# Patient Record
Sex: MALE | Race: OTHER | NOT HISPANIC OR LATINO | URBAN - METROPOLITAN AREA
[De-identification: names, ages, dates, MRNs, and addresses within clinical notes are randomized per-mention and may not be internally consistent; named-entity substitution may affect disease eponyms.]

---

## 2022-11-07 ENCOUNTER — INPATIENT (INPATIENT)
Facility: HOSPITAL | Age: 67
LOS: 32 days | Discharge: HOSPICE | DRG: 180 | End: 2022-12-10
Attending: HOSPITALIST | Admitting: INTERNAL MEDICINE
Payer: MEDICAID

## 2022-11-07 ENCOUNTER — RESULT REVIEW (OUTPATIENT)
Age: 67
End: 2022-11-07

## 2022-11-07 VITALS — OXYGEN SATURATION: 65 % | RESPIRATION RATE: 24 BRPM

## 2022-11-07 DIAGNOSIS — I21.3 ST ELEVATION (STEMI) MYOCARDIAL INFARCTION OF UNSPECIFIED SITE: ICD-10-CM

## 2022-11-07 LAB
ALBUMIN SERPL ELPH-MCNC: 3.7 G/DL — SIGNIFICANT CHANGE UP (ref 3.3–5.2)
ALP SERPL-CCNC: 146 U/L — HIGH (ref 40–120)
ALT FLD-CCNC: >645 U/L — HIGH
ANION GAP SERPL CALC-SCNC: 19 MMOL/L — HIGH (ref 5–17)
APTT BLD: 29.6 SEC — SIGNIFICANT CHANGE UP (ref 27.5–35.5)
AST SERPL-CCNC: 545 U/L — HIGH
B PERT IGG+IGM PNL SER: ABNORMAL
BASOPHILS # BLD AUTO: 0.02 K/UL — SIGNIFICANT CHANGE UP (ref 0–0.2)
BASOPHILS NFR BLD AUTO: 0.1 % — SIGNIFICANT CHANGE UP (ref 0–2)
BILIRUB SERPL-MCNC: 3.8 MG/DL — HIGH (ref 0.4–2)
BLD GP AB SCN SERPL QL: SIGNIFICANT CHANGE UP
BUN SERPL-MCNC: 87.6 MG/DL — HIGH (ref 8–20)
CALCIUM SERPL-MCNC: 10.2 MG/DL — SIGNIFICANT CHANGE UP (ref 8.4–10.5)
CHLORIDE SERPL-SCNC: 85 MMOL/L — LOW (ref 96–108)
CK MB CFR SERPL CALC: 13.1 NG/ML — HIGH (ref 0–6.7)
CK SERPL-CCNC: 812 U/L — HIGH (ref 30–200)
CO2 SERPL-SCNC: 20 MMOL/L — LOW (ref 22–29)
COLOR FLD: ABNORMAL
CREAT SERPL-MCNC: 1.37 MG/DL — HIGH (ref 0.5–1.3)
EGFR: 56 ML/MIN/1.73M2 — LOW
EOSINOPHIL # BLD AUTO: 0 K/UL — SIGNIFICANT CHANGE UP (ref 0–0.5)
EOSINOPHIL NFR BLD AUTO: 0 % — SIGNIFICANT CHANGE UP (ref 0–6)
FLUID INTAKE SUBSTANCE CLASS: SIGNIFICANT CHANGE UP
GAS PNL BLDA: SIGNIFICANT CHANGE UP
GLUCOSE SERPL-MCNC: 133 MG/DL — HIGH (ref 70–99)
HCT VFR BLD CALC: 39.6 % — SIGNIFICANT CHANGE UP (ref 39–50)
HGB BLD-MCNC: 14 G/DL — SIGNIFICANT CHANGE UP (ref 13–17)
IMM GRANULOCYTES NFR BLD AUTO: 0.7 % — SIGNIFICANT CHANGE UP (ref 0–0.9)
INR BLD: 1.71 RATIO — HIGH (ref 0.88–1.16)
LYMPHOCYTES # BLD AUTO: 0.51 K/UL — LOW (ref 1–3.3)
LYMPHOCYTES # BLD AUTO: 3.4 % — LOW (ref 13–44)
MCHC RBC-ENTMCNC: 29.7 PG — SIGNIFICANT CHANGE UP (ref 27–34)
MCHC RBC-ENTMCNC: 35.4 GM/DL — SIGNIFICANT CHANGE UP (ref 32–36)
MCV RBC AUTO: 84.1 FL — SIGNIFICANT CHANGE UP (ref 80–100)
MONOCYTES # BLD AUTO: 1.22 K/UL — HIGH (ref 0–0.9)
MONOCYTES NFR BLD AUTO: 8.1 % — SIGNIFICANT CHANGE UP (ref 2–14)
NEUTROPHILS # BLD AUTO: 13.27 K/UL — HIGH (ref 1.8–7.4)
NEUTROPHILS NFR BLD AUTO: 87.7 % — HIGH (ref 43–77)
PLATELET # BLD AUTO: 185 K/UL — SIGNIFICANT CHANGE UP (ref 150–400)
POTASSIUM SERPL-MCNC: 5.4 MMOL/L — HIGH (ref 3.5–5.3)
POTASSIUM SERPL-SCNC: 5.4 MMOL/L — HIGH (ref 3.5–5.3)
PROT SERPL-MCNC: 7.5 G/DL — SIGNIFICANT CHANGE UP (ref 6.6–8.7)
PROTHROM AB SERPL-ACNC: 19.9 SEC — HIGH (ref 10.5–13.4)
RBC # BLD: 4.71 M/UL — SIGNIFICANT CHANGE UP (ref 4.2–5.8)
RBC # FLD: 14.2 % — SIGNIFICANT CHANGE UP (ref 10.3–14.5)
RCV VOL RI: HIGH /UL (ref 0–0)
SARS-COV-2 RNA SPEC QL NAA+PROBE: SIGNIFICANT CHANGE UP
SODIUM SERPL-SCNC: 124 MMOL/L — LOW (ref 135–145)
TOTAL NUCLEATED CELL COUNT, BODY FLUID: 2730 /UL — SIGNIFICANT CHANGE UP
TROPONIN T SERPL-MCNC: 0.05 NG/ML — SIGNIFICANT CHANGE UP (ref 0–0.06)
TROPONIN T SERPL-MCNC: <0.01 NG/ML — SIGNIFICANT CHANGE UP (ref 0–0.06)
TUBE TYPE: SIGNIFICANT CHANGE UP
WBC # BLD: 15.12 K/UL — HIGH (ref 3.8–10.5)
WBC # FLD AUTO: 15.12 K/UL — HIGH (ref 3.8–10.5)

## 2022-11-07 PROCEDURE — 76705 ECHO EXAM OF ABDOMEN: CPT | Mod: 26

## 2022-11-07 PROCEDURE — 93306 TTE W/DOPPLER COMPLETE: CPT | Mod: 26

## 2022-11-07 PROCEDURE — 88305 TISSUE EXAM BY PATHOLOGIST: CPT | Mod: 26

## 2022-11-07 PROCEDURE — 88360 TUMOR IMMUNOHISTOCHEM/MANUAL: CPT | Mod: 26

## 2022-11-07 PROCEDURE — 93010 ELECTROCARDIOGRAM REPORT: CPT

## 2022-11-07 PROCEDURE — 88342 IMHCHEM/IMCYTCHM 1ST ANTB: CPT | Mod: 26,59

## 2022-11-07 PROCEDURE — 71045 X-RAY EXAM CHEST 1 VIEW: CPT | Mod: 26

## 2022-11-07 PROCEDURE — 88112 CYTOPATH CELL ENHANCE TECH: CPT | Mod: 26

## 2022-11-07 PROCEDURE — 99291 CRITICAL CARE FIRST HOUR: CPT

## 2022-11-07 PROCEDURE — 88341 IMHCHEM/IMCYTCHM EA ADD ANTB: CPT | Mod: 26,59

## 2022-11-07 RX ORDER — IPRATROPIUM/ALBUTEROL SULFATE 18-103MCG
3 AEROSOL WITH ADAPTER (GRAM) INHALATION EVERY 6 HOURS
Refills: 0 | Status: DISCONTINUED | OUTPATIENT
Start: 2022-11-07 | End: 2022-11-16

## 2022-11-07 RX ORDER — COLCHICINE 0.6 MG
0.6 TABLET ORAL ONCE
Refills: 0 | Status: COMPLETED | OUTPATIENT
Start: 2022-11-07 | End: 2022-11-07

## 2022-11-07 RX ORDER — INFLUENZA VIRUS VACCINE 15; 15; 15; 15 UG/.5ML; UG/.5ML; UG/.5ML; UG/.5ML
0.7 SUSPENSION INTRAMUSCULAR ONCE
Refills: 0 | Status: DISCONTINUED | OUTPATIENT
Start: 2022-11-07 | End: 2022-12-05

## 2022-11-07 RX ORDER — IPRATROPIUM/ALBUTEROL SULFATE 18-103MCG
3 AEROSOL WITH ADAPTER (GRAM) INHALATION EVERY 6 HOURS
Refills: 0 | Status: DISCONTINUED | OUTPATIENT
Start: 2022-11-07 | End: 2022-11-07

## 2022-11-07 RX ORDER — SODIUM CHLORIDE 9 MG/ML
1000 INJECTION INTRAMUSCULAR; INTRAVENOUS; SUBCUTANEOUS
Refills: 0 | Status: DISCONTINUED | OUTPATIENT
Start: 2022-11-07 | End: 2022-11-08

## 2022-11-07 RX ADMIN — Medication 0.6 MILLIGRAM(S): at 23:15

## 2022-11-07 NOTE — ED ADULT TRIAGE NOTE - CHIEF COMPLAINT QUOTE
Pt BIBA from home, c/o difficulty breathing x 1 week, EMS unable to obtain pulse ox on scene, hypotensive prior to ED arrival, everyday cigarette smoker, more than 1 pack/day

## 2022-11-07 NOTE — ED PROVIDER NOTE - CLINICAL SUMMARY MEDICAL DECISION MAKING FREE TEXT BOX
67M in hypoxic with ST elevations, code stemi called with cardiology at bedside.  Bedside US showed pericardial effusion, patient to OR, Admit.

## 2022-11-07 NOTE — ED PROVIDER NOTE - OBJECTIVE STATEMENT
67M, 50+ yr smoker never sees doctor or takes meds, presents with worsening SOB for 1 week.  Otherwise denies pain, bleeding,  chest pain, abdominal pain or fevers.  Denies other pertinent medical problems.  Denies any other substance use. 67 M smoker for decades, does not see doctor or take meds, presents with worsening SOB for 1 week.  Otherwise denies pain, bleeding,  chest pain, abdominal pain or fevers.  Denies other pertinent medical problems.  Denies any other substance use.

## 2022-11-07 NOTE — PATIENT PROFILE ADULT - FALL HARM RISK - HARM RISK INTERVENTIONS
Assistance with ambulation/Assistance OOB with selected safe patient handling equipment/Communicate Risk of Fall with Harm to all staff/Discuss with provider need for PT consult/Monitor gait and stability/Reinforce activity limits and safety measures with patient and family/Tailored Fall Risk Interventions/Visual Cue: Yellow wristband and red socks/Bed in lowest position, wheels locked, appropriate side rails in place/Call bell, personal items and telephone in reach/Instruct patient to call for assistance before getting out of bed or chair/Non-slip footwear when patient is out of bed/[place] to call system/Physically safe environment - no spills, clutter or unnecessary equipment/Purposeful Proactive Rounding/Room/bathroom lighting operational, light cord in reach

## 2022-11-07 NOTE — H&P ADULT - ASSESSMENT
68 y/o M with no known PMH (does not see doctors), active smoker, with:    # Large pericardial effusion s/p pericardiocentesis  # Left lung mass  # FILIBERTO  # Transaminitis    Admit to MICU.    - 800cc bloody fluid drained from pericardium, concern for malignant effusion given heavy smoking history and lung mass  - f/u pericardial fluid studies, culture, and cytology  - monitor pericardial drain output closely  - TTE in the morning  - monitor hemodynamics, maintain a MAP > 65  - FILIBERTO likely pre-renal, BUN/Cr ratio 63, continue IVF hydration  - trend BUN/Cr, electrolytes, acid-base balance, monitor UOP  - unclear etiology of transaminitis, possibly congestive hepatopathy due to pericardial effusion and RV dysfunction?  - check RUQ ultrasound and hepatitis panel  - TBili 3.8, trend LFTs, avoid hepatotoxic agents  - will eventually need CT chest to further evaluate lung mass  - start inhaled bronchodilators as he is currently bronchospastic, although moving air and oxygenating with nasal O2  - maintain SpO2 > 92%    Case discussed with MICU physician, Dr. Baron.

## 2022-11-07 NOTE — ED ADULT NURSE NOTE - CHIEF COMPLAINT
[address] [address]  [phone]                                                                                                           Yesika Cohen  [address] [place] [address]    November 21, 2017    Dear Yesika Cohen     Here are your cholesterol results:     Your LDL is: Lab Results        Component                Value               Date                        LDL                      184                 11/18/2017          Your LDL goal is to be less than 160   Your HDL is: Lab Results        Component                Value               Date                        HDL                      44                  11/18/2017           Goal HDL is Greater than 40 (for men) or 50 (for women).   Your Triglycerides are: Lab Results        Component                Value               Date                        TRIG                     70                  11/18/2017           Goal TRIGLYCERIDES are less than 150.     Glucose is in the impaired range, this means that you do not have diabetes but are at an increased risk of developing diabetes.     Here are some ways to improve your cholesterol and glucose without medication:     Try to get at least 45 minutes of aerobic exercise 5-6 days a week   Maintain a healthy body weight   Eat less saturated fats   Buy lean cuts of meat, reduce your portions of red meat or substitute poultry or fish   Avoid fried or fast foods that are high in fat   Eat more fruits and vegetables     We should recheck labs in 6 months and if still elevated then medication will need to be considered.   Please let me know if you have any questions and thank you for choosing [facility].     Regards,     Kathy Thompson MD MPH/smj    Results for orders placed or performed in visit on 11/18/17   Lipid panel reflex to direct LDL Fasting   Result Value Ref Range    Cholesterol 242 (H) <200 mg/dL    Triglycerides 70 <150  mg/dL    HDL Cholesterol 44 (L) >49 mg/dL    LDL Cholesterol Calculated 184 (H) <100 mg/dL    Non HDL Cholesterol 198 (H) <130 mg/dL   Glucose   Result Value Ref Range    Glucose 106 (H) 70 - 99 mg/dL           The patient is a 67y Male complaining of difficulty breathing.

## 2022-11-07 NOTE — H&P ADULT - HISTORY OF PRESENT ILLNESS
68 y/o M with no known PMH (does not see doctors), active smoker, presents to the ED with complaints of SOB. Initial ECG revealed diffuse ST-elevations and the patient was taken for emergent LHC where he was found to have nonobstructive CAD, however a large circumferential pericardial effusion was seen. Pericardiocentesis was performed with approx 800cc bloody fluid drained. Of note, there is a 3cm mass in his left upper lobe on CXR.

## 2022-11-07 NOTE — CONSULT NOTE ADULT - ASSESSMENT
68 y/o old male , active heavy smoker, no known medical history was having shortness of breath for past 1 week.   His friend got him to hospital as he was sob.   On arrival his EKG showed diffuse ST elevations, and bedside limited echo showed circumferential pericardial effusion.   Pulses were equal in both arms  Xray did not show widened mediastinum   He was taken emergently to cath lab for pericardialcenetesis and cardiac cath   Cath showed 90 % m LAD, no acute lesions.   Pericardial drain was placed, 420 ml + was drained.   Patient felt much better  Post EKG showed ST elevations in multiple EKG leads. AS we have coronary angiogram showing stable lesion in the LAD with OSVALDO 3 flow, RCA, and LCX normal, with  his presenting symptoms of Shortness of breath has been on going for a week,  patient and his friend informed us that he had a cold recently, he did not seek medical care, but kept on smoking, not eating, not drinking, not ambulating this  Diffuse ST elevation in multiple leads + pericardial effusion, the pericarditis is more likely the diagnosis than MI  PLAN:   - ICU level care   - C/w  him on non rebreather - wean off as tolerated as per ICU team   - C/w  with IV fluids  - Monitor kidney function , electrolytes,  - Give one dose of colchicine 0.6 mg.   - official echo in AM  - Maintain drain to gravity   - Grain checks for hematoma, bleeding etc..  - we will reassess in AM about the drain removal   - Trend trops q6 hours,   - Trend BMP q6 hours    call cardiology if any questions.

## 2022-11-07 NOTE — CONSULT NOTE ADULT - SUBJECTIVE AND OBJECTIVE BOX
March 18, 2022       Annabelle Lambert  [address]  [place] [address]    Dear Ms. Lambert,    Your procedure is scheduled with Annemarei Garcia MD on May 16, 2022, at [facility] [place].  Merle can expect to be contacted by the facility 3 to 5 days prior to the surgery to confirm arrival and surgery time. Occasionally these times may change.    The address of the facility is:      [place] [address]  [phone]    The following appointment(s) have been scheduled for you:     Preop exam and eye measurements with Dr Annemarie Garcia at the [facility] on   May 4, 2022 at 2:45 PM.    Pre-Procedure / Pre-Surgery COVID-19 Testing:  We have you scheduled for your COVID-19 Testing Visit on:  May 13, 2022 at 1:30 pm  for our ACL Lab site located at [facility]  ([address] [place] [address]).    If you need to reschedule this appointment, please call our office ASAP for assistance.    Please remember the following instructions for after your test and before surgery:  • Self-quarantine is recommended and minimizes your risk of exposure before your procedure. If you are unable to self-quarantine, please continue to wear a mask, practice social distancing and perform good hand hygiene.      • After your COVID-19 test and before your procedure, you should continue to monitor for signs/symptoms of COVID-19 and notify your provider and/or facility contact provided, if you experience any symptoms.                        1 day post op with Dr Annemarie Garcia at the [facility] on May 17, 2022 at 11:00 AM.    1 week post op with Dr Annemarie Garcia at the [facility] on May 24, 2022 at   11:00 AM.    Here are instructions for your surgery:     · Do not eat or drink after midnight the night before your surgery.    · You will need someone to drive you home and remain with you up to 24 hours after  you have been discharged.     · You are strongly advised to have someone stay with you the night of your surgery.  If you live alone, please make arrangements, if possible.     · The hospital recommends 1 pre op shower the night before your surgery with an antibacterial soap.     If you have any questions or need to reschedule, please contact me at the telephone number and extension listed below.     Thank you,    Ameena [phone]  Surgery Scheduler for Annemarie Garcia MD   [facility] Pre-Admit Department    Enclosures: [facility] Booklet       Sub:   patient feels better   SOB much better     REVIEW OF SYSTEMS:    CONSTITUTIONAL: No weakness, fevers or chills  EYES/ENT: No visual changes;  No vertigo or throat pain   NECK: No pain or stiffness  RESPIRATORY: No cough, wheezing, hemoptysis; No shortness of breath  CARDIOVASCULAR: No chest pain or palpitations  GASTROINTESTINAL: No abdominal or epigastric pain. No nausea, vomiting, or hematemesis; No diarrhea or constipation. No melena or hematochezia.  GENITOURINARY: No dysuria, frequency or hematuria  NEUROLOGICAL: No numbness or weakness  SKIN: No itching, burning, rashes, or lesions   All other review of systems is negative unless indicated above.    PHYSICAL EXAM:    Vital Signs Last 24 Hrs  T(C): 37 (07 Nov 2022 16:54), Max: 37 (07 Nov 2022 16:54)  T(F): 98.6 (07 Nov 2022 16:54), Max: 98.6 (07 Nov 2022 16:54)  HR: 103 (07 Nov 2022 17:35) (101 - 103)  BP: 105/74 (07 Nov 2022 17:35) (92/71 - 105/74)  BP(mean): --  RR: 24 (07 Nov 2022 17:35) (16 - 24)  SpO2: 98% (07 Nov 2022 17:35) (65% - 98%)    Parameters below as of 07 Nov 2022 17:35  Patient On (Oxygen Delivery Method): mask, nonrebreather  O2 Flow (L/min): 15      GENERAL: Pt lying comfortably, NAD.  ENMT: PERRL, +EOMI.  NECK: soft, Supple, No JVD,   CHEST/LUNG: Clear to auscultatation bilaterally; No wheezing.  HEART: S1S2+, Regular rate and rhythm; No murmurs. pericaridal drain in place   ABDOMEN: Soft, Nontender, Nondistended; Bowel sounds present.  MUSCULOSKELETAL: Normal range of motion.  SKIN: No rashes or lesions.  NEURO: AAOX3, no focal deficits, no motor r sensory loss.  PSYCH: normal mood.  Procedure site:   VASCULAR:   Radial +2 R/+2 L  Femoral +2 R/+2 L  PT +2 R/+2 L  DP +2 R/+2 L

## 2022-11-07 NOTE — ED PROVIDER NOTE - ATTENDING CONTRIBUTION TO CARE
I, Tea Price DO, have personally provided 60 minutes of critical care time exclusive of time spent on separately billable procedures. Time includes review of laboratory data, radiology results, discussion with consultants, and monitoring for potential decompensation. Interventions were performed as documented above.     I personally saw the patient with the resident, and completed the key components of the history and physical exam. I then discussed the management plan with the resident.    68 y/o M with COPD, heavy smoking history presents for 1 week of SOB, patient satting 74% on room air, denies chest pain or fevers. Patient in respiratory distress, satting well on NRB, on bedside sono with diffuse B lines, also on bedside echo with large pericardial effusion, EKG consistent with STEMI (inferior and lateral) - Dr. Melton at bedside, patient to cath lab for intervention.

## 2022-11-07 NOTE — ED PROVIDER NOTE - PHYSICAL EXAMINATION
General: NAD, cachectic appearing  HEENT: Normocephalic, atraumatic  Neck: No apparent stiffness or JVD  Pulm: Chest wall symmetric and nontender, lungs clear to ascultation   Cardiac: fast rate and regular rhythm  Abdomen: Nontender and nondistended  Skin: Skin is warm, dry and intact without rashes or lesions.  Neuro: No motor or sensory deficits    MSK: No deformity or tenderness

## 2022-11-07 NOTE — H&P ADULT - MENTAL STATUS
A&Ox3, answers questions appropriately and follows commands, moves all extremities, grossly nonfocal

## 2022-11-08 DIAGNOSIS — I50.20 UNSPECIFIED SYSTOLIC (CONGESTIVE) HEART FAILURE: ICD-10-CM

## 2022-11-08 DIAGNOSIS — I30.9 ACUTE PERICARDITIS, UNSPECIFIED: ICD-10-CM

## 2022-11-08 DIAGNOSIS — I82.90 ACUTE EMBOLISM AND THROMBOSIS OF UNSPECIFIED VEIN: ICD-10-CM

## 2022-11-08 LAB
A1C WITH ESTIMATED AVERAGE GLUCOSE RESULT: 6.1 % — HIGH (ref 4–5.6)
ALBUMIN FLD-MCNC: 2.7 G/DL — SIGNIFICANT CHANGE UP
ALBUMIN SERPL ELPH-MCNC: 3.1 G/DL — LOW (ref 3.3–5.2)
ALP SERPL-CCNC: 115 U/L — SIGNIFICANT CHANGE UP (ref 40–120)
ALT FLD-CCNC: 541 U/L — HIGH
ANION GAP SERPL CALC-SCNC: 13 MMOL/L — SIGNIFICANT CHANGE UP (ref 5–17)
APTT BLD: 79.1 SEC — HIGH (ref 27.5–35.5)
AST SERPL-CCNC: 367 U/L — HIGH
BASOPHILS # BLD AUTO: 0.01 K/UL — SIGNIFICANT CHANGE UP (ref 0–0.2)
BASOPHILS NFR BLD AUTO: 0.1 % — SIGNIFICANT CHANGE UP (ref 0–2)
BILIRUB DIRECT SERPL-MCNC: 1.8 MG/DL — HIGH (ref 0–0.3)
BILIRUB INDIRECT FLD-MCNC: 1.4 MG/DL — HIGH (ref 0.2–1)
BILIRUB SERPL-MCNC: 3.2 MG/DL — HIGH (ref 0.4–2)
BUN SERPL-MCNC: 64.7 MG/DL — HIGH (ref 8–20)
CALCIUM SERPL-MCNC: 9.1 MG/DL — SIGNIFICANT CHANGE UP (ref 8.4–10.5)
CHLORIDE SERPL-SCNC: 96 MMOL/L — SIGNIFICANT CHANGE UP (ref 96–108)
CHOLEST SERPL-MCNC: 152 MG/DL — SIGNIFICANT CHANGE UP
CK MB CFR SERPL CALC: 12.8 NG/ML — HIGH (ref 0–6.7)
CK SERPL-CCNC: 477 U/L — HIGH (ref 30–200)
CO2 SERPL-SCNC: 24 MMOL/L — SIGNIFICANT CHANGE UP (ref 22–29)
COMMENT - FLUIDS: SIGNIFICANT CHANGE UP
CREAT SERPL-MCNC: 0.86 MG/DL — SIGNIFICANT CHANGE UP (ref 0.5–1.3)
EGFR: 94 ML/MIN/1.73M2 — SIGNIFICANT CHANGE UP
EOSINOPHIL # BLD AUTO: 0 K/UL — SIGNIFICANT CHANGE UP (ref 0–0.5)
EOSINOPHIL NFR BLD AUTO: 0 % — SIGNIFICANT CHANGE UP (ref 0–6)
ESTIMATED AVERAGE GLUCOSE: 128 MG/DL — HIGH (ref 68–114)
FOLATE+VIT B12 SERBLD-IMP: 4 % — SIGNIFICANT CHANGE UP
GLUCOSE BLDC GLUCOMTR-MCNC: 119 MG/DL — HIGH (ref 70–99)
GLUCOSE FLD-MCNC: 27 MG/DL — SIGNIFICANT CHANGE UP
GLUCOSE SERPL-MCNC: 92 MG/DL — SIGNIFICANT CHANGE UP (ref 70–99)
GRAM STN FLD: SIGNIFICANT CHANGE UP
HAV IGM SER-ACNC: SIGNIFICANT CHANGE UP
HBV CORE IGM SER-ACNC: SIGNIFICANT CHANGE UP
HBV SURFACE AG SER-ACNC: SIGNIFICANT CHANGE UP
HCT VFR BLD CALC: 36.7 % — LOW (ref 39–50)
HCV AB S/CO SERPL IA: 0.1 S/CO — SIGNIFICANT CHANGE UP (ref 0–0.99)
HCV AB SERPL-IMP: SIGNIFICANT CHANGE UP
HDLC SERPL-MCNC: 22 MG/DL — LOW
HGB BLD-MCNC: 13 G/DL — SIGNIFICANT CHANGE UP (ref 13–17)
IMM GRANULOCYTES NFR BLD AUTO: 0.6 % — SIGNIFICANT CHANGE UP (ref 0–0.9)
INR BLD: 1.73 RATIO — HIGH (ref 0.88–1.16)
LDH SERPL L TO P-CCNC: 6191 U/L — SIGNIFICANT CHANGE UP
LIPID PNL WITH DIRECT LDL SERPL: 104 MG/DL — HIGH
LYMPHOCYTES # BLD AUTO: 0.25 K/UL — LOW (ref 1–3.3)
LYMPHOCYTES # BLD AUTO: 2.2 % — LOW (ref 13–44)
LYMPHOCYTES # FLD: 27 % — SIGNIFICANT CHANGE UP
MAGNESIUM SERPL-MCNC: 2.6 MG/DL — SIGNIFICANT CHANGE UP (ref 1.6–2.6)
MCHC RBC-ENTMCNC: 29.9 PG — SIGNIFICANT CHANGE UP (ref 27–34)
MCHC RBC-ENTMCNC: 35.4 GM/DL — SIGNIFICANT CHANGE UP (ref 32–36)
MCV RBC AUTO: 84.4 FL — SIGNIFICANT CHANGE UP (ref 80–100)
MESOTHL CELL # FLD: 7 % — SIGNIFICANT CHANGE UP
MONOCYTES # BLD AUTO: 0.72 K/UL — SIGNIFICANT CHANGE UP (ref 0–0.9)
MONOCYTES NFR BLD AUTO: 6.2 % — SIGNIFICANT CHANGE UP (ref 2–14)
MONOS+MACROS # FLD: 15 % — SIGNIFICANT CHANGE UP
MRSA PCR RESULT.: SIGNIFICANT CHANGE UP
NEUTROPHILS # BLD AUTO: 10.57 K/UL — HIGH (ref 1.8–7.4)
NEUTROPHILS NFR BLD AUTO: 90.9 % — HIGH (ref 43–77)
NEUTROPHILS-BODY FLUID: 47 % — SIGNIFICANT CHANGE UP
NIGHT BLUE STAIN TISS: SIGNIFICANT CHANGE UP
NON HDL CHOLESTEROL: 130 MG/DL — HIGH
PHOSPHATE SERPL-MCNC: 3.5 MG/DL — SIGNIFICANT CHANGE UP (ref 2.4–4.7)
PLATELET # BLD AUTO: 144 K/UL — LOW (ref 150–400)
POTASSIUM SERPL-MCNC: 4.5 MMOL/L — SIGNIFICANT CHANGE UP (ref 3.5–5.3)
POTASSIUM SERPL-SCNC: 4.5 MMOL/L — SIGNIFICANT CHANGE UP (ref 3.5–5.3)
PROT FLD-MCNC: 6.5 G/DL — SIGNIFICANT CHANGE UP
PROT SERPL-MCNC: 6.5 G/DL — LOW (ref 6.6–8.7)
PROTHROM AB SERPL-ACNC: 20.2 SEC — HIGH (ref 10.5–13.4)
RBC # BLD: 4.35 M/UL — SIGNIFICANT CHANGE UP (ref 4.2–5.8)
RBC # FLD: 14.3 % — SIGNIFICANT CHANGE UP (ref 10.3–14.5)
S AUREUS DNA NOSE QL NAA+PROBE: SIGNIFICANT CHANGE UP
SODIUM SERPL-SCNC: 133 MMOL/L — LOW (ref 135–145)
SPECIMEN SOURCE: SIGNIFICANT CHANGE UP
SPECIMEN SOURCE: SIGNIFICANT CHANGE UP
TRIGL SERPL-MCNC: 129 MG/DL — SIGNIFICANT CHANGE UP
TROPONIN T SERPL-MCNC: 0.11 NG/ML — HIGH (ref 0–0.06)
TROPONIN T SERPL-MCNC: 0.15 NG/ML — HIGH (ref 0–0.06)
TSH SERPL-MCNC: 0.94 UIU/ML — SIGNIFICANT CHANGE UP (ref 0.27–4.2)
WBC # BLD: 11.62 K/UL — HIGH (ref 3.8–10.5)
WBC # FLD AUTO: 11.62 K/UL — HIGH (ref 3.8–10.5)

## 2022-11-08 PROCEDURE — 99233 SBSQ HOSP IP/OBS HIGH 50: CPT

## 2022-11-08 PROCEDURE — 93970 EXTREMITY STUDY: CPT | Mod: 26

## 2022-11-08 PROCEDURE — 93010 ELECTROCARDIOGRAM REPORT: CPT

## 2022-11-08 PROCEDURE — 74177 CT ABD & PELVIS W/CONTRAST: CPT | Mod: 26

## 2022-11-08 PROCEDURE — 71275 CT ANGIOGRAPHY CHEST: CPT | Mod: 26

## 2022-11-08 PROCEDURE — 93308 TTE F-UP OR LMTD: CPT | Mod: 26

## 2022-11-08 PROCEDURE — 70491 CT SOFT TISSUE NECK W/DYE: CPT | Mod: 26

## 2022-11-08 PROCEDURE — 99254 IP/OBS CNSLTJ NEW/EST MOD 60: CPT | Mod: 57

## 2022-11-08 PROCEDURE — 76870 US EXAM SCROTUM: CPT | Mod: 26

## 2022-11-08 PROCEDURE — 99253 IP/OBS CNSLTJ NEW/EST LOW 45: CPT

## 2022-11-08 RX ORDER — HEPARIN SODIUM 5000 [USP'U]/ML
INJECTION INTRAVENOUS; SUBCUTANEOUS
Qty: 25000 | Refills: 0 | Status: DISCONTINUED | OUTPATIENT
Start: 2022-11-08 | End: 2022-11-08

## 2022-11-08 RX ORDER — HEPARIN SODIUM 5000 [USP'U]/ML
1500 INJECTION INTRAVENOUS; SUBCUTANEOUS EVERY 6 HOURS
Refills: 0 | Status: DISCONTINUED | OUTPATIENT
Start: 2022-11-08 | End: 2022-11-08

## 2022-11-08 RX ORDER — CHLORHEXIDINE GLUCONATE 213 G/1000ML
1 SOLUTION TOPICAL DAILY
Refills: 0 | Status: DISCONTINUED | OUTPATIENT
Start: 2022-11-08 | End: 2022-12-05

## 2022-11-08 RX ORDER — HEPARIN SODIUM 5000 [USP'U]/ML
3000 INJECTION INTRAVENOUS; SUBCUTANEOUS ONCE
Refills: 0 | Status: DISCONTINUED | OUTPATIENT
Start: 2022-11-08 | End: 2022-11-08

## 2022-11-08 RX ORDER — HEPARIN SODIUM 5000 [USP'U]/ML
1500 INJECTION INTRAVENOUS; SUBCUTANEOUS EVERY 6 HOURS
Refills: 0 | Status: DISCONTINUED | OUTPATIENT
Start: 2022-11-08 | End: 2022-11-15

## 2022-11-08 RX ORDER — POLYETHYLENE GLYCOL 3350 17 G/17G
17 POWDER, FOR SOLUTION ORAL DAILY
Refills: 0 | Status: DISCONTINUED | OUTPATIENT
Start: 2022-11-08 | End: 2022-11-11

## 2022-11-08 RX ORDER — HEPARIN SODIUM 5000 [USP'U]/ML
3000 INJECTION INTRAVENOUS; SUBCUTANEOUS EVERY 6 HOURS
Refills: 0 | Status: DISCONTINUED | OUTPATIENT
Start: 2022-11-08 | End: 2022-11-15

## 2022-11-08 RX ORDER — ATORVASTATIN CALCIUM 80 MG/1
80 TABLET, FILM COATED ORAL AT BEDTIME
Refills: 0 | Status: DISCONTINUED | OUTPATIENT
Start: 2022-11-08 | End: 2022-12-05

## 2022-11-08 RX ORDER — METOPROLOL TARTRATE 50 MG
12.5 TABLET ORAL
Refills: 0 | Status: DISCONTINUED | OUTPATIENT
Start: 2022-11-08 | End: 2022-11-08

## 2022-11-08 RX ORDER — HEPARIN SODIUM 5000 [USP'U]/ML
INJECTION INTRAVENOUS; SUBCUTANEOUS
Qty: 25000 | Refills: 0 | Status: DISCONTINUED | OUTPATIENT
Start: 2022-11-08 | End: 2022-11-15

## 2022-11-08 RX ORDER — ACETAMINOPHEN 500 MG
650 TABLET ORAL ONCE
Refills: 0 | Status: COMPLETED | OUTPATIENT
Start: 2022-11-08 | End: 2022-11-08

## 2022-11-08 RX ORDER — HEPARIN SODIUM 5000 [USP'U]/ML
3000 INJECTION INTRAVENOUS; SUBCUTANEOUS EVERY 6 HOURS
Refills: 0 | Status: DISCONTINUED | OUTPATIENT
Start: 2022-11-08 | End: 2022-11-08

## 2022-11-08 RX ORDER — METOPROLOL TARTRATE 50 MG
12.5 TABLET ORAL
Refills: 0 | Status: DISCONTINUED | OUTPATIENT
Start: 2022-11-08 | End: 2022-11-09

## 2022-11-08 RX ORDER — ASPIRIN/CALCIUM CARB/MAGNESIUM 324 MG
81 TABLET ORAL DAILY
Refills: 0 | Status: DISCONTINUED | OUTPATIENT
Start: 2022-11-08 | End: 2022-11-10

## 2022-11-08 RX ADMIN — ATORVASTATIN CALCIUM 80 MILLIGRAM(S): 80 TABLET, FILM COATED ORAL at 22:39

## 2022-11-08 RX ADMIN — Medication 81 MILLIGRAM(S): at 16:10

## 2022-11-08 RX ADMIN — Medication 650 MILLIGRAM(S): at 18:29

## 2022-11-08 RX ADMIN — Medication 3 MILLILITER(S): at 16:19

## 2022-11-08 RX ADMIN — HEPARIN SODIUM 700 UNIT(S)/HR: 5000 INJECTION INTRAVENOUS; SUBCUTANEOUS at 22:30

## 2022-11-08 RX ADMIN — Medication 3 MILLILITER(S): at 08:40

## 2022-11-08 RX ADMIN — CHLORHEXIDINE GLUCONATE 1 APPLICATION(S): 213 SOLUTION TOPICAL at 11:44

## 2022-11-08 RX ADMIN — HEPARIN SODIUM 700 UNIT(S)/HR: 5000 INJECTION INTRAVENOUS; SUBCUTANEOUS at 16:31

## 2022-11-08 RX ADMIN — POLYETHYLENE GLYCOL 3350 17 GRAM(S): 17 POWDER, FOR SOLUTION ORAL at 16:11

## 2022-11-08 RX ADMIN — Medication 3 MILLILITER(S): at 20:21

## 2022-11-08 NOTE — PROGRESS NOTE ADULT - ASSESSMENT
68 y/o M with no known PMH (does not see doctors), active smoker, with large pericardial effusion s/p pericardiocentesis, left lung mass, Acute Kidney Injury, and Transaminitis, admitted to MICU.    - 800cc bloody fluid drained from pericardium, concern for malignant effusion given heavy smoking history and lung mass  - f/u pericardial fluid studies, culture, and cytology  - monitor pericardial drain output closely  - TTE in the morning  - monitor hemodynamics, maintain a MAP > 65  - FILIBERTO likely pre-renal, BUN/Cr ratio 63, continue IVF hydration  - trend BUN/Cr, electrolytes, acid-base balance, monitor UOP  - unclear etiology of transaminitis, possibly congestive hepatopathy due to pericardial effusion and RV dysfunction?  - check RUQ ultrasound and hepatitis panel  - TBili 3.8, trend LFTs, avoid hepatotoxic agents  - will eventually need CT chest to further evaluate lung mass  - start inhaled bronchodilators as he is currently bronchospastic, although moving air and oxygenating with nasal O2  - maintain SpO2 > 92%     68 y/o M with no known PMH (does not see doctors), active smoker, with large pericardial effusion s/p pericardiocentesis, left lung mass, Acute Kidney Injury, and Transaminitis, admitted to MICU.    - 800cc bloody fluid drained from pericardium, concern for malignant effusion given heavy smoking history and lung mass  - f/u pericardial fluid studies, culture, and cytology  - monitor pericardial drain output closely  - TTE in the morning  - monitor hemodynamics, maintain a MAP > 65  - FILIBERTO likely pre-renal, BUN/Cr ratio 63, continue IVF hydration  - trend BUN/Cr, electrolytes, acid-base balance, monitor UOP  - unclear etiology of transaminitis, possibly congestive hepatopathy due to pericardial effusion and RV dysfunction?  - check RUQ ultrasound and hepatitis panel  - TBili 3.8, trend LFTs, avoid hepatotoxic agents  - will eventually need CT chest to further evaluate lung mass  - start inhaled bronchodilators as he is currently bronchospastic, although moving air and oxygenating with nasal O2  - maintain SpO2 > 92%    Patient was seen and examined at the bedside. I was physically present for the key portions of the evaluation and management (E/M) service provided. Agree with history, physical exam, assessment, and plan as outlined by MICHELLE note above, with the following additions and/or comments:    67M with no medical f/u, smoker who presented on 11/7/22 with SOB, found with diffuse ST-elevation, LHC performed with 90% stenosis of mLAD however felt not to be acute lesion and no PCI performed, found with pericardial effusion s/p drain placement. Will monitor serial TTE. Monitor rhythm on telemetry. F/u cytology from pericardial fluid. Monitor for worsening chest pain, SOB. Has L lung mass on CXR - CT chest ordered, concerning for possible malignancy given smoking history. Monitoring in ICU overnight     68 y/o M with no known PMH (does not see doctors), active smoker, with large pericardial effusion s/p pericardiocentesis, left lung mass, Acute Kidney Injury, and Transaminitis, admitted to MICU.    - 800cc bloody fluid drained from pericardium, concern for malignant effusion given heavy smoking history and lung mass.  Pericardial fluid showed clusters of large cells with nuclear atypia suspicious for malignancy.  Pericardial pigtail in place to gravity.  40 cc of drainage in the last 12 hours.  Interventional cardiology is following.  We will continue to monitor pericardial drain output closely.   - TTE in the morning  - monitor hemodynamics, maintain a MAP > 65  - FILIBERTO likely pre-renal, BUN/Cr ratio 63, continue IVF hydration  - trend BUN/Cr, electrolytes, acid-base balance, monitor UOP  - unclear etiology of transaminitis, possibly congestive hepatopathy due to pericardial effusion and RV dysfunction?  - check RUQ ultrasound and hepatitis panel  - TBili 3.8, trend LFTs, avoid hepatotoxic agents  - will eventually need CT chest to further evaluate lung mass  - start inhaled bronchodilators as he is currently bronchospastic, although moving air and oxygenating with nasal O2  - maintain SpO2 > 92%    Patient was seen and examined at the bedside. I was physically present for the key portions of the evaluation and management (E/M) service provided. Agree with history, physical exam, assessment, and plan as outlined by MICHELLE note above, with the following additions and/or comments:    67M with no medical f/u, smoker who presented on 11/7/22 with SOB, found with diffuse ST-elevation, LHC performed with 90% stenosis of mLAD however felt not to be acute lesion and no PCI performed, found with pericardial effusion s/p drain placement. Will monitor serial TTE. Monitor rhythm on telemetry. F/u cytology from pericardial fluid. Monitor for worsening chest pain, SOB. Has L lung mass on CXR - CT chest ordered, concerning for possible malignancy given smoking history. Monitoring in ICU overnight     66 y/o M with no known PMH (does not see doctors), active smoker, with large pericardial effusion s/p pericardiocentesis, left lung mass, Acute Kidney Injury, and Transaminitis, admitted to MICU.    - 800cc bloody fluid drained from pericardium, concern for malignant effusion given heavy smoking history and lung mass.  Pericardial fluid showed clusters of large cells with nuclear atypia suspicious for malignancy.  Pericardial pigtail in place to gravity.  40 cc of drainage in the last 12 hours.  Interventional cardiology is following.  We will continue to monitor pericardial drain output closely.   - Transthoracic echocardiogram showed left ventricular ejection fraction of 20 to 25% with severely decreased global left ventricular systolic function.  There were multiple left ventricular regional wall motion abnormalities present possibly representing stress induced cardiomyopathy/   Takotsubo cardiomyopathy.   - Continue to monitor hemodynamics closely to maintain MAP > 65 mm Hg  - FILIBERTO likely pre-renal, BUN/Cr ratio 63, continue IVF hydration  - trend BUN/Cr, electrolytes, acid-base balance, monitor UOP  - Transaminitis - Possibly related to acute liver shock from reduced Left Ventricle Ejection Fraction or congestive hepatopathy due to pericardial effusion and RV dysfunction.  RUQ ultrasound significant for distended gallbladder filled with sludge.  Continue to trend LFTs and avoid hepatotoxic agents  - CT scan   - start inhaled bronchodilators as he is currently bronchospastic, although moving air and oxygenating with nasal O2  - maintain SpO2 > 92%    Patient was seen and examined at the bedside. I was physically present for the key portions of the evaluation and management (E/M) service provided. Agree with history, physical exam, assessment, and plan as outlined by MICHELLE note above, with the following additions and/or comments:    67M with no medical f/u, smoker who presented on 11/7/22 with SOB, found with diffuse ST-elevation, LHC performed with 90% stenosis of mLAD however felt not to be acute lesion and no PCI performed, found with pericardial effusion s/p drain placement. Will monitor serial TTE. Monitor rhythm on telemetry. F/u cytology from pericardial fluid. Monitor for worsening chest pain, SOB. Has L lung mass on CXR - CT chest ordered, concerning for possible malignancy given smoking history. Monitoring in ICU overnight     66 y/o M with no known PMH (does not see doctors), active smoker, with large pericardial effusion s/p pericardiocentesis, left lung mass, Acute Kidney Injury, and Transaminitis, admitted to MICU.    - 800cc bloody fluid drained from pericardium, concern for malignant effusion given heavy smoking history and lung mass.  Pericardial fluid showed clusters of large cells with nuclear atypia suspicious for malignancy.  Pericardial pigtail in place to gravity.  40 cc of drainage in the last 12 hours.  Interventional cardiology is following.  We will continue to monitor pericardial drain output closely.   - Transthoracic echocardiogram showed left ventricular ejection fraction of 20 to 25% with severely decreased global left ventricular systolic function.  There were multiple left ventricular regional wall motion abnormalities present possibly representing stress induced cardiomyopathy/   Takotsubo cardiomyopathy.   - Continue to monitor hemodynamics closely to maintain MAP > 65 mm Hg  - FILIBERTO likely pre-renal, BUN/Cr ratio 63, continue IVF hydration  - trend BUN/Cr, electrolytes, acid-base balance, monitor UOP  - Transaminitis - Possibly related to acute liver shock from reduced Left Ventricle Ejection Fraction or congestive hepatopathy due to pericardial effusion and RV dysfunction.  RUQ ultrasound significant for distended gallbladder filled with sludge.  Continue to trend LFTs and avoid hepatotoxic agents  - CT abdomen/pelvis/chest pending  - Continue inhaled bronchodilators as he is currently bronchospastic, although moving air and oxygenating with nasal O2  - maintain SpO2 > 92%

## 2022-11-08 NOTE — CONSULT NOTE ADULT - ATTENDING COMMENTS
Patient seen and examined by me personally. Briefly this is a 67y year old Male with relevant history of tobacco abuse presented to the hospital with shortness of breath, found to have pericardial effusion, now s/p drainage with removal of 450 cc's of serosanguinous fluid. 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      Recommendations:   - Continue drain in place until output <25 cc per day  - Anticoagulation as above.         Burak Bedoya MD  Interventional and Structural Cardiology  [phone]
Patient seen and examined by me personally. Briefly this is a 67y year old Male with relevant history of tobacco abuse presented to the hospital with shortness of breath, found to have pericardial effusion, now s/p drainage with removal of 450 cc's of serosanguinous fluid. 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 AC started yesterday. Drainage over night ~ 10 cc.      Recommendations:   - discontinue drain once AC has run >24 hours.  - Anticoagulation for PE/IVC thrombi as above.    We will sign off. Please call with questions.        Burak Bedoya MD

## 2022-11-08 NOTE — CHART NOTE - NSCHARTNOTEFT_GEN_A_CORE
Left Groin soft, nontender, no hematoma, no bleed, DP 2.   Pericardial pigtail with drainage to gravity, 40ml blood effusion output/shift, TTE pending.

## 2022-11-08 NOTE — CONSULT NOTE ADULT - ASSESSMENT
68 y/o old male , active heavy smoker, no known medical history was having shortness of breath for past 1 week.   His friend got him to hospital as he was sob.   On arrival his EKG showed diffuse ST elevations, and bedside limited echo showed circumferential pericardial effusion.   s/p Drainge of 420 ml in cath lab + 40 cc over past 12 hours.   - trop downtrending- likely low flow state form the low cardiac output.   - c/ww ith drain to gravity   - follow up official echo  - Follow up labs for pericardiocentesis  - Rest of care as per primary team              68 y/o old male , active heavy smoker, no known medical history was having shortness of breath for past 1 week.   His friend got him to hospital as he was sob.   On arrival his EKG showed diffuse ST elevations, and bedside limited echo showed circumferential pericardial effusion.   s/p Drainge of 420 ml in cath lab + 40 cc over past 12 hours.   - trop downtrending- likely low flow state form the low cardiac output.   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 If there is no other contraindication for anticoagulation its ok to start anticoagulation from cardiology point of view.

## 2022-11-08 NOTE — PROGRESS NOTE ADULT - SUBJECTIVE AND OBJECTIVE BOX
Patient is a 67y old  Male who presents with a chief complaint of Pericardial effusion.      PAST MEDICAL & SURGICAL HISTORY:  No pertinent past medical history  No significant past surgical history    Allergies  No Known Allergies    Intolerances    Review of Systems:  CONSTITUTIONAL: No fever, chills, or fatigue  EYES: No eye pain, visual disturbances, or discharge  ENMT:  No difficulty hearing, tinnitus, vertigo; No sinus or throat pain  NECK: No pain or stiffness  RESPIRATORY: No cough, wheezing, chills or hemoptysis; No shortness of breath  CARDIOVASCULAR: No chest pain, palpitations, dizziness, or leg swelling  GASTROINTESTINAL: No abdominal or epigastric pain. No nausea, vomiting, or hematemesis; No diarrhea or constipation. No melena or hematochezia.  GENITOURINARY: No dysuria, frequency, hematuria, or incontinence  NEUROLOGICAL: No headaches, memory loss, loss of strength, numbness, or tremors  SKIN: No itching, burning, rashes, or lesions   MUSCULOSKELETAL: No joint pain or swelling; No muscle, back, or extremity pain  PSYCHIATRIC: No depression, anxiety, mood swings, or difficulty sleeping      Medications:    albuterol/ipratropium for Nebulization 3 milliLiter(s) Nebulizer every 6 hours  polyethylene glycol 3350 17 Gram(s) Oral daily  sodium chloride 0.9%. 1000 milliLiter(s) IV Continuous <Continuous>  influenza  Vaccine (HIGH DOSE) 0.7 milliLiter(s) IntraMuscular once  chlorhexidine 2% Cloths 1 Application(s) Topical daily      ICU Vital Signs Last 24 Hrs    T(C): 36.6 (08 Nov 2022 11:05), Max: 37 (07 Nov 2022 16:54)  T(F): 97.8 (08 Nov 2022 11:05), Max: 98.6 (07 Nov 2022 16:54)  HR: 94 (08 Nov 2022 13:00) (85 - 103)  BP: 93/80 (08 Nov 2022 13:00) (84/72 - 117/94)  BP(mean): 86 (08 Nov 2022 13:00) (76 - 102)  RR: 24 (08 Nov 2022 13:00) (16 - 26)  SpO2: 97% (08 Nov 2022 13:00) (65% - 100%)    O2 Parameters below as of 08 Nov 2022 12:00  Patient On (Oxygen Delivery Method): nasal cannula  O2 Flow (L/min): 2    ABG - ( 07 Nov 2022 18:32 )  pH, Arterial: 7.420 pH, Blood: x     /  pCO2: 34    /  pO2: 244   / HCO3: 22    / Base Excess: -2.4  /  SaO2: 99.9        I&O's Detail    07 Nov 2022 07:01  -  08 Nov 2022 07:00  --------------------------------------------------------  IN:    sodium chloride 0.9%: 600 mL  Total IN: 600 mL    OUT:    Drain (mL): 0 mL    Incontinent per Condom Catheter (mL): 750 mL  Total OUT: 750 mL    Total NET: -150 mL      08 Nov 2022 07:01  -  08 Nov 2022 13:35  --------------------------------------------------------  IN:    Oral Fluid: 120 mL    sodium chloride 0.9%: 300 mL  Total IN: 420 mL    OUT:    Drain (mL): 10 mL    Voided (mL): 365 mL  Total OUT: 375 mL    Total NET: 45 mL    LABS:                        13.0   11.62 )-----------( 144      ( 08 Nov 2022 04:43 )             36.7     11-08    133<L>  |  96  |  64.7<H>  ----------------------------<  92  4.5   |  24.0  |  0.86    Ca    9.1      08 Nov 2022 04:43  Phos  3.5     11-08  Mg     2.6     11-08    TPro  6.5<L>  /  Alb  3.1<L>  /  TBili  3.2<H>  /  DBili  1.8<H>  /  AST  367<H>  /  ALT  541<H>  /  AlkPhos  115  11-08      CARDIAC MARKERS ( 08 Nov 2022 10:50 )  x     / 0.11 ng/mL / 477 U/L / x     / 12.8 ng/mL  CARDIAC MARKERS ( 08 Nov 2022 04:43 )  x     / 0.15 ng/mL / x     / x     / x      CARDIAC MARKERS ( 07 Nov 2022 21:20 )  x     / 0.05 ng/mL / 812 U/L / x     / 13.1 ng/mL  CARDIAC MARKERS ( 07 Nov 2022 17:40 )  x     / <0.01 ng/mL / x     / x     / x          CAPILLARY BLOOD GLUCOSE    PT/INR - ( 07 Nov 2022 17:40 )   PT: 19.9 sec;   INR: 1.71 ratio    PTT - ( 07 Nov 2022 17:40 )  PTT:29.6 sec    CULTURES:  Culture Results:   Testing in progress (11-07 @ 19:00)      Physical Examination:  General: No acute distress.  Alert, oriented, interactive, nonfocal  HEENT: Pupils equal, reactive to light.  Symmetric.  PULM: Clear to auscultation bilaterally, no significant sputum production  CVS: Regular rate and rhythm, no murmurs, rubs, or gallops  ABD: Soft, nondistended, nontender, normoactive bowel sounds, no masses  EXT: No edema, nontender  SKIN: Warm and well perfused, no rashes noted.     Patient is a 67 year old active smoker who presented to the ED last evening with complaints of SOB. Initial ECG revealed diffuse ST-elevations and he was taken for emergent LHC where he was found to have nonobstructive CAD and a large circumferential pericardial effusion. Pericardiocentesis was performed and approximately 800 cc of bloody fluid was drained.  There was also noted to be a 3 cm mass in his left upper lobe on chest x-ray.  He has no known past medical history as he does not see doctors.    PAST MEDICAL & SURGICAL HISTORY:  No pertinent past medical history  No significant past surgical history    Allergies  No Known Allergies    Intolerances    Review of Systems:  CONSTITUTIONAL: No fever, chills, or fatigue  EYES: No eye pain, visual disturbances, or discharge  ENMT:  No difficulty hearing, tinnitus, vertigo; No sinus or throat pain  NECK: No pain or stiffness  RESPIRATORY: No cough, wheezing, chills or hemoptysis; No shortness of breath  CARDIOVASCULAR: No chest pain, palpitations, dizziness, or leg swelling  GASTROINTESTINAL: No abdominal or epigastric pain. No nausea, vomiting, or hematemesis; No diarrhea or constipation. No melena or hematochezia.  GENITOURINARY: No dysuria, frequency, hematuria, or incontinence  NEUROLOGICAL: No headaches, memory loss, loss of strength, numbness, or tremors  SKIN: No itching, burning, rashes, or lesions   MUSCULOSKELETAL: No joint pain or swelling; No muscle, back, or extremity pain  PSYCHIATRIC: No depression, anxiety, mood swings, or difficulty sleeping    MEDICATIONS  (STANDING):  albuterol/ipratropium for Nebulization 3 milliLiter(s) Nebulizer every 6 hours  chlorhexidine 2% Cloths 1 Application(s) Topical daily  influenza  Vaccine (HIGH DOSE) 0.7 milliLiter(s) IntraMuscular once  polyethylene glycol 3350 17 Gram(s) Oral daily  sodium chloride 0.9%. 1000 milliLiter(s) (50 mL/Hr) IV Continuous <Continuous>    MEDICATIONS  (PRN):      ICU Vital Signs Last 24 Hrs    T(C): 36.6 (08 Nov 2022 11:05), Max: 37 (07 Nov 2022 16:54)  T(F): 97.8 (08 Nov 2022 11:05), Max: 98.6 (07 Nov 2022 16:54)  HR: 94 (08 Nov 2022 13:00) (85 - 103)  BP: 93/80 (08 Nov 2022 13:00) (84/72 - 117/94)  BP(mean): 86 (08 Nov 2022 13:00) (76 - 102)  RR: 24 (08 Nov 2022 13:00) (16 - 26)  SpO2: 97% (08 Nov 2022 13:00) (65% - 100%)    O2 Parameters below as of 08 Nov 2022 12:00  Patient On (Oxygen Delivery Method): nasal cannula  O2 Flow (L/min): 2    ABG - ( 07 Nov 2022 18:32 )  pH, Arterial: 7.420 pH, Blood: x     /  pCO2: 34    /  pO2: 244   / HCO3: 22    / Base Excess: -2.4  /  SaO2: 99.9      I&O's Detail    07 Nov 2022 07:01  -  08 Nov 2022 07:00  --------------------------------------------------------  IN:    sodium chloride 0.9%: 600 mL  Total IN: 600 mL    OUT:    Drain (mL): 0 mL    Incontinent per Condom Catheter (mL): 750 mL  Total OUT: 750 mL    Total NET: -150 mL      08 Nov 2022 07:01  -  08 Nov 2022 13:35  --------------------------------------------------------  IN:    Oral Fluid: 120 mL    sodium chloride 0.9%: 300 mL  Total IN: 420 mL    OUT:    Drain (mL): 10 mL    Voided (mL): 365 mL  Total OUT: 375 mL    Total NET: 45 mL    LABS:                        13.0   11.62 )-----------( 144      ( 08 Nov 2022 04:43 )             36.7     11-08    133<L>  |  96  |  64.7<H>  ----------------------------<  92  4.5   |  24.0  |  0.86    Ca    9.1      08 Nov 2022 04:43  Phos  3.5     11-08  Mg     2.6     11-08    TPro  6.5<L>  /  Alb  3.1<L>  /  TBili  3.2<H>  /  DBili  1.8<H>  /  AST  367<H>  /  ALT  541<H>  /  AlkPhos  115  11-08      CARDIAC MARKERS ( 08 Nov 2022 10:50 )  x     / 0.11 ng/mL / 477 U/L / x     / 12.8 ng/mL  CARDIAC MARKERS ( 08 Nov 2022 04:43 )  x     / 0.15 ng/mL / x     / x     / x      CARDIAC MARKERS ( 07 Nov 2022 21:20 )  x     / 0.05 ng/mL / 812 U/L / x     / 13.1 ng/mL  CARDIAC MARKERS ( 07 Nov 2022 17:40 )  x     / <0.01 ng/mL / x     / x     / x          CAPILLARY BLOOD GLUCOSE    PT/INR - ( 07 Nov 2022 17:40 )   PT: 19.9 sec;   INR: 1.71 ratio    PTT - ( 07 Nov 2022 17:40 )  PTT:29.6 sec    CULTURES:  Culture Results:   Testing in progress (11-07 @ 19:00)      Physical Examination:  General: No acute distress.  Alert, oriented, interactive, nonfocal  HEENT: Pupils equal, reactive to light.  Symmetric.  PULM: Clear to auscultation bilaterally, no significant sputum production  CVS: Regular rate and rhythm, no murmurs, rubs, or gallops  ABD: Soft, nondistended, nontender, normoactive bowel sounds, no masses  EXT: No edema, nontender  SKIN: Warm and well perfused, no rashes noted.     Patient is a 67 year old active smoker who presented to the ED last evening with complaints of SOB. Initial ECG revealed diffuse ST-elevations and he was taken for emergent LHC where he was found to have nonobstructive CAD and a large circumferential pericardial effusion. Pericardiocentesis was performed and approximately 800 cc of bloody fluid was drained.  There was also noted to be a 3 cm mass in his left upper lobe on chest x-ray.  He has no known past medical history as he does not see doctors.    PAST MEDICAL & SURGICAL HISTORY:  No pertinent past medical history  No significant past surgical history    Allergies  No Known Allergies    Intolerances    Review of Systems:  CONSTITUTIONAL: No fever, chills, or fatigue  EYES: No eye pain, visual disturbances, or discharge  ENMT:  No difficulty hearing, tinnitus, vertigo; No sinus or throat pain  NECK: No pain or stiffness  RESPIRATORY: No cough, wheezing, chills or hemoptysis; No shortness of breath  CARDIOVASCULAR: No chest pain, palpitations, dizziness, or leg swelling  GASTROINTESTINAL: No abdominal or epigastric pain. No nausea, vomiting, or hematemesis; No diarrhea or constipation. No melena or hematochezia.  GENITOURINARY: No dysuria, frequency, hematuria, or incontinence  NEUROLOGICAL: No headaches, memory loss, loss of strength, numbness, or tremors  SKIN: No itching, burning, rashes, or lesions   MUSCULOSKELETAL: No joint pain or swelling; No muscle, back, or extremity pain  PSYCHIATRIC: No depression, anxiety, mood swings, or difficulty sleeping    MEDICATIONS  (STANDING):  albuterol/ipratropium for Nebulization 3 milliLiter(s) Nebulizer every 6 hours  chlorhexidine 2% Cloths 1 Application(s) Topical daily  influenza  Vaccine (HIGH DOSE) 0.7 milliLiter(s) IntraMuscular once  polyethylene glycol 3350 17 Gram(s) Oral daily  sodium chloride 0.9%. 1000 milliLiter(s) (50 mL/Hr) IV Continuous <Continuous>    MEDICATIONS  (PRN):      ICU Vital Signs Last 24 Hrs    T(C): 36.6 (08 Nov 2022 11:05), Max: 37 (07 Nov 2022 16:54)  T(F): 97.8 (08 Nov 2022 11:05), Max: 98.6 (07 Nov 2022 16:54)  HR: 94 (08 Nov 2022 13:00) (85 - 103)  BP: 93/80 (08 Nov 2022 13:00) (84/72 - 117/94)  BP(mean): 86 (08 Nov 2022 13:00) (76 - 102)  RR: 24 (08 Nov 2022 13:00) (16 - 26)  SpO2: 97% (08 Nov 2022 13:00) (65% - 100%)    O2 Parameters below as of 08 Nov 2022 12:00  Patient On (Oxygen Delivery Method): nasal cannula  O2 Flow (L/min): 2    ABG - ( 07 Nov 2022 18:32 )  pH, Arterial: 7.420 pH, Blood: x     /  pCO2: 34    /  pO2: 244   / HCO3: 22    / Base Excess: -2.4  /  SaO2: 99.9      I&O's Detail    07 Nov 2022 07:01  -  08 Nov 2022 07:00  --------------------------------------------------------  IN:    sodium chloride 0.9%: 600 mL  Total IN: 600 mL    OUT:    Drain (mL): 0 mL    Incontinent per Condom Catheter (mL): 750 mL  Total OUT: 750 mL    Total NET: -150 mL      08 Nov 2022 07:01  -  08 Nov 2022 13:35  --------------------------------------------------------  IN:    Oral Fluid: 120 mL    sodium chloride 0.9%: 300 mL  Total IN: 420 mL    OUT:    Drain (mL): 10 mL    Voided (mL): 365 mL  Total OUT: 375 mL    Total NET: 45 mL    LABS:                        13.0   11.62 )-----------( 144      ( 08 Nov 2022 04:43 )             36.7     11-08    133<L>  |  96  |  64.7<H>  ----------------------------<  92  4.5   |  24.0  |  0.86    Ca    9.1      08 Nov 2022 04:43  Phos  3.5     11-08  Mg     2.6     11-08    TPro  6.5<L>  /  Alb  3.1<L>  /  TBili  3.2<H>  /  DBili  1.8<H>  /  AST  367<H>  /  ALT  541<H>  /  AlkPhos  115  11-08      CARDIAC MARKERS ( 08 Nov 2022 10:50 )  x     / 0.11 ng/mL / 477 U/L / x     / 12.8 ng/mL  CARDIAC MARKERS ( 08 Nov 2022 04:43 )  x     / 0.15 ng/mL / x     / x     / x      CARDIAC MARKERS ( 07 Nov 2022 21:20 )  x     / 0.05 ng/mL / 812 U/L / x     / 13.1 ng/mL  CARDIAC MARKERS ( 07 Nov 2022 17:40 )  x     / <0.01 ng/mL / x     / x     / x          CAPILLARY BLOOD GLUCOSE    PT/INR - ( 07 Nov 2022 17:40 )   PT: 19.9 sec;   INR: 1.71 ratio    PTT - ( 07 Nov 2022 17:40 )  PTT:29.6 sec    CULTURES:  Culture Results:   Testing in progress (11-07 @ 19:00)    · Constitutional	no distress; cachectic  · Constitutional Comments	laying in bed, a bit difficult to understand  	  	    Respiratory	no respiratory distress  · Respiratory Comments	bilateral expiratory wheezing (L>>R)  · Cardiovascular	regular rate and rhythm; S1 S2 present; no murmur  · Cardiovascular Comments	subxiphoid pericardial drain in place  · Gastrointestinal	soft; nontender; nondistended; normal active bowel sounds; no guarding; no rigidity  · Mental Status	A&Ox3, answers questions appropriately and follows commands, moves all extremities, grossly nonfocal       Patient is a 67 year old active smoker who presented to the ED last evening with complaints of SOB. Initial ECG revealed diffuse ST-elevations and he was taken for emergent LHC where he was found to have nonobstructive CAD and a large circumferential pericardial effusion. Pericardiocentesis was performed and approximately 800 cc of bloody fluid was drained.  There was also noted to be a 3 cm mass in his left upper lobe on chest x-ray.  He has no known past medical history as he does not see doctors.    PAST MEDICAL & SURGICAL HISTORY:  No pertinent past medical history  No significant past surgical history    Allergies  No Known Allergies    Intolerances    Review of Systems:  CONSTITUTIONAL: No fever, chills, or fatigue  EYES: No eye pain, visual disturbances, or discharge  ENMT:  No difficulty hearing, tinnitus, vertigo; No sinus or throat pain  NECK: No pain or stiffness  RESPIRATORY: No cough, wheezing, chills or hemoptysis; No shortness of breath  CARDIOVASCULAR: No chest pain, palpitations, dizziness, or leg swelling  GASTROINTESTINAL: No abdominal or epigastric pain. No nausea, vomiting, or hematemesis; No diarrhea or constipation. No melena or hematochezia.  GENITOURINARY: No dysuria, frequency, hematuria, or incontinence  NEUROLOGICAL: No headaches, memory loss, loss of strength, numbness, or tremors  SKIN: No itching, burning, rashes, or lesions   MUSCULOSKELETAL: No joint pain or swelling; No muscle, back, or extremity pain  PSYCHIATRIC: No depression, anxiety, mood swings, or difficulty sleeping    MEDICATIONS  (STANDING):  albuterol/ipratropium for Nebulization 3 milliLiter(s) Nebulizer every 6 hours  chlorhexidine 2% Cloths 1 Application(s) Topical daily  influenza  Vaccine (HIGH DOSE) 0.7 milliLiter(s) IntraMuscular once  polyethylene glycol 3350 17 Gram(s) Oral daily  sodium chloride 0.9%. 1000 milliLiter(s) (50 mL/Hr) IV Continuous <Continuous>    MEDICATIONS  (PRN):    ICU Vital Signs Last 24 Hrs    T(C): 36.6 (08 Nov 2022 11:05), Max: 37 (07 Nov 2022 16:54)  T(F): 97.8 (08 Nov 2022 11:05), Max: 98.6 (07 Nov 2022 16:54)  HR: 94 (08 Nov 2022 13:00) (85 - 103)  BP: 93/80 (08 Nov 2022 13:00) (84/72 - 117/94)  BP(mean): 86 (08 Nov 2022 13:00) (76 - 102)  RR: 24 (08 Nov 2022 13:00) (16 - 26)  SpO2: 97% (08 Nov 2022 13:00) (65% - 100%)    O2 Parameters below as of 08 Nov 2022 12:00  Patient On (Oxygen Delivery Method): nasal cannula  O2 Flow (L/min): 2    ABG - ( 07 Nov 2022 18:32 )  pH, Arterial: 7.420 pH, Blood: x     /  pCO2: 34    /  pO2: 244   / HCO3: 22    / Base Excess: -2.4  /  SaO2: 99.9      I&O's Detail    07 Nov 2022 07:01  -  08 Nov 2022 07:00  --------------------------------------------------------  IN:    sodium chloride 0.9%: 600 mL  Total IN: 600 mL    OUT:    Drain (mL): 0 mL    Incontinent per Condom Catheter (mL): 750 mL  Total OUT: 750 mL    Total NET: -150 mL      08 Nov 2022 07:01  -  08 Nov 2022 13:35  --------------------------------------------------------  IN:    Oral Fluid: 120 mL    sodium chloride 0.9%: 300 mL  Total IN: 420 mL    OUT:    Drain (mL): 10 mL    Voided (mL): 365 mL  Total OUT: 375 mL    Total NET: 45 mL    LABS:                        13.0   11.62 )-----------( 144      ( 08 Nov 2022 04:43 )             36.7     11-08    133<L>  |  96  |  64.7<H>  ----------------------------<  92  4.5   |  24.0  |  0.86    Ca    9.1      08 Nov 2022 04:43  Phos  3.5     11-08  Mg     2.6     11-08    TPro  6.5<L>  /  Alb  3.1<L>  /  TBili  3.2<H>  /  DBili  1.8<H>  /  AST  367<H>  /  ALT  541<H>  /  AlkPhos  115  11-08      CARDIAC MARKERS ( 08 Nov 2022 10:50 )  x     / 0.11 ng/mL / 477 U/L / x     / 12.8 ng/mL  CARDIAC MARKERS ( 08 Nov 2022 04:43 )  x     / 0.15 ng/mL / x     / x     / x      CARDIAC MARKERS ( 07 Nov 2022 21:20 )  x     / 0.05 ng/mL / 812 U/L / x     / 13.1 ng/mL  CARDIAC MARKERS ( 07 Nov 2022 17:40 )  x     / <0.01 ng/mL / x     / x     / x          CAPILLARY BLOOD GLUCOSE    PT/INR - ( 07 Nov 2022 17:40 )   PT: 19.9 sec;   INR: 1.71 ratio    PTT - ( 07 Nov 2022 17:40 )  PTT:29.6 sec    CULTURES:  Culture Results:   Testing in progress (11-07 @ 19:00)    · Constitutional	no distress; cachectic  · Constitutional Comments	laying in bed, a bit difficult to understand  	  	    Respiratory	no respiratory distress  · Respiratory Comments	bilateral expiratory wheezing (L>>R)  · Cardiovascular	regular rate and rhythm; S1 S2 present; no murmur  · Cardiovascular Comments	subxiphoid pericardial drain in place  · Gastrointestinal	soft; nontender; nondistended; normal active bowel sounds; no guarding; no rigidity  · Mental Status	A&Ox3, answers questions appropriately and follows commands, moves all extremities, grossly nonfocal      Pericardial fluid, Pericardiocentesis:  Clusters of large cells with nuclear atypia suspicious for malignancy. Recommend cytopathology evaluation.   Patient is a 67 year old active smoker who presented to the ED last evening with complaints of SOB. Initial ECG revealed diffuse ST-elevations and he was taken for emergent LHC where he was found to have nonobstructive CAD and a large circumferential pericardial effusion. Pericardiocentesis was performed and approximately 800 cc of bloody fluid was drained.  There was also noted to be a 3 cm mass in his left upper lobe on chest x-ray.  He has no known past medical history as he does not see doctors.    PAST MEDICAL & SURGICAL HISTORY:  No pertinent past medical history  No significant past surgical history    Allergies  No Known Allergies    Intolerances    Review of Systems:  CONSTITUTIONAL: No fever, chills, or fatigue  EYES: No eye pain, visual disturbances, or discharge  ENMT:  No difficulty hearing, tinnitus, vertigo; No sinus or throat pain  NECK: No pain or stiffness  RESPIRATORY: No cough, wheezing, chills or hemoptysis; No shortness of breath  CARDIOVASCULAR: No chest pain, palpitations, dizziness, or leg swelling  GASTROINTESTINAL: No abdominal or epigastric pain. No nausea, vomiting, or hematemesis; No diarrhea or constipation. No melena or hematochezia.  GENITOURINARY: No dysuria, frequency, hematuria, or incontinence  NEUROLOGICAL: No headaches, memory loss, loss of strength, numbness, or tremors  SKIN: No itching, burning, rashes, or lesions   MUSCULOSKELETAL: No joint pain or swelling; No muscle, back, or extremity pain  PSYCHIATRIC: No depression, anxiety, mood swings, or difficulty sleeping    MEDICATIONS  (STANDING):  albuterol/ipratropium for Nebulization 3 milliLiter(s) Nebulizer every 6 hours  chlorhexidine 2% Cloths 1 Application(s) Topical daily  influenza  Vaccine (HIGH DOSE) 0.7 milliLiter(s) IntraMuscular once  polyethylene glycol 3350 17 Gram(s) Oral daily  sodium chloride 0.9%. 1000 milliLiter(s) (50 mL/Hr) IV Continuous <Continuous>    MEDICATIONS  (PRN):    ICU Vital Signs Last 24 Hrs    T(C): 36.6 (08 Nov 2022 11:05), Max: 37 (07 Nov 2022 16:54)  T(F): 97.8 (08 Nov 2022 11:05), Max: 98.6 (07 Nov 2022 16:54)  HR: 94 (08 Nov 2022 13:00) (85 - 103)  BP: 93/80 (08 Nov 2022 13:00) (84/72 - 117/94)  BP(mean): 86 (08 Nov 2022 13:00) (76 - 102)  RR: 24 (08 Nov 2022 13:00) (16 - 26)  SpO2: 97% (08 Nov 2022 13:00) (65% - 100%)    O2 Parameters below as of 08 Nov 2022 12:00  Patient On (Oxygen Delivery Method): nasal cannula  O2 Flow (L/min): 2    ABG - ( 07 Nov 2022 18:32 )  pH, Arterial: 7.420 pH, Blood: x     /  pCO2: 34    /  pO2: 244   / HCO3: 22    / Base Excess: -2.4  /  SaO2: 99.9      I&O's Detail    07 Nov 2022 07:01  -  08 Nov 2022 07:00  --------------------------------------------------------  IN:    sodium chloride 0.9%: 600 mL  Total IN: 600 mL    OUT:    Drain (mL): 0 mL    Incontinent per Condom Catheter (mL): 750 mL  Total OUT: 750 mL    Total NET: -150 mL      08 Nov 2022 07:01  -  08 Nov 2022 13:35  --------------------------------------------------------  IN:    Oral Fluid: 120 mL    sodium chloride 0.9%: 300 mL  Total IN: 420 mL    OUT:    Drain (mL): 10 mL    Voided (mL): 365 mL  Total OUT: 375 mL    Total NET: 45 mL    LABS:                        13.0   11.62 )-----------( 144      ( 08 Nov 2022 04:43 )             36.7     11-08    133<L>  |  96  |  64.7<H>  ----------------------------<  92  4.5   |  24.0  |  0.86    Ca    9.1      08 Nov 2022 04:43  Phos  3.5     11-08  Mg     2.6     11-08    TPro  6.5<L>  /  Alb  3.1<L>  /  TBili  3.2<H>  /  DBili  1.8<H>  /  AST  367<H>  /  ALT  541<H>  /  AlkPhos  115  11-08      CARDIAC MARKERS ( 08 Nov 2022 10:50 )  x     / 0.11 ng/mL / 477 U/L / x     / 12.8 ng/mL  CARDIAC MARKERS ( 08 Nov 2022 04:43 )  x     / 0.15 ng/mL / x     / x     / x      CARDIAC MARKERS ( 07 Nov 2022 21:20 )  x     / 0.05 ng/mL / 812 U/L / x     / 13.1 ng/mL  CARDIAC MARKERS ( 07 Nov 2022 17:40 )  x     / <0.01 ng/mL / x     / x     / x          CAPILLARY BLOOD GLUCOSE    PT/INR - ( 07 Nov 2022 17:40 )   PT: 19.9 sec;   INR: 1.71 ratio    PTT - ( 07 Nov 2022 17:40 )  PTT:29.6 sec    CULTURES:  Culture Results:   Testing in progress (11-07 @ 19:00)    · Constitutional	no distress; cachectic  · Constitutional Comments	laying in bed, a bit difficult to understand  	  	    Respiratory	no respiratory distress  · Respiratory Comments	bilateral expiratory wheezing (L>>R)  · Cardiovascular	regular rate and rhythm; S1 S2 present; no murmur  · Cardiovascular Comments	subxiphoid pericardial drain in place  · Gastrointestinal	soft; nontender; nondistended; normal active bowel sounds; no guarding; no rigidity  · Mental Status	A&Ox3, answers questions appropriately and follows commands, moves all extremities, grossly nonfocal    Right Upper Quadrant Ultrasound    Nonocclusive thrombus in the IVC.  Distended gallbladder filled with sludge.  Small amount of ascites and right pleural effusion; internal echoes in   the ascites, possibly hemorrhagic content.  A CT abdomen/pelvis with intravenous contrast is recommended for further   evaluation when feasible.  The critical findings were discussed with Dr. Boland with read back   confirmation at 8:53 AM on 11/8/2022.    Pericardial fluid, Pericardiocentesis:  Clusters of large cells with nuclear atypia suspicious for malignancy. Recommend cytopathology evaluation.   Patient is a 67 year old active smoker who presented to the ED last evening with complaints of SOB. Initial ECG revealed diffuse ST-elevations and he was taken for emergent LHC where he was found to have nonobstructive CAD and a large circumferential pericardial effusion. Pericardiocentesis was performed and approximately 800 cc of bloody fluid was drained.  There was also noted to be a 3 cm mass in his left upper lobe on chest x-ray.  He has no known past medical history as he does not see doctors.    PAST MEDICAL & SURGICAL HISTORY:  No pertinent past medical history  No significant past surgical history    Allergies  No Known Allergies    Intolerances    Review of Systems:  CONSTITUTIONAL: No fever, chills, or fatigue  EYES: No eye pain, visual disturbances, or discharge  ENMT:  No difficulty hearing, tinnitus, vertigo; No sinus or throat pain  NECK: No pain or stiffness  RESPIRATORY: No cough, wheezing, chills or hemoptysis; No shortness of breath  CARDIOVASCULAR: No chest pain, palpitations, dizziness, or leg swelling  GASTROINTESTINAL: No abdominal or epigastric pain. No nausea, vomiting, or hematemesis; No diarrhea or constipation. No melena or hematochezia.  GENITOURINARY: No dysuria, frequency, hematuria, or incontinence  NEUROLOGICAL: No headaches, memory loss, loss of strength, numbness, or tremors  SKIN: No itching, burning, rashes, or lesions   MUSCULOSKELETAL: No joint pain or swelling; No muscle, back, or extremity pain  PSYCHIATRIC: No depression, anxiety, mood swings, or difficulty sleeping    MEDICATIONS  (STANDING):  albuterol/ipratropium for Nebulization 3 milliLiter(s) Nebulizer every 6 hours  chlorhexidine 2% Cloths 1 Application(s) Topical daily  influenza  Vaccine (HIGH DOSE) 0.7 milliLiter(s) IntraMuscular once  polyethylene glycol 3350 17 Gram(s) Oral daily  sodium chloride 0.9%. 1000 milliLiter(s) (50 mL/Hr) IV Continuous <Continuous>    MEDICATIONS  (PRN):    ICU Vital Signs Last 24 Hrs    T(C): 36.6 (08 Nov 2022 11:05), Max: 37 (07 Nov 2022 16:54)  T(F): 97.8 (08 Nov 2022 11:05), Max: 98.6 (07 Nov 2022 16:54)  HR: 94 (08 Nov 2022 13:00) (85 - 103)  BP: 93/80 (08 Nov 2022 13:00) (84/72 - 117/94)  BP(mean): 86 (08 Nov 2022 13:00) (76 - 102)  RR: 24 (08 Nov 2022 13:00) (16 - 26)  SpO2: 97% (08 Nov 2022 13:00) (65% - 100%)    O2 Parameters below as of 08 Nov 2022 12:00  Patient On (Oxygen Delivery Method): nasal cannula  O2 Flow (L/min): 2    ABG - ( 07 Nov 2022 18:32 )  pH, Arterial: 7.420 pH, Blood: x     /  pCO2: 34    /  pO2: 244   / HCO3: 22    / Base Excess: -2.4  /  SaO2: 99.9      I&O's Detail    07 Nov 2022 07:01  -  08 Nov 2022 07:00  --------------------------------------------------------  IN:    sodium chloride 0.9%: 600 mL  Total IN: 600 mL    OUT:    Drain (mL): 0 mL    Incontinent per Condom Catheter (mL): 750 mL  Total OUT: 750 mL    Total NET: -150 mL      08 Nov 2022 07:01  -  08 Nov 2022 13:35  --------------------------------------------------------  IN:    Oral Fluid: 120 mL    sodium chloride 0.9%: 300 mL  Total IN: 420 mL    OUT:    Drain (mL): 10 mL    Voided (mL): 365 mL  Total OUT: 375 mL    Total NET: 45 mL    LABS:                        13.0   11.62 )-----------( 144      ( 08 Nov 2022 04:43 )             36.7     11-08    133<L>  |  96  |  64.7<H>  ----------------------------<  92  4.5   |  24.0  |  0.86    Ca    9.1      08 Nov 2022 04:43  Phos  3.5     11-08  Mg     2.6     11-08    TPro  6.5<L>  /  Alb  3.1<L>  /  TBili  3.2<H>  /  DBili  1.8<H>  /  AST  367<H>  /  ALT  541<H>  /  AlkPhos  115  11-08      CARDIAC MARKERS ( 08 Nov 2022 10:50 )  x     / 0.11 ng/mL / 477 U/L / x     / 12.8 ng/mL  CARDIAC MARKERS ( 08 Nov 2022 04:43 )  x     / 0.15 ng/mL / x     / x     / x      CARDIAC MARKERS ( 07 Nov 2022 21:20 )  x     / 0.05 ng/mL / 812 U/L / x     / 13.1 ng/mL  CARDIAC MARKERS ( 07 Nov 2022 17:40 )  x     / <0.01 ng/mL / x     / x     / x          CAPILLARY BLOOD GLUCOSE    PT/INR - ( 07 Nov 2022 17:40 )   PT: 19.9 sec;   INR: 1.71 ratio    PTT - ( 07 Nov 2022 17:40 )  PTT:29.6 sec    CULTURES:  Culture Results:   Testing in progress (11-07 @ 19:00)    · Constitutional	no distress; cachectic  · Constitutional Comments	laying in bed, a bit difficult to understand  	  	    Respiratory	no respiratory distress  · Respiratory Comments	bilateral expiratory wheezing (L>>R)  · Cardiovascular	regular rate and rhythm; S1 S2 present; no murmur  · Cardiovascular Comments	subxiphoid pericardial drain in place  · Gastrointestinal	soft; nontender; nondistended; normal active bowel sounds; no guarding; no rigidity  · Mental Status	A&Ox3, answers questions appropriately and follows commands, moves all extremities, grossly nonfocal    Right Upper Quadrant Ultrasound    Nonocclusive thrombus in the IVC.  Distended gallbladder filled with sludge.  Small amount of ascites and right pleural effusion; internal echoes in   the ascites, possibly hemorrhagic content.  A CT abdomen/pelvis with intravenous contrast is recommended for further   evaluation when feasible.  The critical findings were discussed with Dr. Boland with read back   confirmation at 8:53 AM on 11/8/2022.    Trans Thoracic Echocardiogram   1. Technically adequate study.   2. Left ventricular ejection fraction, by visual estimation, is 20 to   25%.   3. Severely decreased global left ventricular systolic function.   4. Multiple left ventricular regional wall motion abnormalities exist,   this is possibly consistent with stress induced cardiomyopathy /   Takotsubo cardiomyopathy. Clinically correlate.   5. The mitral in-flow pattern reveals no discernable A-wave, therefore   no comment on diastolic function can be made.   6. Small pericardial effusion, localized anterior to the RV and RA.   7. Mild ascites.   8. Endocardial visualization was enhanced with intravenous echo contrast.   9. Echogenic structure noted in the pericardial space, possibly   consistent with pericardial drain.  10. Findings disucssed with ICU team.    Pericardial fluid, Pericardiocentesis:  Clusters of large cells with nuclear atypia suspicious for malignancy. Recommend cytopathology evaluation.   Patient is a 67 year old active smoker who presented to the ED last evening with complaints of SOB. Initial ECG revealed diffuse ST-elevations and he was taken for emergent LHC where he was found to have nonobstructive CAD and a large circumferential pericardial effusion. Pericardiocentesis was performed and approximately 800 cc of bloody fluid was drained.  There was also noted to be a 3 cm mass in his left upper lobe on chest x-ray.  He has no known past medical history as he does not see doctors.    PAST MEDICAL & SURGICAL HISTORY:  No pertinent past medical history  No significant past surgical history    Allergies  No Known Allergies    Intolerances    Review of Systems:  CONSTITUTIONAL: No fever, chills, or fatigue  EYES: No eye pain, visual disturbances, or discharge  NECK: No pain or stiffness  RESPIRATORY: No cough, wheezing, chills or hemoptysis; No shortness of breath  CARDIOVASCULAR: No chest pain, palpitations, dizziness, or leg swelling  GASTROINTESTINAL: No abdominal or epigastric pain. No nausea, vomiting, or hematemesis; No diarrhea or constipation. No melena or hematochezia.  GENITOURINARY: No dysuria, frequency, hematuria, or incontinence  NEUROLOGICAL: No headaches, memory loss, loss of strength, numbness, or tremors  SKIN: No itching, burning, rashes, or lesions   MUSCULOSKELETAL: No joint pain or swelling; No muscle, back, or extremity pain  PSYCHIATRIC: No depression, anxiety, mood swings, or difficulty sleeping    MEDICATIONS  (STANDING):  albuterol/ipratropium for Nebulization 3 milliLiter(s) Nebulizer every 6 hours  chlorhexidine 2% Cloths 1 Application(s) Topical daily  influenza  Vaccine (HIGH DOSE) 0.7 milliLiter(s) IntraMuscular once  polyethylene glycol 3350 17 Gram(s) Oral daily  sodium chloride 0.9%. 1000 milliLiter(s) (50 mL/Hr) IV Continuous <Continuous>    MEDICATIONS  (PRN):    ICU Vital Signs Last 24 Hrs    T(C): 36.6 (08 Nov 2022 11:05), Max: 37 (07 Nov 2022 16:54)  T(F): 97.8 (08 Nov 2022 11:05), Max: 98.6 (07 Nov 2022 16:54)  HR: 94 (08 Nov 2022 13:00) (85 - 103)  BP: 93/80 (08 Nov 2022 13:00) (84/72 - 117/94)  BP(mean): 86 (08 Nov 2022 13:00) (76 - 102)  RR: 24 (08 Nov 2022 13:00) (16 - 26)  SpO2: 97% (08 Nov 2022 13:00) (65% - 100%)    O2 Parameters below as of 08 Nov 2022 12:00  Patient On (Oxygen Delivery Method): nasal cannula  O2 Flow (L/min): 2    ABG - ( 07 Nov 2022 18:32 )  pH, Arterial: 7.420 pH, Blood: x     /  pCO2: 34    /  pO2: 244   / HCO3: 22    / Base Excess: -2.4  /  SaO2: 99.9      I&O's Detail    07 Nov 2022 07:01  -  08 Nov 2022 07:00  --------------------------------------------------------  IN:    sodium chloride 0.9%: 600 mL  Total IN: 600 mL    OUT:    Drain (mL): 0 mL    Incontinent per Condom Catheter (mL): 750 mL  Total OUT: 750 mL    Total NET: -150 mL      08 Nov 2022 07:01  -  08 Nov 2022 13:35  --------------------------------------------------------  IN:    Oral Fluid: 120 mL    sodium chloride 0.9%: 300 mL  Total IN: 420 mL    OUT:    Drain (mL): 10 mL    Voided (mL): 365 mL  Total OUT: 375 mL    Total NET: 45 mL    LABS:                        13.0   11.62 )-----------( 144      ( 08 Nov 2022 04:43 )             36.7     11-08    133<L>  |  96  |  64.7<H>  ----------------------------<  92  4.5   |  24.0  |  0.86    Ca    9.1      08 Nov 2022 04:43  Phos  3.5     11-08  Mg     2.6     11-08    TPro  6.5<L>  /  Alb  3.1<L>  /  TBili  3.2<H>  /  DBili  1.8<H>  /  AST  367<H>  /  ALT  541<H>  /  AlkPhos  115  11-08      CARDIAC MARKERS ( 08 Nov 2022 10:50 )  x     / 0.11 ng/mL / 477 U/L / x     / 12.8 ng/mL  CARDIAC MARKERS ( 08 Nov 2022 04:43 )  x     / 0.15 ng/mL / x     / x     / x      CARDIAC MARKERS ( 07 Nov 2022 21:20 )  x     / 0.05 ng/mL / 812 U/L / x     / 13.1 ng/mL  CARDIAC MARKERS ( 07 Nov 2022 17:40 )  x     / <0.01 ng/mL / x     / x     / x          CAPILLARY BLOOD GLUCOSE    PT/INR - ( 07 Nov 2022 17:40 )   PT: 19.9 sec;   INR: 1.71 ratio    PTT - ( 07 Nov 2022 17:40 )  PTT:29.6 sec    CULTURES:  Culture Results:   Testing in progress (11-07 @ 19:00)    · Constitutional	no distress; cachectic  · Constitutional Comments	laying in bed, a bit difficult to understand  	  	    Respiratory	no respiratory distress  · Respiratory Comments	bilateral expiratory wheezing (L>>R)  · Cardiovascular	regular rate and rhythm; S1 S2 present; no murmur  · Cardiovascular Comments	subxiphoid pericardial drain in place  · Gastrointestinal	soft; nontender; nondistended; normal active bowel sounds; no guarding; no rigidity  · Mental Status	A&Ox3, answers questions appropriately and follows commands, moves all extremities, grossly nonfocal    Right Upper Quadrant Ultrasound    Nonocclusive thrombus in the IVC.  Distended gallbladder filled with sludge.  Small amount of ascites and right pleural effusion; internal echoes in   the ascites, possibly hemorrhagic content.  A CT abdomen/pelvis with intravenous contrast is recommended for further   evaluation when feasible.  The critical findings were discussed with Dr. Boland with read back   confirmation at 8:53 AM on 11/8/2022.    Trans Thoracic Echocardiogram   1. Technically adequate study.   2. Left ventricular ejection fraction, by visual estimation, is 20 to   25%.   3. Severely decreased global left ventricular systolic function.   4. Multiple left ventricular regional wall motion abnormalities exist,   this is possibly consistent with stress induced cardiomyopathy /   Takotsubo cardiomyopathy. Clinically correlate.   5. The mitral in-flow pattern reveals no discernable A-wave, therefore   no comment on diastolic function can be made.   6. Small pericardial effusion, localized anterior to the RV and RA.   7. Mild ascites.   8. Endocardial visualization was enhanced with intravenous echo contrast.   9. Echogenic structure noted in the pericardial space, possibly   consistent with pericardial drain.  10. Findings disucssed with ICU team.    Pericardial fluid, Pericardiocentesis:  Clusters of large cells with nuclear atypia suspicious for malignancy. Recommend cytopathology evaluation.

## 2022-11-08 NOTE — CONSULT NOTE ADULT - SUBJECTIVE AND OBJECTIVE BOX
[facility]                                              CARDIOLOGY AT [place] [address]                                             Telephone: [phone]. Fax:[phone]                                                       CARDIOLOGY CONSULTATION NOTE                                                                                             History obtained by: Patient and medical record  Community Cardiologist: None   obtained: Yes [  ] No [ x ]  Reason for Consultation: Pericardial Effusion   Available out pt records reviewed: Yes [  ] No [ x ]    Chief complaint:    Patient is a 67y old  Male who presents with a chief complaint of Pericardial effusion (08 Nov 2022 13:32)    HPI:  68 y/o M with no known PMH (does not see doctors), active smoker, presents to the ED with complaints of SOB. Initial ECG revealed diffuse ST-elevations and the patient was taken for emergent LHC where he was found to have nonobstructive CAD, however a large circumferential pericardial effusion was seen. Pericardiocentesis was performed with approx 800cc bloody fluid drained. Of note, there is a 3cm mass in his left upper lobe on CXR. (07 Nov 2022 21:14)      CARDIAC TESTING   ECHO:  < from: TTE Echo Limited or F/U (11.08.22 @ 11:33) >  Summary:   1. Technically adequate study.   2. Left ventricular ejection fraction, by visual estimation, is 20 to   25%.   3. Severely decreased global left ventricular systolic function.   4. Multiple left ventricular regional wall motion abnormalities exist,   this is possibly consistent with stress induced cardiomyopathy /   Takotsubo cardiomyopathy. Clinically correlate.   5. The mitral in-flow pattern reveals no discernable A-wave, therefore   no comment on diastolic function can be made.   6. Small pericardial effusion, localized anterior to the RV and RA.   7. Mild ascites.   8. Endocardial visualization was enhanced with intravenous echo contrast.   9. Echogenic structure noted in the pericardial space, possibly   consistent with pericardial drain.  10. Findings disucssed with ICU team.    Mirela Grier MD Electronically signed on 11/8/2022 at 1:13:50 PM    < end of copied text >    CATH:   < from: Cardiac Catheterization (11.07.22 @ 18:15) >  Cath Lab Report    Diagnostic Cardiologist:       Dusty Melton MD   Interventional Cardiologist:   Dusty Melton MD   Fellow:                        Stephanie Woodward, Fellow   Referring Physician:           Samy Mohsen Selim, MD     Procedures Performed   Procedures:              1.    Arterial Access - Left Femoral     2.    Diagnostic Coronary Angiography   3.    Left Heart Cath   4.    Ultrasound Guided Access   5.    Pericardiocentesis     Indications:               Shortness of breath     Diagnostic Conclusions:     Patient presented with shortness of breath   ECG showed ST elevations inferiorly.   POCUS showed a moderate to large sized pericardial effusion.   Patient brought to Cath lab emergently for Cath for a Cath and  possible pericardiocentesis.  Recommendations:     Normal flow in coronary arteries.   NOT a STEMI   Pericardiocentesis performed.   Drain left in   PCI of LAD once patient has stabilized.     Acute complication:    No complications     Procedure Narrative:   The risks and alternatives of the procedures and conscious sedation  were explained to the patient and informed consent was  obtained. The patient was brought to the cath lab and placed on the  exam table.  Access   Left femoral artery:   Vascular access was obtained using modified seldinger technique.      Coronary Angiography   Left Coronary System:   A catheter was positioned into the vessel ostia under fluoroscopic  guidance. Angiograms were obtained in multiple views.  Right Coronary System:     Patient: STEVE DENISE             MRN: [id number]  Study Date: 11/07/2022   06:15 PM      Page 1 of 4    A catheter was positioned into the vessel ostia under fluoroscopic  guidance. Angiograms were obtained in multiple views.    Pericardiocentesis   Pericardiocentesis was performed. Along thin-walled needle was  advanced into the pericardial space until fluid was aspirated.  Over a floppy wire, the needle was replaced with a catheter.      Diagnostic Findings:     Coronary Angiography   The coronary circulation is right dominant.     LM   Left main artery: The segment is visually normal in size and  structure.    LAD   Mid left anterior descending: There is a 90 % stenosis.      CX   Circumflex: Angiography shows mild atherosclerosis.      RCA   Proximal right coronary artery: The segment is moderately calcified.  There is a 30 % stenosis. Mid right coronary artery: There  is a 20 % stenosis. Distal right coronary artery: The segment is  severely calcified. There is a 60 % stenosis. OSVALDO Flow 3.    Left Heart Cath   Global left ventricular function is normal. Ejection fraction is 60%.    Valves   Aortic Valve: There is no aortic valve stenosis.    Mitral Valve: The mitral valve exhibits trivial (less than 1+)  regurgitation.    History and Risk Factors:   Tobacco Use:                                 Current - Every Day     X-Ray:   Diagnostic Flouro time:       4.6 min.                     Exam record  DAP:  Interventional Flouro time:   2 min.                       Exam fluoro  DAP:  Total Flouro Time: 6.6 min.                     Exam total  DAP:    Exam Start Time:   06:15 PM   Exam End Time:     06:48 PM   Exam Duration:     33 min     Contrast:   Description                      Dose          Unit        Serial No.   OMNIPAQUE 350 100ML       52.000     Medication:   Description                   Dose, Unit, Route, Time     Patient: STEVE DENISE             MRN: [id number]  Study Date: 11/07/2022   06:15 PM      Page 2 of 4    lidocaine PF 1% Injectable 10.0, ml, Subcut, 18:15:33   (Xylocaine-PF)   lidocaine PF 1% Injectable    10.0, ml, Subcut, 18:28:17   (Xylocaine-PF)   fentaNYL  50 mCg/mL           25.0, mcg, IV, 18:30:26   Injectable (Sublimaze)     sodium chloride 0.9% (Bolus)  600.0, ml, IV, 18:50:09     < end of copied text >    PAST MEDICAL HISTORY  No pertinent past medical history    PAST SURGICAL HISTORY  No significant past surgical history    SOCIAL HISTORY:    CIGARETTES:   former smoker  ALCOHOL: occasional ETOH ("2 beers a month")  DRUGS:    FAMILY HISTORY:    Family History of Cardiovascular Disease:  Yes [  ] No [ x ]  Coronary Artery Disease in first degree relative: Yes [  ] No [ x ]  Sudden Cardiac Death in First degree relative: Yes [  ] No [ x ]    CURRENT OTHER MEDICATIONS:  albuterol/ipratropium for Nebulization 3 milliLiter(s) Nebulizer every 6 hours  polyethylene glycol 3350 17 Gram(s) Oral daily  chlorhexidine 2% Cloths 1 Application(s) Topical daily  influenza  Vaccine (HIGH DOSE) 0.7 milliLiter(s) IntraMuscular once  sodium chloride 0.9%. 1000 milliLiter(s) (50 mL/Hr) IV Continuous <Continuous>    ALLERGIES:   No Known Allergies    REVIEW OF SYMPTOMS:   CONSTITUTIONAL: No fever, no chills, no weight loss, no weight gain, no fatigue   ENMT:  No vertigo; No sinus or throat pain  NECK: No pain or stiffness  CARDIOVASCULAR: No chest pain, no dyspnea, no syncope/presyncope, no palpitations, no dizziness, no Orthopnea, no Paroxsymal nocturnal dyspnea  RESPIRATORY: no Shortness of breath, no cough, no wheezing  : No dysuria, no hematuria   GI: No dark color stool, no nausea, no diarrhea, no constipation, no abdominal pain   NEURO: No headache, no slurred speech   MUSCULOSKELETAL: No joint pain or swelling; No muscle, back, or extremity pain  PSYCH: No agitation, no anxiety.    ALL OTHER REVIEW OF SYSTEMS ARE NEGATIVE.    VITAL SIGNS:  T(C): 36.6 (11-08-22 @ 11:05), Max: 37 (11-07-22 @ 16:54)  T(F): 97.8 (11-08-22 @ 11:05), Max: 98.6 (11-07-22 @ 16:54)  HR: 94 (11-08-22 @ 13:00) (85 - 103)  BP: 93/80 (11-08-22 @ 13:00) (84/72 - 117/94)  RR: 24 (11-08-22 @ 13:00) (16 - 26)  SpO2: 97% (11-08-22 @ 13:00) (65% - 100%)    INTAKE AND OUTPUT:     11-07 @ 07:01  -  11-08 @ 07:00  --------------------------------------------------------  IN: 600 mL / OUT: 750 mL / NET: -150 mL    11-08 @ 07:01  -  11-08 @ 14:56  --------------------------------------------------------  IN: 420 mL / OUT: 375 mL / NET: 45 mL    PHYSICAL EXAM:  Constitutional: Cachectic   HEENT: Atraumatic and normocephalic , neck is supple . no JVD. No carotid bruit.  CNS: A&Ox3. No focal deficits.   Respiratory: CTAB, unlabored   Cardiovascular: RRR normal s1 s2. No murmur. No rubs or gallop.  Gastrointestinal: Soft, non-tender. +Bowel sounds.   Extremities: 2+ Peripheral Pulses, No clubbing, cyanosis, or edema  Psychiatric: Calm . no agitation.   Skin: hyperpigmented skin     LABS:  ( 08 Nov 2022 10:50 )  Troponin T  0.11<H>,  CPK  477<H>, CKMB  X    , BNP X        , ( 08 Nov 2022 04:43 )  Troponin T  0.15<H>,  CPK  X    , CKMB  X    , BNP X        , ( 07 Nov 2022 21:20 )  Troponin T  0.05 ,  CPK  812<H>, CKMB  X    , BNP X                   13.0   11.62 )-----------( 144      ( 08 Nov 2022 04:43 )             36.7     11-08    133<L>  |  96  |  64.7<H>  ----------------------------<  92  4.5   |  24.0  |  0.86    Ca    9.1      08 Nov 2022 04:43  Phos  3.5     11-08  Mg     2.6     11-08    TPro  6.5<L>  /  Alb  3.1<L>  /  TBili  3.2<H>  /  DBili  1.8<H>  /  AST  367<H>  /  ALT  541<H>  /  AlkPhos  115  11-08    PT/INR - ( 07 Nov 2022 17:40 )   PT: 19.9 sec;   INR: 1.71 ratio    PTT - ( 07 Nov 2022 17:40 )  PTT:29.6 sec    11-08-22 @ 04:43  CHolesterol: 152,  HDL: 22,  LDL: 104, Triglycerides: 129     Thyroid Stimulating Hormone, Serum: 0.94 uIU/mL (11-08-22 @ 04:43)    INTERPRETATION OF TELEMETRY: NSR                                                   [facility]                                              CARDIOLOGY AT [place] [address]                                             Telephone: [phone]. Fax:[phone]                                                       CARDIOLOGY CONSULTATION NOTE                                                                                             History obtained by: Patient and medical record  Community Cardiologist: None   obtained: Yes [  ] No [ x ]  Reason for Consultation: Pericardial Effusion   Available out pt records reviewed: Yes [  ] No [ x ]    Chief complaint:    Patient is a 67y old  Male who presents with a chief complaint of Pericardial effusion (08 Nov 2022 13:32)    HPI:  66 y/o M with no known PMH (does not see doctors), active smoker, presents to the ED with complaints of SOB. Initial ECG revealed diffuse ST-elevations and the patient was taken for emergent LHC where he was found to have obstructive mLAD, however a large circumferential pericardial effusion was seen. Pericardiocentesis was performed with approx 800cc bloody fluid drained. Of note, there is a 3cm mass in his left upper lobe on CXR. (07 Nov 2022 21:14)      CARDIAC TESTING   ECHO:  < from: TTE Echo Limited or F/U (11.08.22 @ 11:33) >  Summary:   1. Technically adequate study.   2. Left ventricular ejection fraction, by visual estimation, is 20 to   25%.   3. Severely decreased global left ventricular systolic function.   4. Multiple left ventricular regional wall motion abnormalities exist,   this is possibly consistent with stress induced cardiomyopathy /   Takotsubo cardiomyopathy. Clinically correlate.   5. The mitral in-flow pattern reveals no discernable A-wave, therefore   no comment on diastolic function can be made.   6. Small pericardial effusion, localized anterior to the RV and RA.   7. Mild ascites.   8. Endocardial visualization was enhanced with intravenous echo contrast.   9. Echogenic structure noted in the pericardial space, possibly   consistent with pericardial drain.  10. Findings disucssed with ICU team.    Mirela Grier MD Electronically signed on 11/8/2022 at 1:13:50 PM    < end of copied text >    CATH:   < from: Cardiac Catheterization (11.07.22 @ 18:15) >  Cath Lab Report    Diagnostic Cardiologist:       Dusty Melton MD   Interventional Cardiologist:   Dusty Melton MD   Fellow:                        Stephanie Woodward, Fellow   Referring Physician:           Samy Mohsen Selim, MD     Procedures Performed   Procedures:              1.    Arterial Access - Left Femoral     2.    Diagnostic Coronary Angiography   3.    Left Heart Cath   4.    Ultrasound Guided Access   5.    Pericardiocentesis     Indications:               Shortness of breath     Diagnostic Conclusions:     Patient presented with shortness of breath   ECG showed ST elevations inferiorly.   POCUS showed a moderate to large sized pericardial effusion.   Patient brought to Cath lab emergently for Cath for a Cath and  possible pericardiocentesis.  Recommendations:     Normal flow in coronary arteries.   NOT a STEMI   Pericardiocentesis performed.   Drain left in   PCI of LAD once patient has stabilized.     Acute complication:    No complications     Procedure Narrative:   The risks and alternatives of the procedures and conscious sedation  were explained to the patient and informed consent was  obtained. The patient was brought to the cath lab and placed on the  exam table.  Access   Left femoral artery:   Vascular access was obtained using modified seldinger technique.      Coronary Angiography   Left Coronary System:   A catheter was positioned into the vessel ostia under fluoroscopic  guidance. Angiograms were obtained in multiple views.  Right Coronary System:     Patient: STEVE DENISE             MRN: [id number]  Study Date: 11/07/2022   06:15 PM      Page 1 of 4    A catheter was positioned into the vessel ostia under fluoroscopic  guidance. Angiograms were obtained in multiple views.    Pericardiocentesis   Pericardiocentesis was performed. Along thin-walled needle was  advanced into the pericardial space until fluid was aspirated.  Over a floppy wire, the needle was replaced with a catheter.      Diagnostic Findings:     Coronary Angiography   The coronary circulation is right dominant.     LM   Left main artery: The segment is visually normal in size and  structure.    LAD   Mid left anterior descending: There is a 90 % stenosis.      CX   Circumflex: Angiography shows mild atherosclerosis.      RCA   Proximal right coronary artery: The segment is moderately calcified.  There is a 30 % stenosis. Mid right coronary artery: There  is a 20 % stenosis. Distal right coronary artery: The segment is  severely calcified. There is a 60 % stenosis. OSVALDO Flow 3.    Left Heart Cath   Global left ventricular function is normal. Ejection fraction is 60%.    Valves   Aortic Valve: There is no aortic valve stenosis.    Mitral Valve: The mitral valve exhibits trivial (less than 1+)  regurgitation.    History and Risk Factors:   Tobacco Use:                                 Current - Every Day     X-Ray:   Diagnostic Flouro time:       4.6 min.                     Exam record  DAP:  Interventional Flouro time:   2 min.                       Exam fluoro  DAP:  Total Flouro Time: 6.6 min.                     Exam total  DAP:    Exam Start Time:   06:15 PM   Exam End Time:     06:48 PM   Exam Duration:     33 min     Contrast:   Description                      Dose          Unit        Serial No.   OMNIPAQUE 350 100ML       52.000     Medication:   Description                   Dose, Unit, Route, Time     Patient: STEVE DENISE             MRN: [id number]  Study Date: 11/07/2022   06:15 PM      Page 2 of 4    lidocaine PF 1% Injectable 10.0, ml, Subcut, 18:15:33   (Xylocaine-PF)   lidocaine PF 1% Injectable    10.0, ml, Subcut, 18:28:17   (Xylocaine-PF)   fentaNYL  50 mCg/mL           25.0, mcg, IV, 18:30:26   Injectable (Sublimaze)     sodium chloride 0.9% (Bolus)  600.0, ml, IV, 18:50:09     < end of copied text >    PAST MEDICAL HISTORY  No pertinent past medical history    PAST SURGICAL HISTORY  No significant past surgical history    SOCIAL HISTORY:    CIGARETTES:   former smoker  ALCOHOL: occasional ETOH ("2 beers a month")  DRUGS:    FAMILY HISTORY:    Family History of Cardiovascular Disease:  Yes [  ] No [ x ]  Coronary Artery Disease in first degree relative: Yes [  ] No [ x ]  Sudden Cardiac Death in First degree relative: Yes [  ] No [ x ]    CURRENT OTHER MEDICATIONS:  albuterol/ipratropium for Nebulization 3 milliLiter(s) Nebulizer every 6 hours  polyethylene glycol 3350 17 Gram(s) Oral daily  chlorhexidine 2% Cloths 1 Application(s) Topical daily  influenza  Vaccine (HIGH DOSE) 0.7 milliLiter(s) IntraMuscular once  sodium chloride 0.9%. 1000 milliLiter(s) (50 mL/Hr) IV Continuous <Continuous>    ALLERGIES:   No Known Allergies    REVIEW OF SYMPTOMS:   CONSTITUTIONAL: No fever, no chills, no weight loss, no weight gain, no fatigue   ENMT:  No vertigo; No sinus or throat pain  NECK: No pain or stiffness  CARDIOVASCULAR: No chest pain, no dyspnea, no syncope/presyncope, no palpitations, no dizziness, no Orthopnea, no Paroxsymal nocturnal dyspnea  RESPIRATORY: no Shortness of breath, no cough, no wheezing  : No dysuria, no hematuria   GI: No dark color stool, no nausea, no diarrhea, no constipation, no abdominal pain   NEURO: No headache, no slurred speech   MUSCULOSKELETAL: No joint pain or swelling; No muscle, back, or extremity pain  PSYCH: No agitation, no anxiety.    ALL OTHER REVIEW OF SYSTEMS ARE NEGATIVE.    VITAL SIGNS:  T(C): 36.6 (11-08-22 @ 11:05), Max: 37 (11-07-22 @ 16:54)  T(F): 97.8 (11-08-22 @ 11:05), Max: 98.6 (11-07-22 @ 16:54)  HR: 94 (11-08-22 @ 13:00) (85 - 103)  BP: 93/80 (11-08-22 @ 13:00) (84/72 - 117/94)  RR: 24 (11-08-22 @ 13:00) (16 - 26)  SpO2: 97% (11-08-22 @ 13:00) (65% - 100%)    INTAKE AND OUTPUT:     11-07 @ 07:01  -  11-08 @ 07:00  --------------------------------------------------------  IN: 600 mL / OUT: 750 mL / NET: -150 mL    11-08 @ 07:01  -  11-08 @ 14:56  --------------------------------------------------------  IN: 420 mL / OUT: 375 mL / NET: 45 mL    PHYSICAL EXAM:  Constitutional: Cachectic   HEENT: Atraumatic and normocephalic , neck is supple . no JVD. No carotid bruit.  CNS: A&Ox3. No focal deficits.   Respiratory: CTAB, unlabored   Cardiovascular: RRR normal s1 s2. No murmur. No rubs or gallop.  Gastrointestinal: Soft, non-tender. +Bowel sounds.   Extremities: 2+ Peripheral Pulses, No clubbing, cyanosis, or edema  Psychiatric: Calm . no agitation.   Skin: hyperpigmented skin     LABS:  ( 08 Nov 2022 10:50 )  Troponin T  0.11<H>,  CPK  477<H>, CKMB  X    , BNP X        , ( 08 Nov 2022 04:43 )  Troponin T  0.15<H>,  CPK  X    , CKMB  X    , BNP X        , ( 07 Nov 2022 21:20 )  Troponin T  0.05 ,  CPK  812<H>, CKMB  X    , BNP X                   13.0   11.62 )-----------( 144      ( 08 Nov 2022 04:43 )             36.7     11-08    133<L>  |  96  |  64.7<H>  ----------------------------<  92  4.5   |  24.0  |  0.86    Ca    9.1      08 Nov 2022 04:43  Phos  3.5     11-08  Mg     2.6     11-08    TPro  6.5<L>  /  Alb  3.1<L>  /  TBili  3.2<H>  /  DBili  1.8<H>  /  AST  367<H>  /  ALT  541<H>  /  AlkPhos  115  11-08    PT/INR - ( 07 Nov 2022 17:40 )   PT: 19.9 sec;   INR: 1.71 ratio    PTT - ( 07 Nov 2022 17:40 )  PTT:29.6 sec    11-08-22 @ 04:43  CHolesterol: 152,  HDL: 22,  LDL: 104, Triglycerides: 129     Thyroid Stimulating Hormone, Serum: 0.94 uIU/mL (11-08-22 @ 04:43)    INTERPRETATION OF TELEMETRY: NSR

## 2022-11-08 NOTE — CONSULT NOTE ADULT - SUBJECTIVE AND OBJECTIVE BOX
[facility]                                              INTERVENTIONAL CARDIOLOGY AT [place] [address]                                             Telephone: [phone]. Fax:[phone]                                                       INTERVENTIONAL CARDIOLOGY CONSULTATION NOTE                                                                                             History obtained by: Patient and medical record  Community Cardiologist:   Reason for Consultation: Evaluation for cardiac catheterization  Available pt records reviewed: Yes [ x ] No [  ]    SUB:   Patient feels much better  no new complains  SOB is much better  no chest pain         REVIEW OF SYMPTOMS:   CONSTITUTIONAL: o fever, no chills, no weight loss, no weight gain, no fatigue   CARDIOVASCULAR: no chest pain   RESPIRATORY: no Shortness of breath, no cough, no wheezing  : No dysuria, no hematuria   GI: No dark color stool, no nausea, no diarrhea, no constipation, no abdominal pain   NEURO: No headache, no slurred speech   ALL OTHER REVIEW OF SYSTEMS ARE NEGATIVE.    VITAL SIGNS:  T(C): 36.6 (11-08-22 @ 11:05), Max: 37 (11-07-22 @ 16:54)  T(F): 97.8 (11-08-22 @ 11:05), Max: 98.6 (11-07-22 @ 16:54)  HR: 91 (11-08-22 @ 12:00) (85 - 103)  BP: 93/76 (11-08-22 @ 12:00) (84/72 - 117/94)  RR: 24 (11-08-22 @ 12:00) (16 - 26)  SpO2: 98% (11-08-22 @ 12:00) (65% - 100%)    INTAKE AND OUTPUT:     11-07 @ 07:01  -  11-08 @ 07:00  --------------------------------------------------------  IN: 600 mL / OUT: 750 mL / NET: -150 mL    11-08 @ 07:01  -  11-08 @ 12:37  --------------------------------------------------------  IN: 370 mL / OUT: 360 mL / NET: 10 mL        PHYSICAL EXAM:  Constitutional: Comfortable . No acute distress.   HEENT: Atraumatic and normocephalic , neck is supple . no JVD. No carotid bruit.  CNS: A&Ox3. No focal deficits.   Respiratory: CTAB, unlabored   Cardiovascular: RRR normal s1 s2. No murmur. No rubs or gallop.  Gastrointestinal: Soft, non-tender. +Bowel sounds.   Extremities: 2+ Peripheral Pulses, No clubbing, cyanosis, or edema  Psychiatric: Calm . no agitation.   Skin: Warm and dry, no ulcers on extremities     LABS:  ( 08 Nov 2022 10:50 )  Troponin T  0.11<H>,  CPK  477<H>, CKMB  X    , BNP X        , ( 08 Nov 2022 04:43 )  Troponin T  0.15<H>,  CPK  X    , CKMB  X    , BNP X        , ( 07 Nov 2022 21:20 )  Troponin T  0.05 ,  CPK  812<H>, CKMB  X    , BNP X                                  13.0   11.62 )-----------( 144      ( 08 Nov 2022 04:43 )             36.7     11-08    133<L>  |  96  |  64.7<H>  ----------------------------<  92  4.5   |  24.0  |  0.86    Ca    9.1      08 Nov 2022 04:43  Phos  3.5     11-08  Mg     2.6     11-08    TPro  6.5<L>  /  Alb  3.1<L>  /  TBili  3.2<H>  /  DBili  1.8<H>  /  AST  367<H>  /  ALT  541<H>  /  AlkPhos  115  11-08    PT/INR - ( 07 Nov 2022 17:40 )   PT: 19.9 sec;   INR: 1.71 ratio         PTT - ( 07 Nov 2022 17:40 )  PTT:29.6 sec    11-08-22 @ 04:43  CHolesterol: 152,  HDL: 22,  LDL: 104, Triglycerides: 129       Thyroid Stimulating Hormone, Serum: 0.94 uIU/mL (11-08-22 @ 04:43)

## 2022-11-08 NOTE — CONSULT NOTE ADULT - PROBLEM SELECTOR RECOMMENDATION 3
- ok to use full dose anticoagulation for IVC thrombus  - monitor effusion w/ limited echo as pt has fresh pericardial drain, concern for hemopericardium

## 2022-11-08 NOTE — CONSULT NOTE ADULT - ASSESSMENT
27.3 66 y/o old male , active heavy smoker, no known medical history was having shortness of breath for past 1 week. His friend got him to hospital as he was sob. On arrival his EKG showed diffuse ST elevations, and bedside limited echo showed circumferential pericardial effusion.

## 2022-11-08 NOTE — PROGRESS NOTE ADULT - SUBJECTIVE AND OBJECTIVE BOX
Clinical Pathology Note    Patient is a 66 yo M presenting with SOB and left lung mass on CXR.    Pericardial fluid, Pericardiocentesis:  Clusters of large cells with nuclear atypia suspicious for malignancy. Recommend cytopathology evaluation.

## 2022-11-09 DIAGNOSIS — I25.10 ATHEROSCLEROTIC HEART DISEASE OF NATIVE CORONARY ARTERY WITHOUT ANGINA PECTORIS: ICD-10-CM

## 2022-11-09 LAB
ALBUMIN SERPL ELPH-MCNC: 2.7 G/DL — LOW (ref 3.3–5.2)
ALP SERPL-CCNC: 96 U/L — SIGNIFICANT CHANGE UP (ref 40–120)
ALT FLD-CCNC: 327 U/L — HIGH
ANION GAP SERPL CALC-SCNC: 7 MMOL/L — SIGNIFICANT CHANGE UP (ref 5–17)
APTT BLD: 35.5 SEC — SIGNIFICANT CHANGE UP (ref 27.5–35.5)
APTT BLD: 38.5 SEC — HIGH (ref 27.5–35.5)
APTT BLD: 45.7 SEC — HIGH (ref 27.5–35.5)
AST SERPL-CCNC: 117 U/L — HIGH
BASOPHILS # BLD AUTO: 0.01 K/UL — SIGNIFICANT CHANGE UP (ref 0–0.2)
BASOPHILS NFR BLD AUTO: 0.1 % — SIGNIFICANT CHANGE UP (ref 0–2)
BILIRUB SERPL-MCNC: 2.2 MG/DL — HIGH (ref 0.4–2)
BUN SERPL-MCNC: 39 MG/DL — HIGH (ref 8–20)
CALCIUM SERPL-MCNC: 8.6 MG/DL — SIGNIFICANT CHANGE UP (ref 8.4–10.5)
CHLORIDE SERPL-SCNC: 99 MMOL/L — SIGNIFICANT CHANGE UP (ref 96–108)
CO2 SERPL-SCNC: 28 MMOL/L — SIGNIFICANT CHANGE UP (ref 22–29)
CREAT SERPL-MCNC: 0.65 MG/DL — SIGNIFICANT CHANGE UP (ref 0.5–1.3)
EGFR: 103 ML/MIN/1.73M2 — SIGNIFICANT CHANGE UP
EOSINOPHIL # BLD AUTO: 0.01 K/UL — SIGNIFICANT CHANGE UP (ref 0–0.5)
EOSINOPHIL NFR BLD AUTO: 0.1 % — SIGNIFICANT CHANGE UP (ref 0–6)
GLUCOSE SERPL-MCNC: 74 MG/DL — SIGNIFICANT CHANGE UP (ref 70–99)
HCT VFR BLD CALC: 33.4 % — LOW (ref 39–50)
HGB BLD-MCNC: 11.8 G/DL — LOW (ref 13–17)
IMM GRANULOCYTES NFR BLD AUTO: 0.5 % — SIGNIFICANT CHANGE UP (ref 0–0.9)
INR BLD: 1.62 RATIO — HIGH (ref 0.88–1.16)
LACTATE SERPL-SCNC: 1.6 MMOL/L — SIGNIFICANT CHANGE UP (ref 0.5–2)
LYMPHOCYTES # BLD AUTO: 0.57 K/UL — LOW (ref 1–3.3)
LYMPHOCYTES # BLD AUTO: 6.2 % — LOW (ref 13–44)
MAGNESIUM SERPL-MCNC: 2.5 MG/DL — SIGNIFICANT CHANGE UP (ref 1.6–2.6)
MCHC RBC-ENTMCNC: 29.9 PG — SIGNIFICANT CHANGE UP (ref 27–34)
MCHC RBC-ENTMCNC: 35.3 GM/DL — SIGNIFICANT CHANGE UP (ref 32–36)
MCV RBC AUTO: 84.8 FL — SIGNIFICANT CHANGE UP (ref 80–100)
MONOCYTES # BLD AUTO: 0.78 K/UL — SIGNIFICANT CHANGE UP (ref 0–0.9)
MONOCYTES NFR BLD AUTO: 8.5 % — SIGNIFICANT CHANGE UP (ref 2–14)
NEUTROPHILS # BLD AUTO: 7.79 K/UL — HIGH (ref 1.8–7.4)
NEUTROPHILS NFR BLD AUTO: 84.6 % — HIGH (ref 43–77)
PHOSPHATE SERPL-MCNC: 1.8 MG/DL — LOW (ref 2.4–4.7)
PLATELET # BLD AUTO: 127 K/UL — LOW (ref 150–400)
POTASSIUM SERPL-MCNC: 4 MMOL/L — SIGNIFICANT CHANGE UP (ref 3.5–5.3)
POTASSIUM SERPL-SCNC: 4 MMOL/L — SIGNIFICANT CHANGE UP (ref 3.5–5.3)
PROT SERPL-MCNC: 5.6 G/DL — LOW (ref 6.6–8.7)
PROTHROM AB SERPL-ACNC: 18.9 SEC — HIGH (ref 10.5–13.4)
RBC # BLD: 3.94 M/UL — LOW (ref 4.2–5.8)
RBC # FLD: 14.6 % — HIGH (ref 10.3–14.5)
SODIUM SERPL-SCNC: 134 MMOL/L — LOW (ref 135–145)
TROPONIN T SERPL-MCNC: 0.06 NG/ML — SIGNIFICANT CHANGE UP (ref 0–0.06)
WBC # BLD: 9.21 K/UL — SIGNIFICANT CHANGE UP (ref 3.8–10.5)
WBC # FLD AUTO: 9.21 K/UL — SIGNIFICANT CHANGE UP (ref 3.8–10.5)

## 2022-11-09 PROCEDURE — 99233 SBSQ HOSP IP/OBS HIGH 50: CPT

## 2022-11-09 PROCEDURE — 99254 IP/OBS CNSLTJ NEW/EST MOD 60: CPT

## 2022-11-09 PROCEDURE — 93308 TTE F-UP OR LMTD: CPT | Mod: 26

## 2022-11-09 RX ADMIN — POLYETHYLENE GLYCOL 3350 17 GRAM(S): 17 POWDER, FOR SOLUTION ORAL at 13:21

## 2022-11-09 RX ADMIN — HEPARIN SODIUM 1100 UNIT(S)/HR: 5000 INJECTION INTRAVENOUS; SUBCUTANEOUS at 19:37

## 2022-11-09 RX ADMIN — HEPARIN SODIUM 1100 UNIT(S)/HR: 5000 INJECTION INTRAVENOUS; SUBCUTANEOUS at 14:04

## 2022-11-09 RX ADMIN — Medication 3 MILLILITER(S): at 15:55

## 2022-11-09 RX ADMIN — HEPARIN SODIUM 3000 UNIT(S): 5000 INJECTION INTRAVENOUS; SUBCUTANEOUS at 05:55

## 2022-11-09 RX ADMIN — CHLORHEXIDINE GLUCONATE 1 APPLICATION(S): 213 SOLUTION TOPICAL at 11:56

## 2022-11-09 RX ADMIN — HEPARIN SODIUM 1200 UNIT(S)/HR: 5000 INJECTION INTRAVENOUS; SUBCUTANEOUS at 22:22

## 2022-11-09 RX ADMIN — Medication 3 MILLILITER(S): at 04:14

## 2022-11-09 RX ADMIN — Medication 81 MILLIGRAM(S): at 13:21

## 2022-11-09 RX ADMIN — Medication 62.5 MILLIMOLE(S): at 17:09

## 2022-11-09 RX ADMIN — Medication 3 MILLILITER(S): at 09:11

## 2022-11-09 RX ADMIN — ATORVASTATIN CALCIUM 80 MILLIGRAM(S): 80 TABLET, FILM COATED ORAL at 22:20

## 2022-11-09 RX ADMIN — HEPARIN SODIUM 900 UNIT(S)/HR: 5000 INJECTION INTRAVENOUS; SUBCUTANEOUS at 05:52

## 2022-11-09 NOTE — PROGRESS NOTE ADULT - SUBJECTIVE AND OBJECTIVE BOX
67 year old active smoker who presented to the ED 11/7 with complaints of SOB. Initial ECG revealed diffuse ST-elevations and he was taken for emergent LHC where he was found to have nonobstructive CAD and a large circumferential pericardial effusion. Pericardiocentesis was performed and approximately 800 cc of bloody fluid was drained.  There was also noted to be a 3 cm mass in his left upper lobe on chest x-ray.  He has no known past medical history as he does not see doctors.    PAST MEDICAL & SURGICAL HISTORY:  No pertinent past medical history  No significant past surgical history    Allergies  No Known Allergies    Intolerances    Review of Systems:  CONSTITUTIONAL: No fever, chills, or fatigue  EYES: No eye pain, visual disturbances, or discharge  NECK: No pain or stiffness  RESPIRATORY: No cough, wheezing, chills or hemoptysis; No shortness of breath  CARDIOVASCULAR: No chest pain, palpitations, dizziness, or leg swelling  GASTROINTESTINAL: No abdominal or epigastric pain. No nausea, vomiting, or hematemesis; No diarrhea or constipation. No melena or hematochezia.  GENITOURINARY: No dysuria, frequency, hematuria, or incontinence  NEUROLOGICAL: No headaches, memory loss, loss of strength, numbness, or tremors  SKIN: No itching, burning, rashes, or lesions   MUSCULOSKELETAL: No joint pain or swelling; No muscle, back, or extremity pain  PSYCHIATRIC: No depression, anxiety, mood swings, or difficulty sleeping    MEDICATIONS  (STANDING):  albuterol/ipratropium for Nebulization 3 milliLiter(s) Nebulizer every 6 hours  aspirin  chewable 81 milliGRAM(s) Oral daily  atorvastatin 80 milliGRAM(s) Oral at bedtime  chlorhexidine 2% Cloths 1 Application(s) Topical daily  heparin  Infusion.  Unit(s)/Hr (7 mL/Hr) IV Continuous <Continuous>  influenza  Vaccine (HIGH DOSE) 0.7 milliLiter(s) IntraMuscular once  polyethylene glycol 3350 17 Gram(s) Oral daily  sodium phosphate 15 milliMole(s)/250 mL IVPB 15 milliMole(s) IV Intermittent once    MEDICATIONS  (PRN):  heparin   Injectable 3000 Unit(s) IV Push every 6 hours PRN For aPTT less than 40  heparin   Injectable 1500 Unit(s) IV Push every 6 hours PRN For aPTT between 40 - 57    Vital Signs Last 24 Hrs  T(C): 36.8 (09 Nov 2022 11:19), Max: 36.8 (09 Nov 2022 11:19)  T(F): 98.2 (09 Nov 2022 11:19), Max: 98.2 (09 Nov 2022 11:19)  HR: 87 (09 Nov 2022 14:00) (82 - 100)  BP: 83/61 (09 Nov 2022 14:00) (75/61 - 97/74)  BP(mean): 69 (09 Nov 2022 14:00) (67 - 83)  RR: 18 (09 Nov 2022 14:00) (17 - 26)  SpO2: 93% (09 Nov 2022 14:00) (87% - 98%)    Parameters below as of 09 Nov 2022 12:00  Patient On (Oxygen Delivery Method): nasal cannula    O2 Concentration (%): 2    ABG - ( 07 Nov 2022 18:32 )  pH, Arterial: 7.420 pH, Blood: x     /  pCO2: 34    /  pO2: 244   / HCO3: 22    / Base Excess: -2.4  /  SaO2: 99.9      I&O's Detail    08 Nov 2022 07:01  -  09 Nov 2022 07:00  --------------------------------------------------------  IN:    Heparin Infusion: 116 mL    Oral Fluid: 480 mL    sodium chloride 0.9%: 750 mL  Total IN: 1346 mL    OUT:    Drain (mL): 10 mL    Incontinent per Condom Catheter (mL): 1050 mL    Voided (mL): 945 mL  Total OUT: 2005 mL    Total NET: -659 mL      09 Nov 2022 07:01  -  09 Nov 2022 15:00  --------------------------------------------------------  IN:    Heparin Infusion: 76 mL    Oral Fluid: 240 mL  Total IN: 316 mL    OUT:    Drain (mL): 0 mL    Voided (mL): 200 mL  Total OUT: 200 mL    Total NET: 116 mL      LABS:             CBC Full  -  ( 09 Nov 2022 04:25 )  WBC Count : 9.21 K/uL  RBC Count : 3.94 M/uL  Hemoglobin : 11.8 g/dL  Hematocrit : 33.4 %  Platelet Count - Automated : 127 K/uL  Mean Cell Volume : 84.8 fl  Mean Cell Hemoglobin : 29.9 pg  Mean Cell Hemoglobin Concentration : 35.3 gm/dL  Auto Neutrophil # : 7.79 K/uL  Auto Lymphocyte # : 0.57 K/uL  Auto Monocyte # : 0.78 K/uL  Auto Eosinophil # : 0.01 K/uL  Auto Basophil # : 0.01 K/uL  Auto Neutrophil % : 84.6 %  Auto Lymphocyte % : 6.2 %  Auto Monocyte % : 8.5 %  Auto Eosinophil % : 0.1 %  Auto Basophil % : 0.1 %    11-09    134<L>  |  99  |  39.0<H>  ----------------------------<  74  4.0   |  28.0  |  0.65    Ca    8.6      09 Nov 2022 04:25  Phos  1.8     11-09  Mg     2.5     11-09    TPro  5.6<L>  /  Alb  2.7<L>  /  TBili  2.2<H>  /  DBili  x   /  AST  117<H>  /  ALT  327<H>  /  AlkPhos  96  11-09    CARDIAC MARKERS ( 08 Nov 2022 10:50 )  x     / 0.11 ng/mL / 477 U/L / x     / 12.8 ng/mL  CARDIAC MARKERS ( 08 Nov 2022 04:43 )  x     / 0.15 ng/mL / x     / x     / x      CARDIAC MARKERS ( 07 Nov 2022 21:20 )  x     / 0.05 ng/mL / 812 U/L / x     / 13.1 ng/mL  CARDIAC MARKERS ( 07 Nov 2022 17:40 )  x     / <0.01 ng/mL / x     / x     / x          CAPILLARY BLOOD GLUCOSE    PT/INR - ( 09 Nov 2022 04:25 )   PT: 18.9 sec;   INR: 1.62 ratio    PTT - ( 09 Nov 2022 12:40 )  PTT: 38.5 sec    CULTURES:  Culture Results:   Testing in progress (11-07 @ 19:00)    · Constitutional	no distress; cachectic  · Constitutional Comments	laying in bed, a bit difficult to understand  	  	    Respiratory	no respiratory distress  · Respiratory Comments	bilateral expiratory wheezing (L>>R)  · Cardiovascular	regular rate and rhythm; S1 S2 present; no murmur  · Cardiovascular Comments	subxiphoid pericardial drain in place  · Gastrointestinal	soft; nontender; nondistended; normal active bowel sounds; no guarding; no rigidity  · Mental Status	A&Ox3, answers questions appropriately and follows commands, moves all extremities, grossly nonfocal    Right Upper Quadrant Ultrasound    Nonocclusive thrombus in the IVC.  Distended gallbladder filled with sludge.  Small amount of ascites and right pleural effusion; internal echoes in   the ascites, possibly hemorrhagic content.  A CT abdomen/pelvis with intravenous contrast is recommended for further   evaluation when feasible.  The critical findings were discussed with Dr. Boland with read back   confirmation at 8:53 AM on 11/8/2022.    Trans Thoracic Echocardiogram   1. Technically adequate study.   2. Left ventricular ejection fraction, by visual estimation, is 20 to   25%.   3. Severely decreased global left ventricular systolic function.   4. Multiple left ventricular regional wall motion abnormalities exist,   this is possibly consistent with stress induced cardiomyopathy /   Takotsubo cardiomyopathy. Clinically correlate.   5. The mitral in-flow pattern reveals no discernable A-wave, therefore   no comment on diastolic function can be made.   6. Small pericardial effusion, localized anterior to the RV and RA.   7. Mild ascites.   8. Endocardial visualization was enhanced with intravenous echo contrast.   9. Echogenic structure noted in the pericardial space, possibly   consistent with pericardial drain.  10. Findings disucssed with ICU team.    Pericardial fluid, Pericardiocentesis:  Clusters of large cells with nuclear atypia suspicious for malignancy. Recommend cytopathology evaluation.   67 year old active smoker who presented to the ED 11/7 with complaints of SOB. Initial ECG revealed diffuse ST-elevations and he was taken for emergent LHC where he was found to have nonobstructive CAD and a large circumferential pericardial effusion. Pericardiocentesis was performed and approximately 800 cc of bloody fluid was drained.  There was also noted to be a 3 cm mass in his left upper lobe on chest x-ray.  He has no known past medical history as he does not see doctors.  He was consequently diagnosed with a pulmonary embolism, and both IVC and SVC thrombi and was started on a heparin drip.     PAST MEDICAL & SURGICAL HISTORY:  No pertinent past medical history  No significant past surgical history    Allergies  No Known Allergies    Intolerances    Review of Systems:  CONSTITUTIONAL: No fever, chills, or fatigue  EYES: No eye pain, visual disturbances, or discharge  NECK: No pain or stiffness  RESPIRATORY: No cough, wheezing, chills or hemoptysis; No shortness of breath  CARDIOVASCULAR: No chest pain, palpitations, dizziness, or leg swelling  GASTROINTESTINAL: No abdominal or epigastric pain. No nausea, vomiting, or hematemesis; No diarrhea or constipation. No melena or hematochezia.  GENITOURINARY: No dysuria, frequency, hematuria, or incontinence  NEUROLOGICAL: No headaches, memory loss, loss of strength, numbness, or tremors  SKIN: No itching, burning, rashes, or lesions   MUSCULOSKELETAL: No joint pain or swelling; No muscle, back, or extremity pain  PSYCHIATRIC: No depression, anxiety, mood swings, or difficulty sleeping    MEDICATIONS  (STANDING):  albuterol/ipratropium for Nebulization 3 milliLiter(s) Nebulizer every 6 hours  aspirin  chewable 81 milliGRAM(s) Oral daily  atorvastatin 80 milliGRAM(s) Oral at bedtime  chlorhexidine 2% Cloths 1 Application(s) Topical daily  heparin  Infusion.  Unit(s)/Hr (7 mL/Hr) IV Continuous <Continuous>  influenza  Vaccine (HIGH DOSE) 0.7 milliLiter(s) IntraMuscular once  polyethylene glycol 3350 17 Gram(s) Oral daily  sodium phosphate 15 milliMole(s)/250 mL IVPB 15 milliMole(s) IV Intermittent once    MEDICATIONS  (PRN):  heparin   Injectable 3000 Unit(s) IV Push every 6 hours PRN For aPTT less than 40  heparin   Injectable 1500 Unit(s) IV Push every 6 hours PRN For aPTT between 40 - 57    Vital Signs Last 24 Hrs  T(C): 36.8 (09 Nov 2022 11:19), Max: 36.8 (09 Nov 2022 11:19)  T(F): 98.2 (09 Nov 2022 11:19), Max: 98.2 (09 Nov 2022 11:19)  HR: 87 (09 Nov 2022 14:00) (82 - 100)  BP: 83/61 (09 Nov 2022 14:00) (75/61 - 97/74)  BP(mean): 69 (09 Nov 2022 14:00) (67 - 83)  RR: 18 (09 Nov 2022 14:00) (17 - 26)  SpO2: 93% (09 Nov 2022 14:00) (87% - 98%)    Parameters below as of 09 Nov 2022 12:00  Patient On (Oxygen Delivery Method): nasal cannula    O2 Concentration (%): 2    ABG - ( 07 Nov 2022 18:32 )  pH, Arterial: 7.420 pH, Blood: x     /  pCO2: 34    /  pO2: 244   / HCO3: 22    / Base Excess: -2.4  /  SaO2: 99.9      I&O's Detail    08 Nov 2022 07:01  -  09 Nov 2022 07:00  --------------------------------------------------------  IN:    Heparin Infusion: 116 mL    Oral Fluid: 480 mL    sodium chloride 0.9%: 750 mL  Total IN: 1346 mL    OUT:    Drain (mL): 10 mL    Incontinent per Condom Catheter (mL): 1050 mL    Voided (mL): 945 mL  Total OUT: 2005 mL    Total NET: -659 mL      09 Nov 2022 07:01  -  09 Nov 2022 15:00  --------------------------------------------------------  IN:    Heparin Infusion: 76 mL    Oral Fluid: 240 mL  Total IN: 316 mL    OUT:    Drain (mL): 0 mL    Voided (mL): 200 mL  Total OUT: 200 mL    Total NET: 116 mL      LABS:             CBC Full  -  ( 09 Nov 2022 04:25 )  WBC Count : 9.21 K/uL  RBC Count : 3.94 M/uL  Hemoglobin : 11.8 g/dL  Hematocrit : 33.4 %  Platelet Count - Automated : 127 K/uL  Mean Cell Volume : 84.8 fl  Mean Cell Hemoglobin : 29.9 pg  Mean Cell Hemoglobin Concentration : 35.3 gm/dL  Auto Neutrophil # : 7.79 K/uL  Auto Lymphocyte # : 0.57 K/uL  Auto Monocyte # : 0.78 K/uL  Auto Eosinophil # : 0.01 K/uL  Auto Basophil # : 0.01 K/uL  Auto Neutrophil % : 84.6 %  Auto Lymphocyte % : 6.2 %  Auto Monocyte % : 8.5 %  Auto Eosinophil % : 0.1 %  Auto Basophil % : 0.1 %    11-09    134<L>  |  99  |  39.0<H>  ----------------------------<  74  4.0   |  28.0  |  0.65    Ca    8.6      09 Nov 2022 04:25  Phos  1.8     11-09  Mg     2.5     11-09    TPro  5.6<L>  /  Alb  2.7<L>  /  TBili  2.2<H>  /  DBili  x   /  AST  117<H>  /  ALT  327<H>  /  AlkPhos  96  11-09    CARDIAC MARKERS ( 08 Nov 2022 10:50 )  x     / 0.11 ng/mL / 477 U/L / x     / 12.8 ng/mL  CARDIAC MARKERS ( 08 Nov 2022 04:43 )  x     / 0.15 ng/mL / x     / x     / x      CARDIAC MARKERS ( 07 Nov 2022 21:20 )  x     / 0.05 ng/mL / 812 U/L / x     / 13.1 ng/mL  CARDIAC MARKERS ( 07 Nov 2022 17:40 )  x     / <0.01 ng/mL / x     / x     / x          CAPILLARY BLOOD GLUCOSE    PT/INR - ( 09 Nov 2022 04:25 )   PT: 18.9 sec;   INR: 1.62 ratio    PTT - ( 09 Nov 2022 12:40 )  PTT: 38.5 sec    CULTURES:  Culture Results:   Testing in progress (11-07 @ 19:00)    · Constitutional	no distress; cachectic  · Constitutional Comments	laying in bed, a bit difficult to understand  	  	    Respiratory	no respiratory distress  · Respiratory Comments	bilateral expiratory wheezing (L>>R)  · Cardiovascular	regular rate and rhythm; S1 S2 present; no murmur  · Cardiovascular Comments	subxiphoid pericardial drain in place  · Gastrointestinal	soft; nontender; nondistended; normal active bowel sounds; no guarding; no rigidity  · Mental Status	A&Ox3, answers questions appropriately and follows commands, moves all extremities, grossly nonfocal    Right Upper Quadrant Ultrasound    Nonocclusive thrombus in the IVC.  Distended gallbladder filled with sludge.  Small amount of ascites and right pleural effusion; internal echoes in   the ascites, possibly hemorrhagic content.  A CT abdomen/pelvis with intravenous contrast is recommended for further   evaluation when feasible.  The critical findings were discussed with Dr. Boland with read back   confirmation at 8:53 AM on 11/8/2022.    Trans Thoracic Echocardiogram   1. Technically adequate study.   2. Left ventricular ejection fraction, by visual estimation, is 20 to   25%.   3. Severely decreased global left ventricular systolic function.   4. Multiple left ventricular regional wall motion abnormalities exist,   this is possibly consistent with stress induced cardiomyopathy /   Takotsubo cardiomyopathy. Clinically correlate.   5. The mitral in-flow pattern reveals no discernable A-wave, therefore   no comment on diastolic function can be made.   6. Small pericardial effusion, localized anterior to the RV and RA.   7. Mild ascites.   8. Endocardial visualization was enhanced with intravenous echo contrast.   9. Echogenic structure noted in the pericardial space, possibly   consistent with pericardial drain.  10. Findings disucssed with ICU team.    Pericardial fluid, Pericardiocentesis:  Clusters of large cells with nuclear atypia suspicious for malignancy. Recommend cytopathology evaluation.

## 2022-11-09 NOTE — PROGRESS NOTE ADULT - SUBJECTIVE AND OBJECTIVE BOX
[facility]                                              INTERVENTIONAL CARDIOLOGY AT [facility]                                                   [address], [place] [address]                                             Telephone: [phone]. Fax:[phone]                                                       INTERVENTIONAL CARDIOLOGY Progress NOTE   SUB:   Patient feels much better  no new complains  SOB is much better  no chest pain       REVIEW OF SYMPTOMS:   CONSTITUTIONAL: o fever, no chills, no weight loss, no weight gain, no fatigue   CARDIOVASCULAR: no chest pain   RESPIRATORY: no Shortness of breath, no cough, no wheezing  : No dysuria, no hematuria   GI: No dark color stool, no nausea, no diarrhea, no constipation, no abdominal pain   NEURO: No headache, no slurred speech   ALL OTHER REVIEW OF SYSTEMS ARE NEGATIVE    T(C): 36.6 (11-09-22 @ 07:16), Max: 36.6 (11-08-22 @ 11:05)  HR: 91 (11-09-22 @ 09:12) (82 - 100)  BP: 89/72 (11-09-22 @ 07:00) (75/61 - 101/81)  RR: 17 (11-09-22 @ 07:00) (17 - 26)  SpO2: 95% (11-09-22 @ 09:12) (87% - 98%)    PHYSICAL EXAM:  Constitutional: Comfortable. ill appearing, No acute distress.   HEENT: Atraumatic and normocephalic , neck is supple . + JVD.   CNS: A&O. No focal deficits.   Respiratory: CTAB, unlabored   Cardiovascular: RRR normal s1 s2. No murmur. No rubs or gallop.  Gastrointestinal: Soft, non-tender. +Bowel sounds.   Extremities: 2+ Peripheral Pulses, No clubbing, cyanosis, or edema  Psychiatric: Calm . no agitation.   Skin: Warm and dry, no ulcers on extremities     MEDICATIONS  (STANDING):  albuterol/ipratropium for Nebulization 3 milliLiter(s) Nebulizer every 6 hours  aspirin  chewable 81 milliGRAM(s) Oral daily  atorvastatin 80 milliGRAM(s) Oral at bedtime  chlorhexidine 2% Cloths 1 Application(s) Topical daily  heparin  Infusion.  Unit(s)/Hr (7 mL/Hr) IV Continuous <Continuous>  influenza  Vaccine (HIGH DOSE) 0.7 milliLiter(s) IntraMuscular once  polyethylene glycol 3350 17 Gram(s) Oral daily  sodium phosphate 15 milliMole(s)/250 mL IVPB 15 milliMole(s) IV Intermittent once    MEDICATIONS  (PRN):  heparin   Injectable 3000 Unit(s) IV Push every 6 hours PRN For aPTT less than 40  heparin   Injectable 1500 Unit(s) IV Push every 6 hours PRN For aPTT between 40 - 57               11.8   9.21  )-----------( 127      ( 09 Nov 2022 04:25 )             33.4     11-09    134<L>  |  99  |  39.0<H>  ----------------------------<  74  4.0   |  28.0  |  0.65    Ca    8.6      09 Nov 2022 04:25  Phos  1.8     11-09  Mg     2.5     11-09    TPro  5.6<L>  /  Alb  2.7<L>  /  TBili  2.2<H>  /  DBili  x   /  AST  117<H>  /  ALT  327<H>  /  AlkPhos  96  11-09    Summary:   1. Technically adequate study.   2. Left ventricular ejection fraction, by visual estimation, is 20 to   25%.   3. Severely decreased global left ventricular systolic function.   4. Multiple left ventricular regional wall motion abnormalities exist,   this is possibly consistent with stress induced cardiomyopathy /   Takotsubo cardiomyopathy. Clinically correlate.   5. The mitral in-flow pattern reveals no discernable A-wave, therefore   no comment on diastolic function can be made.   6. Small pericardial effusion, localized anterior to the RV and RA.   7. Mild ascites.   8. Endocardial visualization was enhanced with intravenous echo contrast.   9. Echogenic structure noted in the pericardial space, possibly   consistent with pericardial drain.  10. Findings disucssed with ICU team.    Mirela Grier MD Electronically signed on 11/8/2022 at 1:13:50 PM

## 2022-11-09 NOTE — CONSULT NOTE ADULT - ASSESSMENT
68yo M w/ no known PMH who p/w SOB and found to have diffuse ST elevations and taken for emergent LHC where found to have a large pericardial effusion s/p percardiocentesis during which bloody fluid was drained (a 90% LAD lesion also seen but PCI deferred until further stabilized).  Course further c/b dx of HFrEF with EF 20-25% and imaging revealing REYNALDO PE, SVC occlusion, IVC thrombus, mediastinal/rt hilar LAD with bronchial obstruxn in RML/RLL, a 4.1cm LLL mass c/w lung malignancy, and mod rt effusion.  We are consulted for assistance with goals of care.   #Goals of care 66yo M w/ no known PMH who p/w SOB and found to have diffuse ST elevations and taken for emergent LHC where found to have a large pericardial effusion s/p percardiocentesis during which bloody fluid was drained (a 90% LAD lesion also seen but PCI deferred until further stabilized).  Course further c/b dx of HFrEF with EF 20-25% and imaging revealing REYNALDO PE, SVC occlusion, IVC thrombus, mediastinal/rt hilar LAD with bronchial obstruxn in RML/RLL, a 4.1cm LLL mass c/w lung malignancy, and mod rt effusion.  We are consulted for assistance with goals of care.   #Goals of care  - Pt reportedly named friend as HCP today - Samuel Seaman - [phone]  - Clinically appears to have metastatic cancer but no tissue dx as yet  - I d/w Dr Gibbs - as pt just learning of this, will plan to review his diagnostic options (if he wants w/u and would want cancer-directed therapy) tomorrow so as to get clearer picture of potential treatment options.  I worry that pt is so cachectic and deconditioned, he may not be a good candidate for cancer-directed therapy at this time.  - will assist with discussion of code status in context of broader goals once we have more information    #Depression  - pt cited personal losses - specifically, that of his mother and sisters - he didn't care to elaborate  - will cont to provide support  - would consider check TSH/B12 for completeness  - given profound depression and likely quite limited prognosis, may consider trial of psychostimulant methylphenidate

## 2022-11-09 NOTE — DIETITIAN INITIAL EVALUATION ADULT - PERTINENT MEDS FT
MEDICATIONS  (STANDING):  albuterol/ipratropium for Nebulization 3 milliLiter(s) Nebulizer every 6 hours  aspirin  chewable 81 milliGRAM(s) Oral daily  atorvastatin 80 milliGRAM(s) Oral at bedtime  chlorhexidine 2% Cloths 1 Application(s) Topical daily  heparin  Infusion.  Unit(s)/Hr (7 mL/Hr) IV Continuous <Continuous>  influenza  Vaccine (HIGH DOSE) 0.7 milliLiter(s) IntraMuscular once  polyethylene glycol 3350 17 Gram(s) Oral daily  sodium phosphate 15 milliMole(s)/250 mL IVPB 15 milliMole(s) IV Intermittent once    MEDICATIONS  (PRN):  heparin   Injectable 3000 Unit(s) IV Push every 6 hours PRN For aPTT less than 40  heparin   Injectable 1500 Unit(s) IV Push every 6 hours PRN For aPTT between 40 - 57

## 2022-11-09 NOTE — DIETITIAN INITIAL EVALUATION ADULT - ADD RECOMMEND
Rx: MVI and vitamin C 500mg daily to aid in wound healing. Encourage po intake, monitor diet tolerance, and provide assistance at meals as needed. Obtain daily weights to monitor trends.

## 2022-11-09 NOTE — PROGRESS NOTE ADULT - SUBJECTIVE AND OBJECTIVE BOX
[facility]                                                         CARDIOLOGY AT [place] [address]                                                         Telephone: [phone]. Fax:[phone]                                                                             PROGRESS NOTE    Reason for follow up: Pericardial Effusion, CAD  Update: Patient resting comfortably in bed, started on heparin gtt yesterday afternoon for segmental/subsegmental PE, SVC occlusion, and IVC thrombus. Patient with no further drainage of pericardial drain, plan for D/C today.       Review of symptoms:   Cardiac:  No chest pain. No dyspnea. No palpitations.  Respiratory: no cough. No dyspnea  Gastrointestinal: No diarrhea. No abdominal pain. No bleeding.   Neuro: No focal neuro complaints.    Vitals:  T(C): 36.8 (11-09-22 @ 11:19), Max: 36.8 (11-09-22 @ 11:19)  HR: 87 (11-09-22 @ 14:00) (82 - 100)  BP: 83/61 (11-09-22 @ 14:00) (75/61 - 97/74)  RR: 18 (11-09-22 @ 14:00) (17 - 26)  SpO2: 93% (11-09-22 @ 14:00) (87% - 98%)  Wt(kg): --  I&O's Summary    08 Nov 2022 07:01  -  09 Nov 2022 07:00  --------------------------------------------------------  IN: 1346 mL / OUT: 2005 mL / NET: -659 mL    09 Nov 2022 07:01  -  09 Nov 2022 14:59  --------------------------------------------------------  IN: 316 mL / OUT: 200 mL / NET: 116 mL      Weight (kg): 38 (11-07 @ 18:10)    PHYSICAL EXAM:  Constitutional: Cachectic   HEENT: Atraumatic and normocephalic , neck is supple . no JVD. No carotid bruit.  CNS: A&Ox3. No focal deficits.   Respiratory: CTAB, unlabored   Cardiovascular: RRR normal s1 s2. No murmur. No rubs or gallop. + pericardial drain  Gastrointestinal: Soft, non-tender. +Bowel sounds.   Extremities: 2+ Peripheral Pulses, No clubbing, cyanosis, or edema  Psychiatric: Calm . no agitation.   Skin: hyperpigmented skin       CURRENT CARDIAC MEDICATIONS:      CURRENT OTHER MEDICATIONS:  albuterol/ipratropium for Nebulization 3 milliLiter(s) Nebulizer every 6 hours  polyethylene glycol 3350 17 Gram(s) Oral daily  atorvastatin 80 milliGRAM(s) Oral at bedtime  aspirin  chewable 81 milliGRAM(s) Oral daily  chlorhexidine 2% Cloths 1 Application(s) Topical daily  heparin   Injectable 3000 Unit(s) IV Push every 6 hours PRN For aPTT less than 40  heparin   Injectable 1500 Unit(s) IV Push every 6 hours PRN For aPTT between 40 - 57  heparin  Infusion.  Unit(s)/Hr (7 mL/Hr) IV Continuous <Continuous>  influenza  Vaccine (HIGH DOSE) 0.7 milliLiter(s) IntraMuscular once  sodium phosphate 15 milliMole(s)/250 mL IVPB 15 milliMole(s) IV Intermittent once, Stop order after: 1 Doses      LABS:	 	  ( 09 Nov 2022 04:25 )  Troponin T  0.06 ,  CPK  X    , CKMB  X    , BNP X        , ( 08 Nov 2022 10:50 )  Troponin T  0.11<H>,  CPK  477<H>, CKMB  X    , BNP X        , ( 08 Nov 2022 04:43 )  Troponin T  0.15<H>,  CPK  X    , CKMB  X    , BNP X                                  11.8   9.21  )-----------( 127      ( 09 Nov 2022 04:25 )             33.4     11-09    134<L>  |  99  |  39.0<H>  ----------------------------<  74  4.0   |  28.0  |  0.65    Ca    8.6      09 Nov 2022 04:25  Phos  1.8     11-09  Mg     2.5     11-09    TPro  5.6<L>  /  Alb  2.7<L>  /  TBili  2.2<H>  /  DBili  x   /  AST  117<H>  /  ALT  327<H>  /  AlkPhos  96  11-09    PT/INR/PTT ( 09 Nov 2022 12:40 )                       :                       :      X            :       38.5                  .        .                   .              .           .       X           .                                       Lipid Profile: Date: 11-08 @ 04:43  Total cholesterol 152; Direct LDL: --; HDL: 22; Triglycerides:129    HgA1c:   TSH: Thyroid Stimulating Hormone, Serum: 0.94 uIU/mL      TELEMETRY: SR, ST  ECG:    DIAGNOSTIC TESTING:  [ ] Echocardiogram:   < from: TTE Echo Limited or F/U (11.08.22 @ 11:33) >  PHYSICIAN INTERPRETATION:  Left Ventricle: Endocardial visualization was enhanced with intravenous   echo contrast. The left ventricular internal cavity size is normal. Left   ventricular wall thickness is normal.  Global LV systolic function was severely decreased. Left ventricular   ejection fraction, by visual estimation, is 20 to 25%. The mitral in-flow   pattern reveals no discernable A-wave, therefore no comment on diastolic   function can be made.      LV Wall Scoring:  The entire apex, mid and apical anterior wall, mid and apical anterior   septum,  mid and apical inferior septum, mid and apical inferior wall, mid  inferolateral segment, and mid anterolateral segment are akinetic.    Right Ventricle: The right ventricle was not well visualized.  Pericardium: A small pericardial effusion is present. The pericardial   effusion is localized near the right atrium, anterior to the right   ventricle and posterior and lateral to the left ventricle. Mild ascites   is noted. There is a small pleural effusion in the right lateral region.  Venous: The inferior vena cava was normal sized, with respiratory size   variation greater than 50%.      Summary:   1. Technically adequate study.   2. Left ventricular ejection fraction, by visual estimation, is 20 to   25%.   3. Severely decreased global left ventricular systolic function.   4. Multiple left ventricular regional wall motion abnormalities exist,   this is possibly consistent with stress induced cardiomyopathy /   Takotsubo cardiomyopathy. Clinically correlate.   5. The mitral in-flow pattern reveals no discernable A-wave, therefore   no comment on diastolic function can be made.   6. Small pericardial effusion, localized anterior to the RV and RA.   7. Mild ascites.   8. Endocardial visualization was enhanced with intravenous echo contrast.   9. Echogenic structure noted in the pericardial space, possibly   consistent with pericardial drain.  10. Findings disucssed with ICU team.    Mirela Grier MD Electronically signed on 11/8/2022 at 1:13:50 PM        < end of copied text >    [ ]  Catheterization:  < from: Cardiac Catheterization (11.07.22 @ 18:15) >  Diagnostic Conclusions:     Patient presented with shortness of breath   ECG showed ST elevations inferiorly.   POCUS showed a moderate to large sized pericardial effusion.   Patient brought to Cath lab emergently for Cath for a Cath and  possible pericardiocentesis.  Recommendations:     Normal flow in coronary arteries.   NOT a STEMI   Pericardiocentesis performed.   Drain left in   PCI of LAD once patient has stabilized.     Acute complication:    No complications     Procedure Narrative:   The risks and alternatives of the procedures and conscious sedation  were explained to the patient and informed consent was  obtained. The patient was brought to the cath lab and placed on the  exam table.  Access   Left femoral artery:   Vascular access was obtained using modified seldinger technique.      Coronary Angiography   Left Coronary System:   A catheter was positioned into the vessel ostia under fluoroscopic  guidance. Angiograms were obtained in multiple views.  Right Coronary System:     Patient: STEVE DENISE             MRN: [id number]  Study Date: 11/07/2022   06:15 PM      Page 1 of 4          A catheter was positioned into the vessel ostia under fluoroscopic  guidance. Angiograms were obtained in multiple views.    Pericardiocentesis   Pericardiocentesis was performed. Along thin-walled needle was  advanced into the pericardial space until fluid was aspirated.  Over a floppy wire, the needle was replaced with a catheter.      Diagnostic Findings:     Coronary Angiography   The coronary circulation is right dominant.     LM   Left main artery: The segment is visually normal in size and  structure.    LAD   Mid left anterior descending: There is a 90 % stenosis.      CX   Circumflex: Angiography shows mild atherosclerosis.      RCA   Proximal right coronary artery: The segment is moderately calcified.  There is a 30 % stenosis. Mid right coronary artery: There  is a 20 % stenosis. Distal right coronary artery: The segment is  severely calcified. There is a 60 % stenosis. OSVALDO Flow 3.    Left Heart Cath   Global left ventricular function is normal. Ejection fraction is 60%.    Valves   Aortic Valve: There is no aortic valve stenosis.    Mitral Valve: The mitral valve exhibits trivial (less than 1+)  regurgitation.    < end of copied text >    [ ] Stress Test:    OTHER: 	  < from: CT Angio Chest PE Protocol w/ IV Cont (11.08.22 @ 14:17) >  IMPRESSION:  Left upper lobe segmental and subsegmental PE.  Occlusion of the SVC and a thrombus within the intrahepatic IVC.  Mediastinal and right hilar lymphadenopathy with resultant central   bronchial obstruction and atelectasis involving the right middle and   lower lobes.  4.1 cm sized left lower lobe mass compatible with a pulmonary malignancy.  Moderate right and a trace left pleural effusion.  Bowel containing bilateral inguinal hernias, right more than left.      VERTEBRAL BODY ANALYSIS: No Vertebral fracture or low bone density   identified.    Findings were discussed with Dr. [id number] 11/8/2022 3:26 PM by   Dr. Bird with read back confirmation.    < end of copied text >

## 2022-11-09 NOTE — PROCEDURE NOTE - GENERAL PROCEDURE DETAILS
pericardial drain removed, hemodynamically stable, pressure held for 5 min and pressure dressing applied

## 2022-11-09 NOTE — DIETITIAN INITIAL EVALUATION ADULT - ETIOLOGY
related to inability to meet sufficient protein-energy in setting of large pericardial effusion, left lung mass (suspicious for malignancy), skin breakdown

## 2022-11-09 NOTE — PROGRESS NOTE ADULT - ASSESSMENT
68 y/o M with no known PMH (does not see doctors), active smoker, large pericardial effusion s/p pericardiocentesis, left lung mass, acute Kidney Injury, and transaminitis, now with segmental Pulmonary Embolism and thrombi in the SVC and IVC.  Heparin drip started.  Admitted to MICU.    - 800cc bloody fluid drained from pericardium, concern for malignant effusion given heavy smoking history and lung mass.  Pericardial fluid showed clusters of large cells with nuclear atypia suspicious for malignancy.  Pericardial pigtail in place to gravity.  40 cc of drainage in the last 12 hours.  Interventional cardiology is following.  We will continue to monitor pericardial drain output closely.   - Transthoracic echocardiogram showed left ventricular ejection fraction of 20 to 25% with severely decreased global left ventricular systolic function.  There were multiple left ventricular regional wall motion abnormalities present possibly representing stress induced cardiomyopathy/   Takotsubo cardiomyopathy.   - Continue to monitor hemodynamics closely to maintain MAP > 65 mm Hg  - FILIBERTO likely pre-renal, BUN/Cr ratio 63, continue IVF hydration  - trend BUN/Cr, electrolytes, acid-base balance, monitor UOP  - Transaminitis - Possibly related to acute liver shock from reduced Left Ventricle Ejection Fraction or congestive hepatopathy due to pericardial effusion and RV dysfunction.  RUQ ultrasound significant for distended gallbladder filled with sludge.  Continue to trend LFTs and avoid hepatotoxic agents  - CT abdomen/pelvis/chest pending  - Continue inhaled bronchodilators as he is currently bronchospastic, although moving air and oxygenating with nasal O2  - maintain SpO2 > 92%    seen and examined with Dr. Boland, agree with above, in addition to the followin y/o M with active smoking history, present with dyspnea, s/p LHC found 90% mLAD occlusion but PCI deferred due to finding of large pericardial effusion.  Patient s/p periardiocentesis with drainage, fluid cell count noted suspicious for malignant cells, pending cyto.  Patient also has incidental finding of Left sided lung mass, concern for malignancy.  Patient s/p abdominal US, found non occlusive thrombus.  CT chest found segemental PE, but also has thrombus in SVC and IVC.  Heparin drip started after risk and benefit discussion with family.   Patient from [place], has no biological children.  Had multiple family and friends who visited him.  Patient himself also does not appear to have good grasp of what happened.  Palliative consulted, will need family meeting.  patient noted enlarge mediastinal LN.  If cytology of the pericardial fluid is negative, potential EBUS for TBNA of the subcarinal LN.       66 y/o M with no known PMH (does not see doctors), active smoker, large pericardial effusion s/p pericardiocentesis, left lung mass, acute Kidney Injury, and transaminitis, now with segmental Pulmonary Embolism and thrombi in the SVC and IVC.  Heparin drip started.  Admitted to MICU.  Plan to downgrade today.      - 800cc bloody fluid drained from pericardium, concern for malignant effusion given heavy smoking history and lung mass.  Pericardial fluid showed clusters of large cells with nuclear atypia suspicious for malignancy.  Pericardial pigtail in place to gravity.  40 cc of drainage in the last 12 hours.  Interventional cardiology is following.  We will continue to monitor pericardial drain output closely.   - Transthoracic echocardiogram showed left ventricular ejection fraction of 20 to 25% with severely decreased global left ventricular systolic function.  There were multiple left ventricular regional wall motion abnormalities present possibly representing stress induced cardiomyopathy/   Takotsubo cardiomyopathy.   - Continue to monitor hemodynamics closely to maintain MAP > 65 mm Hg  - FILIBERTO likely pre-renal, BUN/Cr ratio 63, continue IVF hydration  - Transaminitis - Possibly related to acute liver shock from reduced Left Ventricle Ejection Fraction or congestive hepatopathy due to pericardial effusion and RV dysfunction.  RUQ ultrasound significant for distended gallbladder filled with sludge.  Continue to trend LFTs and avoid hepatotoxic agents  - Saadet is s/p kassandra-cardiocentesis with drainage, fluid cell count noted suspicious for malignant cells, pending cyto.  Patient also has incidental finding of Left sided lung mass, concern for malignancy.  Patient s/p abdominal US, found non occlusive thrombus.  CT chest found segmental PE, but also has thrombus in SVC and IVC.  Heparin drip started after risk and benefit discussion with family.   Patient from [place], has no biological children. Patient himself also does not appear to have good grasp of what happened.  Palliative consulted, will need family meeting.  Patient noted enlarge mediastinal LN.  If cytology of the pericardial fluid is negative.         66 y/o M admitted with diffuse ST elevations found to have a large pericardial effusion s/p pericardiocentesis, left lung mass, acute kidney Injury, and transaminitis, now with segmental pulmonary embolism and thrombi in the SVC and IVC.  Heparin drip started.  Admitted to MICU.  Plan to downgrade today.      - 800cc bloody fluid drained from pericardium, concern for malignant effusion given heavy smoking history and lung mass.  Pericardial fluid showed clusters of large cells with nuclear atypia suspicious for malignancy.  Pericardial pigtail in place to gravity.  40 cc of drainage in the last 12 hours.  Interventional cardiology is following.  We will continue to monitor pericardial drain output closely.   - Transthoracic echocardiogram showed left ventricular ejection fraction of 20 to 25% with severely decreased global left ventricular systolic function.  There were multiple left ventricular regional wall motion abnormalities present possibly representing stress induced cardiomyopathy/   Takotsubo cardiomyopathy.   - Continue to monitor hemodynamics closely to maintain MAP > 65 mm Hg  - FILIBERTO likely pre-renal, BUN/Cr ratio 63, continue IVF hydration  - Transaminitis - Possibly related to acute liver shock from reduced Left Ventricle Ejection Fraction or congestive hepatopathy due to pericardial effusion and RV dysfunction.  RUQ ultrasound significant for distended gallbladder filled with sludge.  Continue to trend LFTs and avoid hepatotoxic agents  - In summary, Saadet is status post kassandra-cardiocentesis with drainage, fluid cell count noted suspicious for malignant cells, pending cyto.  Patient also has incidental finding of Left sided lung mass, concern for malignancy.  Patient s/p abdominal US, found non occlusive thrombus.  CT chest found segmental PE, but also has thrombus in SVC and IVC.  Heparin drip started after risk and benefit discussion with family.   Patient from [place], has no biological children. Patient himself also does not appear to have good grasp of what happened.  Palliative consulted.  Patient noted enlarge mediastinal LN.  If cytology of the pericardial fluid is negative.      Plan to downgrade today.

## 2022-11-09 NOTE — DIETITIAN NUTRITION RISK NOTIFICATION - ADDITIONAL COMMENTS/DIETITIAN RECOMMENDATIONS
Continue diet as tolerated.   Add Ensure TID to optimize po intake and provide an additional 350 kcal, 20g protein per serving.  Rx: MVI and vitamin C 500mg daily to aid in wound healing. Encourage po intake, monitor diet tolerance, and provide assistance at meals as needed. Obtain daily weights to monitor trends.

## 2022-11-09 NOTE — PROGRESS NOTE ADULT - ASSESSMENT
67 yr old male with no known medical history, current smoker presented with complaints of 1 week of shortness of breath. His EKG on arrival revealed diffuse ST elevations, a limited ECHO revealed circumferential pericardial effusion. He was emergently taken to cardiac cath lab where he underwent pericardiocentesis and coronary angiogram. A pericardial drain was placed, cath revealed a 90% LAD lesion, which was not felt to be acute and given pericardial effusion, PCI was deferred. His presentation was attributed to acute pericarditis He was placed on a NRB, transferred to ICU for further monitoring. His labs on admission revealed FILIBERTO and elevated LFTs, IVF were given. A US abdomen was ordered, revealed non occlusive thrombus in IVC and gall bladder sludge. CXR on admission revealed a left lung mass. Subsequent CTA chest, abdomen and pelvis was done, revealed left upper lobe segmental and subsegmental PE. Occlusion of the SVC and a thrombus within the intrahepatic IVC. Mediastinal and right hilar lymphadenopathy with resultant central  bronchial obstruction and atelectasis involving the right middle and lower lobes. 4.1 cm sized left lower lobe mass compatible with a pulmonary malignancy. Moderate right and a trace left pleural effusion. Concern for malignant cells in pericardial fluid, formal cytology report pending. ECHO revealed a EF 25%. Cardiology follow up was done, advised continue aspirin and statin, unable to initiate GDMT given hypotension. Heparin gtt was initiated, palliative care was consulted. He was transferred to medical floor on 11/9.    1. Acute pericardial effusion s/p pericardiocentesis:  Drain present  monitor output.  watch for bleeding    2. Acute pulmonary embolism:  Continue heparin gtt  monitor for bleeding, hypoxia.    3. CAD, HFrEF:  PCI not performed on admission given effusion  unable to initiate GDMT given hypotension  continue aspirin, statin.  has LAD lesion.    4. Lung ca:  Oncology evaluation  follow up cytology evaluation  palliative care consulted in ICU.      5. Severe protein calorie malnutrition:  Nutrition evaluation.       Discussed with patient.  Guarded prognosis, will need ongoing discussions with patient alongside palliative support.  Patient has no family in [place], lives alone.

## 2022-11-09 NOTE — DIETITIAN NUTRITION RISK NOTIFICATION - UPON NUTRITIONAL ASSESSMENT BY THE REGISTERED DIETITIAN YOUR PATIENT WAS DETERMINED TO MEET CRITERIA/HAS EVIDENCE OF THE FOLLOWING DIAGNOSIS:
Patient understands condition, prognosis and need for follow up care. Severe protein-calorie malnutrition

## 2022-11-09 NOTE — DIETITIAN INITIAL EVALUATION ADULT - PERTINENT LABORATORY DATA
11-09    134<L>  |  99  |  39.0<H>  ----------------------------<  74  4.0   |  28.0  |  0.65    Ca    8.6      09 Nov 2022 04:25  Phos  1.8     11-09  Mg     2.5     11-09    TPro  5.6<L>  /  Alb  2.7<L>  /  TBili  2.2<H>  /  DBili  x   /  AST  117<H>  /  ALT  327<H>  /  AlkPhos  96  11-09  POCT Blood Glucose.: 119 mg/dL (11-08-22 @ 23:55)  A1C with Estimated Average Glucose Result: 6.1 % (11-08-22 @ 04:43)

## 2022-11-09 NOTE — PROGRESS NOTE ADULT - ASSESSMENT
A/P: 68 y/o old male , active heavy smoker, no known medical history was having shortness of breath for past 1 week. His friend got him to hospital as he was sob. On arrival his EKG showed diffuse ST elevations, and bedside limited echo showed circumferential pericardial effusion.

## 2022-11-09 NOTE — PROGRESS NOTE ADULT - ATTENDING COMMENTS
Large pericardial effusion S/P pericardiocentesis   Segmental PE   Suspected stage IV lung CA based on imaging   HFrEF    F/U pericardial fluid cytology   IR eval for LN biopsy if feasible   C/W AC for SVC/IVC thrombus and PE  HFrEF goal directed therapy if tolerated
seen and examined with Dr. Boland, agree with above, in addition to the followin y/o M with active smoking history, present with dyspnea, s/p LHC found 90% mLAD occlusion but PCI deferred due to finding of large pericardial effusion.  Patient s/p periardiocentesis with drainage, fluid cell count noted suspicious for malignant cells, pending cyto.  Patient also has incidental finding of Left sided lung mass, concern for malignancy.  Patient s/p abdominal US, found non occlusive thrombus.  CT chest found segemental PE, but also has thrombus in SVC and IVC.  Heparin drip started after risk and benefit discussion with family.   Patient from [place], has no biological children.  Had multiple family and friends who visited him.  Patient himself also does not appear to have good grasp of what happened.  Palliative consulted, will need family meeting.  patient noted enlarge mediastinal LN.  If cytology of the pericardial fluid is negative, potential EBUS for TBNA of the subcarinal LN.

## 2022-11-09 NOTE — DIETITIAN NUTRITION RISK NOTIFICATION - TREATMENT: THE FOLLOWING DIET HAS BEEN RECOMMENDED
Diet, DASH/TLC:   Sodium & Cholesterol Restricted     Special Instructions for Nursing:  Sodium & Cholesterol Restricted (11-07-22 @ 19:12) [Active]

## 2022-11-09 NOTE — PROGRESS NOTE ADULT - ASSESSMENT
68 y/o old male, active heavy tobacco hx, no known medical history presented w/ 1 week of SOB. On arrival his EKG showed diffuse ST elevations, and bedside limited echo showed circumferential pericardial effusion. He was taken emergently to cath lab for pericardiocentesis and cardiac cath. Negative for STEMI. Cath showed 90 % m LAD, no acute lesions. 420 ml fluid drained. Pt w/ large lung mass, compressive LAD, PE, and thrombus in IVC.    s/p Drainge of  40 ml total 11/8. scant sanguinous drainage this am  Palliative consult pending     68 y/o old male, active heavy tobacco hx, no known medical history presented w/ 1 week of SOB. On arrival his EKG showed diffuse ST elevations, and bedside limited echo showed circumferential pericardial effusion. He was taken emergently to cath lab for pericardiocentesis and cardiac cath. Negative for STEMI. Cath showed 90 % m LAD, no acute lesions. 420 ml fluid drained. Pt w/ large lung mass, compressive LAD, PE, and thrombus in IVC.    - s/p Drainge of  40 ml total 11/8. scant sanguinous drainage this am  - started on heparin gtt. will monitor drainage for additional day  - not a candidate for PCI at this time due to concern for advanced metastatic disease. can be reassessed pending clinical course  - Palliative consult pending     68 y/o old male, active heavy tobacco hx, no known medical history presented w/ 1 week of SOB. On arrival his EKG showed diffuse ST elevations, and bedside limited echo showed circumferential pericardial effusion. He was taken emergently to cath lab for pericardiocentesis and cardiac cath. Negative for STEMI. Cath showed 90 % m LAD, no acute lesions. 420 ml fluid drained. Pt w/ large lung mass, compressive LAD, PE, and thrombus in IVC.    - s/p Drainge of  40 ml total 11/8. scant sanguinous drainage this am  - will remove drain today.  - not a candidate for PCI at this time due to concern for advanced metastatic disease. can be reassessed pending clinical course  - Palliative consult pending

## 2022-11-09 NOTE — PROGRESS NOTE ADULT - PROBLEM SELECTOR PLAN 1
- EF 20-25%, severely depressed LV systolic function, likely ischemic cardiomyopathy  - Now s/p LHC showing mLAD 90%, dRCA 60% OSVALDO flow 3  - Patient overall too unstable at this time for revascularization, no plans for repeat LHC at this time.   - Continue aspirin and statin.   - Unable to add beta blocker or ACEi/ARB due to hypotension.   - IV fluid resuscitation at this time.   - for now will need medical therapy   - hold diuretics, pt w/ pericardial effusion s/p pericardiocentesis  - afterload reduction after drain is removed w/ entresto   - Diet/lifestyle modifications and medication compliance heavily reinforced   - Repeat echo with small effusion EF still 20-25%.  - for any complaint of chest pain please check trop, CPK, and EKG.

## 2022-11-09 NOTE — DIETITIAN INITIAL EVALUATION ADULT - OTHER INFO
67 year old male with no known PMH (does not see doctors), active smoker, with large pericardial effusion s/p pericardiocentesis, left lung mass, acute kidney injury, and transaminitis, admitted to MICU. 800 ml bloody fluid drained from pericardium, concern for malignant effusion given heavy smoking history and lung mass.  Pericardial fluid showed clusters of large cells with nuclear atypia suspicious for malignancy. Pericardial pigtail in place to gravity.    Pt lethargic at time of interview. Consult received for pressure injury, noted with stage II to coccyx. Currently on a DASH/TLC diet- decreased appetite, breakfast tray observed at bedside, <50% consumed per plate waste. Uncertain of weight changes at this time. Pt appears very thin- NFPE consistent with severe muscle/fat loss. RD to follow up as feasible.

## 2022-11-09 NOTE — DIETITIAN INITIAL EVALUATION ADULT - NS FNS DIET ORDER
Diet, DASH/TLC:   Sodium & Cholesterol Restricted     Special Instructions for Nursing:  Sodium & Cholesterol Restricted (11-07-22 @ 19:12)

## 2022-11-09 NOTE — PROGRESS NOTE ADULT - SUBJECTIVE AND OBJECTIVE BOX
INTERVAL HPI/OVERNIGHT EVENTS:    ICU ACCEPTANCE NOTE:    CC: acute pulmonary embolism, CAD, HFrEF, lung mass      Chart and course reviewed.  States he feels ok  asked if he is aware of ongoing medical issues, he stated 'kind off', wants to know if he could 'beat this'  lives alone, has no local family.  smoker > 30 yrs  never saw a doctor  reports poor appetite and weight loss > 3 yrs      Vital Signs Last 24 Hrs  T(C): 36.4 (09 Nov 2022 15:56), Max: 36.8 (09 Nov 2022 11:19)  T(F): 97.5 (09 Nov 2022 15:56), Max: 98.2 (09 Nov 2022 11:19)  HR: 90 (09 Nov 2022 15:55) (82 - 97)  BP: 78/67 (09 Nov 2022 15:00) (75/61 - 97/74)  BP(mean): 72 (09 Nov 2022 15:00) (67 - 83)  RR: 18 (09 Nov 2022 15:00) (17 - 26)  SpO2: 98% (09 Nov 2022 15:55) (88% - 98%)    Parameters below as of 09 Nov 2022 15:55  Patient On (Oxygen Delivery Method): nasal cannula        PHYSICAL EXAM:    GENERAL: alert, flat affect, cachetic male, not in distress  CHEST/LUNG: b/l air entry, coarse, pericardial drain present.   HEART: reg  ABDOMEN: scaphoid, non tender, bs+  EXTREMITIES:  no edema, tenderness    MEDICATIONS  (STANDING):  albuterol/ipratropium for Nebulization 3 milliLiter(s) Nebulizer every 6 hours  aspirin  chewable 81 milliGRAM(s) Oral daily  atorvastatin 80 milliGRAM(s) Oral at bedtime  chlorhexidine 2% Cloths 1 Application(s) Topical daily  heparin  Infusion.  Unit(s)/Hr (7 mL/Hr) IV Continuous <Continuous>  influenza  Vaccine (HIGH DOSE) 0.7 milliLiter(s) IntraMuscular once  polyethylene glycol 3350 17 Gram(s) Oral daily  sodium phosphate 15 milliMole(s)/250 mL IVPB 15 milliMole(s) IV Intermittent once    MEDICATIONS  (PRN):  heparin   Injectable 3000 Unit(s) IV Push every 6 hours PRN For aPTT less than 40  heparin   Injectable 1500 Unit(s) IV Push every 6 hours PRN For aPTT between 40 - 57      Allergies    No Known Allergies    Intolerances          LABS:                          11.8   9.21  )-----------( 127      ( 09 Nov 2022 04:25 )             33.4     11-09    134<L>  |  99  |  39.0<H>  ----------------------------<  74  4.0   |  28.0  |  0.65    Ca    8.6      09 Nov 2022 04:25  Phos  1.8     11-09  Mg     2.5     11-09    TPro  5.6<L>  /  Alb  2.7<L>  /  TBili  2.2<H>  /  DBili  x   /  AST  117<H>  /  ALT  327<H>  /  AlkPhos  96  11-09    PT/INR - ( 09 Nov 2022 04:25 )   PT: 18.9 sec;   INR: 1.62 ratio         PTT - ( 09 Nov 2022 12:40 )  PTT:38.5 sec      RADIOLOGY & ADDITIONAL TESTS:

## 2022-11-10 DIAGNOSIS — R91.8 OTHER NONSPECIFIC ABNORMAL FINDING OF LUNG FIELD: ICD-10-CM

## 2022-11-10 LAB
ALBUMIN SERPL ELPH-MCNC: 2.5 G/DL — LOW (ref 3.3–5.2)
ALP SERPL-CCNC: 92 U/L — SIGNIFICANT CHANGE UP (ref 40–120)
ALT FLD-CCNC: 279 U/L — HIGH
ANION GAP SERPL CALC-SCNC: 10 MMOL/L — SIGNIFICANT CHANGE UP (ref 5–17)
APTT BLD: 56.6 SEC — HIGH (ref 27.5–35.5)
APTT BLD: 89.4 SEC — HIGH (ref 27.5–35.5)
APTT BLD: 95.5 SEC — HIGH (ref 27.5–35.5)
AST SERPL-CCNC: 104 U/L — HIGH
BASOPHILS # BLD AUTO: 0 K/UL — SIGNIFICANT CHANGE UP (ref 0–0.2)
BASOPHILS NFR BLD AUTO: 0 % — SIGNIFICANT CHANGE UP (ref 0–2)
BILIRUB SERPL-MCNC: 1.7 MG/DL — SIGNIFICANT CHANGE UP (ref 0.4–2)
BUN SERPL-MCNC: 26.2 MG/DL — HIGH (ref 8–20)
CALCIUM SERPL-MCNC: 8.3 MG/DL — LOW (ref 8.4–10.5)
CHLORIDE SERPL-SCNC: 97 MMOL/L — SIGNIFICANT CHANGE UP (ref 96–108)
CO2 SERPL-SCNC: 27 MMOL/L — SIGNIFICANT CHANGE UP (ref 22–29)
CREAT SERPL-MCNC: 0.54 MG/DL — SIGNIFICANT CHANGE UP (ref 0.5–1.3)
EGFR: 109 ML/MIN/1.73M2 — SIGNIFICANT CHANGE UP
EOSINOPHIL # BLD AUTO: 0.01 K/UL — SIGNIFICANT CHANGE UP (ref 0–0.5)
EOSINOPHIL NFR BLD AUTO: 0.2 % — SIGNIFICANT CHANGE UP (ref 0–6)
GLUCOSE SERPL-MCNC: 92 MG/DL — SIGNIFICANT CHANGE UP (ref 70–99)
HCT VFR BLD CALC: 32.7 % — LOW (ref 39–50)
HGB BLD-MCNC: 11.4 G/DL — LOW (ref 13–17)
IMM GRANULOCYTES NFR BLD AUTO: 0.5 % — SIGNIFICANT CHANGE UP (ref 0–0.9)
LYMPHOCYTES # BLD AUTO: 0.38 K/UL — LOW (ref 1–3.3)
LYMPHOCYTES # BLD AUTO: 6.3 % — LOW (ref 13–44)
MAGNESIUM SERPL-MCNC: 2.3 MG/DL — SIGNIFICANT CHANGE UP (ref 1.6–2.6)
MCHC RBC-ENTMCNC: 29.8 PG — SIGNIFICANT CHANGE UP (ref 27–34)
MCHC RBC-ENTMCNC: 34.9 GM/DL — SIGNIFICANT CHANGE UP (ref 32–36)
MCV RBC AUTO: 85.6 FL — SIGNIFICANT CHANGE UP (ref 80–100)
MONOCYTES # BLD AUTO: 0.65 K/UL — SIGNIFICANT CHANGE UP (ref 0–0.9)
MONOCYTES NFR BLD AUTO: 10.8 % — SIGNIFICANT CHANGE UP (ref 2–14)
NEUTROPHILS # BLD AUTO: 4.97 K/UL — SIGNIFICANT CHANGE UP (ref 1.8–7.4)
NEUTROPHILS NFR BLD AUTO: 82.2 % — HIGH (ref 43–77)
PHOSPHATE SERPL-MCNC: 1.8 MG/DL — LOW (ref 2.4–4.7)
PLATELET # BLD AUTO: 128 K/UL — LOW (ref 150–400)
POTASSIUM SERPL-MCNC: 3.7 MMOL/L — SIGNIFICANT CHANGE UP (ref 3.5–5.3)
POTASSIUM SERPL-SCNC: 3.7 MMOL/L — SIGNIFICANT CHANGE UP (ref 3.5–5.3)
PROT SERPL-MCNC: 5.7 G/DL — LOW (ref 6.6–8.7)
RBC # BLD: 3.82 M/UL — LOW (ref 4.2–5.8)
RBC # FLD: 16 % — HIGH (ref 10.3–14.5)
SODIUM SERPL-SCNC: 134 MMOL/L — LOW (ref 135–145)
WBC # BLD: 6.04 K/UL — SIGNIFICANT CHANGE UP (ref 3.8–10.5)
WBC # FLD AUTO: 6.04 K/UL — SIGNIFICANT CHANGE UP (ref 3.8–10.5)

## 2022-11-10 PROCEDURE — 99221 1ST HOSP IP/OBS SF/LOW 40: CPT

## 2022-11-10 PROCEDURE — 99233 SBSQ HOSP IP/OBS HIGH 50: CPT

## 2022-11-10 PROCEDURE — 93308 TTE F-UP OR LMTD: CPT | Mod: 26

## 2022-11-10 RX ADMIN — CHLORHEXIDINE GLUCONATE 1 APPLICATION(S): 213 SOLUTION TOPICAL at 12:48

## 2022-11-10 RX ADMIN — HEPARIN SODIUM 1300 UNIT(S)/HR: 5000 INJECTION INTRAVENOUS; SUBCUTANEOUS at 21:52

## 2022-11-10 RX ADMIN — HEPARIN SODIUM 1300 UNIT(S)/HR: 5000 INJECTION INTRAVENOUS; SUBCUTANEOUS at 07:21

## 2022-11-10 RX ADMIN — POLYETHYLENE GLYCOL 3350 17 GRAM(S): 17 POWDER, FOR SOLUTION ORAL at 12:49

## 2022-11-10 RX ADMIN — Medication 3 MILLILITER(S): at 21:37

## 2022-11-10 RX ADMIN — HEPARIN SODIUM 1300 UNIT(S)/HR: 5000 INJECTION INTRAVENOUS; SUBCUTANEOUS at 14:41

## 2022-11-10 RX ADMIN — Medication 3 MILLILITER(S): at 13:27

## 2022-11-10 RX ADMIN — Medication 81 MILLIGRAM(S): at 12:48

## 2022-11-10 RX ADMIN — HEPARIN SODIUM 1300 UNIT(S)/HR: 5000 INJECTION INTRAVENOUS; SUBCUTANEOUS at 19:27

## 2022-11-10 RX ADMIN — ATORVASTATIN CALCIUM 80 MILLIGRAM(S): 80 TABLET, FILM COATED ORAL at 21:51

## 2022-11-10 NOTE — PROGRESS NOTE ADULT - ASSESSMENT
67 yr old male with no known medical history, current smoker presented with complaints of 1 week of shortness of breath. His EKG on arrival revealed diffuse ST elevations, a limited ECHO revealed circumferential pericardial effusion. He was emergently taken to cardiac cath lab where he underwent pericardiocentesis and coronary angiogram. A pericardial drain was placed, cath revealed a 90% LAD lesion, which was not felt to be acute and given pericardial effusion, PCI was deferred. His presentation was attributed to acute pericarditis He was placed on a NRB, transferred to ICU for further monitoring. His labs on admission revealed FILIBERTO and elevated LFTs, IVF were given. A US abdomen was ordered, revealed non occlusive thrombus in IVC and gall bladder sludge. CXR on admission revealed a left lung mass. Subsequent CTA chest, abdomen and pelvis was done, revealed left upper lobe segmental and subsegmental PE. Occlusion of the SVC and a thrombus within the intrahepatic IVC. Mediastinal and right hilar lymphadenopathy with resultant central  bronchial obstruction and atelectasis involving the right middle and lower lobes. 4.1 cm sized left lower lobe mass compatible with a pulmonary malignancy. Moderate right and a trace left pleural effusion. Concern for malignant cells in pericardial fluid, formal cytology report pending. ECHO revealed a EF 25%. Cardiology follow up was done, advised continue aspirin and statin, unable to initiate GDMT given hypotension. Heparin gtt was initiated, palliative care was consulted. He was transferred to medical floor on 11/9.    Acute pericardial effusion s/p pericardiocentesis  - Pericardial drain removed  - Findings concerning for metastatic cancer  - Outpatient PET scan    Acute pulmonary embolism  - Continue heparin drip    CAD, HFrEF  - PCI not performed on admission given effusion  - unable to initiate GDMT given hypotension  - continue aspirin, statin.    Lung Cancer  - Oncology recs appreciated  - follow up cytology evaluation  - Palliative consult appreciated    Severe protein calorie malnutrition  - Nutrition eval    DVT ppx  - Heparin drip 67 yr old male with no known medical history, current smoker presented with complaints of 1 week of shortness of breath. His EKG on arrival revealed diffuse ST elevations, a limited ECHO revealed circumferential pericardial effusion. He was emergently taken to cardiac cath lab where he underwent pericardiocentesis and coronary angiogram. A pericardial drain was placed, cath revealed a 90% LAD lesion, which was not felt to be acute and given pericardial effusion, PCI was deferred. His presentation was attributed to acute pericarditis He was placed on a NRB, transferred to ICU for further monitoring. His labs on admission revealed FILIBERTO and elevated LFTs, IVF were given. A US abdomen was ordered, revealed non occlusive thrombus in IVC and gall bladder sludge. CXR on admission revealed a left lung mass. Subsequent CTA chest, abdomen and pelvis was done, revealed left upper lobe segmental and subsegmental PE. Occlusion of the SVC and a thrombus within the intrahepatic IVC. Mediastinal and right hilar lymphadenopathy with resultant central  bronchial obstruction and atelectasis involving the right middle and lower lobes. 4.1 cm sized left lower lobe mass compatible with a pulmonary malignancy. Moderate right and a trace left pleural effusion. Concern for malignant cells in pericardial fluid, formal cytology report pending. ECHO revealed a EF 25%. Cardiology follow up was done, advised continue aspirin and statin, unable to initiate GDMT given hypotension. Heparin gtt was initiated, palliative care was consulted. He was transferred to medical floor on 11/9.    Acute pericardial effusion s/p pericardiocentesis  - Pericardial drain removed  - Findings concerning for metastatic cancer  - Outpatient PET scan  - IR consulted for biopsy  - Will hold Aspirin for 5 days  - Plan for biopsy on 11/15    Acute pulmonary embolism  - Continue heparin drip    CAD, HFrEF  - PCI not performed on admission given effusion  - unable to initiate GDMT given hypotension  - continue aspirin, statin.    Lung Cancer  - Oncology recs appreciated  - follow up cytology evaluation  - Palliative consult appreciated    Severe protein calorie malnutrition  - Nutrition eval    DVT ppx  - Heparin drip

## 2022-11-10 NOTE — PROGRESS NOTE ADULT - SUBJECTIVE AND OBJECTIVE BOX
[facility]                                                         CARDIOLOGY AT [facility]                                                                  [address], [place] [address]                                                         Telephone: [phone]. Fax:[phone]                                                                             PROGRESS NOTE    Reason for follow up: Pericardial Effusion  Update: Patient had pericardial drain removed yesterday, no issues. Patient seen by Palliative Care as well.       Review of symptoms:   Cardiac:  No chest pain. No dyspnea. No palpitations.  Respiratory: no cough. No dyspnea  Gastrointestinal: No diarrhea. No abdominal pain. No bleeding.   Neuro: No focal neuro complaints.    Vitals:  T(C): 36.7 (11-10-22 @ 05:00), Max: 36.8 (11-09-22 @ 11:19)  HR: 87 (11-10-22 @ 05:00) (84 - 97)  BP: 93/58 (11-10-22 @ 07:00) (78/67 - 119/68)  RR: 18 (11-10-22 @ 05:00) (18 - 25)  SpO2: 93% (11-10-22 @ 05:00) (88% - 98%)  Wt(kg): --  I&O's Summary    09 Nov 2022 07:01  -  10 Nov 2022 07:00  --------------------------------------------------------  IN: 1020 mL / OUT: 1250 mL / NET: -230 mL      Weight (kg): 38 (11-07 @ 18:10)      PHYSICAL EXAM:  Constitutional: Cachectic   HEENT: Atraumatic and normocephalic , neck is supple . no JVD. No carotid bruit.  CNS: A&Ox3. No focal deficits.   Respiratory: CTAB, unlabored   Cardiovascular: RRR normal s1 s2. No murmur. No rubs or gallop. Pericardial drain site c/d/i  Gastrointestinal: Soft, non-tender. +Bowel sounds.   Extremities: 2+ Peripheral Pulses, No clubbing, cyanosis, or edema  Psychiatric: Calm . no agitation.   Skin: hyperpigmented skin       CURRENT CARDIAC MEDICATIONS:      CURRENT OTHER MEDICATIONS:  albuterol/ipratropium for Nebulization 3 milliLiter(s) Nebulizer every 6 hours  polyethylene glycol 3350 17 Gram(s) Oral daily  atorvastatin 80 milliGRAM(s) Oral at bedtime  aspirin  chewable 81 milliGRAM(s) Oral daily  chlorhexidine 2% Cloths 1 Application(s) Topical daily  heparin   Injectable 3000 Unit(s) IV Push every 6 hours PRN For aPTT less than 40  heparin   Injectable 1500 Unit(s) IV Push every 6 hours PRN For aPTT between 40 - 57  heparin  Infusion.  Unit(s)/Hr (7 mL/Hr) IV Continuous <Continuous>  influenza  Vaccine (HIGH DOSE) 0.7 milliLiter(s) IntraMuscular once      LABS:	 	  ( 09 Nov 2022 04:25 )  Troponin T  0.06 ,  CPK  X    , CKMB  X    , BNP X        , ( 08 Nov 2022 10:50 )  Troponin T  0.11<H>,  CPK  477<H>, CKMB  X    , BNP X        , ( 08 Nov 2022 04:43 )  Troponin T  0.15<H>,  CPK  X    , CKMB  X    , BNP X                                  11.4   6.04  )-----------( 128      ( 10 Nov 2022 05:00 )             32.7     11-10    134<L>  |  97  |  26.2<H>  ----------------------------<  92  3.7   |  27.0  |  0.54    Ca    8.3<L>      10 Nov 2022 05:00  Phos  1.8     11-10  Mg     2.3     11-10    TPro  5.7<L>  /  Alb  2.5<L>  /  TBili  1.7  /  DBili  x   /  AST  104<H>  /  ALT  279<H>  /  AlkPhos  92  11-10    PT/INR/PTT ( 10 Nov 2022 05:00 )                       :                       :      X            :       56.6                  .        .                   .              .           .       X           .                                       Lipid Profile: Date: 11-08 @ 04:43  Total cholesterol 152; Direct LDL: --; HDL: 22; Triglycerides:129    HgA1c:   TSH: Thyroid Stimulating Hormone, Serum: 0.94 uIU/mL      TELEMETRY: SR  ECG:    DIAGNOSTIC TESTING:  [ ] Echocardiogram:   < from: TTE Echo Limited or F/U (11.08.22 @ 11:33) >  PHYSICIAN INTERPRETATION:  Left Ventricle: Endocardial visualization was enhanced with intravenous   echo contrast. The left ventricular internal cavity size is normal. Left   ventricular wall thickness is normal.  Global LV systolic function was severely decreased. Left ventricular   ejection fraction, by visual estimation, is 20 to 25%. The mitral in-flow   pattern reveals no discernable A-wave, therefore no comment on diastolic   function can be made.      LV Wall Scoring:  The entire apex, mid and apical anterior wall, mid and apical anterior   septum,  mid and apical inferior septum, mid and apical inferior wall, mid  inferolateral segment, and mid anterolateral segment are akinetic.    Right Ventricle: The right ventricle was not well visualized.  Pericardium: A small pericardial effusion is present. The pericardial   effusion is localized near the right atrium, anterior to the right   ventricle and posterior and lateral to the left ventricle. Mild ascites   is noted. There is a small pleural effusion in the right lateral region.  Venous: The inferior vena cava was normal sized, with respiratory size   variation greater than 50%.      Summary:   1. Technically adequate study.   2. Left ventricular ejection fraction, by visual estimation, is 20 to   25%.   3. Severely decreased global left ventricular systolic function.   4. Multiple left ventricular regional wall motion abnormalities exist,   this is possibly consistent with stress induced cardiomyopathy /   Takotsubo cardiomyopathy. Clinically correlate.   5. The mitral in-flow pattern reveals no discernable A-wave, therefore   no comment on diastolic function can be made.   6. Small pericardial effusion, localized anterior to the RV and RA.   7. Mild ascites.   8. Endocardial visualization was enhanced with intravenous echo contrast.   9. Echogenic structure noted in the pericardial space, possibly   consistent with pericardial drain.  10. Findings disucssed with ICU team.    Mirela Grier MD Electronically signed on 11/8/2022 at 1:13:50 PM        < end of copied text >    [ ]  Catheterization:  < from: Cardiac Catheterization (11.07.22 @ 18:15) >  Diagnostic Conclusions:     Patient presented with shortness of breath   ECG showed ST elevations inferiorly.   POCUS showed a moderate to large sized pericardial effusion.   Patient brought to Cath lab emergently for Cath for a Cath and  possible pericardiocentesis.  Recommendations:     Normal flow in coronary arteries.   NOT a STEMI   Pericardiocentesis performed.   Drain left in   PCI of LAD once patient has stabilized.     Acute complication:    No complications     Procedure Narrative:   The risks and alternatives of the procedures and conscious sedation  were explained to the patient and informed consent was  obtained. The patient was brought to the cath lab and placed on the  exam table.  Access   Left femoral artery:   Vascular access was obtained using modified seldinger technique.      Coronary Angiography   Left Coronary System:   A catheter was positioned into the vessel ostia under fluoroscopic  guidance. Angiograms were obtained in multiple views.  Right Coronary System:     Patient: STEVE DENISE             MRN: [id number]  Study Date: 11/07/2022   06:15 PM      Page 1 of 4          A catheter was positioned into the vessel ostia under fluoroscopic  guidance. Angiograms were obtained in multiple views.    Pericardiocentesis   Pericardiocentesis was performed. Along thin-walled needle was  advanced into the pericardial space until fluid was aspirated.  Over a floppy wire, the needle was replaced with a catheter.      Diagnostic Findings:     Coronary Angiography   The coronary circulation is right dominant.     LM   Left main artery: The segment is visually normal in size and  structure.    LAD   Mid left anterior descending: There is a 90 % stenosis.      CX   Circumflex: Angiography shows mild atherosclerosis.      RCA   Proximal right coronary artery: The segment is moderately calcified.  There is a 30 % stenosis. Mid right coronary artery: There  is a 20 % stenosis. Distal right coronary artery: The segment is  severely calcified. There is a 60 % stenosis. OSVALDO Flow 3.    Left Heart Cath   Global left ventricular function is normal. Ejection fraction is 60%.    Valves   Aortic Valve: There is no aortic valve stenosis.    Mitral Valve: The mitral valve exhibits trivial (less than 1+)  regurgitation.    < end of copied text >    [ ] Stress Test:    OTHER: 	  < from: CT Angio Chest PE Protocol w/ IV Cont (11.08.22 @ 14:17) >  IMPRESSION:  Left upper lobe segmental and subsegmental PE.  Occlusion of the SVC and a thrombus within the intrahepatic IVC.  Mediastinal and right hilar lymphadenopathy with resultant central   bronchial obstruction and atelectasis involving the right middle and   lower lobes.  4.1 cm sized left lower lobe mass compatible with a pulmonary malignancy.  Moderate right and a trace left pleural effusion.  Bowel containing bilateral inguinal hernias, right more than left.      VERTEBRAL BODY ANALYSIS: No Vertebral fracture or low bone density   identified.    Findings were discussed with Dr. [id number] 11/8/2022 3:26 PM by   Dr. Bird with read back confirmation.    < end of copied text >

## 2022-11-10 NOTE — CHART NOTE - NSCHARTNOTEFT_GEN_A_CORE
Palliative care social work note.    SW met with patient to provide support in coping with dx and hospitalization. Patient engaged with SW and addressed hx in [place] and marriage to [place]  woman. Patient had no children and reports having elderly sisters but most family still in [place]. Symptom management addressed with coping with adjustments in health and concerns for future. palliative care following.

## 2022-11-10 NOTE — CONSULT NOTE ADULT - SUBJECTIVE AND OBJECTIVE BOX
67 yr old male with no known medical history, current smoker presented with complaints of 1 week of shortness of breath. His EKG on arrival revealed diffuse ST elevations, a limited ECHO revealed circumferential pericardial effusion. He was emergently taken to cardiac cath lab where he underwent pericardiocentesis and coronary angiogram. A pericardial drain was placed, cath revealed a 90% LAD lesion, which was not felt to be acute and given pericardial effusion, PCI was deferred. His presentation was attributed to acute pericarditis He was placed on a NRB, transferred to ICU for further monitoring. His labs on admission revealed FILIBERTO and elevated LFTs, IVF were given. A US abdomen was ordered, revealed non occlusive thrombus in IVC and gall bladder sludge. CXR on admission revealed a left lung mass. Subsequent CTA chest, abdomen and pelvis was done, revealed left upper lobe segmental and subsegmental PE. Occlusion of the SVC and a thrombus within the intrahepatic IVC. Mediastinal and right hilar lymphadenopathy with resultant central  bronchial obstruction and atelectasis involving the right middle and lower lobes. 4.1 cm sized left lower lobe mass compatible with a pulmonary malignancy. Moderate right and a trace left pleural effusion. Concern for malignant cells in pericardial fluid, formal cytology report pending. ECHO revealed a EF 25%. Cardiology follow up was done, advised continue aspirin and statin, unable to initiate GDMT given hypotension. Heparin gtt was initiated, palliative care was consulted. He was transferred to medical floor on 11/9.    seen at bedside    ROS  negative other than HPI    PAST MEDICAL & SURGICAL HISTORY:  No pertinent past medical history      No significant past surgical history      Social History:    FAMILY HISTORY:      MEDICATIONS  (STANDING):  albuterol/ipratropium for Nebulization 3 milliLiter(s) Nebulizer every 6 hours  aspirin  chewable 81 milliGRAM(s) Oral daily  atorvastatin 80 milliGRAM(s) Oral at bedtime  chlorhexidine 2% Cloths 1 Application(s) Topical daily  heparin  Infusion.  Unit(s)/Hr (7 mL/Hr) IV Continuous <Continuous>  influenza  Vaccine (HIGH DOSE) 0.7 milliLiter(s) IntraMuscular once  polyethylene glycol 3350 17 Gram(s) Oral daily    ICU Vital Signs Last 24 Hrs  T(C): 36.7 (10 Nov 2022 07:55), Max: 36.8 (09 Nov 2022 11:19)  T(F): 98 (10 Nov 2022 07:55), Max: 98.2 (09 Nov 2022 11:19)  HR: 87 (10 Nov 2022 07:55) (87 - 97)  BP: 98/61 (10 Nov 2022 07:55) (78/67 - 119/68)  BP(mean): 85 (09 Nov 2022 19:24) (69 - 85)  ABP: --  ABP(mean): --  RR: 18 (10 Nov 2022 07:55) (18 - 25)  SpO2: 92% (10 Nov 2022 07:55) (88% - 98%)    O2 Parameters below as of 10 Nov 2022 07:55  Patient On (Oxygen Delivery Method): nasal cannula  O2 Flow (L/min): 2      Gen - alert and oriented  Heart - S1S2 RRR  Lung - dec BS  Abd - soft ND NT  eXT - NO EDEMA                          11.4   6.04  )-----------( 128      ( 10 Nov 2022 05:00 )             32.7     11-10    134<L>  |  97  |  26.2<H>  ----------------------------<  92  3.7   |  27.0  |  0.54    Ca    8.3<L>      10 Nov 2022 05:00  Phos  1.8     11-10  Mg     2.3     11-10    TPro  5.7<L>  /  Alb  2.5<L>  /  TBili  1.7  /  DBili  x   /  AST  104<H>  /  ALT  279<H>  /  AlkPhos  92  11-10     67 yr old male with no known medical history, current smoker presented with complaints of 1 week of shortness of breath. His EKG on arrival revealed diffuse ST elevations, a limited ECHO revealed circumferential pericardial effusion. He was emergently taken to cardiac cath lab where he underwent pericardiocentesis and coronary angiogram. A pericardial drain was placed, cath revealed a 90% LAD lesion, which was not felt to be acute and given pericardial effusion, PCI was deferred. His presentation was attributed to acute pericarditis He was placed on a NRB, transferred to ICU for further monitoring. His labs on admission revealed FILIBERTO and elevated LFTs, IVF were given. A US abdomen was ordered, revealed non occlusive thrombus in IVC and gall bladder sludge. CXR on admission revealed a left lung mass. Subsequent CTA chest, abdomen and pelvis was done, revealed left upper lobe segmental and subsegmental PE. Occlusion of the SVC and a thrombus within the intrahepatic IVC. Mediastinal and right hilar lymphadenopathy with resultant central  bronchial obstruction and atelectasis involving the right middle and lower lobes. 4.1 cm sized left lower lobe mass compatible with a pulmonary malignancy. Moderate right and a trace left pleural effusion. Concern for malignant cells in pericardial fluid, formal cytology report pending. ECHO revealed a EF 25%. Cardiology follow up was done, advised continue aspirin and statin, unable to initiate GDMT given hypotension. Heparin gtt was initiated, palliative care was consulted. He was transferred to medical floor on 11/9.    patient gone for echo this am    ROS  negative other than HPI    PAST MEDICAL & SURGICAL HISTORY:  No pertinent past medical history      No significant past surgical history      Social History:    FAMILY HISTORY:      MEDICATIONS  (STANDING):  albuterol/ipratropium for Nebulization 3 milliLiter(s) Nebulizer every 6 hours  aspirin  chewable 81 milliGRAM(s) Oral daily  atorvastatin 80 milliGRAM(s) Oral at bedtime  chlorhexidine 2% Cloths 1 Application(s) Topical daily  heparin  Infusion.  Unit(s)/Hr (7 mL/Hr) IV Continuous <Continuous>  influenza  Vaccine (HIGH DOSE) 0.7 milliLiter(s) IntraMuscular once  polyethylene glycol 3350 17 Gram(s) Oral daily    ICU Vital Signs Last 24 Hrs  T(C): 36.7 (10 Nov 2022 07:55), Max: 36.8 (09 Nov 2022 11:19)  T(F): 98 (10 Nov 2022 07:55), Max: 98.2 (09 Nov 2022 11:19)  HR: 87 (10 Nov 2022 07:55) (87 - 97)  BP: 98/61 (10 Nov 2022 07:55) (78/67 - 119/68)  BP(mean): 85 (09 Nov 2022 19:24) (69 - 85)  ABP: --  ABP(mean): --  RR: 18 (10 Nov 2022 07:55) (18 - 25)  SpO2: 92% (10 Nov 2022 07:55) (88% - 98%)    O2 Parameters below as of 10 Nov 2022 07:55  Patient On (Oxygen Delivery Method): nasal cannula  O2 Flow (L/min): 2                            11.4   6.04  )-----------( 128      ( 10 Nov 2022 05:00 )             32.7     11-10    134<L>  |  97  |  26.2<H>  ----------------------------<  92  3.7   |  27.0  |  0.54    Ca    8.3<L>      10 Nov 2022 05:00  Phos  1.8     11-10  Mg     2.3     11-10    TPro  5.7<L>  /  Alb  2.5<L>  /  TBili  1.7  /  DBili  x   /  AST  104<H>  /  ALT  279<H>  /  AlkPhos  92  11-10     67 yr old male with no known medical history, current smoker presented with complaints of 1 week of shortness of breath. His EKG on arrival revealed diffuse ST elevations, a limited ECHO revealed circumferential pericardial effusion. He was emergently taken to cardiac cath lab where he underwent pericardiocentesis and coronary angiogram. A pericardial drain was placed, cath revealed a 90% LAD lesion, which was not felt to be acute and given pericardial effusion, PCI was deferred. His presentation was attributed to acute pericarditis He was placed on a NRB, transferred to ICU for further monitoring. His labs on admission revealed FILIBERTO and elevated LFTs, IVF were given. A US abdomen was ordered, revealed non occlusive thrombus in IVC and gall bladder sludge. CXR on admission revealed a left lung mass. Subsequent CTA chest, abdomen and pelvis was done, revealed left upper lobe segmental and subsegmental PE. Occlusion of the SVC and a thrombus within the intrahepatic IVC. Mediastinal and right hilar lymphadenopathy with resultant central  bronchial obstruction and atelectasis involving the right middle and lower lobes. 4.1 cm sized left lower lobe mass compatible with a pulmonary malignancy. Moderate right and a trace left pleural effusion. Concern for malignant cells in pericardial fluid, formal cytology report pending. ECHO revealed a EF 25%. Cardiology follow up was done, advised continue aspirin and statin, unable to initiate GDMT given hypotension. Heparin gtt was initiated, palliative care was consulted. He was transferred to medical floor on 11/9.    seen at bedside, s/p echo this am    ROS  negative other than HPI    PAST MEDICAL & SURGICAL HISTORY:  No pertinent past medical history      No significant past surgical history      Social History:    FAMILY HISTORY:      MEDICATIONS  (STANDING):  albuterol/ipratropium for Nebulization 3 milliLiter(s) Nebulizer every 6 hours  aspirin  chewable 81 milliGRAM(s) Oral daily  atorvastatin 80 milliGRAM(s) Oral at bedtime  chlorhexidine 2% Cloths 1 Application(s) Topical daily  heparin  Infusion.  Unit(s)/Hr (7 mL/Hr) IV Continuous <Continuous>  influenza  Vaccine (HIGH DOSE) 0.7 milliLiter(s) IntraMuscular once  polyethylene glycol 3350 17 Gram(s) Oral daily    ICU Vital Signs Last 24 Hrs  T(C): 36.7 (10 Nov 2022 07:55), Max: 36.8 (09 Nov 2022 11:19)  T(F): 98 (10 Nov 2022 07:55), Max: 98.2 (09 Nov 2022 11:19)  HR: 87 (10 Nov 2022 07:55) (87 - 97)  BP: 98/61 (10 Nov 2022 07:55) (78/67 - 119/68)  BP(mean): 85 (09 Nov 2022 19:24) (69 - 85)  ABP: --  ABP(mean): --  RR: 18 (10 Nov 2022 07:55) (18 - 25)  SpO2: 92% (10 Nov 2022 07:55) (88% - 98%)    O2 Parameters below as of 10 Nov 2022 07:55  Patient On (Oxygen Delivery Method): nasal cannula  O2 Flow (L/min): 2    Gen - alert and oriented  Heart - s1s2 rrr  Lung - Dec BS  ABD - SOFT nd nt  Ext - no edema                          11.4   6.04  )-----------( 128      ( 10 Nov 2022 05:00 )             32.7     11-10    134<L>  |  97  |  26.2<H>  ----------------------------<  92  3.7   |  27.0  |  0.54    Ca    8.3<L>      10 Nov 2022 05:00  Phos  1.8     11-10  Mg     2.3     11-10    TPro  5.7<L>  /  Alb  2.5<L>  /  TBili  1.7  /  DBili  x   /  AST  104<H>  /  ALT  279<H>  /  AlkPhos  92  11-10

## 2022-11-10 NOTE — PROGRESS NOTE ADULT - PROBLEM SELECTOR PLAN 1
- EF 20-25%, severely depressed LV systolic function, likely ischemic cardiomyopathy  - Now s/p LHC showing mLAD 90%, dRCA 60% OSVALDO flow 3  - Patient overall too unstable at this time for revascularization, no plans for repeat LHC at this time.   - Continue aspirin and statin.   - Unable to add beta blocker or ACEi/ARB due to hypotension.   - IV fluid resuscitation at this time.   - For now will need medical therapy   - hold diuretics, pt w/ pericardial effusion s/p pericardiocentesis  - Diet/lifestyle modifications and medication compliance heavily reinforced   - Repeat echo with small effusion EF still 20-25%.  - for any complaint of chest pain please check trop, CPK, and EKG.

## 2022-11-10 NOTE — CONSULT NOTE ADULT - ASSESSMENT
67 year-old male admitted for pericardial effusion, now found to have LLL lung mass. Seen in consultation for Lung biopsy. Labs and imaging have been reviewed. Discussed with primary team that lung biopsy can be set up as outpatient, but given the new PE and need for AC will do inpatient biopsy. Patient is on aspirin, which will need to be held for 5 days prior to biopsy. Tentatively will schedule for biopsy with anesthesia  on 11/15. Please make patient NPO at Midnight on 11/14. Heparin gtt can be held 6 hours prior to biopsy. Discussed above case with IR attending Dr. Bowles.     Please call extension [phone] with any questions, concerns or issues regarding above.

## 2022-11-10 NOTE — CONSULT NOTE ADULT - ASSESSMENT
67 yr old male with no known medical history, current smoker presented with complaints of 1 week of shortness of breath. His EKG on arrival revealed diffuse ST elevations, a limited ECHO revealed circumferential pericardial effusion. He was emergently taken to cardiac cath lab where he underwent pericardiocentesis and coronary angiogram. A pericardial drain was placed, cath revealed a 90% LAD lesion, which was not felt to be acute and given pericardial effusion, PCI was deferred. His presentation was attributed to acute pericarditis He was placed on a NRB, transferred to ICU for further monitoring. His labs on admission revealed FILIBERTO and elevated LFTs, IVF were given. A US abdomen was ordered, revealed non occlusive thrombus in IVC and gall bladder sludge. CXR on admission revealed a left lung mass. Subsequent CTA chest, abdomen and pelvis was done, revealed left upper lobe segmental and subsegmental PE. Occlusion of the SVC and a thrombus within the intrahepatic IVC. Mediastinal and right hilar lymphadenopathy with resultant central  bronchial obstruction and atelectasis involving the right middle and lower lobes. 4.1 cm sized left lower lobe mass compatible with a pulmonary malignancy. Moderate right and a trace left pleural effusion. Concern for malignant cells in pericardial fluid, formal cytology report pending. ECHO revealed a EF 25%. Cardiology follow up was done, advised continue aspirin and statin, unable to initiate GDMT given hypotension. Heparin gtt was initiated, palliative care was consulted. He was transferred to medical floor on 11/9.    1) Concern for malignant pleural effusion  findings concerning for metastatic lung cancer  recommend getting additional tissue either via EBUS or IR guided lung biopsy for confirmative dx and to test tissue for additional NGS  outpatient PET CT scan   will need systemic therapy, based on pathology and NGS results, will need chemo/immuno/targeted therapy    2) PE  in setting of malignancy?  on heparin gtt  can switch to DOAC when ok with team , for now c/w gtt as patient may get additional biopsy inpatient    3) Anemia  multifactorial  check iron ,b12,folate    will follow up 67 yr old male with no known medical history, current smoker presented with complaints of 1 week of shortness of breath. His EKG on arrival revealed diffuse ST elevations, a limited ECHO revealed circumferential pericardial effusion. He was emergently taken to cardiac cath lab where he underwent pericardiocentesis and coronary angiogram. A pericardial drain was placed, cath revealed a 90% LAD lesion, which was not felt to be acute and given pericardial effusion, PCI was deferred. His presentation was attributed to acute pericarditis He was placed on a NRB, transferred to ICU for further monitoring. His labs on admission revealed FILIBERTO and elevated LFTs, IVF were given. A US abdomen was ordered, revealed non occlusive thrombus in IVC and gall bladder sludge. CXR on admission revealed a left lung mass. Subsequent CTA chest, abdomen and pelvis was done, revealed left upper lobe segmental and subsegmental PE. Occlusion of the SVC and a thrombus within the intrahepatic IVC. Mediastinal and right hilar lymphadenopathy with resultant central  bronchial obstruction and atelectasis involving the right middle and lower lobes. 4.1 cm sized left lower lobe mass compatible with a pulmonary malignancy. Moderate right and a trace left pleural effusion. Concern for malignant cells in pericardial fluid, formal cytology report pending. ECHO revealed a EF 25%. Cardiology follow up was done, advised continue aspirin and statin, unable to initiate GDMT given hypotension. Heparin gtt was initiated, palliative care was consulted. He was transferred to medical floor on 11/9.    1) Concern for malignant pleural effusion  findings concerning for metastatic lung cancer  recommend getting additional tissue either via EBUS or IR guided lung biopsy for confirmative dx and to test tissue for additional NGS  outpatient PET CT scan   will need systemic therapy, based on pathology and NGS results, will need chemo/immuno/targeted therapy    2) PE  in setting of malignancy?  on heparin gtt  can switch to DOAC when ok with team , for now c/w gtt as patient may get additional biopsy inpatient    3) Anemia  multifactorial  check iron ,b12,folate    d/w team  will follow up

## 2022-11-10 NOTE — CONSULT NOTE ADULT - SUBJECTIVE AND OBJECTIVE BOX
Chief Complaint: 68yo Male who presents with SOB.    Brief Hospital Course:   67 year-old male with no known medical history presented to [facility] ED with SOB, and found to have EKG with diffuse ST elevations, Echo with pericardial effusion. Patient underwent an emergent pericardiocentesis and coronary angiogram. Hospital course complicated by PEs. Patient also found to have LLL lung mass. Hematology following, recommending biopsy. IR consulted for elevation for lung biopsy.     ============================================================================  Medications:  Home Medications:    MEDICATIONS  (STANDING):  albuterol/ipratropium for Nebulization 3 milliLiter(s) Nebulizer every 6 hours  atorvastatin 80 milliGRAM(s) Oral at bedtime  chlorhexidine 2% Cloths 1 Application(s) Topical daily  heparin  Infusion.  Unit(s)/Hr (7 mL/Hr) IV Continuous <Continuous>  influenza  Vaccine (HIGH DOSE) 0.7 milliLiter(s) IntraMuscular once  polyethylene glycol 3350 17 Gram(s) Oral daily    MEDICATIONS  (PRN):  heparin   Injectable 3000 Unit(s) IV Push every 6 hours PRN For aPTT less than 40  heparin   Injectable 1500 Unit(s) IV Push every 6 hours PRN For aPTT between 40 - 57    Allergies:   No Known Allergies    ============================================================================  PAST MEDICAL & SURGICAL HISTORY:  No pertinent past medical history    No significant past surgical history    FAMILY HISTORY:    Social History: tobacco use    ============================================================================  Vitals:  Vital Signs Last 24 Hrs  T(C): 36.7 (10 Nov 2022 07:55), Max: 36.7 (10 Nov 2022 05:00)  T(F): 98 (10 Nov 2022 07:55), Max: 98.1 (10 Nov 2022 05:00)  HR: 88 (10 Nov 2022 13:28) (87 - 94)  BP: 98/61 (10 Nov 2022 07:55) (78/67 - 119/68)  BP(mean): 85 (09 Nov 2022 19:24) (72 - 85)  RR: 18 (10 Nov 2022 07:55) (18 - 22)  SpO2: 97% (10 Nov 2022 13:28) (92% - 98%)    Parameters below as of 10 Nov 2022 13:28  Patient On (Oxygen Delivery Method): nasal cannula 3L    Physical Exam:  GENERAL:  Well-appearing M patient sitting comfortably in bed. NAD  CV: + s1,s2 regular rate and rhythm  Lungs: normal effort, CTA bilat. no wheezes, rales or rhonchi  ABDOMEN: soft, nontender, nondistended  EXTREMITIES:  no edema, cyanosis  skin: warm, dry, no rashes.   Neuro: A&O x 3, no sensory or motor deficit.    Review of Systems:     ROS Otherwise Negative except    Labs:                        11.4   6.04  )-----------( 128      ( 10 Nov 2022 05:00 )             32.7     11-10    134<L>  |  97  |  26.2<H>  ----------------------------<  92  3.7   |  27.0  |  0.54    Ca    8.3<L>      10 Nov 2022 05:00  Phos  1.8     11-10  Mg     2.3     11-10    TPro  5.7<L>  /  Alb  2.5<L>  /  TBili  1.7  /  DBili  x   /  AST  104<H>  /  ALT  279<H>  /  AlkPhos  92  11-10    PT/INR - ( 09 Nov 2022 04:25 )   PT: 18.9 sec;   INR: 1.62 ratio         PTT - ( 10 Nov 2022 05:00 )  PTT:56.6 sec    Imaging:   Pertinent Imaging Reviewed.    ============================================================================

## 2022-11-10 NOTE — PROGRESS NOTE ADULT - SUBJECTIVE AND OBJECTIVE BOX
Interval History:  Pt c/o rt shoulder and neck pain  Cont's to have poor appetite  His friend Adi Chaudhry at     Present Symptoms:     Dyspnea: No  Nausea/Vomiting: No  Anxiety:  No  Depression: Yes   Fatigue: Yes   Loss of appetite: Yes   Constipation: No    Pain: as above            Character-            Duration-            Effect-            Factors-            Frequency-            Location-            Severity-    Review of Systems:   As above - otherwise negative     MEDICATIONS  (STANDING):  albuterol/ipratropium for Nebulization 3 milliLiter(s) Nebulizer every 6 hours  atorvastatin 80 milliGRAM(s) Oral at bedtime  chlorhexidine 2% Cloths 1 Application(s) Topical daily  heparin  Infusion.  Unit(s)/Hr (7 mL/Hr) IV Continuous <Continuous>  influenza  Vaccine (HIGH DOSE) 0.7 milliLiter(s) IntraMuscular once  polyethylene glycol 3350 17 Gram(s) Oral daily    MEDICATIONS  (PRN):  heparin   Injectable 3000 Unit(s) IV Push every 6 hours PRN For aPTT less than 40  heparin   Injectable 1500 Unit(s) IV Push every 6 hours PRN For aPTT between 40 - 57      PHYSICAL EXAM:  Vital Signs Last 24 Hrs  T(C): 36.3 (10 Nov 2022 16:00), Max: 36.7 (10 Nov 2022 05:00)  T(F): 97.4 (10 Nov 2022 16:00), Max: 98.1 (10 Nov 2022 05:00)  HR: 96 (10 Nov 2022 16:00) (87 - 96)  BP: 93/63 (10 Nov 2022 16:00) (84/52 - 119/68)  BP(mean): 85 (09 Nov 2022 19:24) (73 - 85)  RR: 18 (10 Nov 2022 07:55) (18 - 22)  SpO2: 97% (10 Nov 2022 13:28) (92% - 98%)    Parameters below as of 10 Nov 2022 13:28  Patient On (Oxygen Delivery Method): nasal cannula    General: Pt lying in bed in NAD; cachectic  HEENT: NCAT; MM dry; temporal wasting  Resp: breathing unlabored; symmetric chest expansion B/L  MSK: no contractures/deformities  Skin: no rash  Neuro: awake and oriented x 3; no myoclonus  Psych: calm; flat, depressed affect    LABS:                        11.4   6.04  )-----------( 128      ( 10 Nov 2022 05:00 )             32.7     11-10    134<L>  |  97  |  26.2<H>  ----------------------------<  92  3.7   |  27.0  |  0.54    Ca    8.3<L>      10 Nov 2022 05:00  Phos  1.8     11-10  Mg     2.3     11-10    TPro  5.7<L>  /  Alb  2.5<L>  /  TBili  1.7  /  DBili  x   /  AST  104<H>  /  ALT  279<H>  /  AlkPhos  92  11-10    PT/INR - ( 09 Nov 2022 04:25 )   PT: 18.9 sec;   INR: 1.62 ratio         PTT - ( 10 Nov 2022 14:02 )  PTT:89.4 sec    I&O's Summary    09 Nov 2022 07:01  -  10 Nov 2022 07:00  --------------------------------------------------------  IN: 1020 mL / OUT: 1250 mL / NET: -230 mL        RADIOLOGY & ADDITIONAL STUDIES:    NEUROLOGIC MEDICATIONS/OPIOIDS/BENZODIAZEPINES IN PAST 24HRS:    ADVANCE DIRECTIVES/TREATMENT PREFERENCES:  Code Status:  full code  MOLST (if not Full Code):  HCP/Surrogate: friend Adi Chaudhry

## 2022-11-10 NOTE — PROGRESS NOTE ADULT - ASSESSMENT
A/P: 66 y/o old male , active heavy smoker, no known medical history was having shortness of breath for past 1 week. His friend got him to hospital as he was sob. On arrival his EKG showed diffuse ST elevations, and bedside limited echo showed circumferential pericardial effusion.

## 2022-11-10 NOTE — PROGRESS NOTE ADULT - SUBJECTIVE AND OBJECTIVE BOX
Chief complaint: pericardial effusion    Patient seen and examined at bedside. No acute overnight events reported. No fever, chills, cough, chest pain, nausea or vomiting.    Vital Signs Last 24 Hrs  T(F): 98 (10 Nov 2022 07:55), Max: 98.2 (09 Nov 2022 11:19)  HR: 87 (10 Nov 2022 07:55) (87 - 97)  BP: 98/61 (10 Nov 2022 07:55) (78/67 - 119/68)  RR: 18 (10 Nov 2022 07:55) (18 - 25)  SpO2: 92% (10 Nov 2022 07:55) (92% - 98%)    Physical Exam:  Constitutional: alert and oriented, in no acute distress   Neck: Soft and supple  Respiratory: Good air entry b/l  Cardiovascular: Regular rate and rhyhtm  Gastrointestinal: Soft  Musculoskeletal: 5/5 strength b/l upper and lower extremities, no lower extremity edema bilaterally    Labs:                        11.4   6.04  )-----------( 128      ( 10 Nov 2022 05:00 )             32.7   11-10    134<L>  |  97  |  26.2<H>  ----------------------------<  92  3.7   |  27.0  |  0.54    Ca    8.3<L>      10 Nov 2022 05:00  Phos  1.8     11-10  Mg     2.3     11-10    TPro  5.7<L>  /  Alb  2.5<L>  /  TBili  1.7  /  DBili  x   /  AST  104<H>  /  ALT  279<H>  /  AlkPhos  92  11-10

## 2022-11-10 NOTE — PROGRESS NOTE ADULT - ASSESSMENT
66yo M w/ no known PMH who p/w SOB and found to have diffuse ST elevations and taken for emergent LHC where found to have a large pericardial effusion s/p percardiocentesis during which bloody fluid was drained (a 90% LAD lesion also seen but PCI deferred until further stabilized).  Course further c/b dx of HFrEF with EF 20-25% and imaging revealing REYNALDO PE, SVC occlusion, IVC thrombus, mediastinal/rt hilar LAD with bronchial obstruxn in RML/RLL, a 4.1cm LLL mass c/w lung malignancy, and mod rt effusion.  We are consulted for assistance with goals of care.   #Goals of care  - Pt named friend as HCP- Samuel Chaudhry - [phone]  - Clinically appears to have metastatic cancer but no tissue dx as yet  - Plans for bx of LN for Tuesday (pt on ASA) - will plan to meet with pt/HCP to discuss goals of care once bx back to review what, if any, treatment options he will have and which, if any, he is interested in pursuing.    #Rt shoulder/neck pain  - would obtain dedicated imaging of C-spine and rt shoulder - HCP indicated pt did have recent fall or trauma  - Could add tylenol 650mg PO TID prn (this is under 2gm limit for pts with hepatic impairment)    #Depression  - pt cited personal losses - specifically, that of his mother and sisters - he didn't care to elaborate  - will cont to provide support  - would consider check TSH/B12 for completeness  - given profound depression and likely quite limited prognosis, may consider trial of psychostimulant methylphenidate

## 2022-11-11 LAB
ANION GAP SERPL CALC-SCNC: 10 MMOL/L — SIGNIFICANT CHANGE UP (ref 5–17)
APTT BLD: 102.2 SEC — HIGH (ref 27.5–35.5)
APTT BLD: 121.3 SEC — CRITICAL HIGH (ref 27.5–35.5)
APTT BLD: 89 SEC — HIGH (ref 27.5–35.5)
BASOPHILS # BLD AUTO: 0 K/UL — SIGNIFICANT CHANGE UP (ref 0–0.2)
BASOPHILS NFR BLD AUTO: 0 % — SIGNIFICANT CHANGE UP (ref 0–2)
BUN SERPL-MCNC: 20.2 MG/DL — HIGH (ref 8–20)
CALCIUM SERPL-MCNC: 8.2 MG/DL — LOW (ref 8.4–10.5)
CHLORIDE SERPL-SCNC: 99 MMOL/L — SIGNIFICANT CHANGE UP (ref 96–108)
CO2 SERPL-SCNC: 28 MMOL/L — SIGNIFICANT CHANGE UP (ref 22–29)
CREAT SERPL-MCNC: 0.51 MG/DL — SIGNIFICANT CHANGE UP (ref 0.5–1.3)
EGFR: 110 ML/MIN/1.73M2 — SIGNIFICANT CHANGE UP
EOSINOPHIL # BLD AUTO: 0 K/UL — SIGNIFICANT CHANGE UP (ref 0–0.5)
EOSINOPHIL NFR BLD AUTO: 0 % — SIGNIFICANT CHANGE UP (ref 0–6)
GLUCOSE SERPL-MCNC: 100 MG/DL — HIGH (ref 70–99)
HCT VFR BLD CALC: 33.6 % — LOW (ref 39–50)
HGB BLD-MCNC: 11.4 G/DL — LOW (ref 13–17)
LYMPHOCYTES # BLD AUTO: 0.56 K/UL — LOW (ref 1–3.3)
LYMPHOCYTES # BLD AUTO: 9.6 % — LOW (ref 13–44)
MANUAL SMEAR VERIFICATION: SIGNIFICANT CHANGE UP
MCHC RBC-ENTMCNC: 29.2 PG — SIGNIFICANT CHANGE UP (ref 27–34)
MCHC RBC-ENTMCNC: 33.9 GM/DL — SIGNIFICANT CHANGE UP (ref 32–36)
MCV RBC AUTO: 85.9 FL — SIGNIFICANT CHANGE UP (ref 80–100)
MONOCYTES # BLD AUTO: 0.81 K/UL — SIGNIFICANT CHANGE UP (ref 0–0.9)
MONOCYTES NFR BLD AUTO: 13.9 % — SIGNIFICANT CHANGE UP (ref 2–14)
NEUTROPHILS # BLD AUTO: 4.48 K/UL — SIGNIFICANT CHANGE UP (ref 1.8–7.4)
NEUTROPHILS NFR BLD AUTO: 71.3 % — SIGNIFICANT CHANGE UP (ref 43–77)
NEUTS BAND # BLD: 5.2 % — SIGNIFICANT CHANGE UP (ref 0–8)
PLAT MORPH BLD: NORMAL — SIGNIFICANT CHANGE UP
PLATELET # BLD AUTO: 137 K/UL — LOW (ref 150–400)
POLYCHROMASIA BLD QL SMEAR: SIGNIFICANT CHANGE UP
POTASSIUM SERPL-MCNC: 3.7 MMOL/L — SIGNIFICANT CHANGE UP (ref 3.5–5.3)
POTASSIUM SERPL-SCNC: 3.7 MMOL/L — SIGNIFICANT CHANGE UP (ref 3.5–5.3)
RBC # BLD: 3.91 M/UL — LOW (ref 4.2–5.8)
RBC # FLD: 16 % — HIGH (ref 10.3–14.5)
RBC BLD AUTO: ABNORMAL
SODIUM SERPL-SCNC: 137 MMOL/L — SIGNIFICANT CHANGE UP (ref 135–145)
WBC # BLD: 5.86 K/UL — SIGNIFICANT CHANGE UP (ref 3.8–10.5)
WBC # FLD AUTO: 5.86 K/UL — SIGNIFICANT CHANGE UP (ref 3.8–10.5)

## 2022-11-11 PROCEDURE — 99233 SBSQ HOSP IP/OBS HIGH 50: CPT

## 2022-11-11 RX ORDER — ACETAMINOPHEN 500 MG
650 TABLET ORAL EVERY 8 HOURS
Refills: 0 | Status: DISCONTINUED | OUTPATIENT
Start: 2022-11-11 | End: 2022-12-05

## 2022-11-11 RX ORDER — SODIUM CHLORIDE 9 MG/ML
1000 INJECTION INTRAMUSCULAR; INTRAVENOUS; SUBCUTANEOUS
Refills: 0 | Status: DISCONTINUED | OUTPATIENT
Start: 2022-11-11 | End: 2022-11-16

## 2022-11-11 RX ORDER — ASCORBIC ACID 60 MG
500 TABLET,CHEWABLE ORAL DAILY
Refills: 0 | Status: DISCONTINUED | OUTPATIENT
Start: 2022-11-11 | End: 2022-12-05

## 2022-11-11 RX ADMIN — Medication 1 TABLET(S): at 21:36

## 2022-11-11 RX ADMIN — Medication 500 MILLIGRAM(S): at 21:36

## 2022-11-11 RX ADMIN — SODIUM CHLORIDE 50 MILLILITER(S): 9 INJECTION INTRAMUSCULAR; INTRAVENOUS; SUBCUTANEOUS at 21:36

## 2022-11-11 RX ADMIN — HEPARIN SODIUM 1100 UNIT(S)/HR: 5000 INJECTION INTRAVENOUS; SUBCUTANEOUS at 14:19

## 2022-11-11 RX ADMIN — CHLORHEXIDINE GLUCONATE 1 APPLICATION(S): 213 SOLUTION TOPICAL at 08:50

## 2022-11-11 RX ADMIN — HEPARIN SODIUM 1200 UNIT(S)/HR: 5000 INJECTION INTRAVENOUS; SUBCUTANEOUS at 07:34

## 2022-11-11 RX ADMIN — Medication 3 MILLILITER(S): at 21:21

## 2022-11-11 RX ADMIN — Medication 3 MILLILITER(S): at 07:22

## 2022-11-11 RX ADMIN — POLYETHYLENE GLYCOL 3350 17 GRAM(S): 17 POWDER, FOR SOLUTION ORAL at 08:50

## 2022-11-11 RX ADMIN — HEPARIN SODIUM 1100 UNIT(S)/HR: 5000 INJECTION INTRAVENOUS; SUBCUTANEOUS at 19:29

## 2022-11-11 RX ADMIN — Medication 3 MILLILITER(S): at 15:24

## 2022-11-11 RX ADMIN — ATORVASTATIN CALCIUM 80 MILLIGRAM(S): 80 TABLET, FILM COATED ORAL at 21:36

## 2022-11-11 RX ADMIN — HEPARIN SODIUM 1200 UNIT(S)/HR: 5000 INJECTION INTRAVENOUS; SUBCUTANEOUS at 06:25

## 2022-11-11 RX ADMIN — HEPARIN SODIUM 1100 UNIT(S)/HR: 5000 INJECTION INTRAVENOUS; SUBCUTANEOUS at 21:35

## 2022-11-11 NOTE — PROGRESS NOTE ADULT - ASSESSMENT
66yo M w/ no known PMH who p/w SOB and found to have diffuse ST elevations and taken for emergent LHC where found to have a large pericardial effusion s/p percardiocentesis during which bloody fluid was drained (a 90% LAD lesion also seen but PCI deferred until further stabilized).  Course further c/b dx of HFrEF with EF 20-25% and imaging revealing REYNALDO PE, SVC occlusion, IVC thrombus, mediastinal/rt hilar LAD with bronchial obstruxn in RML/RLL, a 4.1cm LLL mass c/w lung malignancy, and mod rt effusion.  We are consulted for assistance with goals of care.   #Goals of care  - Pt named friend as HCP- Samuel Chaudhry - [phone]  - Clinically appears to have metastatic cancer but no tissue dx as yet  - Plans for bx of LN for Tuesday (pt on ASA) - will plan to meet with pt/HCP to discuss code status and broader goals of care once bx back to review what, if any, treatment options he will have and which, if any, he is interested in pursuing.    #Rt shoulder/neck pain  - would obtain dedicated imaging of C-spine and rt shoulder - pt does report recent fall/trauma  - Could add tylenol 650mg PO TID prn (this is under 2gm limit for pts with hepatic impairment)    #Depression  - pt cited personal losses - specifically, that of his mother and sisters - he didn't care to elaborate  - will cont to provide support  - given profound depression and likely quite limited prognosis, would consider trial of methylphenidate - however, pt does not wish to trial any medications for his mood nor does he wish to engage in psychotherapy

## 2022-11-11 NOTE — PROGRESS NOTE ADULT - PROBLEM SELECTOR PLAN 1
EF 20-25%, severely depressed LV systolic function, likely ischemic cardiomyopathy  - Now s/p LHC showing mLAD 90%, dRCA 60% OSVALDO flow 3  - Patient overall too unstable at this time for revascularization, no plans for repeat LHC at this time.   - Continue aspirin and statin.   - Unable to add beta blocker or ACEi/ARB due to hypotension.   - IV fluid resuscitation at this time.   - For now will need medical therapy   - hold diuretics, pt w/ pericardial effusion s/p pericardiocentesis  - Diet/lifestyle modifications and medication compliance heavily reinforced   - Repeat echo with small effusion EF still 20-25%.  - for any complaint of chest pain please check trop, CPK, and EKG.

## 2022-11-11 NOTE — PROGRESS NOTE ADULT - SUBJECTIVE AND OBJECTIVE BOX
Chief complaint: Pericardial effusion    Patient seen and examined at bedside. No acute overnight events reported. No fever, chills, cough, chest pain, nausea or vomiting.     Vital Signs Last 24 Hrs  T(F): 98.2 (11 Nov 2022 08:47), Max: 98.2 (11 Nov 2022 06:27)  HR: 89 (11 Nov 2022 08:47) (87 - 96)  BP: 94/62 (11 Nov 2022 08:47) (93/61 - 95/62)  RR: 18 (11 Nov 2022 08:47) (18 - 18)  SpO2: 92% (11 Nov 2022 08:47) (92% - 97%)    Physical Exam:  Constitutional: alert and oriented, in no acute distress   Neck: Soft and supple  Respiratory: Clear to auscultation bilaterally  Cardiovascular: Regular rate and rhyhtm  Gastrointestinal: Soft, non-tender to palpation, +bs  Musculoskeletal: no lower extremity edema bilaterally    Labs:                        11.4   5.86  )-----------( 137      ( 11 Nov 2022 04:55 )             33.6   11-11    137  |  99  |  20.2<H>  ----------------------------<  100<H>  3.7   |  28.0  |  0.51    Ca    8.2<L>      11 Nov 2022 04:55  Phos  1.8     11-10  Mg     2.3     11-10    TPro  5.7<L>  /  Alb  2.5<L>  /  TBili  1.7  /  DBili  x   /  AST  104<H>  /  ALT  279<H>  /  AlkPhos  92  11-10

## 2022-11-11 NOTE — PROGRESS NOTE ADULT - SUBJECTIVE AND OBJECTIVE BOX
[facility]                                                         CARDIOLOGY AT [facility]                                                                  [address], [place] [address]                                                         Telephone: [phone]. Fax:[phone]                                                                             PROGRESS NOTE    Reason for follow up:  Pericardial Effusion  Update:  Patient up in chair denies cp, sob states he wants to go home.       Review of symptoms:   Cardiac:  No chest pain. No dyspnea. No palpitations.  Respiratory: no cough. No dyspnea  Gastrointestinal: No diarrhea. No abdominal pain. No bleeding.   Neuro: No focal neuro complaints.    Vitals:  T(C): 36.8 (11-11-22 @ 08:47), Max: 36.8 (11-11-22 @ 06:27)  HR: 89 (11-11-22 @ 08:47) (87 - 96)  BP: 94/62 (11-11-22 @ 08:47) (93/61 - 95/62)  RR: 18 (11-11-22 @ 08:47) (18 - 18)  SpO2: 92% (11-11-22 @ 08:47) (92% - 96%)  Wt(kg): --  I&O's Summary    10 Nov 2022 07:01  -  11 Nov 2022 07:00  --------------------------------------------------------  IN: 538 mL / OUT: 500 mL / NET: 38 mL      Weight (kg): 38 (11-07 @ 18:10)    PHYSICAL EXAM:  Appearance: Comfortable. No acute distress  HEENT:  Atraumatic. Normocephalic.  Normal oral mucosa  Neurologic: A & O x 3, no gross focal deficits.  Cardiovascular: RRR S1 S2, No murmur, no rubs/gallops. No JVD  Respiratory: Lungs clear to auscultation, unlabored   Gastrointestinal:  Soft, Non-tender, + BS  Lower Extremities: 2+ Peripheral Pulses, No clubbing, cyanosis, or edema  Psychiatry: Patient is calm. No agitation.   Skin: warm and dry.    CURRENT CARDIAC MEDICATIONS:      CURRENT OTHER MEDICATIONS:  albuterol/ipratropium for Nebulization 3 milliLiter(s) Nebulizer every 6 hours  acetaminophen     Tablet .. 650 milliGRAM(s) Oral every 8 hours PRN Mild Pain (1 - 3), Moderate Pain (4 - 6)  polyethylene glycol 3350 17 Gram(s) Oral daily  atorvastatin 80 milliGRAM(s) Oral at bedtime  chlorhexidine 2% Cloths 1 Application(s) Topical daily  heparin   Injectable 3000 Unit(s) IV Push every 6 hours PRN For aPTT less than 40  heparin   Injectable 1500 Unit(s) IV Push every 6 hours PRN For aPTT between 40 - 57  heparin  Infusion.  Unit(s)/Hr (7 mL/Hr) IV Continuous <Continuous>  influenza  Vaccine (HIGH DOSE) 0.7 milliLiter(s) IntraMuscular once      LABS:	 	  ( 09 Nov 2022 04:25 )  Troponin T  0.06 ,  CPK  X    , CKMB  X    , BNP X        , ( 08 Nov 2022 10:50 )  Troponin T  0.11<H>,  CPK  477<H>, CKMB  X    , BNP X        , ( 08 Nov 2022 04:43 )  Troponin T  0.15<H>,  CPK  X    , CKMB  X    , BNP X                                  11.4   5.86  )-----------( 137      ( 11 Nov 2022 04:55 )             33.6     11-11    137  |  99  |  20.2<H>  ----------------------------<  100<H>  3.7   |  28.0  |  0.51    Ca    8.2<L>      11 Nov 2022 04:55  Phos  1.8     11-10  Mg     2.3     11-10    TPro  5.7<L>  /  Alb  2.5<L>  /  TBili  1.7  /  DBili  x   /  AST  104<H>  /  ALT  279<H>  /  AlkPhos  92  11-10    PT/INR/PTT ( 11 Nov 2022 12:20 )                       :                       :      X            :       102.2                 .        .                   .              .           .       X           .                                       Lipid Profile: Date: 11-08 @ 04:43  Total cholesterol 152; Direct LDL: --; HDL: 22; Triglycerides:129    HgA1c:   TSH: Thyroid Stimulating Hormone, Serum: 0.94 uIU/mL      TELEMETRY:   SR rate 66     DIAGNOSTIC TESTING:  [ ] Echocardiogram:  < from: TTE Echo Limited or F/U (11.10.22 @ 10:07) >  [facility] INTERPRETATION:  Left Ventricle: Endocardial visualization was enhanced with intravenous   echo contrast. The left ventricular internal cavity size is normal.  Global LV systolic function was severely decreased. Left ventricular   ejection fraction, by visual estimation, is 30 to 35%. The basal and mid   ventricular segments are hypokinetic with sparing of the apical segments.   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  Right Ventricle: The right ventricular size is mildly enlarged. RV   systolic function is mildly reduced.  Pericardium: Trivial pericardial effusion is present. The pericardial   effusion is localized near the right atrium, anterior to the right   ventricle and surrounding the apex. There is a small pleural effusion in   the right lateral region.  Aortic Valve: The aortic valve is trileaflet.  Venous: The inferior vena cava was dilated, with respiratory size   variation less than 50%.There is a echogenic mass in the IVC measuring   3.83cm X 1.37cm. Probably thrombus.  In comparison to the previous echocardiogram(s): Prior examinations are   available and were reviewed for comparison purposes.      Summary:   1. Endocardial visualization was enhanced with intravenous echo contrast.   2. The basal and mid ventricular segments are hypokinetic with sparing   of the apical segments. 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   3. Left ventricular ejection fraction, by visual estimation, is 30 to   35%.   4. Mildly enlarged right ventricle. Mildly reduced RV systolic function.   5. Trivial pericardial effusion.   6. There is a echogenic mass in the IVC measuring 3.83cm X 1.37cm.  Probably thrombus.    MD Raisa, RPVI Electronically signed on 11/10/2022 at 12:17:18   PM       [ ]  Catheterization:  Diagnostic Conclusions:     Patient presented with shortness of breath   ECG showed ST elevations inferiorly.   POCUS showed a moderate to large sized pericardial effusion.   Patient brought to Cath lab emergently for Cath for a Cath and  possible pericardiocentesis.  Recommendations:     Normal flow in coronary arteries.   NOT a STEMI   Pericardiocentesis performed.   Drain left in   PCI of LAD once patient has stabilized.     Acute complication:    No complications     Procedure Narrative:   The risks and alternatives of the procedures and conscious sedation  were explained to the patient and informed consent was  obtained. The patient was brought to the cath lab and placed on the  exam table.  Access   Left femoral artery:   Vascular access was obtained using modified seldinger technique.      Coronary Angiography   Left Coronary System:   A catheter was positioned into the vessel ostia under fluoroscopic  guidance. Angiograms were obtained in multiple views.  Right Coronary System:     Patient: STEVE DENISE             MRN: [id number]  Study Date: 11/07/2022   06:15 PM      Page 1 of 4      A catheter was positioned into the vessel ostia under fluoroscopic  guidance. Angiograms were obtained in multiple views.    Pericardiocentesis   Pericardiocentesis was performed. Along thin-walled needle was  advanced into the pericardial space until fluid was aspirated.  Over a floppy wire, the needle was replaced with a catheter.      Diagnostic Findings:     Coronary Angiography   The coronary circulation is right dominant.     LM   Left main artery: The segment is visually normal in size and  structure.    LAD   Mid left anterior descending: There is a 90 % stenosis.      CX   Circumflex: Angiography shows mild atherosclerosis.      RCA   Proximal right coronary artery: The segment is moderately calcified.  There is a 30 % stenosis. Mid right coronary artery: There  is a 20 % stenosis. Distal right coronary artery: The segment is  severely calcified. There is a 60 % stenosis. OSVALDO Flow 3.    Left Heart Cath   Global left ventricular function is normal. Ejection fraction is 60%.    Valves   Aortic Valve: There is no aortic valve stenosis.    Mitral Valve: The mitral valve exhibits trivial (less than 1+)  regurgitation.    < end of copied text >       OTHER: 	  < from: CT Angio Chest PE Protocol w/ IV Cont (11.08.22 @ 14:17) >  IMPRESSION:  Left upper lobe segmental and subsegmental PE.  Occlusion of the SVC and a thrombus within the intrahepatic IVC.  Mediastinal and right hilar lymphadenopathy with resultant central   bronchial obstruction and atelectasis involving the right middle and   lower lobes.  4.1 cm sized left lower lobe mass compatible with a pulmonary malignancy.  Moderate right and a trace left pleural effusion.  Bowel containing bilateral inguinal hernias, right more than left.      VERTEBRAL BODY ANALYSIS: No Vertebral fracture or low bone density   identified.    Findings were discussed with Dr. [id number] 11/8/2022 3:26 PM by   Dr. Bird with read back confirmation.

## 2022-11-11 NOTE — PROGRESS NOTE ADULT - ASSESSMENT
A/P: 66 y/o old male , active heavy smoker, no known medical history was having shortness of breath for past 1 week. His friend got him to hospital as he was sob. On arrival his EKG showed diffuse ST elevations, and bedside limited echo showed circumferential pericardial effusion.A pericardial drain was placed, cath revealed a 90% LAD lesion, which was not felt to be acute and given pericardial effusion, PCI was deferred.     Radiology follow up was done, advised continue aspirin and statin, unable to initiate GDMT given hypotension. Heparin gtt was initiated, palliative care was consulted. He was transferred to medical floor on 11/9. Pericardial drain removed, patient with findings concerning for metastatic cancer.    Today 11/11 patient with no complaints no SOB/no chest pain

## 2022-11-11 NOTE — CHART NOTE - NSCHARTNOTEFT_GEN_A_CORE
Source: Patient [ ]  Family [ ]   other [x ]    Current Diet: Diet, DASH/TLC:   Sodium & Cholesterol Restricted     Special Instructions for Nursing:  Sodium & Cholesterol Restricted (11-07-22 @ 19:12)    PO intake:  < 50% [ x]   50-75%  [ ]   %  [ ]  other :    Current Weight:   (11/11) 83.9 lbs  (11/7) 102 lbs    % Weight Change - question accuracy of weight trend, will continue to monitor.  Aware pt with non pitting edema to scrotum per documentation.      Pertinent Medications: MEDICATIONS  (STANDING):  albuterol/ipratropium for Nebulization 3 milliLiter(s) Nebulizer every 6 hours  atorvastatin 80 milliGRAM(s) Oral at bedtime  chlorhexidine 2% Cloths 1 Application(s) Topical daily  heparin  Infusion.  Unit(s)/Hr (7 mL/Hr) IV Continuous <Continuous>  influenza  Vaccine (HIGH DOSE) 0.7 milliLiter(s) IntraMuscular once  polyethylene glycol 3350 17 Gram(s) Oral daily    MEDICATIONS  (PRN):  heparin   Injectable 3000 Unit(s) IV Push every 6 hours PRN For aPTT less than 40  heparin   Injectable 1500 Unit(s) IV Push every 6 hours PRN For aPTT between 40 - 57    Pertinent Labs: CBC Full  -  ( 11 Nov 2022 04:55 )  WBC Count : 5.86 K/uL  RBC Count : 3.91 M/uL  Hemoglobin : 11.4 g/dL  Hematocrit : 33.6 %  Platelet Count - Automated : 137 K/uL  Mean Cell Volume : 85.9 fl  Mean Cell Hemoglobin : 29.2 pg  Mean Cell Hemoglobin Concentration : 33.9 gm/dL  Auto Neutrophil # : 4.48 K/uL  Auto Lymphocyte # : 0.56 K/uL  Auto Monocyte # : 0.81 K/uL  Auto Eosinophil # : 0.00 K/uL  Auto Basophil # : 0.00 K/uL  Auto Neutrophil % : 71.3 %  Auto Lymphocyte % : 9.6 %  Auto Monocyte % : 13.9 %  Auto Eosinophil % : 0.0 %  Auto Basophil % : 0.0 %  11-11 Na137 mmol/L Glu 100 mg/dL<H> K+ 3.7 mmol/L Cr  0.51 mg/dL BUN 20.2 mg/dL<H> Phos n/a   Alb n/a   PAB n/a       Skin: stage 2 coccyx    Nutrition focused physical exam previously conducted - found signs of malnutrition [ ]absent [ x]present    Subcutaneous fat loss: [ x] Orbital fat pads region, [x ]Buccal fat region, [ ]Triceps region,  [ x]Ribs region    Muscle wasting: [x ]Temples region, [x ]Clavicle region, [ x]Shoulder region, [ ]Scapula region, [ ]Interosseous region,  [ ]thigh region, [ ]Calf region    Current Nutrition Diagnosis: Pt remains at nutrition risk secondary to severe protein calorie malnutrition (chronic) related to inability to meet sufficient protein-energy needs with poor appetite in setting of large pericardial effusion, left lung mass (suspicious for malignancy) and skin breakdown as evidenced by pt with severe muscle wasting/fat loss and meeting <75% estimated nutrient needs >1 month.  Pt currently sleeping; continue with poor appetite/po intake at meals noted.  Aware palliative care following for GOC.  RD to remain available.     Recommendations:   1. RX: Ensure TID   2. RX: MVI and Vit C 500mg daily   3. Obtain daily weight and monitor trend     Monitoring and Evaluation:   [x ] PO intake [x ] Tolerance to diet prescription [X] Weights  [X] Follow up per protocol [X] Labs:

## 2022-11-11 NOTE — PROGRESS NOTE ADULT - ASSESSMENT
67 yr old male with no known medical history, current smoker presented with complaints of 1 week of shortness of breath. His EKG on arrival revealed diffuse ST elevations, a limited ECHO revealed circumferential pericardial effusion. He was emergently taken to cardiac cath lab where he underwent pericardiocentesis and coronary angiogram. A pericardial drain was placed, cath revealed a 90% LAD lesion, which was not felt to be acute and given pericardial effusion, PCI was deferred. His presentation was attributed to acute pericarditis He was placed on a NRB, transferred to ICU for further monitoring. His labs on admission revealed FILIBERTO and elevated LFTs, IVF were given. A US abdomen was ordered, revealed non occlusive thrombus in IVC and gall bladder sludge. CXR on admission revealed a left lung mass. Subsequent CTA chest, abdomen and pelvis was done, revealed left upper lobe segmental and subsegmental PE. Occlusion of the SVC and a thrombus within the intrahepatic IVC. Mediastinal and right hilar lymphadenopathy with resultant central  bronchial obstruction and atelectasis involving the right middle and lower lobes. 4.1 cm sized left lower lobe mass compatible with a pulmonary malignancy. Moderate right and a trace left pleural effusion. Concern for malignant cells in pericardial fluid, formal cytology report pending. ECHO revealed a EF 25%. Cardiology follow up was done, advised continue aspirin and statin, unable to initiate GDMT given hypotension. Heparin gtt was initiated, palliative care was consulted. He was transferred to medical floor on 11/9.    Acute pericardial effusion s/p pericardiocentesis  - Pericardial drain removed  - Findings concerning for metastatic cancer  - Outpatient PET scan  - IR consulted for biopsy  - Will hold Aspirin for 5 days  - Plan for biopsy on 11/15    Acute pulmonary embolism  - Continue heparin drip    CAD, HFrEF  - PCI not performed on admission given effusion  - unable to initiate GDMT given hypotension  - Continue Statin  - Hold Aspirin     Lung Cancer  - Oncology recs appreciated  - follow up cytology evaluation  - Palliative consult appreciated    Severe protein calorie malnutrition  - Nutrition eval    DVT ppx  - Heparin drip

## 2022-11-11 NOTE — PROGRESS NOTE ADULT - SUBJECTIVE AND OBJECTIVE BOX
Interval History:  Pt seen lying in bed in NAD  He cont's to c/o rt shoulder and neck pain  Endorses depression that he said has been almost lifelong - not interested in meds or therapy    Present Symptoms:     Dyspnea: Yes   Nausea/Vomiting: No  Anxiety:  No  Depression: Yes   Fatigue: Yes   Loss of appetite: Yes   Constipation: No    Pain: as above            Character-            Duration-            Effect-            Factors-            Frequency-            Location-            Severity-    Review of Systems:   As above - otherwise negative     MEDICATIONS  (STANDING):  albuterol/ipratropium for Nebulization 3 milliLiter(s) Nebulizer every 6 hours  atorvastatin 80 milliGRAM(s) Oral at bedtime  chlorhexidine 2% Cloths 1 Application(s) Topical daily  heparin  Infusion.  Unit(s)/Hr (7 mL/Hr) IV Continuous <Continuous>  influenza  Vaccine (HIGH DOSE) 0.7 milliLiter(s) IntraMuscular once  polyethylene glycol 3350 17 Gram(s) Oral daily    MEDICATIONS  (PRN):  acetaminophen     Tablet .. 650 milliGRAM(s) Oral every 8 hours PRN Mild Pain (1 - 3), Moderate Pain (4 - 6)  heparin   Injectable 3000 Unit(s) IV Push every 6 hours PRN For aPTT less than 40  heparin   Injectable 1500 Unit(s) IV Push every 6 hours PRN For aPTT between 40 - 57    PHYSICAL EXAM:  Vital Signs Last 24 Hrs  T(C): 36.8 (11 Nov 2022 08:47), Max: 36.8 (11 Nov 2022 06:27)  T(F): 98.2 (11 Nov 2022 08:47), Max: 98.2 (11 Nov 2022 06:27)  HR: 89 (11 Nov 2022 08:47) (87 - 96)  BP: 94/62 (11 Nov 2022 08:47) (93/61 - 95/62)  BP(mean): --  RR: 18 (11 Nov 2022 08:47) (18 - 18)  SpO2: 92% (11 Nov 2022 08:47) (92% - 96%)    Parameters below as of 11 Nov 2022 08:47  Patient On (Oxygen Delivery Method): nasal cannula  O2 Flow (L/min): 2    General: Pt lying in bed in NAD - cachectic  HEENT: NCAT; MM dry - temporal wasting  Resp: breathing unlabored; symmetric chest expansion B/L  MSK: no contractures/deformities  Skin: no rash  Neuro: awake and oriented x 3; no myoclonus  Psych: calm; flat and depressed affect    LABS:                        11.4   5.86  )-----------( 137      ( 11 Nov 2022 04:55 )             33.6     11-11    137  |  99  |  20.2<H>  ----------------------------<  100<H>  3.7   |  28.0  |  0.51    Ca    8.2<L>      11 Nov 2022 04:55  Phos  1.8     11-10  Mg     2.3     11-10    TPro  5.7<L>  /  Alb  2.5<L>  /  TBili  1.7  /  DBili  x   /  AST  104<H>  /  ALT  279<H>  /  AlkPhos  92  11-10    PTT - ( 11 Nov 2022 12:20 )  PTT:102.2 sec    I&O's Summary    10 Nov 2022 07:01  -  11 Nov 2022 07:00  --------------------------------------------------------  IN: 538 mL / OUT: 500 mL / NET: 38 mL    RADIOLOGY & ADDITIONAL STUDIES:    NEUROLOGIC MEDICATIONS/OPIOIDS/BENZODIAZEPINES IN PAST 24HRS:        ADVANCE DIRECTIVES/TREATMENT PREFERENCES:  Code Status: full code  MOLST (if not Full Code):  HCP/Surrogate: avelino diamond

## 2022-11-12 LAB
ANION GAP SERPL CALC-SCNC: 8 MMOL/L — SIGNIFICANT CHANGE UP (ref 5–17)
APTT BLD: 89.3 SEC — HIGH (ref 27.5–35.5)
BASOPHILS # BLD AUTO: 0.01 K/UL — SIGNIFICANT CHANGE UP (ref 0–0.2)
BASOPHILS NFR BLD AUTO: 0.1 % — SIGNIFICANT CHANGE UP (ref 0–2)
BUN SERPL-MCNC: 21 MG/DL — HIGH (ref 8–20)
CALCIUM SERPL-MCNC: 8.3 MG/DL — LOW (ref 8.4–10.5)
CHLORIDE SERPL-SCNC: 98 MMOL/L — SIGNIFICANT CHANGE UP (ref 96–108)
CO2 SERPL-SCNC: 28 MMOL/L — SIGNIFICANT CHANGE UP (ref 22–29)
CREAT SERPL-MCNC: 0.42 MG/DL — LOW (ref 0.5–1.3)
CULTURE RESULTS: SIGNIFICANT CHANGE UP
EGFR: 117 ML/MIN/1.73M2 — SIGNIFICANT CHANGE UP
EOSINOPHIL # BLD AUTO: 0.03 K/UL — SIGNIFICANT CHANGE UP (ref 0–0.5)
EOSINOPHIL NFR BLD AUTO: 0.4 % — SIGNIFICANT CHANGE UP (ref 0–6)
GLUCOSE SERPL-MCNC: 100 MG/DL — HIGH (ref 70–99)
HCT VFR BLD CALC: 33.1 % — LOW (ref 39–50)
HGB BLD-MCNC: 11.6 G/DL — LOW (ref 13–17)
IMM GRANULOCYTES NFR BLD AUTO: 0.4 % — SIGNIFICANT CHANGE UP (ref 0–0.9)
LYMPHOCYTES # BLD AUTO: 0.62 K/UL — LOW (ref 1–3.3)
LYMPHOCYTES # BLD AUTO: 7.6 % — LOW (ref 13–44)
MCHC RBC-ENTMCNC: 29.9 PG — SIGNIFICANT CHANGE UP (ref 27–34)
MCHC RBC-ENTMCNC: 35 GM/DL — SIGNIFICANT CHANGE UP (ref 32–36)
MCV RBC AUTO: 85.3 FL — SIGNIFICANT CHANGE UP (ref 80–100)
MONOCYTES # BLD AUTO: 1.02 K/UL — HIGH (ref 0–0.9)
MONOCYTES NFR BLD AUTO: 12.5 % — SIGNIFICANT CHANGE UP (ref 2–14)
NEUTROPHILS # BLD AUTO: 6.47 K/UL — SIGNIFICANT CHANGE UP (ref 1.8–7.4)
NEUTROPHILS NFR BLD AUTO: 79 % — HIGH (ref 43–77)
PLATELET # BLD AUTO: 144 K/UL — LOW (ref 150–400)
POTASSIUM SERPL-MCNC: 3.3 MMOL/L — LOW (ref 3.5–5.3)
POTASSIUM SERPL-SCNC: 3.3 MMOL/L — LOW (ref 3.5–5.3)
RBC # BLD: 3.88 M/UL — LOW (ref 4.2–5.8)
RBC # FLD: 15.6 % — HIGH (ref 10.3–14.5)
SODIUM SERPL-SCNC: 134 MMOL/L — LOW (ref 135–145)
SPECIMEN SOURCE: SIGNIFICANT CHANGE UP
TSH SERPL-MCNC: 0.41 UIU/ML — SIGNIFICANT CHANGE UP (ref 0.27–4.2)
VIT B12 SERPL-MCNC: >2000 PG/ML — HIGH (ref 232–1245)
WBC # BLD: 8.18 K/UL — SIGNIFICANT CHANGE UP (ref 3.8–10.5)
WBC # FLD AUTO: 8.18 K/UL — SIGNIFICANT CHANGE UP (ref 3.8–10.5)

## 2022-11-12 PROCEDURE — 99232 SBSQ HOSP IP/OBS MODERATE 35: CPT

## 2022-11-12 RX ADMIN — Medication 3 MILLILITER(S): at 22:15

## 2022-11-12 RX ADMIN — Medication 650 MILLIGRAM(S): at 15:41

## 2022-11-12 RX ADMIN — CHLORHEXIDINE GLUCONATE 1 APPLICATION(S): 213 SOLUTION TOPICAL at 08:51

## 2022-11-12 RX ADMIN — Medication 3 MILLILITER(S): at 10:30

## 2022-11-12 RX ADMIN — Medication 1 TABLET(S): at 08:50

## 2022-11-12 RX ADMIN — HEPARIN SODIUM 1100 UNIT(S)/HR: 5000 INJECTION INTRAVENOUS; SUBCUTANEOUS at 07:38

## 2022-11-12 RX ADMIN — HEPARIN SODIUM 1100 UNIT(S)/HR: 5000 INJECTION INTRAVENOUS; SUBCUTANEOUS at 04:31

## 2022-11-12 RX ADMIN — Medication 650 MILLIGRAM(S): at 16:41

## 2022-11-12 RX ADMIN — Medication 3 MILLILITER(S): at 16:12

## 2022-11-12 RX ADMIN — SODIUM CHLORIDE 50 MILLILITER(S): 9 INJECTION INTRAMUSCULAR; INTRAVENOUS; SUBCUTANEOUS at 08:51

## 2022-11-12 RX ADMIN — ATORVASTATIN CALCIUM 80 MILLIGRAM(S): 80 TABLET, FILM COATED ORAL at 20:58

## 2022-11-12 RX ADMIN — Medication 500 MILLIGRAM(S): at 08:50

## 2022-11-12 NOTE — PROGRESS NOTE ADULT - ASSESSMENT
67 yr old male with no known medical history, current smoker presented with complaints of 1 week of shortness of breath. His EKG on arrival revealed diffuse ST elevations, a limited ECHO revealed circumferential pericardial effusion. He was emergently taken to cardiac cath lab where he underwent pericardiocentesis and coronary angiogram. A pericardial drain was placed, cath revealed a 90% LAD lesion, which was not felt to be acute and given pericardial effusion, PCI was deferred. His presentation was attributed to acute pericarditis He was placed on a NRB, transferred to ICU for further monitoring. His labs on admission revealed FILIBERTO and elevated LFTs, IVF were given. A US abdomen was ordered, revealed non occlusive thrombus in IVC and gall bladder sludge. CXR on admission revealed a left lung mass. Subsequent CTA chest, abdomen and pelvis was done, revealed left upper lobe segmental and subsegmental PE. Occlusion of the SVC and a thrombus within the intrahepatic IVC. Mediastinal and right hilar lymphadenopathy with resultant central  bronchial obstruction and atelectasis involving the right middle and lower lobes. 4.1 cm sized left lower lobe mass compatible with a pulmonary malignancy. Moderate right and a trace left pleural effusion. Concern for malignant cells in pericardial fluid, formal cytology report pending. ECHO revealed a EF 25%. Cardiology follow up was done, advised continue aspirin and statin, unable to initiate GDMT given hypotension. Heparin gtt was initiated, palliative care was consulted. He was transferred to medical floor on 11/9.    Acute pericardial effusion s/p pericardiocentesis  - Pericardial drain removed  - Findings concerning for metastatic cancer  - Outpatient PET scan  - IR consulted for biopsy  - Will hold Aspirin for 5 days  - Plan for biopsy on 11/15    Acute pulmonary embolism  - Continue heparin drip  - Transition to DOAC after biopsy    CAD, HFrEF  - PCI not performed on admission given effusion  - unable to initiate GDMT given hypotension  - Continue Statin  - Hold Aspirin for biopsy    Lung Cancer  - Oncology recs appreciated  - Palliative consult appreciated    Severe protein calorie malnutrition  - Nutrition eval    DVT ppx  - Heparin drip

## 2022-11-12 NOTE — PROGRESS NOTE ADULT - SUBJECTIVE AND OBJECTIVE BOX
Chief complaint: Effusion    Patient seen and examined at bedside. No acute overnight events reported. No fever, chills, cough, chest pain, nausea or vomiting.     Vital Signs Last 24 Hrs  T(F): 98 (12 Nov 2022 09:00), Max: 98.2 (11 Nov 2022 16:28)  HR: 98 (12 Nov 2022 10:25) (86 - 98)  BP: 98/64 (12 Nov 2022 09:00) (90/64 - 100/68)  RR: 18 (12 Nov 2022 09:00) (18 - 18)  SpO2: 95% (12 Nov 2022 10:25) (91% - 96%)    Physical Exam:  Constitutional: alert and oriented, in no acute distress   Neck: Soft and supple  Respiratory: Good air entry b/l  Cardiovascular: Regular rate and rhyhtm  Gastrointestinal: Soft, non-tender to palpation, +bs  Musculoskeletal: 5/5 strength b/l upper and lower extremities, no lower extremity edema bilaterally    Labs:                        11.6   8.18  )-----------( 144      ( 12 Nov 2022 04:01 )             33.1   11-12    134<L>  |  98  |  21.0<H>  ----------------------------<  100<H>  3.3<L>   |  28.0  |  0.42<L>    Ca    8.3<L>      12 Nov 2022 04:01

## 2022-11-13 LAB
ANION GAP SERPL CALC-SCNC: 12 MMOL/L — SIGNIFICANT CHANGE UP (ref 5–17)
APTT BLD: 76.7 SEC — HIGH (ref 27.5–35.5)
BASOPHILS # BLD AUTO: 0.01 K/UL — SIGNIFICANT CHANGE UP (ref 0–0.2)
BASOPHILS NFR BLD AUTO: 0.1 % — SIGNIFICANT CHANGE UP (ref 0–2)
BUN SERPL-MCNC: 24.3 MG/DL — HIGH (ref 8–20)
CALCIUM SERPL-MCNC: 8.2 MG/DL — LOW (ref 8.4–10.5)
CHLORIDE SERPL-SCNC: 100 MMOL/L — SIGNIFICANT CHANGE UP (ref 96–108)
CO2 SERPL-SCNC: 26 MMOL/L — SIGNIFICANT CHANGE UP (ref 22–29)
CREAT SERPL-MCNC: 0.4 MG/DL — LOW (ref 0.5–1.3)
EGFR: 119 ML/MIN/1.73M2 — SIGNIFICANT CHANGE UP
EOSINOPHIL # BLD AUTO: 0.03 K/UL — SIGNIFICANT CHANGE UP (ref 0–0.5)
EOSINOPHIL NFR BLD AUTO: 0.4 % — SIGNIFICANT CHANGE UP (ref 0–6)
GLUCOSE SERPL-MCNC: 88 MG/DL — SIGNIFICANT CHANGE UP (ref 70–99)
HCT VFR BLD CALC: 34.2 % — LOW (ref 39–50)
HGB BLD-MCNC: 11.6 G/DL — LOW (ref 13–17)
IMM GRANULOCYTES NFR BLD AUTO: 0.4 % — SIGNIFICANT CHANGE UP (ref 0–0.9)
LYMPHOCYTES # BLD AUTO: 0.73 K/UL — LOW (ref 1–3.3)
LYMPHOCYTES # BLD AUTO: 9.2 % — LOW (ref 13–44)
MAGNESIUM SERPL-MCNC: 2.3 MG/DL — SIGNIFICANT CHANGE UP (ref 1.6–2.6)
MCHC RBC-ENTMCNC: 29.6 PG — SIGNIFICANT CHANGE UP (ref 27–34)
MCHC RBC-ENTMCNC: 33.9 GM/DL — SIGNIFICANT CHANGE UP (ref 32–36)
MCV RBC AUTO: 87.2 FL — SIGNIFICANT CHANGE UP (ref 80–100)
MONOCYTES # BLD AUTO: 0.92 K/UL — HIGH (ref 0–0.9)
MONOCYTES NFR BLD AUTO: 11.6 % — SIGNIFICANT CHANGE UP (ref 2–14)
NEUTROPHILS # BLD AUTO: 6.24 K/UL — SIGNIFICANT CHANGE UP (ref 1.8–7.4)
NEUTROPHILS NFR BLD AUTO: 78.3 % — HIGH (ref 43–77)
PHOSPHATE SERPL-MCNC: 2.1 MG/DL — LOW (ref 2.4–4.7)
PLATELET # BLD AUTO: 176 K/UL — SIGNIFICANT CHANGE UP (ref 150–400)
POTASSIUM SERPL-MCNC: 3.8 MMOL/L — SIGNIFICANT CHANGE UP (ref 3.5–5.3)
POTASSIUM SERPL-SCNC: 3.8 MMOL/L — SIGNIFICANT CHANGE UP (ref 3.5–5.3)
RBC # BLD: 3.92 M/UL — LOW (ref 4.2–5.8)
RBC # FLD: 15.9 % — HIGH (ref 10.3–14.5)
SODIUM SERPL-SCNC: 138 MMOL/L — SIGNIFICANT CHANGE UP (ref 135–145)
WBC # BLD: 7.96 K/UL — SIGNIFICANT CHANGE UP (ref 3.8–10.5)
WBC # FLD AUTO: 7.96 K/UL — SIGNIFICANT CHANGE UP (ref 3.8–10.5)

## 2022-11-13 PROCEDURE — 72125 CT NECK SPINE W/O DYE: CPT | Mod: 26

## 2022-11-13 PROCEDURE — 99233 SBSQ HOSP IP/OBS HIGH 50: CPT

## 2022-11-13 PROCEDURE — 73200 CT UPPER EXTREMITY W/O DYE: CPT | Mod: 26,RT

## 2022-11-13 PROCEDURE — 12345: CPT | Mod: NC

## 2022-11-13 RX ORDER — MULTIVIT-MIN/FERROUS GLUCONATE 9 MG/15 ML
1 LIQUID (ML) ORAL DAILY
Refills: 0 | Status: DISCONTINUED | OUTPATIENT
Start: 2022-11-13 | End: 2022-12-05

## 2022-11-13 RX ORDER — BUDESONIDE, MICRONIZED 100 %
0.5 POWDER (GRAM) MISCELLANEOUS
Refills: 0 | Status: DISCONTINUED | OUTPATIENT
Start: 2022-11-13 | End: 2022-11-16

## 2022-11-13 RX ADMIN — Medication 1 TABLET(S): at 08:16

## 2022-11-13 RX ADMIN — Medication 1 TABLET(S): at 13:19

## 2022-11-13 RX ADMIN — Medication 600 MILLIGRAM(S): at 17:39

## 2022-11-13 RX ADMIN — SODIUM CHLORIDE 50 MILLILITER(S): 9 INJECTION INTRAMUSCULAR; INTRAVENOUS; SUBCUTANEOUS at 08:10

## 2022-11-13 RX ADMIN — CHLORHEXIDINE GLUCONATE 1 APPLICATION(S): 213 SOLUTION TOPICAL at 08:16

## 2022-11-13 RX ADMIN — Medication 3 MILLILITER(S): at 08:46

## 2022-11-13 RX ADMIN — HEPARIN SODIUM 1100 UNIT(S)/HR: 5000 INJECTION INTRAVENOUS; SUBCUTANEOUS at 08:09

## 2022-11-13 RX ADMIN — Medication 3 MILLILITER(S): at 21:56

## 2022-11-13 RX ADMIN — Medication 40 MILLIGRAM(S): at 13:19

## 2022-11-13 RX ADMIN — ATORVASTATIN CALCIUM 80 MILLIGRAM(S): 80 TABLET, FILM COATED ORAL at 22:13

## 2022-11-13 RX ADMIN — Medication 500 MILLIGRAM(S): at 08:16

## 2022-11-13 RX ADMIN — Medication 40 MILLIGRAM(S): at 22:13

## 2022-11-13 RX ADMIN — Medication 3 MILLILITER(S): at 15:02

## 2022-11-13 RX ADMIN — Medication 0.5 MILLIGRAM(S): at 21:57

## 2022-11-13 NOTE — PROGRESS NOTE ADULT - ASSESSMENT
67 yr old male with no known medical history, current smoker presented with complaints of 1 week of shortness of breath. His EKG on arrival revealed diffuse ST elevations, a limited ECHO revealed circumferential pericardial effusion. He was emergently taken to cardiac cath lab where he underwent pericardiocentesis and coronary angiogram. A pericardial drain was placed, cath revealed a 90% LAD lesion, which was not felt to be acute and given pericardial effusion, PCI was deferred. His presentation was attributed to acute pericarditis He was placed on a NRB, transferred to ICU for further monitoring. His labs on admission revealed FILIBERTO and elevated LFTs, IVF were given. A US abdomen was ordered, revealed non occlusive thrombus in IVC and gall bladder sludge. CXR on admission revealed a left lung mass. Subsequent CTA chest, abdomen and pelvis was done, revealed left upper lobe segmental and subsegmental PE. Occlusion of the SVC and a thrombus within the intrahepatic IVC. Mediastinal and right hilar lymphadenopathy with resultant central  bronchial obstruction and atelectasis involving the right middle and lower lobes. 4.1 cm sized left lower lobe mass compatible with a pulmonary malignancy. Moderate right and a trace left pleural effusion. Concern for malignant cells in pericardial fluid, formal cytology report pending. ECHO revealed a EF 25%. Cardiology follow up was done, advised continue aspirin and statin, unable to initiate GDMT given hypotension. Heparin gtt was initiated, palliative care was consulted. He was transferred to medical floor on 11/9.    1-Acute pericardial effusion s/p pericardiocentesis  - Pericardial drain removed  - Findings concerning for metastatic cancer  - Outpatient PET scan  - IR consulted for biopsy  off ASA for 5 days to get bx by IR on 11/15    2-Acute pulmonary embolism/ IVC thrombus   - Continue heparin drip  - Transition to DOAC after biopsy    3- HFrEF with wall motion abnormalities probable CAD   PCI not performed on admission given effusion  - unable to initiate GDMT given hypotension  - Continue Statin, aspirin after bx on hold now     4-Lung mass /cancer   - Oncology recs appreciated  - Palliative consult appreciated    5-Severe protein calorie malnutrition  - Nutrition eval    6- Smoker, likely COPD   add short course iv steroid   neb q6h   start pulmicort   chest PT   mucinex   oxygen to kep pulse ox over 90 %     7- Severe protein krzysztof malnutrution   cont ensure     overall prognosis is poor     d/w pt   DVT ppx  on iv heparin

## 2022-11-14 DIAGNOSIS — R91.8 OTHER NONSPECIFIC ABNORMAL FINDING OF LUNG FIELD: ICD-10-CM

## 2022-11-14 DIAGNOSIS — Z01.810 ENCOUNTER FOR PREPROCEDURAL CARDIOVASCULAR EXAMINATION: ICD-10-CM

## 2022-11-14 DIAGNOSIS — J90 PLEURAL EFFUSION, NOT ELSEWHERE CLASSIFIED: ICD-10-CM

## 2022-11-14 LAB
APTT BLD: 28.5 SEC — SIGNIFICANT CHANGE UP (ref 27.5–35.5)
APTT BLD: 80.2 SEC — HIGH (ref 27.5–35.5)
B PERT IGG+IGM PNL SER: ABNORMAL
COLOR FLD: ABNORMAL
FLUID INTAKE SUBSTANCE CLASS: SIGNIFICANT CHANGE UP
HCT VFR BLD CALC: 32.3 % — LOW (ref 39–50)
HGB BLD-MCNC: 11.1 G/DL — LOW (ref 13–17)
INR BLD: 1.2 RATIO — HIGH (ref 0.88–1.16)
MCHC RBC-ENTMCNC: 29.5 PG — SIGNIFICANT CHANGE UP (ref 27–34)
MCHC RBC-ENTMCNC: 34.4 GM/DL — SIGNIFICANT CHANGE UP (ref 32–36)
MCV RBC AUTO: 85.9 FL — SIGNIFICANT CHANGE UP (ref 80–100)
PH FLD: 8.5 — SIGNIFICANT CHANGE UP
PLATELET # BLD AUTO: 217 K/UL — SIGNIFICANT CHANGE UP (ref 150–400)
PROTHROM AB SERPL-ACNC: 13.9 SEC — HIGH (ref 10.5–13.4)
RBC # BLD: 3.76 M/UL — LOW (ref 4.2–5.8)
RBC # FLD: 15.4 % — HIGH (ref 10.3–14.5)
RCV VOL RI: HIGH /UL (ref 0–0)
SARS-COV-2 RNA SPEC QL NAA+PROBE: SIGNIFICANT CHANGE UP
TOTAL NUCLEATED CELL COUNT, BODY FLUID: 1130 /UL — SIGNIFICANT CHANGE UP
TUBE TYPE: SIGNIFICANT CHANGE UP
WBC # BLD: 7.42 K/UL — SIGNIFICANT CHANGE UP (ref 3.8–10.5)
WBC # FLD AUTO: 7.42 K/UL — SIGNIFICANT CHANGE UP (ref 3.8–10.5)

## 2022-11-14 PROCEDURE — 71045 X-RAY EXAM CHEST 1 VIEW: CPT | Mod: 26

## 2022-11-14 PROCEDURE — 32557 INSERT CATH PLEURA W/ IMAGE: CPT

## 2022-11-14 PROCEDURE — 99234 HOSP IP/OBS SM DT SF/LOW 45: CPT

## 2022-11-14 PROCEDURE — 99233 SBSQ HOSP IP/OBS HIGH 50: CPT

## 2022-11-14 PROCEDURE — 99255 IP/OBS CONSLTJ NEW/EST HI 80: CPT | Mod: 57

## 2022-11-14 RX ADMIN — Medication 0.5 MILLIGRAM(S): at 21:03

## 2022-11-14 RX ADMIN — CHLORHEXIDINE GLUCONATE 1 APPLICATION(S): 213 SOLUTION TOPICAL at 12:41

## 2022-11-14 RX ADMIN — Medication 600 MILLIGRAM(S): at 05:22

## 2022-11-14 RX ADMIN — Medication 40 MILLIGRAM(S): at 12:41

## 2022-11-14 RX ADMIN — Medication 0.5 MILLIGRAM(S): at 09:57

## 2022-11-14 RX ADMIN — HEPARIN SODIUM 1100 UNIT(S)/HR: 5000 INJECTION INTRAVENOUS; SUBCUTANEOUS at 11:26

## 2022-11-14 RX ADMIN — Medication 3 MILLILITER(S): at 15:08

## 2022-11-14 RX ADMIN — Medication 650 MILLIGRAM(S): at 22:44

## 2022-11-14 RX ADMIN — Medication 600 MILLIGRAM(S): at 16:37

## 2022-11-14 RX ADMIN — ATORVASTATIN CALCIUM 80 MILLIGRAM(S): 80 TABLET, FILM COATED ORAL at 21:44

## 2022-11-14 RX ADMIN — Medication 3 MILLILITER(S): at 09:57

## 2022-11-14 RX ADMIN — Medication 500 MILLIGRAM(S): at 12:41

## 2022-11-14 RX ADMIN — Medication 3 MILLILITER(S): at 21:04

## 2022-11-14 RX ADMIN — Medication 1 TABLET(S): at 12:41

## 2022-11-14 RX ADMIN — Medication 40 MILLIGRAM(S): at 05:22

## 2022-11-14 RX ADMIN — Medication 650 MILLIGRAM(S): at 21:44

## 2022-11-14 NOTE — CONSULT NOTE ADULT - ASSESSMENT
68 y/o M with no PMH initially presented to [facility] with SOB. Found to have large pericardial effusion s/p pericardial drainage with 800cc removed. Incidental finding of REYNALDO mass. Also found to have RT pleural effusion. Thoracic surgery consulted to evaluate RT pleural effusion.

## 2022-11-14 NOTE — PROGRESS NOTE ADULT - SUBJECTIVE AND OBJECTIVE BOX
67 yr old male with no known medical history, current smoker presented with complaints of 1 week of shortness of breath. His EKG on arrival revealed diffuse ST elevations, a limited ECHO revealed circumferential pericardial effusion. He was emergently taken to cardiac cath lab where he underwent pericardiocentesis and coronary angiogram. A pericardial drain was placed, cath revealed a 90% LAD lesion, which was not felt to be acute and given pericardial effusion, PCI was deferred. His presentation was attributed to acute pericarditis He was placed on a NRB, transferred to ICU for further monitoring. His labs on admission revealed FILIBERTO and elevated LFTs, IVF were given. A US abdomen was ordered, revealed non occlusive thrombus in IVC and gall bladder sludge. CXR on admission revealed a left lung mass. Subsequent CTA chest, abdomen and pelvis was done, revealed left upper lobe segmental and subsegmental PE. Occlusion of the SVC and a thrombus within the intrahepatic IVC. Mediastinal and right hilar lymphadenopathy with resultant central  bronchial obstruction and atelectasis involving the right middle and lower lobes. 4.1 cm sized left lower lobe mass compatible with a pulmonary malignancy. Moderate right and a trace left pleural effusion. Concern for malignant cells in pericardial fluid, formal cytology report pending. ECHO revealed a EF 25%. Cardiology follow up was done, advised continue aspirin and statin, unable to initiate GDMT given hypotension. Heparin gtt was initiated, palliative care was consulted. He was transferred to medical floor on 11/9.      No acute events overnight.  No fever.  No n/v/d.      MEDICATIONS  (STANDING):  albuterol/ipratropium for Nebulization 3 milliLiter(s) Nebulizer every 6 hours  ascorbic acid 500 milliGRAM(s) Oral daily  atorvastatin 80 milliGRAM(s) Oral at bedtime  buDESOnide    Inhalation Suspension 0.5 milliGRAM(s) Inhalation two times a day  chlorhexidine 2% Cloths 1 Application(s) Topical daily  guaiFENesin  milliGRAM(s) Oral every 12 hours  heparin  Infusion.  Unit(s)/Hr (7 mL/Hr) IV Continuous <Continuous>  influenza  Vaccine (HIGH DOSE) 0.7 milliLiter(s) IntraMuscular once  methylPREDNISolone sodium succinate Injectable 40 milliGRAM(s) IV Push every 8 hours  multivitamin/minerals 1 Tablet(s) Oral daily  sodium chloride 0.9%. 1000 milliLiter(s) (50 mL/Hr) IV Continuous <Continuous>    MEDICATIONS  (PRN):  acetaminophen     Tablet .. 650 milliGRAM(s) Oral every 8 hours PRN Mild Pain (1 - 3), Moderate Pain (4 - 6)  heparin   Injectable 3000 Unit(s) IV Push every 6 hours PRN For aPTT less than 40  heparin   Injectable 1500 Unit(s) IV Push every 6 hours PRN For aPTT between 40 - 57    Vital Signs Last 24 Hrs  T(C): 36.7 (14 Nov 2022 05:00), Max: 36.7 (14 Nov 2022 05:00)  T(F): 98.1 (14 Nov 2022 05:00), Max: 98.1 (14 Nov 2022 05:00)  HR: 81 (14 Nov 2022 05:00) (75 - 90)  BP: 110/71 (14 Nov 2022 05:00) (96/65 - 110/71)  BP(mean): --  RR: 18 (14 Nov 2022 05:00) (18 - 18)  SpO2: 99% (14 Nov 2022 05:00) (91% - 99%)    Parameters below as of 14 Nov 2022 05:00  Patient On (Oxygen Delivery Method): nasal cannula  O2 Flow (L/min): 2  Gen - alert and oriented  Heart - s1s2 rrr  Lung - Dec BS  ABD - SOFT nd nt  Ext - no edema      Labs:                          11.1   7.42  )-----------( 217      ( 14 Nov 2022 05:05 )             32.3                           11.4   6.04  )-----------( 128      ( 10 Nov 2022 05:00 )             32.7     11-13    138  |  100  |  24.3<H>  ----------------------------<  88  3.8   |  26.0  |  0.40<L>    Ca    8.2<L>      13 Nov 2022 06:20  Phos  2.1     11-13  Mg     2.3     11-13 11-10    134<L>  |  97  |  26.2<H>  ----------------------------<  92  3.7   |  27.0  |  0.54    Ca    8.3<L>      10 Nov 2022 05:00  Phos  1.8     11-10  Mg     2.3     11-10    TPro  5.7<L>  /  Alb  2.5<L>  /  TBili  1.7  /  DBili  x   /  AST  104<H>  /  ALT  279<H>  /  AlkPhos  92  11-10

## 2022-11-14 NOTE — CONSULT NOTE ADULT - SUBJECTIVE AND OBJECTIVE BOX
HPI 68 y/o M with no PMH initially presented to [facility] with SOB. Found to have large pericardial effusion s/p pericardial drainage with 800cc removed. Incidental finding of REYNALDO mass. Also found to have RT pleural effusion. Thoracic surgery consulted to evaluate RT pleural effusion.     Subjective - patient seen and evaluated bedside. Sitting comfortably in bed. Admits to SOB. Denies CP, HA, dizziness, n/v/d    Review of Systems: negative x 10 systems except as noted above              PAST MEDICAL & SURGICAL HISTORY:  No pertinent past medical history      No significant past surgical history      No significant past surgical history            acetaminophen     Tablet .. 650 milliGRAM(s) Oral every 8 hours PRN  albuterol/ipratropium for Nebulization 3 milliLiter(s) Nebulizer every 6 hours  ascorbic acid 500 milliGRAM(s) Oral daily  atorvastatin 80 milliGRAM(s) Oral at bedtime  buDESOnide    Inhalation Suspension 0.5 milliGRAM(s) Inhalation two times a day  chlorhexidine 2% Cloths 1 Application(s) Topical daily  guaiFENesin  milliGRAM(s) Oral every 12 hours  heparin   Injectable 3000 Unit(s) IV Push every 6 hours PRN  heparin   Injectable 1500 Unit(s) IV Push every 6 hours PRN  heparin  Infusion.  Unit(s)/Hr IV Continuous <Continuous>  influenza  Vaccine (HIGH DOSE) 0.7 milliLiter(s) IntraMuscular once  multivitamin/minerals 1 Tablet(s) Oral daily  sodium chloride 0.9%. 1000 milliLiter(s) IV Continuous <Continuous>  MEDICATIONS  (PRN):  acetaminophen     Tablet .. 650 milliGRAM(s) Oral every 8 hours PRN Mild Pain (1 - 3), Moderate Pain (4 - 6)  heparin   Injectable 3000 Unit(s) IV Push every 6 hours PRN For aPTT less than 40  heparin   Injectable 1500 Unit(s) IV Push every 6 hours PRN For aPTT between 40 - 57      Daily     Daily Weight in k.6 (2022 04:32)                              11.1   7.42  )-----------( 217      ( 2022 05:05 )             32.3   11-13    138  |  100  |  24.3<H>  ----------------------------<  88  3.8   |  26.0  |  0.40<L>    Ca    8.2<L>      2022 06:20  Phos  2.1       Mg     2.3     -        PTT - ( 2022 05:05 )  PTT:80.2 sec      Objective:  T(C): 36.3 (22 @ 10:00), Max: 36.7 (22 @ 05:00)  HR: 96 (22 @ 10:16) (75 - 96)  BP: 95/68 (22 @ 10:00) (95/68 - 110/71)  RR: 18 (22 @ 10:00) (18 - 18)  SpO2: 96% (22 @ 10:16) (91% - 99%)  Wt(kg): --CAPILLARY BLOOD GLUCOSE      I&O's Summary    2022 07:  -  2022 07:00  --------------------------------------------------------  IN: 2413 mL / OUT: 600 mL / NET: 1813 mL    2022 07:01  -  2022 16:47  --------------------------------------------------------  IN: 0 mL / OUT: 400 mL / NET: -400 mL        Physical Exam  General: NAD  Neuro: A+O x 3, non-focal, speech clear and intact  Psych: Appropriate affect  HEENT:  NCAT, No conjuctival edema or icterus, extremely poor dentition  Pulm: Clear on left, diminished RT base  CV: RRR,, +S1S2  Abd: soft, NT, ND, +BS  Ext: +DP Pulses b/l, no edema  Skin: Warm, dry, intact          Imaging:  < from: CT Angio Chest PE Protocol w/ IV Cont (22 @ 14:17) >    IMPRESSION:  Left upper lobe segmental and subsegmental PE.  Occlusion of the SVC and a thrombus within the intrahepatic IVC.  Mediastinal and right hilar lymphadenopathy with resultant central   bronchial obstruction and atelectasis involving the right middle and   lower lobes.  4.1 cm sized left lower lobe mass compatible with a pulmonary malignancy.  Moderate right and a trace left pleural effusion.  Bowel containing bilateral inguinal hernias, right more than left.      VERTEBRAL BODY ANALYSIS: No Vertebral fracture or low bone density   identified.    Findings were discussed with Dr. [id number] 2022 3:26 PM by   Dr. Bird with read back confirmation.    < end of copied text >

## 2022-11-14 NOTE — PROGRESS NOTE ADULT - PROBLEM SELECTOR PLAN 1
- EF 20-25%, severely depressed LV systolic function, likely ischemic cardiomyopathy  - Now s/p LHC showing mLAD 90%, dRCA 60% OSVALDO flow 3  - Patient overall too unstable at this time for revascularization, no plans for repeat LHC at this time.   - Aspirin on hold for IR procedure, restart when cleared by IR.  - Continue statin.   - Unable to add beta blocker or ACEi/ARB due to hypotension.   - For now will need medical therapy   - Pt w/ pericardial effusion s/p pericardiocentesis.   - Check testicular ultrasound.  - Diet/lifestyle modifications and medication compliance heavily reinforced   - Repeat echo with small effusion EF still 20-25%.  - for any complaint of chest pain please check trop, CPK, and EKG.

## 2022-11-14 NOTE — CONSULT NOTE ADULT - PROBLEM SELECTOR PROBLEM 1
Pleural effusion, right
Mass of lower lobe of left lung
HFrEF (heart failure with reduced ejection fraction)

## 2022-11-14 NOTE — PROGRESS NOTE ADULT - ASSESSMENT
67 yr old male with no known medical history, current smoker presented with complaints of 1 week of shortness of breath. His EKG on arrival revealed diffuse ST elevations, a limited ECHO revealed circumferential pericardial effusion. He was emergently taken to cardiac cath lab where he underwent pericardiocentesis and coronary angiogram. A pericardial drain was placed, cath revealed a 90% LAD lesion, which was not felt to be acute and given pericardial effusion, PCI was deferred. His presentation was attributed to acute pericarditis He was placed on a NRB, transferred to ICU for further monitoring. His labs on admission revealed FILIBERTO and elevated LFTs, IVF were given. A US abdomen was ordered, revealed non occlusive thrombus in IVC and gall bladder sludge. CXR on admission revealed a left lung mass. Subsequent CTA chest, abdomen and pelvis was done, revealed left upper lobe segmental and subsegmental PE. Occlusion of the SVC and a thrombus within the intrahepatic IVC. Mediastinal and right hilar lymphadenopathy with resultant central  bronchial obstruction and atelectasis involving the right middle and lower lobes. 4.1 cm sized left lower lobe mass compatible with a pulmonary malignancy. Moderate right and a trace left pleural effusion. Concern for malignant cells in pericardial fluid, formal cytology report pending. ECHO revealed a EF 25%. Cardiology follow up was done, advised continue aspirin and statin, unable to initiate GDMT given hypotension. Heparin gtt was initiated, palliative care was consulted. He was transferred to medical floor on 11/9.    1) Concern for malignant pleural effusion  findings concerning for metastatic lung cancer, cytology pending  IR guided lung biopsy planned for 11/15 for confirmative dx and to test tissue for additional NGS  outpatient PET CT scan   will need systemic therapy, based on pathology and NGS result    2) PE  in setting of malignancy?  on heparin gtt  can switch to DOAC when ok with team , for now c/w gtt as patient is to get inpatient biopsy    3) Anemia  multifactorial  adequate iron stores, vit b12, folate  transfuse if hgb<7.0.      will follow up

## 2022-11-14 NOTE — PROGRESS NOTE ADULT - ASSESSMENT
67 yr old male with no known medical history, current smoker presented with complaints of 1 week of shortness of breath. His EKG on arrival revealed diffuse ST elevations, a limited ECHO revealed circumferential pericardial effusion. He was emergently taken to cardiac cath lab where he underwent pericardiocentesis and coronary angiogram. A pericardial drain was placed, cath revealed a 90% LAD lesion, which was not felt to be acute and given pericardial effusion, PCI was deferred. His presentation was attributed to acute pericarditis He was placed on a NRB, transferred to ICU for further monitoring. His labs on admission revealed FILIBERTO and elevated LFTs, IVF were given. A US abdomen was ordered, revealed non occlusive thrombus in IVC and gall bladder sludge. CXR on admission revealed a left lung mass. Subsequent CTA chest, abdomen and pelvis was done, revealed left upper lobe segmental and subsegmental PE. Occlusion of the SVC and a thrombus within the intrahepatic IVC. Mediastinal and right hilar lymphadenopathy with resultant central  bronchial obstruction and atelectasis involving the right middle and lower lobes. 4.1 cm sized left lower lobe mass compatible with a pulmonary malignancy. Moderate right and a trace left pleural effusion. Concern for malignant cells in pericardial fluid, formal cytology report pending. ECHO revealed a EF 25%. Cardiology follow up was done, advised continue aspirin and statin, unable to initiate GDMT given hypotension. Heparin gtt was initiated, palliative care was consulted. He was transferred to medical floor on 11/9.    Acute pericardial effusion s/p pericardiocentesis  - Pericardial drain removed  - Findings concerning for metastatic cancer  - Outpatient PET scan  - Off Aspirin  - IR biopsy tomorrow     Acute pulmonary embolism/ IVC thrombus   - Continue heparin drip  - Transition to DOAC after biopsy    HFrEF with wall motion abnormalities probable CAD   PCI not performed on admission given effusion  - unable to initiate GDMT given hypotension  - Continue Statin, aspirin after bx on hold now     Lung mass /cancer   - Oncology recs appreciated  - Palliative consult appreciated    Smoker, likely COPD   - Solumedrol 40mg q8h  - Pulmicort  - Mucinex    DVT ppx  - Heparin drip

## 2022-11-14 NOTE — PROCEDURE NOTE - NSSITEPREP_SKIN_A_CORE
under sterile technique
chlorhexidine/Adherence to aseptic technique: hand hygiene prior to donning barriers (gown, gloves), don cap and mask, sterile drape over patient

## 2022-11-14 NOTE — PROGRESS NOTE ADULT - SUBJECTIVE AND OBJECTIVE BOX
[facility]                                                         CARDIOLOGY AT [facility]                                                                  [address], [place] [address]                                                         Telephone: [phone]. Fax:[phone]                                                                             PROGRESS NOTE    Reason for follow up: Pericardial Effusion, CAD, HFrEF  Update: Patient currently receiving a breathing treatment at this time, states he feels a little bit short of breath. Patient has also experienced testicular swelling for 2 years. Plan for IR biopsy tomorrow.       Review of symptoms:   Cardiac:  No chest pain. + dyspnea. No palpitations.  Respiratory: no cough. + dyspnea  Gastrointestinal: No diarrhea. No abdominal pain. No bleeding.   Neuro: No focal neuro complaints.    Vitals:  T(C): 36.7 (11-14-22 @ 05:00), Max: 36.7 (11-14-22 @ 05:00)  HR: 96 (11-14-22 @ 10:16) (75 - 96)  BP: 110/71 (11-14-22 @ 05:00) (96/65 - 110/71)  RR: 18 (11-14-22 @ 05:00) (18 - 18)  SpO2: 96% (11-14-22 @ 10:16) (91% - 99%)  Wt(kg): --  I&O's Summary    13 Nov 2022 07:01  -  14 Nov 2022 07:00  --------------------------------------------------------  IN: 2413 mL / OUT: 600 mL / NET: 1813 mL      PHYSICAL EXAM:  Appearance: Comfortable. No acute distress  HEENT:  Atraumatic. Normocephalic.  Normal oral mucosa  Neurologic: A & O x 3, no gross focal deficits.  Cardiovascular: RRR S1 S2, No murmur, no rubs/gallops. No JVD  Respiratory: Decreased BS at bases  Gastrointestinal:  Soft, Non-tender, + BS  Lower Extremities: 2+ Peripheral Pulses, No clubbing, cyanosis, or edema, +scrotal edema  Psychiatry: Patient is calm. No agitation.   Skin: warm and dry.    CURRENT CARDIAC MEDICATIONS:      CURRENT OTHER MEDICATIONS:  albuterol/ipratropium for Nebulization 3 milliLiter(s) Nebulizer every 6 hours  buDESOnide    Inhalation Suspension 0.5 milliGRAM(s) Inhalation two times a day  guaiFENesin  milliGRAM(s) Oral every 12 hours  acetaminophen     Tablet .. 650 milliGRAM(s) Oral every 8 hours PRN Mild Pain (1 - 3), Moderate Pain (4 - 6)  atorvastatin 80 milliGRAM(s) Oral at bedtime  methylPREDNISolone sodium succinate Injectable 40 milliGRAM(s) IV Push every 8 hours, Stop order after: 4 Doses  ascorbic acid 500 milliGRAM(s) Oral daily  chlorhexidine 2% Cloths 1 Application(s) Topical daily  heparin   Injectable 3000 Unit(s) IV Push every 6 hours PRN For aPTT less than 40  heparin   Injectable 1500 Unit(s) IV Push every 6 hours PRN For aPTT between 40 - 57  heparin  Infusion.  Unit(s)/Hr (7 mL/Hr) IV Continuous <Continuous>  influenza  Vaccine (HIGH DOSE) 0.7 milliLiter(s) IntraMuscular once  multivitamin/minerals 1 Tablet(s) Oral daily  sodium chloride 0.9%. 1000 milliLiter(s) (50 mL/Hr) IV Continuous <Continuous>      LABS:	 	  ( 09 Nov 2022 04:25 )  Troponin T  0.06 ,  CPK  X    , CKMB  X    , BNP X        , ( 08 Nov 2022 10:50 )  Troponin T  0.11<H>,  CPK  477<H>, CKMB  X    , BNP X        , ( 08 Nov 2022 04:43 )  Troponin T  0.15<H>,  CPK  X    , CKMB  X    , BNP X                                  11.1   7.42  )-----------( 217      ( 14 Nov 2022 05:05 )             32.3     11-13    138  |  100  |  24.3<H>  ----------------------------<  88  3.8   |  26.0  |  0.40<L>    Ca    8.2<L>      13 Nov 2022 06:20  Phos  2.1     11-13  Mg     2.3     11-13      PT/INR/PTT ( 14 Nov 2022 05:05 )                       :                       :      X            :       80.2                  .        .                   .              .           .       X           .                                       Lipid Profile: Date: 11-08 @ 04:43  Total cholesterol 152; Direct LDL: --; HDL: 22; Triglycerides:129    HgA1c:   TSH: Thyroid Stimulating Hormone, Serum: 0.41 uIU/mL  Thyroid Stimulating Hormone, Serum: 0.94 uIU/mL      TELEMETRY: SR  ECG:    DIAGNOSTIC TESTING:  [ ] Echocardiogram:   < from: TTE Echo Limited or F/U (11.10.22 @ 10:07) >  PHYSICIAN INTERPRETATION:  Left Ventricle: Endocardial visualization was enhanced with intravenous   echo contrast. The left ventricular internal cavity size is normal.  Global LV systolic function was severely decreased. Left ventricular   ejection fraction, by visual estimation, is 30 to 35%. The basal and mid   ventricular segments are hypokinetic with sparing of the apical segments.   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  Right Ventricle: The right ventricular size is mildly enlarged. RV   systolic function is mildly reduced.  Pericardium: Trivial pericardial effusion is present. The pericardial   effusion is localized near the right atrium, anterior to the right   ventricle and surrounding the apex. There is a small pleural effusion in   the right lateral region.  Aortic Valve: The aortic valve is trileaflet.  Venous: The inferior vena cava was dilated, with respiratory size   variation less than 50%.There is a echogenic mass in the IVC measuring   3.83cm X 1.37cm. Probably thrombus.  In comparison to the previous echocardiogram(s): Prior examinations are   available and were reviewed for comparison purposes.      Summary:   1. Endocardial visualization was enhanced with intravenous echo contrast.   2. The basal and mid ventricular segments are hypokinetic with sparing   of the apical segments. 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   3. Left ventricular ejection fraction, by visual estimation, is 30 to   35%.   4. Mildly enlarged right ventricle. Mildly reduced RV systolic function.   5. Trivial pericardial effusion.   6. There is a echogenic mass in the IVC measuring 3.83cm X 1.37cm.  Probably thrombus.    MD Raisa, RPVI Electronically signed on 11/10/2022 at 12:17:18   PM    < end of copied text >    [ ]  Catheterization:  < from: Cardiac Catheterization (11.07.22 @ 18:15) >  Pericardiocentesis   Pericardiocentesis was performed. Along thin-walled needle was  advanced into the pericardial space until fluid was aspirated.  Over a floppy wire, the needle was replaced with a catheter.      Diagnostic Findings:     Coronary Angiography   The coronary circulation is right dominant.     LM   Left main artery: The segment is visually normal in size and  structure.    LAD   Mid left anterior descending: There is a 90 % stenosis.      CX   Circumflex: Angiography shows mild atherosclerosis.      RCA   Proximal right coronary artery: The segment is moderately calcified.  There is a 30 % stenosis. Mid right coronary artery: There  is a 20 % stenosis. Distal right coronary artery: The segment is  severely calcified. There is a 60 % stenosis. OSVALDO Flow 3.    Left Heart Cath   Global left ventricular function is normal. Ejection fraction is 60%.    Valves   Aortic Valve: There is no aortic valve stenosis.    Mitral Valve: The mitral valve exhibits trivial (less than 1+)  regurgitation.      < end of copied text >    [ ] Stress Test:    OTHER:

## 2022-11-14 NOTE — PROGRESS NOTE ADULT - SUBJECTIVE AND OBJECTIVE BOX
Chief complaint: Effusion    Patient seen and examined at bedside. No acute overnight events reported. No fever, chills, cough, chest pain, nausea or vomiting.     Vital Signs Last 24 Hrs  T(F): 97.4 (14 Nov 2022 10:00), Max: 98.1 (14 Nov 2022 05:00)  HR: 96 (14 Nov 2022 10:16) (75 - 96)  BP: 95/68 (14 Nov 2022 10:00) (95/68 - 110/71)  RR: 18 (14 Nov 2022 10:00) (18 - 18)  SpO2: 96% (14 Nov 2022 10:16) (91% - 99%)    Physical Exam:  Constitutional: alert, in no acute distress   Neck: Soft and supple  Respiratory: Clear to auscultation bilaterally  Cardiovascular: Regular rate and rhyhtm  Gastrointestinal: Soft  Musculoskeletal: 5/5 strength b/l upper and lower extremities, no lower extremity edema bilaterally    Labs:                        11.1   7.42  )-----------( 217      ( 14 Nov 2022 05:05 )             32.3   11-13    138  |  100  |  24.3<H>  ----------------------------<  88  3.8   |  26.0  |  0.40<L>    Ca    8.2<L>      13 Nov 2022 06:20  Phos  2.1     11-13  Mg     2.3     11-13

## 2022-11-14 NOTE — CONSULT NOTE ADULT - PROBLEM SELECTOR RECOMMENDATION 9
RT pleural effusion.  Thoracic surgery consulted for drainage  POCUS reveals large RT pleural effusion  Plan for RT PTC placement pending repeat INR    If INR within safe range for chest tube placement, plan will be to maintain CT to WS, with daily CXR and Q12 outputs documented in flowsheet.     D/w Dr. Bonds
- EF 20-25%   - severely depressed LV systolic function   - ischemic cardiomyopathy  - now s/p LHC showing mLAD 90%, dRCA 60% OSVALDO flow 3  - patient was too unstable for revascularization, will need repeat cath when stabilized  - for now will need medical therapy   - start aspirin, statin  - will hold off on addition of plavix, patient may require full dose anticoagulation of IVC thrombus   - toprol  - hold diuretics, pt w/ pericardial effusion s/p pericardiocentesis  - afterload reduction after drain is removed w/ entresto   - Diet/lifestyle modifications and medication compliance heavily reinforced   - repeat limited echo in AM to eval for effusion  - check CRP, ESR  - for any complaint of chest pain please check trop, CPK, and EKG.

## 2022-11-14 NOTE — PROGRESS NOTE ADULT - PROBLEM SELECTOR PLAN 5
- RCRI 6.6% risk of major cardiac event.   - Patient is at an elevated risk due to CAD, HFrEF, and multiple thrombi but there is no absolute cardiac contraindication to proposed procedure.

## 2022-11-15 LAB
ALBUMIN FLD-MCNC: 1.5 G/DL — SIGNIFICANT CHANGE UP
ANION GAP SERPL CALC-SCNC: 8 MMOL/L — SIGNIFICANT CHANGE UP (ref 5–17)
APTT BLD: 70.6 SEC — HIGH (ref 27.5–35.5)
BASOPHILS # BLD AUTO: 0.02 K/UL — SIGNIFICANT CHANGE UP (ref 0–0.2)
BASOPHILS NFR BLD AUTO: 0.1 % — SIGNIFICANT CHANGE UP (ref 0–2)
BUN SERPL-MCNC: 33 MG/DL — HIGH (ref 8–20)
CALCIUM SERPL-MCNC: 8.8 MG/DL — SIGNIFICANT CHANGE UP (ref 8.4–10.5)
CHLORIDE SERPL-SCNC: 104 MMOL/L — SIGNIFICANT CHANGE UP (ref 96–108)
CO2 SERPL-SCNC: 26 MMOL/L — SIGNIFICANT CHANGE UP (ref 22–29)
COMMENT - FLUIDS: SIGNIFICANT CHANGE UP
CREAT SERPL-MCNC: 0.52 MG/DL — SIGNIFICANT CHANGE UP (ref 0.5–1.3)
EGFR: 110 ML/MIN/1.73M2 — SIGNIFICANT CHANGE UP
EOSINOPHIL # BLD AUTO: 0 K/UL — SIGNIFICANT CHANGE UP (ref 0–0.5)
EOSINOPHIL NFR BLD AUTO: 0 % — SIGNIFICANT CHANGE UP (ref 0–6)
GLUCOSE FLD-MCNC: 131 MG/DL — SIGNIFICANT CHANGE UP
GLUCOSE SERPL-MCNC: 105 MG/DL — HIGH (ref 70–99)
GRAM STN FLD: SIGNIFICANT CHANGE UP
HCT VFR BLD CALC: 33.2 % — LOW (ref 39–50)
HGB BLD-MCNC: 11.4 G/DL — LOW (ref 13–17)
IMM GRANULOCYTES NFR BLD AUTO: 0.4 % — SIGNIFICANT CHANGE UP (ref 0–0.9)
LDH SERPL L TO P-CCNC: 219 U/L — SIGNIFICANT CHANGE UP
LDH SERPL L TO P-CCNC: 299 U/L — HIGH (ref 98–192)
LYMPHOCYTES # BLD AUTO: 0.6 K/UL — LOW (ref 1–3.3)
LYMPHOCYTES # BLD AUTO: 3.8 % — LOW (ref 13–44)
LYMPHOCYTES # FLD: 30 % — SIGNIFICANT CHANGE UP
MCHC RBC-ENTMCNC: 29.2 PG — SIGNIFICANT CHANGE UP (ref 27–34)
MCHC RBC-ENTMCNC: 34.3 GM/DL — SIGNIFICANT CHANGE UP (ref 32–36)
MCV RBC AUTO: 85.1 FL — SIGNIFICANT CHANGE UP (ref 80–100)
MESOTHL CELL # FLD: 16 % — SIGNIFICANT CHANGE UP
MONOCYTES # BLD AUTO: 1.21 K/UL — HIGH (ref 0–0.9)
MONOCYTES NFR BLD AUTO: 7.7 % — SIGNIFICANT CHANGE UP (ref 2–14)
MONOS+MACROS # FLD: 22 % — SIGNIFICANT CHANGE UP
NEUTROPHILS # BLD AUTO: 13.75 K/UL — HIGH (ref 1.8–7.4)
NEUTROPHILS NFR BLD AUTO: 88 % — HIGH (ref 43–77)
NEUTROPHILS-BODY FLUID: 32 % — SIGNIFICANT CHANGE UP
NON-GYNECOLOGICAL CYTOLOGY STUDY: SIGNIFICANT CHANGE UP
PLATELET # BLD AUTO: 263 K/UL — SIGNIFICANT CHANGE UP (ref 150–400)
POTASSIUM SERPL-MCNC: 4.4 MMOL/L — SIGNIFICANT CHANGE UP (ref 3.5–5.3)
POTASSIUM SERPL-SCNC: 4.4 MMOL/L — SIGNIFICANT CHANGE UP (ref 3.5–5.3)
PROT FLD-MCNC: 2.7 G/DL — SIGNIFICANT CHANGE UP
RBC # BLD: 3.9 M/UL — LOW (ref 4.2–5.8)
RBC # FLD: 15.6 % — HIGH (ref 10.3–14.5)
SODIUM SERPL-SCNC: 138 MMOL/L — SIGNIFICANT CHANGE UP (ref 135–145)
SPECIMEN SOURCE: SIGNIFICANT CHANGE UP
WBC # BLD: 15.65 K/UL — HIGH (ref 3.8–10.5)
WBC # FLD AUTO: 15.65 K/UL — HIGH (ref 3.8–10.5)

## 2022-11-15 PROCEDURE — 32408 CORE NDL BX LNG/MED PERQ: CPT

## 2022-11-15 PROCEDURE — 71045 X-RAY EXAM CHEST 1 VIEW: CPT | Mod: 26

## 2022-11-15 PROCEDURE — 88341 IMHCHEM/IMCYTCHM EA ADD ANTB: CPT | Mod: 26,59

## 2022-11-15 PROCEDURE — 99233 SBSQ HOSP IP/OBS HIGH 50: CPT

## 2022-11-15 PROCEDURE — 88305 TISSUE EXAM BY PATHOLOGIST: CPT | Mod: 26,59

## 2022-11-15 PROCEDURE — 88342 IMHCHEM/IMCYTCHM 1ST ANTB: CPT | Mod: 26

## 2022-11-15 PROCEDURE — 88341 IMHCHEM/IMCYTCHM EA ADD ANTB: CPT | Mod: 26

## 2022-11-15 PROCEDURE — 71045 X-RAY EXAM CHEST 1 VIEW: CPT | Mod: 26,77

## 2022-11-15 PROCEDURE — 99232 SBSQ HOSP IP/OBS MODERATE 35: CPT

## 2022-11-15 PROCEDURE — 88305 TISSUE EXAM BY PATHOLOGIST: CPT | Mod: 26

## 2022-11-15 PROCEDURE — 88112 CYTOPATH CELL ENHANCE TECH: CPT | Mod: 26

## 2022-11-15 PROCEDURE — 88342 IMHCHEM/IMCYTCHM 1ST ANTB: CPT | Mod: 26,59

## 2022-11-15 RX ORDER — OXYCODONE AND ACETAMINOPHEN 5; 325 MG/1; MG/1
1 TABLET ORAL EVERY 6 HOURS
Refills: 0 | Status: DISCONTINUED | OUTPATIENT
Start: 2022-11-15 | End: 2022-11-16

## 2022-11-15 RX ORDER — APIXABAN 2.5 MG/1
10 TABLET, FILM COATED ORAL EVERY 12 HOURS
Refills: 0 | Status: DISCONTINUED | OUTPATIENT
Start: 2022-11-15 | End: 2022-11-21

## 2022-11-15 RX ADMIN — Medication 600 MILLIGRAM(S): at 17:06

## 2022-11-15 RX ADMIN — Medication 3 MILLILITER(S): at 09:50

## 2022-11-15 RX ADMIN — Medication 3 MILLILITER(S): at 21:12

## 2022-11-15 RX ADMIN — Medication 600 MILLIGRAM(S): at 06:25

## 2022-11-15 RX ADMIN — APIXABAN 10 MILLIGRAM(S): 2.5 TABLET, FILM COATED ORAL at 17:06

## 2022-11-15 RX ADMIN — OXYCODONE AND ACETAMINOPHEN 1 TABLET(S): 5; 325 TABLET ORAL at 02:14

## 2022-11-15 RX ADMIN — Medication 650 MILLIGRAM(S): at 18:05

## 2022-11-15 RX ADMIN — ATORVASTATIN CALCIUM 80 MILLIGRAM(S): 80 TABLET, FILM COATED ORAL at 22:49

## 2022-11-15 RX ADMIN — Medication 3 MILLILITER(S): at 04:33

## 2022-11-15 RX ADMIN — Medication 650 MILLIGRAM(S): at 17:05

## 2022-11-15 RX ADMIN — Medication 3 MILLILITER(S): at 15:49

## 2022-11-15 RX ADMIN — Medication 0.5 MILLIGRAM(S): at 21:14

## 2022-11-15 RX ADMIN — Medication 1 TABLET(S): at 17:06

## 2022-11-15 RX ADMIN — SODIUM CHLORIDE 50 MILLILITER(S): 9 INJECTION INTRAMUSCULAR; INTRAVENOUS; SUBCUTANEOUS at 11:22

## 2022-11-15 RX ADMIN — Medication 0.5 MILLIGRAM(S): at 09:50

## 2022-11-15 RX ADMIN — OXYCODONE AND ACETAMINOPHEN 1 TABLET(S): 5; 325 TABLET ORAL at 01:14

## 2022-11-15 RX ADMIN — Medication 500 MILLIGRAM(S): at 17:06

## 2022-11-15 NOTE — PROGRESS NOTE ADULT - ASSESSMENT
67 yr old male with no known medical history, current smoker presented with complaints of 1 week of shortness of breath. His EKG on arrival revealed diffuse ST elevations, a limited ECHO revealed circumferential pericardial effusion. He was emergently taken to cardiac cath lab where he underwent pericardiocentesis and coronary angiogram. A pericardial drain was placed, cath revealed a 90% LAD lesion, which was not felt to be acute and given pericardial effusion, PCI was deferred. His presentation was attributed to acute pericarditis He was placed on a NRB, transferred to ICU for further monitoring. His labs on admission revealed FILIBERTO and elevated LFTs, IVF were given. A US abdomen was ordered, revealed non occlusive thrombus in IVC and gall bladder sludge. CXR on admission revealed a left lung mass. Subsequent CTA chest, abdomen and pelvis was done, revealed left upper lobe segmental and subsegmental PE. Occlusion of the SVC and a thrombus within the intrahepatic IVC. Mediastinal and right hilar lymphadenopathy with resultant central  bronchial obstruction and atelectasis involving the right middle and lower lobes. 4.1 cm sized left lower lobe mass compatible with a pulmonary malignancy. Moderate right and a trace left pleural effusion. Concern for malignant cells in pericardial fluid, formal cytology report pending. ECHO revealed a EF 25%. Cardiology follow up was done, advised continue aspirin and statin, unable to initiate GDMT given hypotension. Heparin gtt was initiated, palliative care was consulted. He was transferred to medical floor on 11/9.    Acute pericardial effusion s/p pericardiocentesis  - Pericardial drain removed  - Findings concerning for metastatic cancer  - Outpatient PET scan  - Off Aspirin  - IR lung biopsy today     Right pleural effusion  - Thoracic surgery consulted  - Chest tube placed  `  Acute pulmonary embolism/ IVC thrombus   - Continue heparin drip  - Transition to DOAC after biopsy    HFrEF with wall motion abnormalities probable CAD   PCI not performed on admission given effusion  - unable to initiate GDMT given hypotension  - Continue Statin, aspirin after bx on hold now     Lung mass /cancer   - Oncology recs appreciated  - Palliative consult appreciated    Smoker, likely COPD   - Solumedrol 40mg q8h  - Pulmicort  - Mucinex    DVT ppx  - Heparin drip held for lung biopsy

## 2022-11-15 NOTE — PROGRESS NOTE ADULT - SUBJECTIVE AND OBJECTIVE BOX
Interval History:  Pt seen lying in bed in NAD awaiting IR bx of lung mass    Present Symptoms:     Dyspnea: No  Nausea/Vomiting: No  Anxiety:  No  Depression: Yes   Fatigue: No  Loss of appetite: No  Constipation: No    Pain: rt shoulder/neck as prior            Character-            Duration-            Effect-            Factors-            Frequency-            Location-            Severity-    Review of Systems:   As above - otherwise negative     MEDICATIONS  (STANDING):  albuterol/ipratropium for Nebulization 3 milliLiter(s) Nebulizer every 6 hours  ascorbic acid 500 milliGRAM(s) Oral daily  atorvastatin 80 milliGRAM(s) Oral at bedtime  buDESOnide    Inhalation Suspension 0.5 milliGRAM(s) Inhalation two times a day  chlorhexidine 2% Cloths 1 Application(s) Topical daily  guaiFENesin  milliGRAM(s) Oral every 12 hours  heparin  Infusion.  Unit(s)/Hr (7 mL/Hr) IV Continuous <Continuous>  influenza  Vaccine (HIGH DOSE) 0.7 milliLiter(s) IntraMuscular once  multivitamin/minerals 1 Tablet(s) Oral daily  sodium chloride 0.9%. 1000 milliLiter(s) (50 mL/Hr) IV Continuous <Continuous>    MEDICATIONS  (PRN):  acetaminophen     Tablet .. 650 milliGRAM(s) Oral every 8 hours PRN Mild Pain (1 - 3), Moderate Pain (4 - 6)  heparin   Injectable 3000 Unit(s) IV Push every 6 hours PRN For aPTT less than 40  heparin   Injectable 1500 Unit(s) IV Push every 6 hours PRN For aPTT between 40 - 57  oxycodone    5 mG/acetaminophen 325 mG 1 Tablet(s) Oral every 6 hours PRN Moderate Pain (4 - 6)    PHYSICAL EXAM:  Vital Signs Last 24 Hrs  T(C): 37.1 (15 Nov 2022 13:12), Max: 37.1 (15 Nov 2022 13:12)  T(F): 98.7 (15 Nov 2022 13:12), Max: 98.7 (15 Nov 2022 13:12)  HR: 97 (15 Nov 2022 13:12) (82 - 101)  BP: 101/67 (15 Nov 2022 13:12) (101/67 - 114/77)  BP(mean): --  RR: 18 (15 Nov 2022 13:12) (18 - 18)  SpO2: 93% (15 Nov 2022 13:12) (92% - 99%)    Parameters below as of 15 Nov 2022 13:12  Patient On (Oxygen Delivery Method): nasal cannula  O2 Flow (L/min): 2    General: Pt lying in bed in NAD; cachectic  HEENT: NCAT; MM dry; temporal wasting  Resp: breathing unlabored; symmetric chest expansion B/L  MSK: no contractures/deformities  Skin: no rash  Neuro: awake and oriented x 3; no myoclonus  Psych: calm; flat affect    LABS:                        11.4   15.65 )-----------( 263      ( 15 Nov 2022 04:45 )             33.2     11-15    138  |  104  |  33.0<H>  ----------------------------<  105<H>  4.4   |  26.0  |  0.52    Ca    8.8      15 Nov 2022 04:45      PT/INR - ( 14 Nov 2022 16:30 )   PT: 13.9 sec;   INR: 1.20 ratio      PTT - ( 15 Nov 2022 04:45 )  PTT:70.6 sec    I&O's Summary    14 Nov 2022 07:01  -  15 Nov 2022 07:00  --------------------------------------------------------  IN: 700 mL / OUT: 2470 mL / NET: -1770 mL    15 Nov 2022 07:01  -  15 Nov 2022 15:25  --------------------------------------------------------  IN: 250 mL / OUT: 750 mL / NET: -500 mL    RADIOLOGY & ADDITIONAL STUDIES:    NEUROLOGIC MEDICATIONS/OPIOIDS/BENZODIAZEPINES IN PAST 24HRS:    acetaminophen     Tablet ..   650 milliGRAM(s) Oral (11-14-22 @ 21:44)    oxycodone    5 mG/acetaminophen 325 mG   1 Tablet(s) Oral (11-15-22 @ 01:14)    ADVANCE DIRECTIVES/TREATMENT PREFERENCES:  Code Status: full code  MOLST (if not Full Code):  HCP/Surrogate: avelino diamond

## 2022-11-15 NOTE — PROGRESS NOTE ADULT - ASSESSMENT
68yo M w/ no known PMH who p/w SOB and found to have diffuse ST elevations and taken for emergent LHC where found to have a large pericardial effusion s/p percardiocentesis during which bloody fluid was drained (a 90% LAD lesion also seen but PCI deferred until further stabilized).  Course further c/b dx of HFrEF with EF 20-25% and imaging revealing REYNALDO PE, SVC occlusion, IVC thrombus, mediastinal/rt hilar LAD with bronchial obstruxn in RML/RLL, a 4.1cm LLL mass c/w lung malignancy, and mod rt effusion.  We are consulted for assistance with goals of care.   #Goals of care  - Pt named friend as HCP- Samuel Chaudhry - [phone]   - per copy of HCP shown to me today, pt also named alternate HCP friend Rios Maza ([phone]) - Spoke with Rios today and updated him  - Clinically appears to have metastatic cancer but no tissue dx as yet - plans for lung bx today - will plan to meet with pt/HCP to discuss code status and broader goals of care once bx back to review what treatment options he will have    #Rt shoulder/neck pain  - CT of C-spine and rt shoulder did not reveal any explanatory findings  - Cont tylenol 650mg PO TID prn (this is under 2gm limit for pts with hepatic impairment)    #Depression  - pt cited personal losses - specifically, that of his mother and sisters - he didn't care to elaborate  - will cont to provide support  - given profound depression and likely quite limited prognosis, would consider trial of methylphenidate - however, pt does not wish to trial any medications for his mood nor does he wish to engage in psychotherapy

## 2022-11-15 NOTE — PROGRESS NOTE ADULT - SUBJECTIVE AND OBJECTIVE BOX
IR Post Procedure Note    Diagnosis: Left Lung Mass    Procedure: Left Lung Mass Biopsy    : Kushal Kelly MD    Contrast: None    Anesthesia: 1% Lidocaine Subcutaneous, Sedation administered by Anesthesiology    Estimated Blood Loss: Less than 10cc    Specimens: Identified, Labeled, Confirmed and Sent to Lab.     Complications: No Immediate Complications/ Small Pneumothorax Requiring Chest Tube Placement    Anticoagulation: Resume in 24 Hours    Findings & Plan: two core bx of Left lung mass w 20g biopsy needle under ultrasound guidance. No PTX and mild alveolar hemorrhage post procedure.     Please call Interventional Radiology with any questions, concerns, or issues.

## 2022-11-15 NOTE — PROGRESS NOTE ADULT - SUBJECTIVE AND OBJECTIVE BOX
Subjective:  Pt sitting up in bed                         T(F): 98 (11-15-22 @ 08:12), Max: 98 (11-15-22 @ 08:12)  HR: 87 (11-15-22 @ 09:50) (82 - 101)  BP: 113/76 (11-15-22 @ 08:12) (106/71 - 114/79)  RR: 18 (11-15-22 @ 08:12) (18 - 18)  SpO2: 98% (11-15-22 @ 09:50) (92% - 99%)    Daily     Daily Weight in k.7 (15 Nov 2022 04:26)    No Known Allergies      11-15    138  |  104  |  33.0<H>  ----------------------------<  105<H>  4.4   |  26.0  |  0.52    Ca    8.8      15 Nov 2022 04:45                                 11.4   15.65 )-----------( 263      ( 15 Nov 2022 04:45 )             33.2        PT/INR - ( 2022 16:30 )   PT: 13.9 sec;   INR: 1.20 ratio         PTT - ( 15 Nov 2022 04:45 )  PTT:70.6 sec        I&O's Detail    2022 07:01  -  15 Nov 2022 07:00  --------------------------------------------------------  IN:    Heparin Infusion: 80 mL    Oral Fluid: 120 mL    sodium chloride 0.9%: 500 mL  Total IN: 700 mL    OUT:    Chest Tube (mL): 970 mL    Incontinent per Condom Catheter (mL): 1500 mL  Total OUT: 2470 mL    Total NET: -1770 mL      15 Nov 2022 07:01  -  15 Nov 2022 11:59  --------------------------------------------------------  IN:    sodium chloride 0.9%: 250 mL  Total IN: 250 mL    OUT:    Incontinent per Condom Catheter (mL): 750 mL  Total OUT: 750 mL    Total NET: -500 mL      CHEST TUBE:  [x ] YES [ ] NO  OUTPUT: 970ml over the last 24 hours  AIR LEAKS:  [ ] YES [x ] NO        Active Medications:  acetaminophen     Tablet .. 650 milliGRAM(s) Oral every 8 hours PRN  albuterol/ipratropium for Nebulization 3 milliLiter(s) Nebulizer every 6 hours  ascorbic acid 500 milliGRAM(s) Oral daily  atorvastatin 80 milliGRAM(s) Oral at bedtime  buDESOnide    Inhalation Suspension 0.5 milliGRAM(s) Inhalation two times a day  chlorhexidine 2% Cloths 1 Application(s) Topical daily  guaiFENesin  milliGRAM(s) Oral every 12 hours  heparin   Injectable 3000 Unit(s) IV Push every 6 hours PRN  heparin   Injectable 1500 Unit(s) IV Push every 6 hours PRN  heparin  Infusion.  Unit(s)/Hr IV Continuous <Continuous>  influenza  Vaccine (HIGH DOSE) 0.7 milliLiter(s) IntraMuscular once  multivitamin/minerals 1 Tablet(s) Oral daily  oxycodone    5 mG/acetaminophen 325 mG 1 Tablet(s) Oral every 6 hours PRN  sodium chloride 0.9%. 1000 milliLiter(s) IV Continuous <Continuous>      Physical Exam:    Neuro: AAOX3.  Non focal.    Pulm: Diminished BLL.  Right pigtail to waterseal without crepitus or airleak.    CV: RRR.  +S1+S2.    Abd: Soft/ND/NT.  +BS.                   PAST MEDICAL & SURGICAL HISTORY:  No pertinent past medical history      No significant past surgical history

## 2022-11-15 NOTE — PROGRESS NOTE ADULT - SUBJECTIVE AND OBJECTIVE BOX
67 yr old male with no known medical history, current smoker presented with complaints of 1 week of shortness of breath. His EKG on arrival revealed diffuse ST elevations, a limited ECHO revealed circumferential pericardial effusion. He was emergently taken to cardiac cath lab where he underwent pericardiocentesis and coronary angiogram. A pericardial drain was placed, cath revealed a 90% LAD lesion, which was not felt to be acute and given pericardial effusion, PCI was deferred. His presentation was attributed to acute pericarditis He was placed on a NRB, transferred to ICU for further monitoring. His labs on admission revealed FILIBERTO and elevated LFTs, IVF were given. A US abdomen was ordered, revealed non occlusive thrombus in IVC and gall bladder sludge. CXR on admission revealed a left lung mass. Subsequent CTA chest, abdomen and pelvis was done, revealed left upper lobe segmental and subsegmental PE. Occlusion of the SVC and a thrombus within the intrahepatic IVC. Mediastinal and right hilar lymphadenopathy with resultant central  bronchial obstruction and atelectasis involving the right middle and lower lobes. 4.1 cm sized left lower lobe mass compatible with a pulmonary malignancy. Moderate right and a trace left pleural effusion. Concern for malignant cells in pericardial fluid, formal cytology report pending. ECHO revealed a EF 25%. Cardiology follow up was done, advised continue aspirin and statin, unable to initiate GDMT given hypotension. Heparin gtt was initiated, palliative care was consulted. He was transferred to medical floor on 11/9.      No acute events overnight.  No fever.  No n/v/d.  IR guided bx today      MEDICATIONS  (STANDING):  albuterol/ipratropium for Nebulization 3 milliLiter(s) Nebulizer every 6 hours  ascorbic acid 500 milliGRAM(s) Oral daily  atorvastatin 80 milliGRAM(s) Oral at bedtime  buDESOnide    Inhalation Suspension 0.5 milliGRAM(s) Inhalation two times a day  chlorhexidine 2% Cloths 1 Application(s) Topical daily  guaiFENesin  milliGRAM(s) Oral every 12 hours  heparin  Infusion.  Unit(s)/Hr (7 mL/Hr) IV Continuous <Continuous>  influenza  Vaccine (HIGH DOSE) 0.7 milliLiter(s) IntraMuscular once  methylPREDNISolone sodium succinate Injectable 40 milliGRAM(s) IV Push every 8 hours  multivitamin/minerals 1 Tablet(s) Oral daily  sodium chloride 0.9%. 1000 milliLiter(s) (50 mL/Hr) IV Continuous <Continuous>    MEDICATIONS  (PRN):  acetaminophen     Tablet .. 650 milliGRAM(s) Oral every 8 hours PRN Mild Pain (1 - 3), Moderate Pain (4 - 6)  heparin   Injectable 3000 Unit(s) IV Push every 6 hours PRN For aPTT less than 40  heparin   Injectable 1500 Unit(s) IV Push every 6 hours PRN For aPTT between 40 - 57    ICU Vital Signs Last 24 Hrs  T(C): 36.7 (15 Nov 2022 08:12), Max: 36.7 (15 Nov 2022 08:12)  T(F): 98 (15 Nov 2022 08:12), Max: 98 (15 Nov 2022 08:12)  HR: 83 (15 Nov 2022 08:12) (82 - 101)  BP: 113/76 (15 Nov 2022 08:12) (95/68 - 114/79)  BP(mean): --  ABP: --  ABP(mean): --  RR: 18 (15 Nov 2022 08:12) (18 - 18)  SpO2: 92% (15 Nov 2022 08:12) (92% - 99%)    O2 Parameters below as of 15 Nov 2022 08:12  Patient On (Oxygen Delivery Method): nasal cannula  O2 Flow (L/min): 2        Gen - alert and oriented  Heart - s1s2 rrr  Lung - Dec BS  ABD - SOFT nd nt  Ext - no edema      Labs:                          11.4   15.65 )-----------( 263      ( 15 Nov 2022 04:45 )             33.2                           11.1   7.42  )-----------( 217      ( 14 Nov 2022 05:05 )             32.3                           11.4   6.04  )-----------( 128      ( 10 Nov 2022 05:00 )             32.7     11-13    138  |  100  |  24.3<H>  ----------------------------<  88  3.8   |  26.0  |  0.40<L>    Ca    8.2<L>      13 Nov 2022 06:20  Phos  2.1     11-13  Mg     2.3     11-13          11-10    134<L>  |  97  |  26.2<H>  ----------------------------<  92  3.7   |  27.0  |  0.54    Ca    8.3<L>      10 Nov 2022 05:00  Phos  1.8     11-10  Mg     2.3     11-10    TPro  5.7<L>  /  Alb  2.5<L>  /  TBili  1.7  /  DBili  x   /  AST  104<H>  /  ALT  279<H>  /  AlkPhos  92  11-10

## 2022-11-15 NOTE — PROGRESS NOTE ADULT - SUBJECTIVE AND OBJECTIVE BOX
Chief complaint: pericardial effusion    Patient seen and examined at bedside. No acute overnight events reported. No fever, chills, cough, chest pain.     Vital Signs Last 24 Hrs  T(F): 98 (15 Nov 2022 08:12), Max: 98 (15 Nov 2022 08:12)  HR: 83 (15 Nov 2022 08:12) (82 - 101)  BP: 113/76 (15 Nov 2022 08:12) (106/71 - 114/79)  RR: 18 (15 Nov 2022 08:12) (18 - 18)  SpO2: 92% (15 Nov 2022 08:12) (92% - 99%)    Physical Exam:  Constitutional: alert and oriented, in no acute distress   Neck: Soft and supple  Respiratory: Good air entry   Chest: R chest tube in place  Cardiovascular: Regular rate and rhythm  Gastrointestinal: Soft, non-tender to palpation, +bs  Musculoskeletal: no lower extremity edema bilaterally    Labs:                        11.4   15.65 )-----------( 263      ( 15 Nov 2022 04:45 )             33.2   11-15    138  |  104  |  33.0<H>  ----------------------------<  105<H>  4.4   |  26.0  |  0.52    Ca    8.8      15 Nov 2022 04:45

## 2022-11-15 NOTE — PROGRESS NOTE ADULT - ASSESSMENT
66 y/o M with no PMH initially presented to [facility] with SOB. Found to have large pericardial effusion s/p pericardial drainage with 800cc removed. Incidental finding of REYNALDO mass. Also found to have RT pleural effusion. Thoracic surgery consulted to evaluate RT pleural effusion.  Right pigtail placed 11/14

## 2022-11-15 NOTE — PROGRESS NOTE ADULT - ASSESSMENT
67 yr old male with no known medical history, current smoker presented with complaints of 1 week of shortness of breath. His EKG on arrival revealed diffuse ST elevations, a limited ECHO revealed circumferential pericardial effusion. He was emergently taken to cardiac cath lab where he underwent pericardiocentesis and coronary angiogram. A pericardial drain was placed, cath revealed a 90% LAD lesion, which was not felt to be acute and given pericardial effusion, PCI was deferred. His presentation was attributed to acute pericarditis He was placed on a NRB, transferred to ICU for further monitoring. His labs on admission revealed FILIBERTO and elevated LFTs, IVF were given. A US abdomen was ordered, revealed non occlusive thrombus in IVC and gall bladder sludge. CXR on admission revealed a left lung mass. Subsequent CTA chest, abdomen and pelvis was done, revealed left upper lobe segmental and subsegmental PE. Occlusion of the SVC and a thrombus within the intrahepatic IVC. Mediastinal and right hilar lymphadenopathy with resultant central  bronchial obstruction and atelectasis involving the right middle and lower lobes. 4.1 cm sized left lower lobe mass compatible with a pulmonary malignancy. Moderate right and a trace left pleural effusion. Concern for malignant cells in pericardial fluid, formal cytology report pending. ECHO revealed a EF 25%. Cardiology follow up was done, advised continue aspirin and statin, unable to initiate GDMT given hypotension. Heparin gtt was initiated, palliative care was consulted. He was transferred to medical floor on 11/9.    1) Concern for malignant pleural effusion  findings concerning for metastatic lung cancer, cytology pending  IR guided lung biopsy planned for 11/15 for confirmative dx and to test tissue for additional NGS  outpatient PET CT scan   will need systemic therapy, based on pathology and NGS result    2) PE  in setting of malignancy?  on heparin gtt  can switch to DOAC when ok with team , for now c/w gtt as patient is to get inpatient biopsy    3) Anemia  multifactorial  adequate iron stores, vit b12, folate  transfuse if hgb<7.0.      will follow up  after biopsy if clinically stable patient can DC with outpatient f/up with Dr Elise SOLIS in 1 weeks time

## 2022-11-16 LAB
ANION GAP SERPL CALC-SCNC: 7 MMOL/L — SIGNIFICANT CHANGE UP (ref 5–17)
BUN SERPL-MCNC: 27.3 MG/DL — HIGH (ref 8–20)
CALCIUM SERPL-MCNC: 8.6 MG/DL — SIGNIFICANT CHANGE UP (ref 8.4–10.5)
CHLORIDE SERPL-SCNC: 104 MMOL/L — SIGNIFICANT CHANGE UP (ref 96–108)
CO2 SERPL-SCNC: 25 MMOL/L — SIGNIFICANT CHANGE UP (ref 22–29)
CREAT SERPL-MCNC: 0.36 MG/DL — LOW (ref 0.5–1.3)
EGFR: 123 ML/MIN/1.73M2 — SIGNIFICANT CHANGE UP
GLUCOSE SERPL-MCNC: 86 MG/DL — SIGNIFICANT CHANGE UP (ref 70–99)
HCT VFR BLD CALC: 33 % — LOW (ref 39–50)
HGB BLD-MCNC: 11.2 G/DL — LOW (ref 13–17)
MCHC RBC-ENTMCNC: 29.2 PG — SIGNIFICANT CHANGE UP (ref 27–34)
MCHC RBC-ENTMCNC: 33.9 GM/DL — SIGNIFICANT CHANGE UP (ref 32–36)
MCV RBC AUTO: 86.2 FL — SIGNIFICANT CHANGE UP (ref 80–100)
PLATELET # BLD AUTO: 284 K/UL — SIGNIFICANT CHANGE UP (ref 150–400)
POTASSIUM SERPL-MCNC: 4.2 MMOL/L — SIGNIFICANT CHANGE UP (ref 3.5–5.3)
POTASSIUM SERPL-SCNC: 4.2 MMOL/L — SIGNIFICANT CHANGE UP (ref 3.5–5.3)
RBC # BLD: 3.83 M/UL — LOW (ref 4.2–5.8)
RBC # FLD: 16 % — HIGH (ref 10.3–14.5)
SODIUM SERPL-SCNC: 136 MMOL/L — SIGNIFICANT CHANGE UP (ref 135–145)
WBC # BLD: 12.01 K/UL — HIGH (ref 3.8–10.5)
WBC # FLD AUTO: 12.01 K/UL — HIGH (ref 3.8–10.5)

## 2022-11-16 PROCEDURE — 99233 SBSQ HOSP IP/OBS HIGH 50: CPT

## 2022-11-16 PROCEDURE — 99232 SBSQ HOSP IP/OBS MODERATE 35: CPT

## 2022-11-16 PROCEDURE — 71045 X-RAY EXAM CHEST 1 VIEW: CPT | Mod: 26

## 2022-11-16 PROCEDURE — 99231 SBSQ HOSP IP/OBS SF/LOW 25: CPT

## 2022-11-16 PROCEDURE — 99235 HOSP IP/OBS SAME DATE MOD 70: CPT

## 2022-11-16 RX ORDER — LANOLIN ALCOHOL/MO/W.PET/CERES
5 CREAM (GRAM) TOPICAL AT BEDTIME
Refills: 0 | Status: DISCONTINUED | OUTPATIENT
Start: 2022-11-16 | End: 2022-12-05

## 2022-11-16 RX ORDER — TIOTROPIUM BROMIDE 18 UG/1
1 CAPSULE ORAL; RESPIRATORY (INHALATION) DAILY
Refills: 0 | Status: DISCONTINUED | OUTPATIENT
Start: 2022-11-16 | End: 2022-12-05

## 2022-11-16 RX ORDER — ALBUTEROL 90 UG/1
2 AEROSOL, METERED ORAL
Refills: 0 | Status: DISCONTINUED | OUTPATIENT
Start: 2022-11-16 | End: 2022-11-20

## 2022-11-16 RX ORDER — LIDOCAINE 4 G/100G
2 CREAM TOPICAL DAILY
Refills: 0 | Status: DISCONTINUED | OUTPATIENT
Start: 2022-11-16 | End: 2022-12-10

## 2022-11-16 RX ORDER — BUDESONIDE AND FORMOTEROL FUMARATE DIHYDRATE 160; 4.5 UG/1; UG/1
2 AEROSOL RESPIRATORY (INHALATION)
Refills: 0 | Status: DISCONTINUED | OUTPATIENT
Start: 2022-11-16 | End: 2022-12-10

## 2022-11-16 RX ORDER — IPRATROPIUM/ALBUTEROL SULFATE 18-103MCG
3 AEROSOL WITH ADAPTER (GRAM) INHALATION EVERY 6 HOURS
Refills: 0 | Status: DISCONTINUED | OUTPATIENT
Start: 2022-11-16 | End: 2022-11-20

## 2022-11-16 RX ADMIN — Medication 600 MILLIGRAM(S): at 17:13

## 2022-11-16 RX ADMIN — APIXABAN 10 MILLIGRAM(S): 2.5 TABLET, FILM COATED ORAL at 17:13

## 2022-11-16 RX ADMIN — Medication 600 MILLIGRAM(S): at 05:53

## 2022-11-16 RX ADMIN — OXYCODONE AND ACETAMINOPHEN 1 TABLET(S): 5; 325 TABLET ORAL at 11:57

## 2022-11-16 RX ADMIN — LIDOCAINE 2 PATCH: 4 CREAM TOPICAL at 12:43

## 2022-11-16 RX ADMIN — Medication 5 MILLIGRAM(S): at 22:49

## 2022-11-16 RX ADMIN — CHLORHEXIDINE GLUCONATE 1 APPLICATION(S): 213 SOLUTION TOPICAL at 09:41

## 2022-11-16 RX ADMIN — BUDESONIDE AND FORMOTEROL FUMARATE DIHYDRATE 2 PUFF(S): 160; 4.5 AEROSOL RESPIRATORY (INHALATION) at 20:30

## 2022-11-16 RX ADMIN — ATORVASTATIN CALCIUM 80 MILLIGRAM(S): 80 TABLET, FILM COATED ORAL at 22:15

## 2022-11-16 RX ADMIN — Medication 1 TABLET(S): at 09:38

## 2022-11-16 RX ADMIN — APIXABAN 10 MILLIGRAM(S): 2.5 TABLET, FILM COATED ORAL at 05:56

## 2022-11-16 RX ADMIN — OXYCODONE AND ACETAMINOPHEN 1 TABLET(S): 5; 325 TABLET ORAL at 12:57

## 2022-11-16 RX ADMIN — Medication 500 MILLIGRAM(S): at 09:38

## 2022-11-16 NOTE — PROGRESS NOTE ADULT - ASSESSMENT
A/P: 66 y/o old male , active heavy smoker, no known medical history was having shortness of breath for past 1 week. His friend got him to hospital as he was sob. On arrival his EKG showed diffuse ST elevations, and bedside limited echo showed circumferential pericardial effusion.A pericardial drain was placed, cath revealed a 90% LAD lesion, which was not felt to be acute and given pericardial effusion, PCI was deferred.     Radiology follow up was done, advised continue aspirin and statin, unable to initiate GDMT given hypotension. Heparin gtt was initiated, palliative care was consulted. He was transferred to medical floor on 11/9. Pericardial drain removed, patient with findings concerning for metastatic cancer.

## 2022-11-16 NOTE — PHYSICAL THERAPY INITIAL EVALUATION ADULT - PREDICTED DURATION OF THERAPY (DAYS/WKS), PT EVAL
[facility]  [address]  [facility] [address]  Phone: [phone]  Fax: [phone]                  Randall Strickland JrEllis   2017 10:00 AM   Office Visit    Description:  Male : 4/3/1928   Provider:  HRA, NOM 3   Department:  [facility]           Reason for Visit     HRA 3           Diagnoses this Visit        Comments    Encounter for preventive health examination    -  Primary            To Do List           Future Appointments        Provider Department Dept Phone    2017 11:00 AM LAB, SAME DAY [facility] [phone]      Goals (5 Years of Data)     None      [facility]     [facility] On [facility] Nurse Care Line -  Assistance  Registered nurses in the [facility] provide clinical advisement, health education, appointment booking, and other advisory services.  Call for this free service at [phone].             Medications           Message regarding Medications     Verify the changes and/or additions to your medication regime listed below are the same as discussed with your clinician today.  If any of these changes or additions are incorrect, please notify your healthcare provider.             Verify that the below list of medications is an accurate representation of the medications you are currently taking.  If none reported, the list may be blank. If incorrect, please contact your healthcare provider. Carry this list with you in case of emergency.           Current Medications     amlodipine (NORVASC) 5 MG tablet Take 1 tablet (5 mg total) by mouth once daily.    aspirin 81 MG Chew Take 81 mg by mouth once daily.      CHOLESTYRAMINE LIGHT 4 gram packet DISSOLVE ONE PACKET IN LIQUID AND DRINK TWICE DAILY    ciprofloxacin HCl (CIPRO) 500 MG tablet Take 1 tablet (500 mg total) by mouth 2 (two) times daily.    clopidogrel (PLAVIX) 75 mg tablet Take 1 tablet (75 mg total) by mouth once daily. Take 4  "tablets the night before the procedure, then 1 tab PO daily.    finasteride (PROSCAR) 5 mg tablet Take 1 tablet (5 mg total) by mouth once daily.    ipratropium (ATROVENT) 0.03 % nasal spray USE 1 TO 2 SPRAYS IN EACH NOSTRIL TWICE DAILY AS NEEDED    omeprazole (PRILOSEC) 40 MG capsule TAKE 1 CAPSULE(40 MG) BY MOUTH EVERY MORNING    tamsulosin (FLOMAX) 0.4 mg Cp24 TAKE ONE CAPSULE BY MOUTH DAILY    torsemide (DEMADEX) 5 MG Tab Take 1 tablet (5 mg total) by mouth once daily.    trospium (SANCTURA) 20 mg Tab tablet Take 1 tablet (20 mg total) by mouth 2 (two) times daily.           Clinical Reference Information           Your Vitals Were     BP Pulse Height Weight BMI    112/50 (BP Location: Left arm, Patient Position: Sitting, BP Method: Manual) 106 5' 6" (1.676 m) 83.9 kg (184 lb 15.5 oz) 29.85 kg/m2      Blood Pressure          Most Recent Value    BP  (!)  112/50      Allergies as of 2/22/2017     No Known Allergies      Immunizations Administered on Date of Encounter - 2/22/2017     None      Instructions      Counseling and Referral of Other Preventative  (Italic type indicates deductible and co-insurance are waived)    Patient Name: Randall Strickland  Today's Date: 2/22/2017      SERVICE LIMITATIONS RECOMMENDATION    Vaccines    · Pneumococcal (once after 65)    · Influenza (annually)    · Hepatitis B (if medium/high risk)    · Prevnar 13      Hepatitis B medium/high risk factors:       - End-stage renal disease       - Hemophiliacs who received Factor VII or         IX concentrates       - Clients of institutions for the mentally             retarded       - Persons who live in the same house as          a HepB carrier       - Homosexual men       - Illicit injectable drug abusers     Pneumococcal: Done, no repeat necessary     Influenza: Done, repeat in one year     Hepatitis B: N/A Not indicated     Prevnar 13: N/A Done, no repeat necessary    Prostate cancer screening (annually to age 75)     Prostate specific " 1 week antigen (PSA) Shared decision making with Provider. Sometimes a co-pay may be required if the patient decides to have this test. The USPSTF no longer recommends prostate cancer screening routinely in medicine: not to follow    Colorectal cancer screening (to age 75)    · Fecal occult blood test (annual)  · Flexible sigmoidoscopy (5y)  · Screening colonoscopy (10y)  · Barium enema   N/A Not indicated    Diabetes self-management training (no USPSTF recommendations)  Requires referral by treating physician for patient with diabetes or renal disease. 10 hours of initial DSMT sessions of no less than 30 minutes each in a continuous 12-month period. 2 hours of follow-up DSMT in subsequent years.  N/A Not indicated    Glaucoma screening (no USPSTF recommendation)  Diabetes mellitus, family history   , age 50 or over    American, age 65 or over  Last done 4/18/16, recommend to repeat every 1  years    Medical nutrition therapy for diabetes or renal disease (no recommended schedule)  Requires referral by treating physician for patient with diabetes or renal disease or kidney transplant within the past 3 years.  Can be provided in same year as diabetes self-management training (DSMT), and CMS recommends medical nutrition therapy take place after DSMT. Up to 3 hours for initial year and 2 hours in subsequent years.  N/A Not indicated    Cardiovascular screening blood tests (every 5 years)  · Fasting lipid panel  Order as a panel if possible  Last done 3/11/16, recommend to repeat every 3-5  years    Diabetes screening tests (at least every 3 years, Medicare covers annually or at 6-month intervals for prediabetic patients)  · Fasting blood sugar (FBS) or glucose tolerance test (GTT)  Patient must be diagnosed with one of the following:       - Hypertension       - Dyslipidemia       - Obesity (BMI 30kg/m2)       - Previous elevated impaired FBS or GTT       ... or any two of the following:       -  Overweight (BMI 25 but <30)       - Family history of diabetes       - Age 65 or older       - History of gestational diabetes or birth of baby weighing more than 9 pounds  Last done 3/11/16, recommend to repeat every 1  years    Abdominal aortic aneurysm screening (once)  · Sonogram   Limited to patients who meet one of the following criteria:       - Men who are 65-75 years old and have smoked more than 100 cigarette in their lifetime       - Anyone with a family history of abdominal aortic aneurysm       - Anyone recommended for screening by the USPSTF  N/A Not indicated    HIV screening (annually for increased risk patients)  · HIV-1 and HIV-2 by EIA, or PATRICIA, rapid antibody test or oral mucosa transudate  Patients must be at increased risk for HIV infection per USPSTF guidelines or pregnant. Tests covered annually for patient at increased risk or as requested by the patient. Pregnant patients may receive up to 3 tests during pregnancy.  Risks discussed, screening is not recommended    Smoking cessation counseling (up to 8 sessions per year)  Patients must be asymptomatic of tobacco-related conditions to receive as a preventative service.  Not indicated    Subsequent annual wellness visit  At least 12 months since last AWV  Return in one year     The following information is provided to all patients.  This information is to help you find resources for any of the problems found today that may be affecting your health:                Living healthy guide: www.[facility].[place].gov      Understanding Diabetes: www.diabetes.org      Eating healthy: www.cdc.gov/healthyweight      CDC home safety checklist: www.cdc.gov/steadi/patient.html      Agency on Aging: www.goea.[place].[facility]      Alcoholics anonymous (AA): www.aa.org      Physical Activity: www.reji.nih.gov/rg9skwe      Tobacco use: www.quitwithusla.org          Language Assistance Services     ATTENTION: Language assistance services are available, free of charge.  Please call [phone].      ATENCIÓN: Si habla español, tiene a clayton disposición servicios gratuitos de asistencia lingüística. Llame al [phone].     CHÚ Ý: N?u b?n nói Ti?ng Vi?t, có các d?ch v? h? tr? ngôn ng? mi?n phí dành cho b?n. G?i s? [phone].         [facility] complies with applicable Federal civil rights laws and does not discriminate on the basis of race, color, national origin, age, disability, or sex.

## 2022-11-16 NOTE — PROGRESS NOTE ADULT - SUBJECTIVE AND OBJECTIVE BOX
pain at chest tube site  feels ok otherwise   ros negative     MEDICATIONS  (STANDING):  albuterol/ipratropium for Nebulization 3 milliLiter(s) Nebulizer every 6 hours  apixaban 10 milliGRAM(s) Oral every 12 hours  ascorbic acid 500 milliGRAM(s) Oral daily  atorvastatin 80 milliGRAM(s) Oral at bedtime  buDESOnide    Inhalation Suspension 0.5 milliGRAM(s) Inhalation two times a day  chlorhexidine 2% Cloths 1 Application(s) Topical daily  guaiFENesin  milliGRAM(s) Oral every 12 hours  influenza  Vaccine (HIGH DOSE) 0.7 milliLiter(s) IntraMuscular once  lidocaine   4% Patch 2 Patch Transdermal daily  multivitamin/minerals 1 Tablet(s) Oral daily    MEDICATIONS  (PRN):  acetaminophen     Tablet .. 650 milliGRAM(s) Oral every 8 hours PRN Mild Pain (1 - 3), Moderate Pain (4 - 6)  oxycodone    5 mG/acetaminophen 325 mG 1 Tablet(s) Oral every 6 hours PRN Moderate Pain (4 - 6)      Allergies    No Known Allergies      Vital Signs Last 24 Hrs  T(C): 36.7 (16 Nov 2022 09:43), Max: 37.1 (15 Nov 2022 13:12)  T(F): 98.1 (16 Nov 2022 09:43), Max: 98.7 (15 Nov 2022 13:12)  HR: 88 (16 Nov 2022 09:43) (78 - 107)  BP: 102/70 (16 Nov 2022 09:43) (91/61 - 111/76)  BP(mean): --  RR: 18 (16 Nov 2022 09:43) (18 - 18)  SpO2: 100% (16 Nov 2022 09:43) (93% - 100%)    Parameters below as of 16 Nov 2022 09:43  Patient On (Oxygen Delivery Method): nasal cannula  O2 Flow (L/min): 3      PHYSICAL EXAM:    GENERAL: NAD, frail   CHEST/LUNG: dec bs at bases, right pigtail. + exp wheeze intermittently   HEART: Regular rate and rhythm; S1 S2  ABDOMEN: Soft,  Bowel sounds present  EXTREMITIES no edema  gu  scrotal swelling   NERVOUS SYSTEM:  Alert & Oriented X3,  Motor Strength 5/5 B/L upper and lower extremities  PSYCH: normal mood, appropriate response.    LABS:                        11.2   12.01 )-----------( 284      ( 16 Nov 2022 04:40 )             33.0     11-16    136  |  104  |  27.3<H>  ----------------------------<  86  4.2   |  25.0  |  0.36<L>    Ca    8.6      16 Nov 2022 04:40      PT/INR - ( 14 Nov 2022 16:30 )   PT: 13.9 sec;   INR: 1.20 ratio         PTT - ( 15 Nov 2022 04:45 )  PTT:70.6 sec      CAPILLARY BLOOD GLUCOSE            RADIOLOGY & ADDITIONAL TESTS:

## 2022-11-16 NOTE — PROGRESS NOTE ADULT - ASSESSMENT
67 yr old male with no known medical history, current smoker presented with complaints of 1 week of shortness of breath. His EKG on arrival revealed diffuse ST elevations, a limited ECHO revealed circumferential pericardial effusion. He was emergently taken to cardiac cath lab where he underwent pericardiocentesis and coronary angiogram. A pericardial drain was placed, cath revealed a 90% LAD lesion, which was not felt to be acute and given pericardial effusion, PCI was deferred. His presentation was attributed to acute pericarditis He was placed on a NRB, transferred to ICU for further monitoring. His labs on admission revealed FILIBERTO and elevated LFTs, IVF were given. A US abdomen was ordered, revealed non occlusive thrombus in IVC and gall bladder sludge. CXR on admission revealed a left lung mass. Subsequent CTA chest, abdomen and pelvis was done, revealed left upper lobe segmental and subsegmental PE. Occlusion of the SVC and a thrombus within the intrahepatic IVC. Mediastinal and right hilar lymphadenopathy with resultant central  bronchial obstruction and atelectasis involving the right middle and lower lobes. 4.1 cm sized left lower lobe mass compatible with a pulmonary malignancy. Moderate right and a trace left pleural effusion. Concern for malignant cells in pericardial fluid, formal cytology report pending. ECHO revealed a EF 50%. Cardiology follow up was done, advised continue aspirin and statin, unable to initiate GDMT given hypotension. Heparin gtt was initiated, palliative care was consulted. He was transferred to medical floor on 11/9.    Acute pericardial effusion s/p pericardiocentesis  - Pericardial drain removed  - Findings concerning for metastatic cancer  - Outpatient PET scan  - Off Aspirin  - s/p IR lung biopsy  cytology positive from pericardial fluid of adenocarcinoma  repeat echo in 48 hrs     Right pleural effusion  - Thoracic surgery following   - Chest tube placed  `  Acute pulmonary embolism/ IVC thrombus   - eliquis    HFrEF with wall motion abnormalities probable CAD   PCI not performed on admission given effusion  - unable to initiate GDMT given hypotension  - Continue Statin, aspirin after bx on hold now     Lung mass /cancer   - Oncology recs appreciated  - Palliative consult appreciated    Smoker, likely COPD    symbicort/spiriva    prn nebs       PT evaluation

## 2022-11-16 NOTE — PHYSICAL THERAPY INITIAL EVALUATION ADULT - NSPTDISCHREC_GEN_A_CORE
pt would benefit from subacute rehab however if no subacute rehab benefit then pending progress home with RW, will continue to follow

## 2022-11-16 NOTE — PHYSICAL THERAPY INITIAL EVALUATION ADULT - GENERAL OBSERVATIONS, REHAB EVAL
received sitting in chair at bedside, NAD, RN agreeable to PT, chest tube, ann, O2line, cardiac monitor

## 2022-11-16 NOTE — PROGRESS NOTE ADULT - SUBJECTIVE AND OBJECTIVE BOX
67 yr old male with no known medical history, current smoker presented with complaints of 1 week of shortness of breath. His EKG on arrival revealed diffuse ST elevations, a limited ECHO revealed circumferential pericardial effusion. He was emergently taken to cardiac cath lab where he underwent pericardiocentesis and coronary angiogram. A pericardial drain was placed, cath revealed a 90% LAD lesion, which was not felt to be acute and given pericardial effusion, PCI was deferred. His presentation was attributed to acute pericarditis He was placed on a NRB, transferred to ICU for further monitoring. His labs on admission revealed FILIBERTO and elevated LFTs, IVF were given. A US abdomen was ordered, revealed non occlusive thrombus in IVC and gall bladder sludge. CXR on admission revealed a left lung mass. Subsequent CTA chest, abdomen and pelvis was done, revealed left upper lobe segmental and subsegmental PE. Occlusion of the SVC and a thrombus within the intrahepatic IVC. Mediastinal and right hilar lymphadenopathy with resultant central  bronchial obstruction and atelectasis involving the right middle and lower lobes. 4.1 cm sized left lower lobe mass compatible with a pulmonary malignancy. Moderate right and a trace left pleural effusion. Concern for malignant cells in pericardial fluid, formal cytology report pending. ECHO revealed a EF 25%. Cardiology follow up was done, advised continue aspirin and statin, unable to initiate GDMT given hypotension. Heparin gtt was initiated, palliative care was consulted. He was transferred to medical floor on 11/9.      No acute events overnight.  s/p Lung bx yesterday   no acute complaints     MEDICATIONS  (STANDING):  albuterol/ipratropium for Nebulization 3 milliLiter(s) Nebulizer every 6 hours  apixaban 10 milliGRAM(s) Oral every 12 hours  ascorbic acid 500 milliGRAM(s) Oral daily  atorvastatin 80 milliGRAM(s) Oral at bedtime  buDESOnide    Inhalation Suspension 0.5 milliGRAM(s) Inhalation two times a day  chlorhexidine 2% Cloths 1 Application(s) Topical daily  guaiFENesin  milliGRAM(s) Oral every 12 hours  influenza  Vaccine (HIGH DOSE) 0.7 milliLiter(s) IntraMuscular once  multivitamin/minerals 1 Tablet(s) Oral daily    MEDICATIONS  (PRN):  acetaminophen     Tablet .. 650 milliGRAM(s) Oral every 8 hours PRN Mild Pain (1 - 3), Moderate Pain (4 - 6)  oxycodone    5 mG/acetaminophen 325 mG 1 Tablet(s) Oral every 6 hours PRN Moderate Pain (4 - 6)        Vital Signs Last 24 Hrs  T(C): 36.7 (16 Nov 2022 09:43), Max: 37.1 (15 Nov 2022 13:12)  T(F): 98.1 (16 Nov 2022 09:43), Max: 98.7 (15 Nov 2022 13:12)  HR: 88 (16 Nov 2022 09:43) (78 - 107)  BP: 102/70 (16 Nov 2022 09:43) (91/61 - 111/76)  BP(mean): --  RR: 18 (16 Nov 2022 09:43) (18 - 18)  SpO2: 100% (16 Nov 2022 09:43) (93% - 100%)    Parameters below as of 16 Nov 2022 09:43  Patient On (Oxygen Delivery Method): nasal cannula  O2 Flow (L/min): 3        Gen - alert and oriented  Heart - s1s2 rrr  Lung - Dec BS  ABD - SOFT nd nt  Ext - no edema      Labs:                          11.2   12.01 )-----------( 284      ( 16 Nov 2022 04:40 )             33.0                             11.4   15.65 )-----------( 263      ( 15 Nov 2022 04:45 )             33.2                           11.1   7.42  )-----------( 217      ( 14 Nov 2022 05:05 )             32.3                           11.4   6.04  )-----------( 128      ( 10 Nov 2022 05:00 )             32.7     11-13    138  |  100  |  24.3<H>  ----------------------------<  88  3.8   |  26.0  |  0.40<L>    Ca    8.2<L>      13 Nov 2022 06:20  Phos  2.1     11-13  Mg     2.3     11-13          11-10    134<L>  |  97  |  26.2<H>  ----------------------------<  92  3.7   |  27.0  |  0.54    Ca    8.3<L>      10 Nov 2022 05:00  Phos  1.8     11-10  Mg     2.3     11-10    TPro  5.7<L>  /  Alb  2.5<L>  /  TBili  1.7  /  DBili  x   /  AST  104<H>  /  ALT  279<H>  /  AlkPhos  92  11-10

## 2022-11-16 NOTE — PROGRESS NOTE ADULT - ASSESSMENT
67 yr old male with no known medical history, current smoker presented with complaints of 1 week of shortness of breath. His EKG on arrival revealed diffuse ST elevations, a limited ECHO revealed circumferential pericardial effusion. He was emergently taken to cardiac cath lab where he underwent pericardiocentesis and coronary angiogram. A pericardial drain was placed, cath revealed a 90% LAD lesion, which was not felt to be acute and given pericardial effusion, PCI was deferred. His presentation was attributed to acute pericarditis He was placed on a NRB, transferred to ICU for further monitoring. His labs on admission revealed FILIBERTO and elevated LFTs, IVF were given. A US abdomen was ordered, revealed non occlusive thrombus in IVC and gall bladder sludge. CXR on admission revealed a left lung mass. Subsequent CTA chest, abdomen and pelvis was done, revealed left upper lobe segmental and subsegmental PE. Occlusion of the SVC and a thrombus within the intrahepatic IVC. Mediastinal and right hilar lymphadenopathy with resultant central  bronchial obstruction and atelectasis involving the right middle and lower lobes. 4.1 cm sized left lower lobe mass compatible with a pulmonary malignancy. Moderate right and a trace left pleural effusion. Concern for malignant cells in pericardial fluid, formal cytology report pending. ECHO revealed a EF 25%. Cardiology follow up was done, advised continue aspirin and statin, unable to initiate GDMT given hypotension. Heparin gtt was initiated, palliative care was consulted. He was transferred to medical floor on 11/9.    1) Concern for malignant pleural effusion  findings concerning for metastatic lung cancer, cytology pending  s/p IR guided lung biopsy 11/15 for confirmative dx and to test tissue for additional NGS  outpatient PET CT scan   will need systemic therapy, based on pathology and NGS result    2) PE  in setting of malignancy?  off  heparin gtt  started on Eliquis      3) Anemia  multifactorial  adequate iron stores, vit b12, folate  transfuse if hgb<7.0.  Hb stable     will follow up  after biopsy if clinically stable patient can DC with outpatient f/up with Dr Elise SOLIS in 1 weeks time

## 2022-11-16 NOTE — PROGRESS NOTE ADULT - ASSESSMENT
68 y/o M with no PMH initially presented to [facility] with SOB. Found to have large pericardial effusion s/p pericardial drainage with 800cc removed. Incidental finding of REYNALDO mass. Also found to have RT pleural effusion. Thoracic surgery consulted to evaluate RT pleural effusion.  Right pigtail placed 11/14

## 2022-11-16 NOTE — PROGRESS NOTE ADULT - PROBLEM SELECTOR PLAN 3
- Restart heparing gtt when cleared by IR.   - Transition to NOAC after chest tube is removed.
- Continue heparin gtt for AC at this time.
- Continue heparin gtt at this time.
Continue heparin gtt at this time.
- Continue heparin gtt.

## 2022-11-16 NOTE — PROGRESS NOTE ADULT - SUBJECTIVE AND OBJECTIVE BOX
Interval History:  Pt frustrated as he was told he has cancer - he would like to hear what treatment options exist once all bx testing is concluded.    Present Symptoms:     Dyspnea: No  Nausea/Vomiting: No  Anxiety:  No  Depression: Yes   Fatigue: No  Loss of appetite: No  Constipation: No    Pain: rt shoulder and neck            Character-            Duration-            Effect-            Factors-            Frequency-            Location-            Severity-    Review of Systems:   As above - otherwise negative     MEDICATIONS  (STANDING):  albuterol    90 MICROgram(s) HFA Inhaler 2 Puff(s) Inhalation four times a day  apixaban 10 milliGRAM(s) Oral every 12 hours  ascorbic acid 500 milliGRAM(s) Oral daily  atorvastatin 80 milliGRAM(s) Oral at bedtime  budesonide 160 MICROgram(s)/formoterol 4.5 MICROgram(s) Inhaler 2 Puff(s) Inhalation two times a day  chlorhexidine 2% Cloths 1 Application(s) Topical daily  guaiFENesin  milliGRAM(s) Oral every 12 hours  influenza  Vaccine (HIGH DOSE) 0.7 milliLiter(s) IntraMuscular once  lidocaine   4% Patch 2 Patch Transdermal daily  multivitamin/minerals 1 Tablet(s) Oral daily  tiotropium 18 MICROgram(s) Capsule 1 Capsule(s) Inhalation daily    MEDICATIONS  (PRN):  acetaminophen     Tablet .. 650 milliGRAM(s) Oral every 8 hours PRN Mild Pain (1 - 3), Moderate Pain (4 - 6)  albuterol/ipratropium for Nebulization 3 milliLiter(s) Nebulizer every 6 hours PRN Shortness of Breath and/or Wheezing  oxycodone    5 mG/acetaminophen 325 mG 1 Tablet(s) Oral every 6 hours PRN Moderate Pain (4 - 6)    PHYSICAL EXAM:  Vital Signs Last 24 Hrs  T(C): 36.7 (16 Nov 2022 09:43), Max: 36.8 (15 Nov 2022 22:43)  T(F): 98.1 (16 Nov 2022 09:43), Max: 98.3 (15 Nov 2022 22:43)  HR: 88 (16 Nov 2022 09:43) (78 - 93)  BP: 102/70 (16 Nov 2022 09:43) (102/70 - 111/76)  BP(mean): --  RR: 18 (16 Nov 2022 09:43) (18 - 18)  SpO2: 100% (16 Nov 2022 09:43) (95% - 100%)    Parameters below as of 16 Nov 2022 09:43  Patient On (Oxygen Delivery Method): nasal cannula  O2 Flow (L/min): 3    General: Pt lying in bed in NAD - cachectic  HEENT: NCAT; MM dry - temporal wasting  Resp: breathing unlabored; symmetric chest expansion B/L  MSK: no contractures/deformities  Skin: no rash  Neuro: awake and oriented x 3; hypophonic; no myoclonus  Psych: calm; flat affect    LABS:                        11.2   12.01 )-----------( 284      ( 16 Nov 2022 04:40 )             33.0     11-16    136  |  104  |  27.3<H>  ----------------------------<  86  4.2   |  25.0  |  0.36<L>    Ca    8.6      16 Nov 2022 04:40      PT/INR - ( 14 Nov 2022 16:30 )   PT: 13.9 sec;   INR: 1.20 ratio         PTT - ( 15 Nov 2022 04:45 )  PTT:70.6 sec    I&O's Summary    15 Nov 2022 07:01  -  16 Nov 2022 07:00  --------------------------------------------------------  IN: 500 mL / OUT: 2030 mL / NET: -1530 mL    16 Nov 2022 07:01  -  16 Nov 2022 15:57  --------------------------------------------------------  IN: 0 mL / OUT: 30 mL / NET: -30 mL    RADIOLOGY & ADDITIONAL STUDIES:    NEUROLOGIC MEDICATIONS/OPIOIDS/BENZODIAZEPINES IN PAST 24HRS:    acetaminophen     Tablet ..   650 milliGRAM(s) Oral (11-15-22 @ 17:05)    oxycodone    5 mG/acetaminophen 325 mG   1 Tablet(s) Oral (11-16-22 @ 11:57)    ADVANCE DIRECTIVES/TREATMENT PREFERENCES:  Code Status: full  MOLST (if not Full Code):  HCP/Surrogate: friend Adi Chaudhry

## 2022-11-16 NOTE — PROGRESS NOTE ADULT - PROBLEM SELECTOR PLAN 1
- EF 20-25%, severely depressed LV systolic function, likely ischemic cardiomyopathy  - Now s/p LHC showing mLAD 90%, dRCA 60% OSVALDO flow 3  - Patient overall too unstable at this time for revascularization, no plans for repeat LHC at this time.   - Continue aspirin and statin.   - Start Toprol XL 12.5mg PO QHS.   - Will add Lisinopril 2.5mg PO qday when BP allows.   - For now will need medical therapy   - hold diuretics, pt w/ pericardial effusion s/p pericardiocentesis  - Diet/lifestyle modifications and medication compliance heavily reinforced   - Repeat echo with small effusion EF still 20-25%.  - for any complaint of chest pain please check trop, CPK, and EKG.

## 2022-11-16 NOTE — PHYSICAL THERAPY INITIAL EVALUATION ADULT - GAIT PATTERN USED, PT EVAL
decreased gait velocity and alondra step length, increased alondra UE support, verbal cues for energy conservation techniques

## 2022-11-16 NOTE — PROGRESS NOTE ADULT - ASSESSMENT
68yo M w/ no known PMH who p/w SOB and found to have diffuse ST elevations and taken for emergent LHC where found to have a large pericardial effusion s/p percardiocentesis during which bloody fluid was drained (a 90% LAD lesion also seen but PCI deferred until further stabilized).  Course further c/b dx of HFrEF with EF 20-25% and imaging revealing REYNALDO PE, SVC occlusion, IVC thrombus, mediastinal/rt hilar LAD with bronchial obstruxn in RML/RLL, a 4.1cm LLL mass c/w lung malignancy, and mod rt effusion.  We are consulted for assistance with goals of care.   #Goals of care  - Pt named friend as HCP- Samuel Chaudhry - [phone] (alternate HCP friend Rios Maza - [phone])  - Clinically appears to have metastatic cancer but no tissue dx as yet - s/p lung bx 11/15- would discuss code status and broader goals of care once bx back and treatment options can be presented    #Rt shoulder/neck pain  - CT of C-spine and rt shoulder did not reveal any explanatory findings  - Cont tylenol 650mg PO TID prn (this is under 2gm limit for pts with hepatic impairment)  - Heat pad may help as well    #Depression  - pt cited personal losses in prior discussion but he didn't care to elaborate  - will cont to provide support  - pt does not wish to trial any medications for his mood nor does he wish to engage in psychotherapy

## 2022-11-16 NOTE — PHYSICAL THERAPY INITIAL EVALUATION ADULT - PERTINENT HX OF CURRENT PROBLEM, REHAB EVAL
large pericardial effusion s/p pericardiocentesis, REYNALDO mass, right pleural effusion, PE, IVC thrombus

## 2022-11-16 NOTE — PROGRESS NOTE ADULT - PROBLEM SELECTOR PLAN 4
"3/31/2018    He is a 52 year old male who came in for a left great toe wound and infection.  Found to have osteo on xray in the ED.  I've seen him a couple times now and he has not opened his eyes to have a conversation.  Told me it's been that way for a month, then provides no more history or discussion.    Past Medical History:   Diagnosis Date   • Anxiety    • Bronchitis     has had 3-4 times   • Dental disorder     upper partial, but lost it   • Diabetes (CMS-HCC)     \"borderline\"   • Hepatitis C 1997   • Hypercholesteremia    • Hypertension        Past Surgical History:   Procedure Laterality Date   • VENTRAL HERNIA REPAIR LAPAROSCOPIC  3/30/2016    Procedure: VENTRAL HERNIA REPAIR LAPAROSCOPIC WITH MESH;  Surgeon: Caden Cat M.D.;  Location: SURGERY [facility];  Service:        Medications  No current facility-administered medications on file prior to encounter.      No current outpatient prescriptions on file prior to encounter.       Allergies  Patient has no known allergies.    ROS  Unable to obtain due to lack of cooperation.    History reviewed. No pertinent family history.    Social History     Social History   • Marital status: Single     Spouse name: N/A   • Number of children: N/A   • Years of education: N/A     Social History Main Topics   • Smoking status: Current Every Day Smoker     Packs/day: 0.25     Years: 30.00     Types: Cigarettes   • Smokeless tobacco: Never Used   • Alcohol use No      Comment: None since 1-5-2016   • Drug use: No      Comment: denies   • Sexual activity: Not on file     Other Topics Concern   • Not on file     Social History Narrative   • No narrative on file       Physical Exam  Vitals  Blood pressure 102/65, pulse 68, temperature 36.8 °C (98.2 °F), resp. rate 18, weight 113.4 kg (250 lb), SpO2 93 %.  General: Obese, sedate   HEENT: Normocephalic, atraumatic  Eyes: Anicteric   Neck: Supple, nontender, no masses  Lungs: Non labored breathing  Heart: RRR  Abdomen: "
Soft, NT, ND   Skin: Wound over left great toe with bleeding.  There is a small eschar on the right medial MTP, superficial  Extremities: Not compliant with ROM or strength exam  Neuro: Not compliant  Vascular: Palpable PT pulse     Radiographs:  DX-TOE(S) 2+ LEFT   Final Result      Cortical irregularity in the lateral base of the first distal phalanx with overlying soft tissue swelling and foci of air. Findings are highly suspicious for osteomyelitis. If clinically indicated, MRI may be helpful for confirmation.      DX-CHEST-PORTABLE (1 VIEW)   Final Result      1.  No acute cardiac or pulmonary abnormality is noted.      2.  Cardiomegaly. Prominence of the ascending aorta.      DX-FOOT-COMPLETE 3+ LEFT    (Results Pending)       Laboratory Values  Recent Labs      03/30/18   1504  03/31/18   0034   WBC  14.5*  9.2   RBC  3.96*  3.63*   HEMOGLOBIN  12.0*  11.2*   HEMATOCRIT  35.2*  33.8*   MCV  88.9  93.1   MCH  30.3  30.9   MCHC  34.1  33.1*   RDW  37.7  40.1   PLATELETCT  202  148*   MPV  10.2  10.1     Recent Labs      03/30/18   1504  03/31/18   0034   SODIUM  127*  134*   POTASSIUM  4.0  3.9   CHLORIDE  98  106   CO2  17*  21   GLUCOSE  258*  149*   BUN  23*  17             Impression:  1. Left great toe osteomyelitis    He will need an amputation of his great toe to rid the infection, but so far is not cooperative enough for a proper discussion.  As he is stable, continue medical management and wound care and we will follow along.  NPO after midnight in case he is awake and agreeable to surgery tomorrow.    
S/p [facility] 11/7/22 with pLAD 90% and dRCA 60%.   - However at this time the patient is acutely  ill with metastatic lung cancer as well as malignant pericardial effusion, and is not a candidate at this time for re-vascularization.   - Continue medical management.  - Continue aspirin and statin.   - Unable to add beta blocker or ACEi/ARB due to hypotension.  discussed plan with attending will follow on Monday
- S/p [facility] 11/7/22 with pLAD 90% and dRCA 60%.   - However at this time the patient is critically ill with metastatic lung cancer as well as malignant pericardial effusion, and is not a candidate at this time for re-vascularization.   - Continue medical management.  - Continue aspirin and statin.   - Unable to add beta blocker or ACEi/ARB due to hypotension.
- S/p [facility] 11/7/22 with pLAD 90% and dRCA 60%.   - However at this patient is critically ill at this time with metastatic lung cancer as well as malignant pericardial effusion, and is not a candidate at this time for re-vascularization.   - Continue medical management.  - Continue aspirin and statin.   - Unable to add beta blocker or ACEi/ARB due to hypotension.
- S/p [facility] 11/7/22 with pLAD 90% and dRCA 60%.   - However at this time the patient is acutely  ill with metastatic lung cancer as well as malignant pericardial effusion, and is not a candidate at this time for re-vascularization.   - Continue medical management.  - Restart aspirin 81mg PO qday.  - Continue statin.   - Start Toprol XL 12.5mg PO qday.
- S/p [facility] 11/7/22 with pLAD 90% and dRCA 60%.   - However at this time the patient is acutely  ill with metastatic lung cancer as well as malignant pericardial effusion, and is not a candidate at this time for re-vascularization.   - Continue medical management.  - Continue aspirin and statin.   - Unable to add beta blocker or ACEi/ARB due to hypotension.

## 2022-11-16 NOTE — PROGRESS NOTE ADULT - SUBJECTIVE AND OBJECTIVE BOX
Subjective: Pt sitting up in bed.  NAD noted. Denies SOB or CP.                          T(F): 98 (22 @ 04:59), Max: 98.7 (11-15-22 @ 13:12)  HR: 87 (22 @ 04:59) (78 - 107)  BP: 111/76 (22 @ 04:59) (91/61 - 111/76)  RR: 18 (22 @ 04:59) (18 - 18)  SpO2: 95% (22 @ 04:59) (93% - 98%)  Wt(kg): --          Daily     Daily Weight in k.1 (2022 06:05)        No Known Allergies          136  |  104  |  27.3<H>  ----------------------------<  86  4.2   |  25.0  |  0.36<L>    Ca    8.6      2022 04:40                                 11.2   12.01 )-----------( 284      ( 2022 04:40 )             33.0        PT/INR - ( 2022 16:30 )   PT: 13.9 sec;   INR: 1.20 ratio         PTT - ( 15 Nov 2022 04:45 )  PTT:70.6 sec                CXR: < from: Xray Chest 1 View- PORTABLE-Urgent (Xray Chest 1 View- PORTABLE-Urgent .) (11.15.22 @ 13:59) >    IMPRESSION:  1. There is a left infrahilar mass, without significant change compared   to the previous exam.  2. Right basilar chest tube without pneumothorax on either side, however   noting minimal right-sided pleural reaction versus thickening along the   minor fissure with adjacent right upper lobe linear atelectasis versus   scarring, unchanged.  3. There is fullness of the right hilum, increased from the prior exam,   of indeterminate significance.  3. Left lower lobe atelectasis with small left basilar effusion, the   latter decreased from the previous study.    --- End of Report ---            RACHID ABDUL MD; Attending Radiologist  This document has been electronically signed. Nov 15 2022  5:36PM    < end of copied text >      I&O's Detail    15 Nov 2022 07:01  -  2022 07:00  --------------------------------------------------------  IN:    sodium chloride 0.9%: 500 mL  Total IN: 500 mL    OUT:    Chest Tube (mL): 430 mL    Incontinent per Condom Catheter (mL): 1600 mL  Total OUT: 2030 mL    Total NET: -1530 mL      CHEST TUBE:  [x ] YES [ ] NO  OUTPUT:   170ml overnight and 430ml over the last  24 hours    AIR LEAKS:  [ ] YES [x ] NO          Active Medications:  acetaminophen     Tablet .. 650 milliGRAM(s) Oral every 8 hours PRN  albuterol/ipratropium for Nebulization 3 milliLiter(s) Nebulizer every 6 hours  apixaban 10 milliGRAM(s) Oral every 12 hours  ascorbic acid 500 milliGRAM(s) Oral daily  atorvastatin 80 milliGRAM(s) Oral at bedtime  buDESOnide    Inhalation Suspension 0.5 milliGRAM(s) Inhalation two times a day  chlorhexidine 2% Cloths 1 Application(s) Topical daily  guaiFENesin  milliGRAM(s) Oral every 12 hours  influenza  Vaccine (HIGH DOSE) 0.7 milliLiter(s) IntraMuscular once  multivitamin/minerals 1 Tablet(s) Oral daily  oxycodone    5 mG/acetaminophen 325 mG 1 Tablet(s) Oral every 6 hours PRN      Physical Exam:    Neuro: AAOX3.  Non focal.    Pulm: Diminished BLL.  Right pigtail to waterseal without crepitus or airleak.    CV: RRR.  +S1+S2.    Abd: Soft/ND/NT.  +BS.                            PAST MEDICAL & SURGICAL HISTORY:  No pertinent past medical history      No significant past surgical history

## 2022-11-17 PROCEDURE — 99233 SBSQ HOSP IP/OBS HIGH 50: CPT

## 2022-11-17 PROCEDURE — 99231 SBSQ HOSP IP/OBS SF/LOW 25: CPT

## 2022-11-17 PROCEDURE — 93308 TTE F-UP OR LMTD: CPT | Mod: 26

## 2022-11-17 PROCEDURE — 71045 X-RAY EXAM CHEST 1 VIEW: CPT | Mod: 26

## 2022-11-17 PROCEDURE — 71045 X-RAY EXAM CHEST 1 VIEW: CPT | Mod: 26,77

## 2022-11-17 RX ORDER — SODIUM CHLORIDE 9 MG/ML
1000 INJECTION INTRAMUSCULAR; INTRAVENOUS; SUBCUTANEOUS
Refills: 0 | Status: DISCONTINUED | OUTPATIENT
Start: 2022-11-17 | End: 2022-11-17

## 2022-11-17 RX ADMIN — Medication 600 MILLIGRAM(S): at 06:40

## 2022-11-17 RX ADMIN — TIOTROPIUM BROMIDE 1 CAPSULE(S): 18 CAPSULE ORAL; RESPIRATORY (INHALATION) at 09:04

## 2022-11-17 RX ADMIN — Medication 1 TABLET(S): at 17:10

## 2022-11-17 RX ADMIN — BUDESONIDE AND FORMOTEROL FUMARATE DIHYDRATE 2 PUFF(S): 160; 4.5 AEROSOL RESPIRATORY (INHALATION) at 09:04

## 2022-11-17 RX ADMIN — Medication 500 MILLIGRAM(S): at 17:10

## 2022-11-17 RX ADMIN — BUDESONIDE AND FORMOTEROL FUMARATE DIHYDRATE 2 PUFF(S): 160; 4.5 AEROSOL RESPIRATORY (INHALATION) at 21:36

## 2022-11-17 RX ADMIN — APIXABAN 10 MILLIGRAM(S): 2.5 TABLET, FILM COATED ORAL at 06:39

## 2022-11-17 RX ADMIN — CHLORHEXIDINE GLUCONATE 1 APPLICATION(S): 213 SOLUTION TOPICAL at 11:24

## 2022-11-17 RX ADMIN — APIXABAN 10 MILLIGRAM(S): 2.5 TABLET, FILM COATED ORAL at 17:09

## 2022-11-17 RX ADMIN — ATORVASTATIN CALCIUM 80 MILLIGRAM(S): 80 TABLET, FILM COATED ORAL at 20:40

## 2022-11-17 NOTE — PROGRESS NOTE ADULT - SUBJECTIVE AND OBJECTIVE BOX
67 yr old male with no known medical history, current smoker presented with complaints of 1 week of shortness of breath. His EKG on arrival revealed diffuse ST elevations, a limited ECHO revealed circumferential pericardial effusion. He was emergently taken to cardiac cath lab where he underwent pericardiocentesis and coronary angiogram. A pericardial drain was placed, cath revealed a 90% LAD lesion, which was not felt to be acute and given pericardial effusion, PCI was deferred. His presentation was attributed to acute pericarditis He was placed on a NRB, transferred to ICU for further monitoring. His labs on admission revealed FILIBERTO and elevated LFTs, IVF were given. A US abdomen was ordered, revealed non occlusive thrombus in IVC and gall bladder sludge. CXR on admission revealed a left lung mass. Subsequent CTA chest, abdomen and pelvis was done, revealed left upper lobe segmental and subsegmental PE. Occlusion of the SVC and a thrombus within the intrahepatic IVC. Mediastinal and right hilar lymphadenopathy with resultant central  bronchial obstruction and atelectasis involving the right middle and lower lobes. 4.1 cm sized left lower lobe mass compatible with a pulmonary malignancy. Moderate right and a trace left pleural effusion. Concern for malignant cells in pericardial fluid, formal cytology report pending. ECHO revealed a EF 25%. Cardiology follow up was done, advised continue aspirin and statin, unable to initiate GDMT given hypotension. Heparin gtt was initiated, palliative care was consulted. He was transferred to medical floor on 11/9.      No acute events overnight.  s/p IR guided Bx 11/15  s/p R pig tail removal    MEDICATIONS  (STANDING):  albuterol/ipratropium for Nebulization 3 milliLiter(s) Nebulizer every 6 hours  apixaban 10 milliGRAM(s) Oral every 12 hours  ascorbic acid 500 milliGRAM(s) Oral daily  atorvastatin 80 milliGRAM(s) Oral at bedtime  buDESOnide    Inhalation Suspension 0.5 milliGRAM(s) Inhalation two times a day  chlorhexidine 2% Cloths 1 Application(s) Topical daily  guaiFENesin  milliGRAM(s) Oral every 12 hours  influenza  Vaccine (HIGH DOSE) 0.7 milliLiter(s) IntraMuscular once  multivitamin/minerals 1 Tablet(s) Oral daily    MEDICATIONS  (PRN):  acetaminophen     Tablet .. 650 milliGRAM(s) Oral every 8 hours PRN Mild Pain (1 - 3), Moderate Pain (4 - 6)  oxycodone    5 mG/acetaminophen 325 mG 1 Tablet(s) Oral every 6 hours PRN Moderate Pain (4 - 6)        ICU Vital Signs Last 24 Hrs  T(C): 36.4 (17 Nov 2022 06:37), Max: 36.7 (16 Nov 2022 09:43)  T(F): 97.6 (17 Nov 2022 06:37), Max: 98.1 (16 Nov 2022 09:43)  HR: 81 (17 Nov 2022 06:37) (81 - 98)  BP: 97/64 (17 Nov 2022 06:37) (97/64 - 118/72)  BP(mean): --  ABP: --  ABP(mean): --  RR: 18 (17 Nov 2022 06:37) (18 - 18)  SpO2: 100% (17 Nov 2022 06:37) (95% - 100%)    O2 Parameters below as of 17 Nov 2022 06:37  Patient On (Oxygen Delivery Method): nasal cannula  O2 Flow (L/min): 3    Gen - alert and oriented  Heart - s1s2 rrr  Lung - Dec BS  ABD - SOFT nd nt  Ext - no edema      Labs:                          11.2   12.01 )-----------( 284      ( 16 Nov 2022 04:40 )             33.0                         11.2   12.01 )-----------( 284      ( 16 Nov 2022 04:40 )             33.0                             11.4   15.65 )-----------( 263      ( 15 Nov 2022 04:45 )             33.2                           11.1   7.42  )-----------( 217      ( 14 Nov 2022 05:05 )             32.3                           11.4   6.04  )-----------( 128      ( 10 Nov 2022 05:00 )             32.7     11-13    138  |  100  |  24.3<H>  ----------------------------<  88  3.8   |  26.0  |  0.40<L>    Ca    8.2<L>      13 Nov 2022 06:20  Phos  2.1     11-13  Mg     2.3     11-13          11-10    134<L>  |  97  |  26.2<H>  ----------------------------<  92  3.7   |  27.0  |  0.54    Ca    8.3<L>      10 Nov 2022 05:00  Phos  1.8     11-10  Mg     2.3     11-10    TPro  5.7<L>  /  Alb  2.5<L>  /  TBili  1.7  /  DBili  x   /  AST  104<H>  /  ALT  279<H>  /  AlkPhos  92  11-10     67 yr old male with no known medical history, current smoker presented with complaints of 1 week of shortness of breath. His EKG on arrival revealed diffuse ST elevations, a limited ECHO revealed circumferential pericardial effusion. He was emergently taken to cardiac cath lab where he underwent pericardiocentesis and coronary angiogram. A pericardial drain was placed, cath revealed a 90% LAD lesion, which was not felt to be acute and given pericardial effusion, PCI was deferred. His presentation was attributed to acute pericarditis He was placed on a NRB, transferred to ICU for further monitoring. His labs on admission revealed FILIBERTO and elevated LFTs, IVF were given. A US abdomen was ordered, revealed non occlusive thrombus in IVC and gall bladder sludge. CXR on admission revealed a left lung mass. Subsequent CTA chest, abdomen and pelvis was done, revealed left upper lobe segmental and subsegmental PE. Occlusion of the SVC and a thrombus within the intrahepatic IVC. Mediastinal and right hilar lymphadenopathy with resultant central  bronchial obstruction and atelectasis involving the right middle and lower lobes. 4.1 cm sized left lower lobe mass compatible with a pulmonary malignancy. Moderate right and a trace left pleural effusion. Concern for malignant cells in pericardial fluid, formal cytology report pending. ECHO revealed a EF 25%. Cardiology follow up was done, advised continue aspirin and statin, unable to initiate GDMT given hypotension. Heparin gtt was initiated, palliative care was consulted. He was transferred to medical floor on 11/9.      No acute events overnight.  s/p IR guided Bx 11/15  to get R pig tail catheter removed today    MEDICATIONS  (STANDING):  albuterol/ipratropium for Nebulization 3 milliLiter(s) Nebulizer every 6 hours  apixaban 10 milliGRAM(s) Oral every 12 hours  ascorbic acid 500 milliGRAM(s) Oral daily  atorvastatin 80 milliGRAM(s) Oral at bedtime  buDESOnide    Inhalation Suspension 0.5 milliGRAM(s) Inhalation two times a day  chlorhexidine 2% Cloths 1 Application(s) Topical daily  guaiFENesin  milliGRAM(s) Oral every 12 hours  influenza  Vaccine (HIGH DOSE) 0.7 milliLiter(s) IntraMuscular once  multivitamin/minerals 1 Tablet(s) Oral daily    MEDICATIONS  (PRN):  acetaminophen     Tablet .. 650 milliGRAM(s) Oral every 8 hours PRN Mild Pain (1 - 3), Moderate Pain (4 - 6)  oxycodone    5 mG/acetaminophen 325 mG 1 Tablet(s) Oral every 6 hours PRN Moderate Pain (4 - 6)        ICU Vital Signs Last 24 Hrs  T(C): 36.4 (17 Nov 2022 06:37), Max: 36.7 (16 Nov 2022 09:43)  T(F): 97.6 (17 Nov 2022 06:37), Max: 98.1 (16 Nov 2022 09:43)  HR: 81 (17 Nov 2022 06:37) (81 - 98)  BP: 97/64 (17 Nov 2022 06:37) (97/64 - 118/72)  BP(mean): --  ABP: --  ABP(mean): --  RR: 18 (17 Nov 2022 06:37) (18 - 18)  SpO2: 100% (17 Nov 2022 06:37) (95% - 100%)    O2 Parameters below as of 17 Nov 2022 06:37  Patient On (Oxygen Delivery Method): nasal cannula  O2 Flow (L/min): 3    Gen - alert and oriented  Heart - s1s2 rrr  Lung - Dec BS  ABD - SOFT nd nt  Ext - no edema      Labs:                          11.2   12.01 )-----------( 284      ( 16 Nov 2022 04:40 )             33.0                         11.2   12.01 )-----------( 284      ( 16 Nov 2022 04:40 )             33.0                             11.4   15.65 )-----------( 263      ( 15 Nov 2022 04:45 )             33.2                           11.1   7.42  )-----------( 217      ( 14 Nov 2022 05:05 )             32.3                           11.4   6.04  )-----------( 128      ( 10 Nov 2022 05:00 )             32.7     11-13    138  |  100  |  24.3<H>  ----------------------------<  88  3.8   |  26.0  |  0.40<L>    Ca    8.2<L>      13 Nov 2022 06:20  Phos  2.1     11-13  Mg     2.3     11-13          11-10    134<L>  |  97  |  26.2<H>  ----------------------------<  92  3.7   |  27.0  |  0.54    Ca    8.3<L>      10 Nov 2022 05:00  Phos  1.8     11-10  Mg     2.3     11-10    TPro  5.7<L>  /  Alb  2.5<L>  /  TBili  1.7  /  DBili  x   /  AST  104<H>  /  ALT  279<H>  /  AlkPhos  92  11-10

## 2022-11-17 NOTE — PROGRESS NOTE ADULT - ASSESSMENT
67 yr old male with no known medical history, current smoker presented with complaints of 1 week of shortness of breath. His EKG on arrival revealed diffuse ST elevations, a limited ECHO revealed circumferential pericardial effusion. He was emergently taken to cardiac cath lab where he underwent pericardiocentesis and coronary angiogram. A pericardial drain was placed, cath revealed a 90% LAD lesion, which was not felt to be acute and given pericardial effusion, PCI was deferred. His presentation was attributed to acute pericarditis He was placed on a NRB, transferred to ICU for further monitoring. His labs on admission revealed FILIBERTO and elevated LFTs, IVF were given. A US abdomen was ordered, revealed non occlusive thrombus in IVC and gall bladder sludge. CXR on admission revealed a left lung mass. Subsequent CTA chest, abdomen and pelvis was done, revealed left upper lobe segmental and subsegmental PE. Occlusion of the SVC and a thrombus within the intrahepatic IVC. Mediastinal and right hilar lymphadenopathy with resultant central  bronchial obstruction and atelectasis involving the right middle and lower lobes. 4.1 cm sized left lower lobe mass compatible with a pulmonary malignancy. Moderate right and a trace left pleural effusion. Concern for malignant cells in pericardial fluid, formal cytology report pending. ECHO revealed a EF 25%. Cardiology follow up was done, advised continue aspirin and statin, unable to initiate GDMT given hypotension. Heparin gtt was initiated, palliative care was consulted. He was transferred to medical floor on 11/9.    1) Concern for malignant pleural effusion  findings concerning for metastatic lung cancer, pericardial fluid cytology consistent with adenocarcinoma, PDL1 0  s/p IR guided lung biopsy 11/15 for confirmative dx and to test tissue for additional NGS  outpatient PET CT scan   will need systemic therapy, based on pathology and NGS result    2) PE  in setting of malignancy?  off  heparin gtt  started on Eliquis      3) Anemia  multifactorial  adequate iron stores, vit b12, folate  transfuse if hgb<7.0.  Hb stable     will follow up  after biopsy if clinically stable patient can DC with outpatient f/up with Dr Elise SOLIS in 1 weeks time  He will also need f/up with cardiology and to get cardiac clearance prior to initiatiating any form of systemic chemotherapy for stage IV malignancy 67 yr old male with no known medical history, current smoker presented with complaints of 1 week of shortness of breath. His EKG on arrival revealed diffuse ST elevations, a limited ECHO revealed circumferential pericardial effusion. He was emergently taken to cardiac cath lab where he underwent pericardiocentesis and coronary angiogram. A pericardial drain was placed, cath revealed a 90% LAD lesion, which was not felt to be acute and given pericardial effusion, PCI was deferred. His presentation was attributed to acute pericarditis He was placed on a NRB, transferred to ICU for further monitoring. His labs on admission revealed FILIBERTO and elevated LFTs, IVF were given. A US abdomen was ordered, revealed non occlusive thrombus in IVC and gall bladder sludge. CXR on admission revealed a left lung mass. Subsequent CTA chest, abdomen and pelvis was done, revealed left upper lobe segmental and subsegmental PE. Occlusion of the SVC and a thrombus within the intrahepatic IVC. Mediastinal and right hilar lymphadenopathy with resultant central  bronchial obstruction and atelectasis involving the right middle and lower lobes. 4.1 cm sized left lower lobe mass compatible with a pulmonary malignancy. Moderate right and a trace left pleural effusion. Concern for malignant cells in pericardial fluid, formal cytology report pending. ECHO revealed a EF 25%. Cardiology follow up was done, advised continue aspirin and statin, unable to initiate GDMT given hypotension. Heparin gtt was initiated, palliative care was consulted. He was transferred to medical floor on 11/9.    1) Concern for malignant pleural effusion  findings concerning for metastatic lung cancer, pericardial fluid cytology consistent with adenocarcinoma, PDL1 0  s/p IR guided lung biopsy 11/15 for confirmative dx and to test tissue for additional NGS  outpatient PET CT scan   will need systemic therapy, based on pathology and NGS result    2) PE  in setting of malignancy?  off  heparin gtt  started on Eliquis      3) Anemia  multifactorial  adequate iron stores, vit b12, folate  transfuse if hgb<7.0.  Hb stable       He will also need f/up with cardiology and to get cardiac clearance prior to initiatiating any form of systemic chemotherapy for stage IV malignancy

## 2022-11-17 NOTE — PROGRESS NOTE ADULT - PROBLEM SELECTOR PLAN 1
drainage improved  d/c pigtail today  f/u AM CXR (ordered) drainage improved  d/c pigtail today  f/u CXR without obvious pneumothorax  thoracic surgery to sign off  remainder of care per primary team and oncology   reconsult as needed    d/w Dr. Bonds

## 2022-11-17 NOTE — PROGRESS NOTE ADULT - SUBJECTIVE AND OBJECTIVE BOX
Subjective:    T(C): 36.4 (11-17-22 @ 06:37), Max: 36.7 (11-16-22 @ 09:43)  HR: 81 (11-17-22 @ 06:37) (81 - 98)  BP: 97/64 (11-17-22 @ 06:37) (97/64 - 118/72)  RR: 18 (11-17-22 @ 06:37) (18 - 18)  SpO2: 100% (11-17-22 @ 06:37) (95% - 100%)    11-16    136  |  104  |  27.3<H>  ----------------------------<  86  4.2   |  25.0  |  0.36<L>    Ca    8.6      16 Nov 2022 04:40                                 11.2   12.01 )-----------( 284      ( 16 Nov 2022 04:40 )             33.0       I&O's Detail    16 Nov 2022 07:01  -  17 Nov 2022 07:00  --------------------------------------------------------  IN:    Oral Fluid: 120 mL  Total IN: 120 mL    OUT:    Chest Tube (mL): 60 mL    Incontinent per Condom Catheter (mL): 1700 mL  Total OUT: 1760 mL  Total NET: -1640 mL    Drug Dosing Weight  Height (cm): 149 (07 Nov 2022 18:10)  Weight (kg): 38 (07 Nov 2022 18:10)  BMI (kg/m2): 17.1 (07 Nov 2022 18:10)  BSA (m2): 1.27 (07 Nov 2022 18:10)    MEDICATIONS  (STANDING):  albuterol    90 MICROgram(s) HFA Inhaler 2 Puff(s) Inhalation four times a day  apixaban 10 milliGRAM(s) Oral every 12 hours  ascorbic acid 500 milliGRAM(s) Oral daily  atorvastatin 80 milliGRAM(s) Oral at bedtime  budesonide 160 MICROgram(s)/formoterol 4.5 MICROgram(s) Inhaler 2 Puff(s) Inhalation two times a day  chlorhexidine 2% Cloths 1 Application(s) Topical daily  guaiFENesin  milliGRAM(s) Oral every 12 hours  influenza  Vaccine (HIGH DOSE) 0.7 milliLiter(s) IntraMuscular once  lidocaine   4% Patch 2 Patch Transdermal daily  multivitamin/minerals 1 Tablet(s) Oral daily  tiotropium 18 MICROgram(s) Capsule 1 Capsule(s) Inhalation daily    MEDICATIONS  (PRN):  acetaminophen     Tablet .. 650 milliGRAM(s) Oral every 8 hours PRN Mild Pain (1 - 3), Moderate Pain (4 - 6)  albuterol/ipratropium for Nebulization 3 milliLiter(s) Nebulizer every 6 hours PRN Shortness of Breath and/or Wheezing  melatonin 5 milliGRAM(s) Oral at bedtime PRN Insomnia  oxycodone    5 mG/acetaminophen 325 mG 1 Tablet(s) Oral every 6 hours PRN Moderate Pain (4 - 6)    Physical Exam  Gen:   Neuro:   Pulm:   CV:   Abd:   Extrem/MS:   Incision(s):        Subjective: no complaints, states his breathing is better denies CP, palpitations, SOB, cough, fever, chills, itchiness/rash, diaphoresis, vision changes, HA, dizziness/lightheadedness, numbness/tingling, abd pain, N/V     T(C): 36.4 (11-17-22 @ 06:37), Max: 36.7 (11-16-22 @ 09:43)  HR: 81 (11-17-22 @ 06:37) (81 - 98)  BP: 97/64 (11-17-22 @ 06:37) (97/64 - 118/72)  RR: 18 (11-17-22 @ 06:37) (18 - 18)  SpO2: 100% (11-17-22 @ 06:37) (95% - 100%)    11-16    136  |  104  |  27.3<H>  ----------------------------<  86  4.2   |  25.0  |  0.36<L>    Ca    8.6      16 Nov 2022 04:40                                 11.2   12.01 )-----------( 284      ( 16 Nov 2022 04:40 )             33.0       I&O's Detail    16 Nov 2022 07:01  -  17 Nov 2022 07:00  --------------------------------------------------------  IN:    Oral Fluid: 120 mL  Total IN: 120 mL    OUT:    Chest Tube (mL): 60 mL    Incontinent per Condom Catheter (mL): 1700 mL  Total OUT: 1760 mL  Total NET: -1640 mL    Drug Dosing Weight  Height (cm): 149 (07 Nov 2022 18:10)  Weight (kg): 38 (07 Nov 2022 18:10)  BMI (kg/m2): 17.1 (07 Nov 2022 18:10)  BSA (m2): 1.27 (07 Nov 2022 18:10)    MEDICATIONS  (STANDING):  albuterol    90 MICROgram(s) HFA Inhaler 2 Puff(s) Inhalation four times a day  apixaban 10 milliGRAM(s) Oral every 12 hours  ascorbic acid 500 milliGRAM(s) Oral daily  atorvastatin 80 milliGRAM(s) Oral at bedtime  budesonide 160 MICROgram(s)/formoterol 4.5 MICROgram(s) Inhaler 2 Puff(s) Inhalation two times a day  chlorhexidine 2% Cloths 1 Application(s) Topical daily  guaiFENesin  milliGRAM(s) Oral every 12 hours  influenza  Vaccine (HIGH DOSE) 0.7 milliLiter(s) IntraMuscular once  lidocaine   4% Patch 2 Patch Transdermal daily  multivitamin/minerals 1 Tablet(s) Oral daily  tiotropium 18 MICROgram(s) Capsule 1 Capsule(s) Inhalation daily    MEDICATIONS  (PRN):  acetaminophen     Tablet .. 650 milliGRAM(s) Oral every 8 hours PRN Mild Pain (1 - 3), Moderate Pain (4 - 6)  albuterol/ipratropium for Nebulization 3 milliLiter(s) Nebulizer every 6 hours PRN Shortness of Breath and/or Wheezing  melatonin 5 milliGRAM(s) Oral at bedtime PRN Insomnia  oxycodone    5 mG/acetaminophen 325 mG 1 Tablet(s) Oral every 6 hours PRN Moderate Pain (4 - 6)    Physical Exam  Gen: NAD  Neuro: A&Ox3 non focal speech clear and intact  Pulm: rhonchi b/l no wheezing  CV: S1S2 RRR no murmurs  Abd: +BS soft NT ND  Extrem/MS: no edema/cyanosis, BRUNO  R pigtail to waterseal no air leak

## 2022-11-17 NOTE — PROGRESS NOTE ADULT - PROBLEM SELECTOR PLAN 2
S/P IR biopsy 11/15.
- S/p pericardiocentesis with pericardial drain still in place.   - Minimal output even with addition of heparin gtt at this time.   - Drain D/C'd today.  - Repeat echocardiogram today to re-evaluate re-accumulation.
Plan for IR biopsy today.
- S/p pericardiocentesis with pericardial drain removed.  - Minimal output even with addition of heparin gtt at this time.   - Drain D/C'd 11/10.
- S/p pericardiocentesis with pericardial drain removed.  - Minimal output even with addition of heparin gtt at this time.   - Drain D/C'd 11/10.  - Repeat limited echocardiogram 24 hours after restarting the NOAC.
S/P IR biopsy 11/15  results pending
- S/p pericardiocentesis with pericardial drain still in place.   - Minimal output even with addition of heparin gtt at this time.   - D/C drain today.   - Repeat echo in 2 days to re-evaluate for reaccumulation.
S/p pericardiocentesis with pericardial drain still in place.   - Minimal output even with addition of heparin gtt at this time.   - Drain D/C'd 11/10.

## 2022-11-17 NOTE — PROGRESS NOTE ADULT - ASSESSMENT
67 yr old male with no known medical history, current smoker presented with complaints of 1 week of shortness of breath. His EKG on arrival revealed diffuse ST elevations, a limited ECHO revealed circumferential pericardial effusion. He was emergently taken to cardiac cath lab where he underwent pericardiocentesis and coronary angiogram. A pericardial drain was placed, cath revealed a 90% LAD lesion, which was not felt to be acute and given pericardial effusion, PCI was deferred. His presentation was attributed to acute pericarditis He was placed on a NRB, transferred to ICU for further monitoring. His labs on admission revealed FILIBERTO and elevated LFTs, IVF were given. A US abdomen was ordered, revealed non occlusive thrombus in IVC and gall bladder sludge. CXR on admission revealed a left lung mass. Subsequent CTA chest, abdomen and pelvis was done, revealed left upper lobe segmental and subsegmental PE. Occlusion of the SVC and a thrombus within the intrahepatic IVC. Mediastinal and right hilar lymphadenopathy with resultant central  bronchial obstruction and atelectasis involving the right middle and lower lobes. 4.1 cm sized left lower lobe mass compatible with a pulmonary malignancy. Moderate right and a trace left pleural effusion. Concern for malignant cells in pericardial fluid, formal cytology report pending. ECHO revealed a EF 50%. Cardiology follow up was done, advised continue aspirin and statin, unable to initiate GDMT given hypotension. Heparin gtt was initiated, palliative care was consulted. He was transferred to medical floor on 11/9.    Acute pericardial effusion s/p pericardiocentesis  - Pericardial drain removed  - Findings concerning for metastatic cancer  - Outpatient PET scan  - Off Aspirin  - s/p IR lung biopsy  cytology positive from pericardial fluid of adenocarcinoma  repeat echo today     Right pleural effusion  - Thoracic surgery following   - Chest tube placed, planned for dc today  home o2 evaluation  `  Acute pulmonary embolism/ IVC thrombus   - eliquis    HFrEF with wall motion abnormalities   PCI not performed on admission given effusion  90% LAD stenosis   - unable to initiate GDMT given hypotension  - Continue Statin, aspirin after bx on hold now     Lung mass /cancer   - Oncology recs appreciated  - Palliative consult appreciated    Smoker, likely COPD    symbicort/spiriva    prn nebs       PT evaluation --[facility] vs home    dc planning

## 2022-11-17 NOTE — PROGRESS NOTE ADULT - PROBLEM SELECTOR PROBLEM 2
Pericardial effusion, acute
Lung mass
Pericardial effusion, acute

## 2022-11-17 NOTE — PROGRESS NOTE ADULT - ASSESSMENT
68 y/o M with no PMH initially presented to [facility] with SOB. Found to have large pericardial effusion s/p pericardial drainage with 800cc removed. Incidental finding of REYNALDO mass. Also found to have RT pleural effusion. Thoracic surgery consulted to evaluate RT pleural effusion s/p R pigtail 11/14.

## 2022-11-17 NOTE — PROGRESS NOTE ADULT - SUBJECTIVE AND OBJECTIVE BOX
feels ok  some SOB but improved  no chest pain  ros negative     MEDICATIONS  (STANDING):  albuterol    90 MICROgram(s) HFA Inhaler 2 Puff(s) Inhalation four times a day  apixaban 10 milliGRAM(s) Oral every 12 hours  ascorbic acid 500 milliGRAM(s) Oral daily  atorvastatin 80 milliGRAM(s) Oral at bedtime  budesonide 160 MICROgram(s)/formoterol 4.5 MICROgram(s) Inhaler 2 Puff(s) Inhalation two times a day  chlorhexidine 2% Cloths 1 Application(s) Topical daily  influenza  Vaccine (HIGH DOSE) 0.7 milliLiter(s) IntraMuscular once  lidocaine   4% Patch 2 Patch Transdermal daily  multivitamin/minerals 1 Tablet(s) Oral daily  sodium chloride 0.9%. 1000 milliLiter(s) (100 mL/Hr) IV Continuous <Continuous>  tiotropium 18 MICROgram(s) Capsule 1 Capsule(s) Inhalation daily    MEDICATIONS  (PRN):  acetaminophen     Tablet .. 650 milliGRAM(s) Oral every 8 hours PRN Mild Pain (1 - 3), Moderate Pain (4 - 6)  albuterol/ipratropium for Nebulization 3 milliLiter(s) Nebulizer every 6 hours PRN Shortness of Breath and/or Wheezing  melatonin 5 milliGRAM(s) Oral at bedtime PRN Insomnia  oxycodone    5 mG/acetaminophen 325 mG 1 Tablet(s) Oral every 6 hours PRN Moderate Pain (4 - 6)      Allergies    No Known Allergies      Vital Signs Last 24 Hrs  T(C): 36.4 (17 Nov 2022 10:23), Max: 36.6 (16 Nov 2022 22:12)  T(F): 97.5 (17 Nov 2022 10:23), Max: 97.8 (16 Nov 2022 22:12)  HR: 91 (17 Nov 2022 10:23) (81 - 98)  BP: 90/61 (17 Nov 2022 10:23) (90/61 - 118/72)  BP(mean): --  RR: 20 (17 Nov 2022 10:23) (18 - 20)  SpO2: 100% (17 Nov 2022 10:23) (95% - 100%)    Parameters below as of 17 Nov 2022 10:23  Patient On (Oxygen Delivery Method): nasal cannula        PHYSICAL EXAM:    GENERAL: NAD  CHEST/LUNG: dec bs right base, right pigtail no wheeze   HEART: Regular rate and rhythm; S1 S2  ABDOMEN: Soft, Nontender, Bowel sounds present  EXTREMITIES:  no edema   NERVOUS SYSTEM:  Alert & Oriented X3, gen weakness    LABS:                        11.2   12.01 )-----------( 284      ( 16 Nov 2022 04:40 )             33.0     11-16    136  |  104  |  27.3<H>  ----------------------------<  86  4.2   |  25.0  |  0.36<L>    Ca    8.6      16 Nov 2022 04:40            CAPILLARY BLOOD GLUCOSE            RADIOLOGY & ADDITIONAL TESTS:

## 2022-11-18 PROCEDURE — 99233 SBSQ HOSP IP/OBS HIGH 50: CPT

## 2022-11-18 RX ORDER — ASPIRIN/CALCIUM CARB/MAGNESIUM 324 MG
81 TABLET ORAL DAILY
Refills: 0 | Status: DISCONTINUED | OUTPATIENT
Start: 2022-11-18 | End: 2022-11-21

## 2022-11-18 RX ADMIN — Medication 1 TABLET(S): at 12:46

## 2022-11-18 RX ADMIN — Medication 500 MILLIGRAM(S): at 12:46

## 2022-11-18 RX ADMIN — ATORVASTATIN CALCIUM 80 MILLIGRAM(S): 80 TABLET, FILM COATED ORAL at 22:08

## 2022-11-18 RX ADMIN — CHLORHEXIDINE GLUCONATE 1 APPLICATION(S): 213 SOLUTION TOPICAL at 12:47

## 2022-11-18 RX ADMIN — APIXABAN 10 MILLIGRAM(S): 2.5 TABLET, FILM COATED ORAL at 17:27

## 2022-11-18 RX ADMIN — Medication 81 MILLIGRAM(S): at 12:46

## 2022-11-18 RX ADMIN — BUDESONIDE AND FORMOTEROL FUMARATE DIHYDRATE 2 PUFF(S): 160; 4.5 AEROSOL RESPIRATORY (INHALATION) at 22:09

## 2022-11-18 RX ADMIN — APIXABAN 10 MILLIGRAM(S): 2.5 TABLET, FILM COATED ORAL at 05:10

## 2022-11-18 RX ADMIN — TIOTROPIUM BROMIDE 1 CAPSULE(S): 18 CAPSULE ORAL; RESPIRATORY (INHALATION) at 09:33

## 2022-11-18 RX ADMIN — BUDESONIDE AND FORMOTEROL FUMARATE DIHYDRATE 2 PUFF(S): 160; 4.5 AEROSOL RESPIRATORY (INHALATION) at 09:33

## 2022-11-18 NOTE — PROGRESS NOTE ADULT - ASSESSMENT
67 yr old male with no known medical history, current smoker presented with complaints of 1 week of shortness of breath. His EKG on arrival revealed diffuse ST elevations, a limited ECHO revealed circumferential pericardial effusion. He was emergently taken to cardiac cath lab where he underwent pericardiocentesis and coronary angiogram. A pericardial drain was placed, cath revealed a 90% LAD lesion, which was not felt to be acute and given pericardial effusion, PCI was deferred. His presentation was attributed to acute pericarditis He was placed on a NRB, transferred to ICU for further monitoring. His labs on admission revealed FILIBERTO and elevated LFTs, IVF were given. A US abdomen was ordered, revealed non occlusive thrombus in IVC and gall bladder sludge. CXR on admission revealed a left lung mass. Subsequent CTA chest, abdomen and pelvis was done, revealed left upper lobe segmental and subsegmental PE. Occlusion of the SVC and a thrombus within the intrahepatic IVC. Mediastinal and right hilar lymphadenopathy with resultant central  bronchial obstruction and atelectasis involving the right middle and lower lobes. 4.1 cm sized left lower lobe mass compatible with a pulmonary malignancy. Moderate right and a trace left pleural effusion. Concern for malignant cells in pericardial fluid, formal cytology report pending. ECHO revealed a EF 25%. Cardiology follow up was done, advised continue aspirin and statin, unable to initiate GDMT given hypotension. Heparin gtt was initiated, palliative care was consulted. He was transferred to medical floor on 11/9.    1) Concern for malignant pleural effusion  findings concerning for metastatic lung cancer, pericardial fluid cytology consistent with adenocarcinoma, PDL1 0  s/p IR guided lung biopsy 11/15 for confirmative dx and to test tissue for additional NGS  outpatient PET CT scan   will need systemic therapy, based on pathology and NGS result    2) PE  in setting of malignancy?  off  heparin gtt  started on Eliquis      3) Anemia  multifactorial  adequate iron stores, vit b12, folate  transfuse if hgb<7.0.  Hb stable       He will also need f/up with cardiology and to get cardiac clearance prior to initiatiating any form of systemic chemotherapy for stage IV malignancy  f/u with Dr Olivares in office

## 2022-11-18 NOTE — PROGRESS NOTE ADULT - SUBJECTIVE AND OBJECTIVE BOX
67 yr old male with no known medical history, current smoker presented with complaints of 1 week of shortness of breath. His EKG on arrival revealed diffuse ST elevations, a limited ECHO revealed circumferential pericardial effusion. He was emergently taken to cardiac cath lab where he underwent pericardiocentesis and coronary angiogram. A pericardial drain was placed, cath revealed a 90% LAD lesion, which was not felt to be acute and given pericardial effusion, PCI was deferred. His presentation was attributed to acute pericarditis He was placed on a NRB, transferred to ICU for further monitoring. His labs on admission revealed FILIBERTO and elevated LFTs, IVF were given. A US abdomen was ordered, revealed non occlusive thrombus in IVC and gall bladder sludge. CXR on admission revealed a left lung mass. Subsequent CTA chest, abdomen and pelvis was done, revealed left upper lobe segmental and subsegmental PE. Occlusion of the SVC and a thrombus within the intrahepatic IVC. Mediastinal and right hilar lymphadenopathy with resultant central  bronchial obstruction and atelectasis involving the right middle and lower lobes. 4.1 cm sized left lower lobe mass compatible with a pulmonary malignancy. Moderate right and a trace left pleural effusion. Concern for malignant cells in pericardial fluid, formal cytology report pending. ECHO revealed a EF 25%. Cardiology follow up was done, advised continue aspirin and statin, unable to initiate GDMT given hypotension. Heparin gtt was initiated, palliative care was consulted. He was transferred to medical floor on 11/9.      No acute events overnight.  s/p IR guided Bx 11/15 c/w adenoca   to get R pig tail catheter removed       MEDICATIONS  (STANDING):  albuterol    90 MICROgram(s) HFA Inhaler 2 Puff(s) Inhalation four times a day  apixaban 10 milliGRAM(s) Oral every 12 hours  ascorbic acid 500 milliGRAM(s) Oral daily  aspirin enteric coated 81 milliGRAM(s) Oral daily  atorvastatin 80 milliGRAM(s) Oral at bedtime  budesonide 160 MICROgram(s)/formoterol 4.5 MICROgram(s) Inhaler 2 Puff(s) Inhalation two times a day  chlorhexidine 2% Cloths 1 Application(s) Topical daily  influenza  Vaccine (HIGH DOSE) 0.7 milliLiter(s) IntraMuscular once  lidocaine   4% Patch 2 Patch Transdermal daily  multivitamin/minerals 1 Tablet(s) Oral daily  tiotropium 18 MICROgram(s) Capsule 1 Capsule(s) Inhalation daily    MEDICATIONS  (PRN):  acetaminophen     Tablet .. 650 milliGRAM(s) Oral every 8 hours PRN Mild Pain (1 - 3), Moderate Pain (4 - 6)  albuterol/ipratropium for Nebulization 3 milliLiter(s) Nebulizer every 6 hours PRN Shortness of Breath and/or Wheezing  melatonin 5 milliGRAM(s) Oral at bedtime PRN Insomnia  oxycodone    5 mG/acetaminophen 325 mG 1 Tablet(s) Oral every 6 hours PRN Moderate Pain (4 - 6)    Vital Signs Last 24 Hrs  T(C): 36.7 (18 Nov 2022 12:43), Max: 36.7 (18 Nov 2022 12:43)  T(F): 98 (18 Nov 2022 12:43), Max: 98 (18 Nov 2022 12:43)  HR: 96 (18 Nov 2022 12:43) (82 - 101)  BP: 98/60 (18 Nov 2022 12:47) (92/69 - 109/73)  BP(mean): --  RR: 18 (18 Nov 2022 12:43) (18 - 19)  SpO2: 98% (18 Nov 2022 12:43) (93% - 99%)    Parameters below as of 18 Nov 2022 12:43  Patient On (Oxygen Delivery Method): nasal cannula  O2 Flow (L/min): 3    Gen - alert and oriented  Heart - s1s2 rrr  Lung - Dec BS  ABD - SOFT nd nt  Ext - no edema      Labs:                            11.2   12.01 )-----------( 284      ( 16 Nov 2022 04:40 )             33.0                         11.2   12.01 )-----------( 284      ( 16 Nov 2022 04:40 )             33.0                             11.4   15.65 )-----------( 263      ( 15 Nov 2022 04:45 )             33.2                           11.1   7.42  )-----------( 217      ( 14 Nov 2022 05:05 )             32.3                           11.4   6.04  )-----------( 128      ( 10 Nov 2022 05:00 )             32.7     11-13    138  |  100  |  24.3<H>  ----------------------------<  88  3.8   |  26.0  |  0.40<L>    Ca    8.2<L>      13 Nov 2022 06:20  Phos  2.1     11-13  Mg     2.3     11-13          11-10    134<L>  |  97  |  26.2<H>  ----------------------------<  92  3.7   |  27.0  |  0.54    Ca    8.3<L>      10 Nov 2022 05:00  Phos  1.8     11-10  Mg     2.3     11-10    TPro  5.7<L>  /  Alb  2.5<L>  /  TBili  1.7  /  DBili  x   /  AST  104<H>  /  ALT  279<H>  /  AlkPhos  92  11-10

## 2022-11-18 NOTE — PROGRESS NOTE ADULT - SUBJECTIVE AND OBJECTIVE BOX
no acute complaints  feels ok  ros negative     MEDICATIONS  (STANDING):  albuterol    90 MICROgram(s) HFA Inhaler 2 Puff(s) Inhalation four times a day  apixaban 10 milliGRAM(s) Oral every 12 hours  ascorbic acid 500 milliGRAM(s) Oral daily  atorvastatin 80 milliGRAM(s) Oral at bedtime  budesonide 160 MICROgram(s)/formoterol 4.5 MICROgram(s) Inhaler 2 Puff(s) Inhalation two times a day  chlorhexidine 2% Cloths 1 Application(s) Topical daily  influenza  Vaccine (HIGH DOSE) 0.7 milliLiter(s) IntraMuscular once  lidocaine   4% Patch 2 Patch Transdermal daily  multivitamin/minerals 1 Tablet(s) Oral daily  tiotropium 18 MICROgram(s) Capsule 1 Capsule(s) Inhalation daily    MEDICATIONS  (PRN):  acetaminophen     Tablet .. 650 milliGRAM(s) Oral every 8 hours PRN Mild Pain (1 - 3), Moderate Pain (4 - 6)  albuterol/ipratropium for Nebulization 3 milliLiter(s) Nebulizer every 6 hours PRN Shortness of Breath and/or Wheezing  melatonin 5 milliGRAM(s) Oral at bedtime PRN Insomnia  oxycodone    5 mG/acetaminophen 325 mG 1 Tablet(s) Oral every 6 hours PRN Moderate Pain (4 - 6)      Allergies    No Known Allergies      Vital Signs Last 24 Hrs  T(C): 36.6 (18 Nov 2022 08:08), Max: 36.6 (18 Nov 2022 05:00)  T(F): 97.9 (18 Nov 2022 08:08), Max: 97.9 (18 Nov 2022 08:08)  HR: 93 (18 Nov 2022 09:42) (82 - 101)  BP: 94/64 (18 Nov 2022 08:08) (90/61 - 109/73)  BP(mean): --  RR: 18 (18 Nov 2022 08:08) (18 - 20)  SpO2: 97% (18 Nov 2022 08:08) (93% - 100%)    Parameters below as of 18 Nov 2022 09:42  Patient On (Oxygen Delivery Method): nasal cannula,3 lpm        PHYSICAL EXAM:    GENERAL: NAD frail, cachectic   CHEST/LUNG:  dec bs at bases   HEART: Regular rate and rhythm; S1 S2  ABDOMEN: Soft, Nontender, Bowel sounds present  EXTREMITIES:  no edema   NERVOUS SYSTEM:  Alert & Oriented X3, Motor Strength 5/5 B/L upper and lower extremities  PSYCH: normal mood, appropriate response.    LABS:                CAPILLARY BLOOD GLUCOSE            RADIOLOGY & ADDITIONAL TESTS:

## 2022-11-18 NOTE — PROGRESS NOTE ADULT - ASSESSMENT
67 yr old male with no known medical history, current smoker presented with complaints of 1 week of shortness of breath. His EKG on arrival revealed diffuse ST elevations, a limited ECHO revealed circumferential pericardial effusion. He was emergently taken to cardiac cath lab where he underwent pericardiocentesis and coronary angiogram. A pericardial drain was placed, cath revealed a 90% LAD lesion, which was not felt to be acute and given pericardial effusion, PCI was deferred. His presentation was attributed to acute pericarditis He was placed on a NRB, transferred to ICU for further monitoring. His labs on admission revealed FILIBERTO and elevated LFTs, IVF were given. A US abdomen was ordered, revealed non occlusive thrombus in IVC and gall bladder sludge. CXR on admission revealed a left lung mass. Subsequent CTA chest, abdomen and pelvis was done, revealed left upper lobe segmental and subsegmental PE. Occlusion of the SVC and a thrombus within the intrahepatic IVC. Mediastinal and right hilar lymphadenopathy with resultant central  bronchial obstruction and atelectasis involving the right middle and lower lobes. 4.1 cm sized left lower lobe mass compatible with a pulmonary malignancy. Moderate right and a trace left pleural effusion. Concern for malignant cells in pericardial fluid, formal cytology report pending. ECHO revealed a EF 50%. Cardiology follow up was done, advised continue aspirin and statin, unable to initiate GDMT given hypotension. Heparin gtt was initiated, palliative care was consulted. He was transferred to medical floor on 11/9.    Acute pericardial effusion s/p pericardiocentesis  - Pericardial drain removed  - Outpatient PET scan  - Off Aspirin  - s/p IR lung biopsy  cytology positive from pericardial fluid of adenocarcinoma  repeat echo w/o pericardial effusion reaccumulation    Right pleural effusion  - Thoracic surgery following   - s/p pigtail drainage  home o2 evaluation  `  Acute pulmonary embolism/ IVC thrombus   - eliquis    HFrEF with wall motion abnormalities   PCI not performed on admission given effusion  90% LAD stenosis   - unable to initiate GDMT given hypotension  - Continue Statin, aspirin    Lung mass /cancer   - Oncology recs appreciated  - Palliative consult appreciated    Smoker, likely COPD    symbicort/spiriva    prn nebs       PT evaluation --[facility] vs home    dc planning

## 2022-11-19 LAB
CULTURE RESULTS: SIGNIFICANT CHANGE UP
SPECIMEN SOURCE: SIGNIFICANT CHANGE UP

## 2022-11-19 PROCEDURE — 99233 SBSQ HOSP IP/OBS HIGH 50: CPT

## 2022-11-19 RX ORDER — METOPROLOL TARTRATE 50 MG
12.5 TABLET ORAL
Refills: 0 | Status: DISCONTINUED | OUTPATIENT
Start: 2022-11-19 | End: 2022-11-20

## 2022-11-19 RX ADMIN — Medication 500 MILLIGRAM(S): at 11:11

## 2022-11-19 RX ADMIN — APIXABAN 10 MILLIGRAM(S): 2.5 TABLET, FILM COATED ORAL at 17:42

## 2022-11-19 RX ADMIN — Medication 1 TABLET(S): at 11:11

## 2022-11-19 RX ADMIN — CHLORHEXIDINE GLUCONATE 1 APPLICATION(S): 213 SOLUTION TOPICAL at 11:11

## 2022-11-19 RX ADMIN — APIXABAN 10 MILLIGRAM(S): 2.5 TABLET, FILM COATED ORAL at 05:41

## 2022-11-19 RX ADMIN — ATORVASTATIN CALCIUM 80 MILLIGRAM(S): 80 TABLET, FILM COATED ORAL at 21:57

## 2022-11-19 RX ADMIN — Medication 81 MILLIGRAM(S): at 11:11

## 2022-11-19 RX ADMIN — BUDESONIDE AND FORMOTEROL FUMARATE DIHYDRATE 2 PUFF(S): 160; 4.5 AEROSOL RESPIRATORY (INHALATION) at 21:56

## 2022-11-19 NOTE — PROGRESS NOTE ADULT - ASSESSMENT
67 yr old male with no known medical history, current smoker presented with complaints of 1 week of shortness of breath. His EKG on arrival revealed diffuse ST elevations, a limited ECHO revealed circumferential pericardial effusion. He was emergently taken to cardiac cath lab where he underwent pericardiocentesis and coronary angiogram. A pericardial drain was placed, cath revealed a 90% LAD lesion, which was not felt to be acute and given pericardial effusion, PCI was deferred. His presentation was attributed to acute pericarditis He was placed on a NRB, transferred to ICU for further monitoring. His labs on admission revealed FILIBERTO and elevated LFTs, IVF were given. A US abdomen was ordered, revealed non occlusive thrombus in IVC and gall bladder sludge. CXR on admission revealed a left lung mass. Subsequent CTA chest, abdomen and pelvis was done, revealed left upper lobe segmental and subsegmental PE. Occlusion of the SVC and a thrombus within the intrahepatic IVC. Mediastinal and right hilar lymphadenopathy with resultant central  bronchial obstruction and atelectasis involving the right middle and lower lobes. 4.1 cm sized left lower lobe mass compatible with a pulmonary malignancy. Moderate right and a trace left pleural effusion. Concern for malignant cells in pericardial fluid, formal cytology report pending. ECHO revealed a EF 50%. Cardiology follow up was done, advised continue aspirin and statin, unable to initiate GDMT given hypotension. Heparin gtt was initiated, palliative care was consulted. He was transferred to medical floor on 11/9.    Acute pericardial effusion s/p pericardiocentesis  - Pericardial drain removed  - Outpatient PET scan  - C/w Aspirin  - s/p IR lung biopsy- cytology positive from pericardial fluid of adenocarcinoma  repeat echo w/o pericardial effusion reaccumulation    Right pleural effusion  - Thoracic surgery following   - s/p pigtail drainage  home o2 evaluation    Acute pulmonary embolism/ IVC thrombus   - eliquis    HFrEF with wall motion abnormalities   PCI not performed on admission given effusion  90% LAD stenosis   - Appreciate cardio recs- if chest pain, will obtain CE and EKG  - Started Toprol XL. Will start lisinopril once BP stable    Lung mass /cancer   - Oncology recs appreciated  - Palliative consult appreciated    Smoker, likely COPD    symbicort/spiriva    prn nebs     PT evaluation --[facility]   dc planning. Patient is uninsured. Not on home oxygen.

## 2022-11-19 NOTE — PROGRESS NOTE ADULT - SUBJECTIVE AND OBJECTIVE BOX
[facility] Division of Hospital Medicine    Chief Complaint: metastatic lung cancer, PE, IVC thrombus and malignant pericardial effusion      SUBJECTIVE / OVERNIGHT EVENTS: No acute events overnight. Patient continues to require NC 3L.     Patient denies chest pain, SOB, abd pain, N/V, fever, chills, dysuria or any other complaints. All remainder ROS negative.     MEDICATIONS  (STANDING):  albuterol    90 MICROgram(s) HFA Inhaler 2 Puff(s) Inhalation four times a day  apixaban 10 milliGRAM(s) Oral every 12 hours  ascorbic acid 500 milliGRAM(s) Oral daily  aspirin enteric coated 81 milliGRAM(s) Oral daily  atorvastatin 80 milliGRAM(s) Oral at bedtime  budesonide 160 MICROgram(s)/formoterol 4.5 MICROgram(s) Inhaler 2 Puff(s) Inhalation two times a day  chlorhexidine 2% Cloths 1 Application(s) Topical daily  influenza  Vaccine (HIGH DOSE) 0.7 milliLiter(s) IntraMuscular once  lidocaine   4% Patch 2 Patch Transdermal daily  multivitamin/minerals 1 Tablet(s) Oral daily  tiotropium 18 MICROgram(s) Capsule 1 Capsule(s) Inhalation daily    MEDICATIONS  (PRN):  acetaminophen     Tablet .. 650 milliGRAM(s) Oral every 8 hours PRN Mild Pain (1 - 3), Moderate Pain (4 - 6)  albuterol/ipratropium for Nebulization 3 milliLiter(s) Nebulizer every 6 hours PRN Shortness of Breath and/or Wheezing  melatonin 5 milliGRAM(s) Oral at bedtime PRN Insomnia  oxycodone    5 mG/acetaminophen 325 mG 1 Tablet(s) Oral every 6 hours PRN Moderate Pain (4 - 6)        I&O's Summary    18 Nov 2022 07:01  -  19 Nov 2022 07:00  --------------------------------------------------------  IN: 420 mL / OUT: 1600 mL / NET: -1180 mL        PHYSICAL EXAM:  Vital Signs Last 24 Hrs  T(C): 36.6 (19 Nov 2022 08:15), Max: 36.7 (18 Nov 2022 12:43)  T(F): 97.8 (19 Nov 2022 08:15), Max: 98.1 (18 Nov 2022 16:27)  HR: 86 (19 Nov 2022 08:15) (86 - 98)  BP: 91/60 (19 Nov 2022 08:15) (88/62 - 98/65)  BP(mean): --  RR: 18 (19 Nov 2022 08:15) (18 - 18)  SpO2: 95% (19 Nov 2022 08:15) (94% - 98%)    Parameters below as of 19 Nov 2022 08:15  Patient On (Oxygen Delivery Method): nasal cannula  O2 Flow (L/min): 3    GENERAL: sitting comfortably, ill-appearing   CHEST/LUNG: CTA b/, no wheezing  HEART: Regular rate and rhythm; S1 S2  ABDOMEN: Soft, Nontender, Bowel sounds present  EXTREMITIES:  no edema   NERVOUS SYSTEM:  Alert & Oriented X3, Motor Strength 5/5 B/L upper and lower extremities  PSYCH: normal mood, appropriate response.    LABS:                    CAPILLARY BLOOD GLUCOSE            RADIOLOGY & ADDITIONAL TESTS:  Results Reviewed:   Imaging Personally Reviewed:  Electrocardiogram Personally Reviewed:

## 2022-11-20 LAB
ALBUMIN SERPL ELPH-MCNC: 2.4 G/DL — LOW (ref 3.3–5.2)
ALP SERPL-CCNC: 97 U/L — SIGNIFICANT CHANGE UP (ref 40–120)
ALT FLD-CCNC: 105 U/L — HIGH
ANION GAP SERPL CALC-SCNC: 7 MMOL/L — SIGNIFICANT CHANGE UP (ref 5–17)
AST SERPL-CCNC: 55 U/L — HIGH
BILIRUB SERPL-MCNC: 0.4 MG/DL — SIGNIFICANT CHANGE UP (ref 0.4–2)
BUN SERPL-MCNC: 32.6 MG/DL — HIGH (ref 8–20)
CALCIUM SERPL-MCNC: 8.6 MG/DL — SIGNIFICANT CHANGE UP (ref 8.4–10.5)
CHLORIDE SERPL-SCNC: 102 MMOL/L — SIGNIFICANT CHANGE UP (ref 96–108)
CO2 SERPL-SCNC: 29 MMOL/L — SIGNIFICANT CHANGE UP (ref 22–29)
CREAT SERPL-MCNC: 0.3 MG/DL — LOW (ref 0.5–1.3)
EGFR: 130 ML/MIN/1.73M2 — SIGNIFICANT CHANGE UP
GLUCOSE SERPL-MCNC: 86 MG/DL — SIGNIFICANT CHANGE UP (ref 70–99)
HCT VFR BLD CALC: 21.5 % — LOW (ref 39–50)
HCT VFR BLD CALC: 31.9 % — LOW (ref 39–50)
HGB BLD-MCNC: 10.7 G/DL — LOW (ref 13–17)
HGB BLD-MCNC: 7.2 G/DL — LOW (ref 13–17)
MCHC RBC-ENTMCNC: 29 PG — SIGNIFICANT CHANGE UP (ref 27–34)
MCHC RBC-ENTMCNC: 29.5 PG — SIGNIFICANT CHANGE UP (ref 27–34)
MCHC RBC-ENTMCNC: 33.5 GM/DL — SIGNIFICANT CHANGE UP (ref 32–36)
MCHC RBC-ENTMCNC: 33.5 GM/DL — SIGNIFICANT CHANGE UP (ref 32–36)
MCV RBC AUTO: 86.4 FL — SIGNIFICANT CHANGE UP (ref 80–100)
MCV RBC AUTO: 88.1 FL — SIGNIFICANT CHANGE UP (ref 80–100)
PLATELET # BLD AUTO: 274 K/UL — SIGNIFICANT CHANGE UP (ref 150–400)
PLATELET # BLD AUTO: 344 K/UL — SIGNIFICANT CHANGE UP (ref 150–400)
POTASSIUM SERPL-MCNC: 4.6 MMOL/L — SIGNIFICANT CHANGE UP (ref 3.5–5.3)
POTASSIUM SERPL-SCNC: 4.6 MMOL/L — SIGNIFICANT CHANGE UP (ref 3.5–5.3)
PROT SERPL-MCNC: 5.9 G/DL — LOW (ref 6.6–8.7)
RBC # BLD: 2.44 M/UL — LOW (ref 4.2–5.8)
RBC # BLD: 3.69 M/UL — LOW (ref 4.2–5.8)
RBC # FLD: 15.6 % — HIGH (ref 10.3–14.5)
RBC # FLD: 15.8 % — HIGH (ref 10.3–14.5)
SODIUM SERPL-SCNC: 138 MMOL/L — SIGNIFICANT CHANGE UP (ref 135–145)
WBC # BLD: 13.02 K/UL — HIGH (ref 3.8–10.5)
WBC # BLD: 15.73 K/UL — HIGH (ref 3.8–10.5)
WBC # FLD AUTO: 13.02 K/UL — HIGH (ref 3.8–10.5)
WBC # FLD AUTO: 15.73 K/UL — HIGH (ref 3.8–10.5)

## 2022-11-20 PROCEDURE — 93010 ELECTROCARDIOGRAM REPORT: CPT

## 2022-11-20 RX ORDER — SODIUM CHLORIDE 9 MG/ML
500 INJECTION INTRAMUSCULAR; INTRAVENOUS; SUBCUTANEOUS ONCE
Refills: 0 | Status: COMPLETED | OUTPATIENT
Start: 2022-11-20 | End: 2022-11-20

## 2022-11-20 RX ORDER — LEVALBUTEROL 1.25 MG/.5ML
1.25 SOLUTION, CONCENTRATE RESPIRATORY (INHALATION) EVERY 6 HOURS
Refills: 0 | Status: DISCONTINUED | OUTPATIENT
Start: 2022-11-20 | End: 2022-12-10

## 2022-11-20 RX ORDER — METOPROLOL TARTRATE 50 MG
2.5 TABLET ORAL ONCE
Refills: 0 | Status: COMPLETED | OUTPATIENT
Start: 2022-11-20 | End: 2022-11-20

## 2022-11-20 RX ORDER — ALBUMIN HUMAN 25 %
100 VIAL (ML) INTRAVENOUS ONCE
Refills: 0 | Status: COMPLETED | OUTPATIENT
Start: 2022-11-20 | End: 2022-11-20

## 2022-11-20 RX ADMIN — CHLORHEXIDINE GLUCONATE 1 APPLICATION(S): 213 SOLUTION TOPICAL at 11:46

## 2022-11-20 RX ADMIN — APIXABAN 10 MILLIGRAM(S): 2.5 TABLET, FILM COATED ORAL at 06:21

## 2022-11-20 RX ADMIN — Medication 500 MILLIGRAM(S): at 11:46

## 2022-11-20 RX ADMIN — Medication 1 TABLET(S): at 11:46

## 2022-11-20 RX ADMIN — BUDESONIDE AND FORMOTEROL FUMARATE DIHYDRATE 2 PUFF(S): 160; 4.5 AEROSOL RESPIRATORY (INHALATION) at 22:31

## 2022-11-20 RX ADMIN — APIXABAN 10 MILLIGRAM(S): 2.5 TABLET, FILM COATED ORAL at 17:31

## 2022-11-20 RX ADMIN — Medication 2.5 MILLIGRAM(S): at 17:58

## 2022-11-20 RX ADMIN — BUDESONIDE AND FORMOTEROL FUMARATE DIHYDRATE 2 PUFF(S): 160; 4.5 AEROSOL RESPIRATORY (INHALATION) at 08:29

## 2022-11-20 RX ADMIN — Medication 81 MILLIGRAM(S): at 11:46

## 2022-11-20 RX ADMIN — SODIUM CHLORIDE 500 MILLILITER(S): 9 INJECTION INTRAMUSCULAR; INTRAVENOUS; SUBCUTANEOUS at 21:54

## 2022-11-20 RX ADMIN — TIOTROPIUM BROMIDE 1 CAPSULE(S): 18 CAPSULE ORAL; RESPIRATORY (INHALATION) at 08:28

## 2022-11-20 RX ADMIN — ATORVASTATIN CALCIUM 80 MILLIGRAM(S): 80 TABLET, FILM COATED ORAL at 21:36

## 2022-11-20 RX ADMIN — LEVALBUTEROL 1.25 MILLIGRAM(S): 1.25 SOLUTION, CONCENTRATE RESPIRATORY (INHALATION) at 16:38

## 2022-11-20 RX ADMIN — Medication 50 MILLILITER(S): at 23:45

## 2022-11-20 RX ADMIN — SODIUM CHLORIDE 500 MILLILITER(S): 9 INJECTION INTRAMUSCULAR; INTRAVENOUS; SUBCUTANEOUS at 20:40

## 2022-11-20 NOTE — CHART NOTE - NSCHARTNOTEFT_GEN_A_CORE
Pt with BP 79/54  All remainder of vital signs stable  Resting comfortably in NAD  Denies chest pain, SOB, palpitations, HA, n/v/d/c, abdominal pain, dizziness, blurry vision  500cc NS bolus given  RN to notify of any acute change in pt status Pt with BP 79/54  All remainder of vital signs stable  Resting comfortably in NAD  Denies chest pain, SOB, palpitations, HA, n/v/d/c, abdominal pain, dizziness, blurry vision  500cc NS bolus given, BP with minimal improvement  Additional 500cc NS bolus given, again BP with minimal improvement  1 dose albumin given at this time. Pt remains asymptomatic.   RN to notify of any acute change in pt status Pt hypotensive overnight. 79/54.   All remainder of vital signs stable  Resting comfortably in NAD  Denies chest pain, SOB, palpitations, HA, n/v/d/c, abdominal pain, dizziness, blurry vision.      *Few hrs later in shift RN reporting pt with dark, bloody bowel movement appearing of melena.     VITAL SIGNS:  T(C): 37.1 (11-20-22 @ 20:36), Max: 37.2 (11-20-22 @ 16:24)  T(F): 98.7 (11-20-22 @ 20:36), Max: 99 (11-20-22 @ 16:24)  HR: 109 (11-20-22 @ 23:13) (89 - 125)  BP: 82/60 (11-20-22 @ 23:13) (79/54 - 103/71)  RR: 17 (11-20-22 @ 23:13) (17 - 18)  SpO2: 98% (11-20-22 @ 23:13) (96% - 98%)    PHYSICAL EXAM:  GENERAL: NAD, lying in bed comfortably  HEAD:  Atraumatic, Normocephalic  EYES: EOMI, PERRL, conjunctiva and sclera clear  ENT: Moist mucous membranes  NECK: Supple, No JVD  CHEST/LUNG: Clear to auscultation bilaterally; No rales, rhonchi, wheezing, or rubs. Unlabored respirations  HEART: Regular rate and rhythm; No murmurs, rubs, or gallops  ABDOMEN: Bowel sounds present; Soft, Nontender, Nondistended   EXTREMITIES:  2+ Peripheral Pulses, brisk capillary refill. No clubbing, cyanosis, or edema  NERVOUS SYSTEM:  Alert & Oriented X3, speech clear. Answers questions appropriately. No focal neurological deficits   MSK: FROM all 4 extremities, full and equal strength  SKIN: Warm, dry      A/P:  Symptomatic Anemia likely 2/2 GI bleed  -500cc NS bolus x 2 given with minimal improvement in BP. 1dose albumin ordered. (Prior to CBC results and melena)   -CBC with hemoglobin 7.2  -Stat type and screen  -Will obtain consent and transfuse 1U prbcs  -FOBT  -Protonix 40mg IV BID  -CT abd/pelvis  -GI Consult placed for am Pt hypotensive overnight. 79/54.   All remainder of vital signs stable  Resting comfortably in NAD  Denies chest pain, SOB, palpitations, HA, n/v/d/c, abdominal pain, dizziness, blurry vision.      *Few hrs later in shift RN reporting pt with dark, bloody bowel movement appearing of melena.     VITAL SIGNS:  T(C): 37.1 (11-20-22 @ 20:36), Max: 37.2 (11-20-22 @ 16:24)  T(F): 98.7 (11-20-22 @ 20:36), Max: 99 (11-20-22 @ 16:24)  HR: 109 (11-20-22 @ 23:13) (89 - 125)  BP: 82/60 (11-20-22 @ 23:13) (79/54 - 103/71)  RR: 17 (11-20-22 @ 23:13) (17 - 18)  SpO2: 98% (11-20-22 @ 23:13) (96% - 98%)    PHYSICAL EXAM:  GENERAL: NAD, lying in bed comfortably  HEAD:  Atraumatic, Normocephalic  EYES: EOMI, PERRL, conjunctiva and sclera clear  ENT: Moist mucous membranes  NECK: Supple, No JVD  CHEST/LUNG: Clear to auscultation bilaterally; No rales, rhonchi, wheezing, or rubs. Unlabored respirations  HEART: Regular rate and rhythm; No murmurs, rubs, or gallops  ABDOMEN: Bowel sounds present; Soft, Nontender, Nondistended   EXTREMITIES:  2+ Peripheral Pulses, brisk capillary refill. No clubbing, cyanosis, or edema  NERVOUS SYSTEM:  Alert & Oriented X3, speech clear. Answers questions appropriately. No focal neurological deficits   MSK: FROM all 4 extremities, full and equal strength  SKIN: Warm, dry      A/P:  Symptomatic Anemia likely 2/2 GI bleed  -500cc NS bolus x 2 given with minimal improvement in BP. 1dose albumin ordered. (Prior to CBC results and melena)   -CBC with hemoglobin 7.2  -Stat type and screen  -Will obtain consent and transfuse 1U prbcs  -FOBT  -Protonix 40mg IV BID  -CT abd/pelvis  -NPO  -GI Consult placed for am

## 2022-11-20 NOTE — PROGRESS NOTE ADULT - SUBJECTIVE AND OBJECTIVE BOX
[facility] Division of Hospital Medicine    Chief Complaint: metastatic lung cancer, PE, IVC thrombus and malignant pericardial effusion        SUBJECTIVE / OVERNIGHT EVENTS: No acute events overnight. Patient continues to have soft BP. He continues to require NC 2L. Denies SOB, n/v/f/c/h/d.    Patient denies chest pain, SOB, abd pain, N/V, fever, chills, dysuria or any other complaints. All remainder ROS negative.     MEDICATIONS  (STANDING):  albuterol    90 MICROgram(s) HFA Inhaler 2 Puff(s) Inhalation four times a day  apixaban 10 milliGRAM(s) Oral every 12 hours  ascorbic acid 500 milliGRAM(s) Oral daily  aspirin enteric coated 81 milliGRAM(s) Oral daily  atorvastatin 80 milliGRAM(s) Oral at bedtime  budesonide 160 MICROgram(s)/formoterol 4.5 MICROgram(s) Inhaler 2 Puff(s) Inhalation two times a day  chlorhexidine 2% Cloths 1 Application(s) Topical daily  influenza  Vaccine (HIGH DOSE) 0.7 milliLiter(s) IntraMuscular once  lidocaine   4% Patch 2 Patch Transdermal daily  metoprolol succinate ER 12.5 milliGRAM(s) Oral <User Schedule>  multivitamin/minerals 1 Tablet(s) Oral daily  tiotropium 18 MICROgram(s) Capsule 1 Capsule(s) Inhalation daily    MEDICATIONS  (PRN):  acetaminophen     Tablet .. 650 milliGRAM(s) Oral every 8 hours PRN Mild Pain (1 - 3), Moderate Pain (4 - 6)  albuterol/ipratropium for Nebulization 3 milliLiter(s) Nebulizer every 6 hours PRN Shortness of Breath and/or Wheezing  melatonin 5 milliGRAM(s) Oral at bedtime PRN Insomnia  oxycodone    5 mG/acetaminophen 325 mG 1 Tablet(s) Oral every 6 hours PRN Moderate Pain (4 - 6)        I&O's Summary    19 Nov 2022 07:01  -  20 Nov 2022 07:00  --------------------------------------------------------  IN: 240 mL / OUT: 2000 mL / NET: -1760 mL        PHYSICAL EXAM:  Vital Signs Last 24 Hrs  T(C): 36.7 (20 Nov 2022 08:45), Max: 36.9 (20 Nov 2022 06:12)  T(F): 98 (20 Nov 2022 08:45), Max: 98.4 (20 Nov 2022 06:12)  HR: 95 (20 Nov 2022 08:45) (89 - 104)  BP: 93/63 (20 Nov 2022 08:45) (91/63 - 96/66)  BP(mean): --  RR: 18 (20 Nov 2022 08:45) (18 - 18)  SpO2: 97% (20 Nov 2022 08:45) (94% - 98%)    Parameters below as of 20 Nov 2022 08:45  Patient On (Oxygen Delivery Method): room air        GENERAL: sitting comfortably, ill-appearing   CHEST/LUNG: CTA b/, no wheezing  HEART: Regular rate and rhythm; S1 S2  ABDOMEN: Soft, Nontender, Bowel sounds present  EXTREMITIES:  no edema   NERVOUS SYSTEM:  Alert & Oriented X3, Motor Strength 5/5 B/L upper and lower extremities  PSYCH: normal mood, appropriate response.      LABS:                        10.7   13.02 )-----------( 344      ( 20 Nov 2022 06:11 )             31.9     11-20    138  |  102  |  32.6<H>  ----------------------------<  86  4.6   |  29.0  |  0.30<L>    Ca    8.6      20 Nov 2022 06:11    TPro  5.9<L>  /  Alb  2.4<L>  /  TBili  0.4  /  DBili  x   /  AST  55<H>  /  ALT  105<H>  /  AlkPhos  97  11-20              CAPILLARY BLOOD GLUCOSE            RADIOLOGY & ADDITIONAL TESTS:  Results Reviewed:   Imaging Personally Reviewed:  Electrocardiogram Personally Reviewed:

## 2022-11-20 NOTE — PROGRESS NOTE ADULT - ASSESSMENT
67 yr old male with no known medical history, current smoker presented with complaints of 1 week of shortness of breath. His EKG on arrival revealed diffuse ST elevations, a limited ECHO revealed circumferential pericardial effusion. He was emergently taken to cardiac cath lab where he underwent pericardiocentesis and coronary angiogram. A pericardial drain was placed, cath revealed a 90% LAD lesion, which was not felt to be acute and given pericardial effusion, PCI was deferred. His presentation was attributed to acute pericarditis He was placed on a NRB, transferred to ICU for further monitoring. His labs on admission revealed FILIBERTO and elevated LFTs, IVF were given. A US abdomen was ordered, revealed non occlusive thrombus in IVC and gall bladder sludge. CXR on admission revealed a left lung mass. Subsequent CTA chest, abdomen and pelvis was done, revealed left upper lobe segmental and subsegmental PE. Occlusion of the SVC and a thrombus within the intrahepatic IVC. Mediastinal and right hilar lymphadenopathy with resultant central  bronchial obstruction and atelectasis involving the right middle and lower lobes. 4.1 cm sized left lower lobe mass compatible with a pulmonary malignancy. Moderate right and a trace left pleural effusion. Concern for malignant cells in pericardial fluid, formal cytology report pending. ECHO revealed a EF 50%. Cardiology follow up was done, advised continue aspirin and statin, unable to initiate GDMT given hypotension. Heparin gtt was initiated, palliative care was consulted. He was transferred to medical floor on 11/9.    Acute pericardial effusion s/p pericardiocentesis  - Pericardial drain removed  - Outpatient PET scan  - C/w Aspirin  - s/p IR lung biopsy- cytology positive from pericardial fluid of adenocarcinoma  repeat echo w/o pericardial effusion reaccumulation  - F/u path report (11/15)    Right pleural effusion  - Thoracic surgery following   - s/p pigtail drainage  home o2 evaluation    Acute pulmonary embolism/ IVC thrombus   - eliquis    HFrEF with wall motion abnormalities   PCI not performed on admission given effusion  90% LAD stenosis   - Appreciate cardio recs- if chest pain, will obtain CE and EKG  - Will hold toprol XL and lisinopril    Lung mass /cancer   - Oncology recs appreciated  - Palliative consult appreciated    Smoker, likely COPD    symbicort/spiriva    prn nebs     PT evaluation --roslyn   dc planning. Patient is uninsured. Not on home oxygen.

## 2022-11-21 LAB
ALBUMIN SERPL ELPH-MCNC: 2.9 G/DL — LOW (ref 3.3–5.2)
ALP SERPL-CCNC: 69 U/L — SIGNIFICANT CHANGE UP (ref 40–120)
ALT FLD-CCNC: 73 U/L — HIGH
ANION GAP SERPL CALC-SCNC: 9 MMOL/L — SIGNIFICANT CHANGE UP (ref 5–17)
ANION GAP SERPL CALC-SCNC: 9 MMOL/L — SIGNIFICANT CHANGE UP (ref 5–17)
APTT BLD: 32.7 SEC — SIGNIFICANT CHANGE UP (ref 27.5–35.5)
AST SERPL-CCNC: 42 U/L — HIGH
BILIRUB SERPL-MCNC: 0.6 MG/DL — SIGNIFICANT CHANGE UP (ref 0.4–2)
BLD GP AB SCN SERPL QL: SIGNIFICANT CHANGE UP
BUN SERPL-MCNC: 32.7 MG/DL — HIGH (ref 8–20)
BUN SERPL-MCNC: 50.3 MG/DL — HIGH (ref 8–20)
CALCIUM SERPL-MCNC: 8.2 MG/DL — LOW (ref 8.4–10.5)
CALCIUM SERPL-MCNC: 8.4 MG/DL — SIGNIFICANT CHANGE UP (ref 8.4–10.5)
CHLORIDE SERPL-SCNC: 102 MMOL/L — SIGNIFICANT CHANGE UP (ref 96–108)
CHLORIDE SERPL-SCNC: 104 MMOL/L — SIGNIFICANT CHANGE UP (ref 96–108)
CO2 SERPL-SCNC: 26 MMOL/L — SIGNIFICANT CHANGE UP (ref 22–29)
CO2 SERPL-SCNC: 27 MMOL/L — SIGNIFICANT CHANGE UP (ref 22–29)
CREAT SERPL-MCNC: 0.28 MG/DL — LOW (ref 0.5–1.3)
CREAT SERPL-MCNC: 0.32 MG/DL — LOW (ref 0.5–1.3)
EGFR: 127 ML/MIN/1.73M2 — SIGNIFICANT CHANGE UP
EGFR: 132 ML/MIN/1.73M2 — SIGNIFICANT CHANGE UP
GLUCOSE BLDC GLUCOMTR-MCNC: 101 MG/DL — HIGH (ref 70–99)
GLUCOSE SERPL-MCNC: 107 MG/DL — HIGH (ref 70–99)
GLUCOSE SERPL-MCNC: 98 MG/DL — SIGNIFICANT CHANGE UP (ref 70–99)
HCT VFR BLD CALC: 18.1 % — CRITICAL LOW (ref 39–50)
HCT VFR BLD CALC: 22.6 % — LOW (ref 39–50)
HCT VFR BLD CALC: 23.1 % — LOW (ref 39–50)
HGB BLD-MCNC: 6.2 G/DL — CRITICAL LOW (ref 13–17)
HGB BLD-MCNC: 7.6 G/DL — LOW (ref 13–17)
HGB BLD-MCNC: 8 G/DL — LOW (ref 13–17)
INR BLD: 1.88 RATIO — HIGH (ref 0.88–1.16)
LACTATE SERPL-SCNC: 1.4 MMOL/L — SIGNIFICANT CHANGE UP (ref 0.5–2)
LACTATE SERPL-SCNC: 2.3 MMOL/L — HIGH (ref 0.5–2)
MCHC RBC-ENTMCNC: 29.6 PG — SIGNIFICANT CHANGE UP (ref 27–34)
MCHC RBC-ENTMCNC: 29.8 PG — SIGNIFICANT CHANGE UP (ref 27–34)
MCHC RBC-ENTMCNC: 30.7 PG — SIGNIFICANT CHANGE UP (ref 27–34)
MCHC RBC-ENTMCNC: 33.6 GM/DL — SIGNIFICANT CHANGE UP (ref 32–36)
MCHC RBC-ENTMCNC: 34.3 GM/DL — SIGNIFICANT CHANGE UP (ref 32–36)
MCHC RBC-ENTMCNC: 34.6 GM/DL — SIGNIFICANT CHANGE UP (ref 32–36)
MCV RBC AUTO: 87 FL — SIGNIFICANT CHANGE UP (ref 80–100)
MCV RBC AUTO: 87.9 FL — SIGNIFICANT CHANGE UP (ref 80–100)
MCV RBC AUTO: 88.5 FL — SIGNIFICANT CHANGE UP (ref 80–100)
PLATELET # BLD AUTO: 184 K/UL — SIGNIFICANT CHANGE UP (ref 150–400)
PLATELET # BLD AUTO: 208 K/UL — SIGNIFICANT CHANGE UP (ref 150–400)
PLATELET # BLD AUTO: 239 K/UL — SIGNIFICANT CHANGE UP (ref 150–400)
POTASSIUM SERPL-MCNC: 3.7 MMOL/L — SIGNIFICANT CHANGE UP (ref 3.5–5.3)
POTASSIUM SERPL-MCNC: 4.3 MMOL/L — SIGNIFICANT CHANGE UP (ref 3.5–5.3)
POTASSIUM SERPL-SCNC: 3.7 MMOL/L — SIGNIFICANT CHANGE UP (ref 3.5–5.3)
POTASSIUM SERPL-SCNC: 4.3 MMOL/L — SIGNIFICANT CHANGE UP (ref 3.5–5.3)
PROT SERPL-MCNC: 5.3 G/DL — LOW (ref 6.6–8.7)
PROTHROM AB SERPL-ACNC: 21.9 SEC — HIGH (ref 10.5–13.4)
RBC # BLD: 2.08 M/UL — LOW (ref 4.2–5.8)
RBC # BLD: 2.57 M/UL — LOW (ref 4.2–5.8)
RBC # BLD: 2.61 M/UL — LOW (ref 4.2–5.8)
RBC # FLD: 14.8 % — HIGH (ref 10.3–14.5)
RBC # FLD: 15.1 % — HIGH (ref 10.3–14.5)
RBC # FLD: 15.1 % — HIGH (ref 10.3–14.5)
SARS-COV-2 RNA SPEC QL NAA+PROBE: SIGNIFICANT CHANGE UP
SODIUM SERPL-SCNC: 138 MMOL/L — SIGNIFICANT CHANGE UP (ref 135–145)
SODIUM SERPL-SCNC: 139 MMOL/L — SIGNIFICANT CHANGE UP (ref 135–145)
WBC # BLD: 10.34 K/UL — SIGNIFICANT CHANGE UP (ref 3.8–10.5)
WBC # BLD: 12.92 K/UL — HIGH (ref 3.8–10.5)
WBC # BLD: 9.32 K/UL — SIGNIFICANT CHANGE UP (ref 3.8–10.5)
WBC # FLD AUTO: 10.34 K/UL — SIGNIFICANT CHANGE UP (ref 3.8–10.5)
WBC # FLD AUTO: 12.92 K/UL — HIGH (ref 3.8–10.5)
WBC # FLD AUTO: 9.32 K/UL — SIGNIFICANT CHANGE UP (ref 3.8–10.5)

## 2022-11-21 PROCEDURE — 74178 CT ABD&PLV WO CNTR FLWD CNTR: CPT | Mod: 26

## 2022-11-21 PROCEDURE — 99223 1ST HOSP IP/OBS HIGH 75: CPT

## 2022-11-21 PROCEDURE — 99232 SBSQ HOSP IP/OBS MODERATE 35: CPT

## 2022-11-21 PROCEDURE — 99233 SBSQ HOSP IP/OBS HIGH 50: CPT

## 2022-11-21 PROCEDURE — 99233 SBSQ HOSP IP/OBS HIGH 50: CPT | Mod: 25

## 2022-11-21 RX ORDER — PHYTONADIONE (VIT K1) 5 MG
10 TABLET ORAL ONCE
Refills: 0 | Status: COMPLETED | OUTPATIENT
Start: 2022-11-21 | End: 2022-11-21

## 2022-11-21 RX ORDER — MIDODRINE HYDROCHLORIDE 2.5 MG/1
10 TABLET ORAL EVERY 8 HOURS
Refills: 0 | Status: DISCONTINUED | OUTPATIENT
Start: 2022-11-21 | End: 2022-11-28

## 2022-11-21 RX ORDER — PANTOPRAZOLE SODIUM 20 MG/1
40 TABLET, DELAYED RELEASE ORAL
Refills: 0 | Status: DISCONTINUED | OUTPATIENT
Start: 2022-11-21 | End: 2022-12-10

## 2022-11-21 RX ORDER — SODIUM CHLORIDE 9 MG/ML
1000 INJECTION, SOLUTION INTRAVENOUS
Refills: 0 | Status: DISCONTINUED | OUTPATIENT
Start: 2022-11-21 | End: 2022-11-22

## 2022-11-21 RX ORDER — ALBUMIN HUMAN 25 %
25 VIAL (ML) INTRAVENOUS EVERY 8 HOURS
Refills: 0 | Status: COMPLETED | OUTPATIENT
Start: 2022-11-21 | End: 2022-11-22

## 2022-11-21 RX ORDER — SODIUM CHLORIDE 9 MG/ML
1000 INJECTION INTRAMUSCULAR; INTRAVENOUS; SUBCUTANEOUS ONCE
Refills: 0 | Status: COMPLETED | OUTPATIENT
Start: 2022-11-21 | End: 2022-11-21

## 2022-11-21 RX ORDER — ALBUMIN HUMAN 25 %
100 VIAL (ML) INTRAVENOUS ONCE
Refills: 0 | Status: COMPLETED | OUTPATIENT
Start: 2022-11-21 | End: 2022-11-21

## 2022-11-21 RX ORDER — SODIUM CHLORIDE 9 MG/ML
2000 INJECTION, SOLUTION INTRAVENOUS ONCE
Refills: 0 | Status: COMPLETED | OUTPATIENT
Start: 2022-11-21 | End: 2022-11-21

## 2022-11-21 RX ORDER — MIDODRINE HYDROCHLORIDE 2.5 MG/1
5 TABLET ORAL THREE TIMES A DAY
Refills: 0 | Status: DISCONTINUED | OUTPATIENT
Start: 2022-11-21 | End: 2022-11-21

## 2022-11-21 RX ORDER — SODIUM CHLORIDE 9 MG/ML
500 INJECTION INTRAMUSCULAR; INTRAVENOUS; SUBCUTANEOUS ONCE
Refills: 0 | Status: COMPLETED | OUTPATIENT
Start: 2022-11-21 | End: 2022-11-21

## 2022-11-21 RX ADMIN — SODIUM CHLORIDE 500 MILLILITER(S): 9 INJECTION INTRAMUSCULAR; INTRAVENOUS; SUBCUTANEOUS at 02:44

## 2022-11-21 RX ADMIN — MIDODRINE HYDROCHLORIDE 5 MILLIGRAM(S): 2.5 TABLET ORAL at 17:34

## 2022-11-21 RX ADMIN — Medication 50 MILLILITER(S): at 10:24

## 2022-11-21 RX ADMIN — Medication 100 GRAM(S): at 15:26

## 2022-11-21 RX ADMIN — ATORVASTATIN CALCIUM 80 MILLIGRAM(S): 80 TABLET, FILM COATED ORAL at 20:37

## 2022-11-21 RX ADMIN — Medication 1 TABLET(S): at 17:34

## 2022-11-21 RX ADMIN — CHLORHEXIDINE GLUCONATE 1 APPLICATION(S): 213 SOLUTION TOPICAL at 12:36

## 2022-11-21 RX ADMIN — BUDESONIDE AND FORMOTEROL FUMARATE DIHYDRATE 2 PUFF(S): 160; 4.5 AEROSOL RESPIRATORY (INHALATION) at 20:22

## 2022-11-21 RX ADMIN — PANTOPRAZOLE SODIUM 40 MILLIGRAM(S): 20 TABLET, DELAYED RELEASE ORAL at 17:35

## 2022-11-21 RX ADMIN — LEVALBUTEROL 1.25 MILLIGRAM(S): 1.25 SOLUTION, CONCENTRATE RESPIRATORY (INHALATION) at 15:15

## 2022-11-21 RX ADMIN — MIDODRINE HYDROCHLORIDE 5 MILLIGRAM(S): 2.5 TABLET ORAL at 12:38

## 2022-11-21 RX ADMIN — Medication 500 MILLIGRAM(S): at 12:38

## 2022-11-21 RX ADMIN — Medication 100 GRAM(S): at 20:37

## 2022-11-21 RX ADMIN — LEVALBUTEROL 1.25 MILLIGRAM(S): 1.25 SOLUTION, CONCENTRATE RESPIRATORY (INHALATION) at 20:22

## 2022-11-21 RX ADMIN — SODIUM CHLORIDE 1000 MILLILITER(S): 9 INJECTION INTRAMUSCULAR; INTRAVENOUS; SUBCUTANEOUS at 18:12

## 2022-11-21 RX ADMIN — Medication 10 MILLIGRAM(S): at 17:35

## 2022-11-21 RX ADMIN — PANTOPRAZOLE SODIUM 40 MILLIGRAM(S): 20 TABLET, DELAYED RELEASE ORAL at 05:34

## 2022-11-21 RX ADMIN — LIDOCAINE 2 PATCH: 4 CREAM TOPICAL at 12:38

## 2022-11-21 RX ADMIN — LIDOCAINE 2 PATCH: 4 CREAM TOPICAL at 19:35

## 2022-11-21 RX ADMIN — SODIUM CHLORIDE 2000 MILLILITER(S): 9 INJECTION, SOLUTION INTRAVENOUS at 12:40

## 2022-11-21 RX ADMIN — MIDODRINE HYDROCHLORIDE 10 MILLIGRAM(S): 2.5 TABLET ORAL at 20:33

## 2022-11-21 RX ADMIN — LEVALBUTEROL 1.25 MILLIGRAM(S): 1.25 SOLUTION, CONCENTRATE RESPIRATORY (INHALATION) at 05:12

## 2022-11-21 NOTE — CHART NOTE - NSCHARTNOTEFT_GEN_A_CORE
Pt has been hypotensive  MICU consulted today  as per consult  pt is not a candidate for MICU unless condition deteriorates  B/P 74/44 P 80  Pt A+OX3 cooperative NAD  sleeping  Mentating well  B/P 80/51 P 83 R 22 % afebrile  will give tonight's  Midodrine and albumin dose now  Continue to monitor  notify PA of any changes in pts condition Family informed/Explanation of wait/Patient informed/Warm blanket

## 2022-11-21 NOTE — PROVIDER CONTACT NOTE (CRITICAL VALUE NOTIFICATION) - NAME OF MD/NP/PA/DO NOTIFIED:
Delores GILBERT
VLADIMIR Osei
MAREN Baron MD
Pt with clinical h/o MRSA cellulitis c/b sepsis >5 times. Pt w/ DM neuropathy and multiple lesions on the LE. S/S of L LE cellulitis on exam, non-purulent.   - s/p Vanc 1g ED - given weight based dosing, give 500mg IVP now and then plan for Vanc 1.5mg IVP q24 with trough before 4th dose - ensure to run slowly with concern for red man syndrome  - continue to monitor and consider wound consult

## 2022-11-21 NOTE — CONSULT NOTE ADULT - SUBJECTIVE AND OBJECTIVE BOX
Patient is a 67y old  Male who presents with a chief complaint of Pericardial effusion (21 Nov 2022 09:56)      BRIEF HOSPITAL COURSE: 68 y/o M with a h/o no prior known medical history, initially admitted on 11/7 with complaint of SOB x1 week. On arrival to the ER, an ECG revealed diffuse CRISTAL and a limited TTE revealed a circumferential pericardial effusion. He was taken for LHC which revealed 90% LAD lesion (intervention deffered) and he underwent pericardiocentesis of significant amount of bloody fluid. Also found to have SVC/intrahepatic IVC thrombus, left upper lobe segmental and subsegmental PE, and a left lung mass, and bilateral pleural effusions. Cytology from pericardial fluid positive for adenocarcinoma. Hospital course complicated by recent bloody BM.    Events last 24 hours: MICU eval requested for hypotension (SBP 80s). He is without complaints at this time. No active bleeding clinically and CTA A/P overnight did not reveal any extravasation of contrast. Bedside POCUS shows trace pericardial effusion, thin IVC, no major ventricular dysfunction. He has approx 11L net neg fluid balance for the entirety of this admission with approx 5L net neg over the past 48 hours as per documentation. He reports poor PO intake recently.      PAST MEDICAL & SURGICAL HISTORY:  No pertinent past medical history      No significant past surgical history        Allergies    No Known Allergies    Intolerances      FAMILY HISTORY:      Review of Systems:  CONSTITUTIONAL: No fever, chills, or fatigue  EYES: No eye pain, visual disturbances, or discharge  ENMT:  No difficulty hearing, tinnitus, vertigo; No sinus or throat pain  NECK: No pain or stiffness  RESPIRATORY: No cough, wheezing, chills or hemoptysis; No shortness of breath  CARDIOVASCULAR: No chest pain, palpitations, dizziness, or leg swelling  GASTROINTESTINAL: No abdominal or epigastric pain. No nausea, vomiting, or hematemesis; No diarrhea or constipation. No melena or hematochezia.  GENITOURINARY: No dysuria, frequency, hematuria, or incontinence  NEUROLOGICAL: No headaches, memory loss, loss of strength, numbness, or tremors  SKIN: No itching, burning, rashes, or lesions   MUSCULOSKELETAL: No joint pain or swelling; No muscle, back, or extremity pain  PSYCHIATRIC: No depression, anxiety, mood swings, or difficulty sleeping        Medications:    budesonide 160 MICROgram(s)/formoterol 4.5 MICROgram(s) Inhaler 2 Puff(s) Inhalation two times a day  levalbuterol Inhalation 1.25 milliGRAM(s) Inhalation every 6 hours  tiotropium 18 MICROgram(s) Capsule 1 Capsule(s) Inhalation daily  acetaminophen     Tablet .. 650 milliGRAM(s) Oral every 8 hours PRN  melatonin 5 milliGRAM(s) Oral at bedtime PRN  oxycodone    5 mG/acetaminophen 325 mG 1 Tablet(s) Oral every 6 hours PRN  pantoprazole  Injectable 40 milliGRAM(s) IV Push two times a day  atorvastatin 80 milliGRAM(s) Oral at bedtime  ascorbic acid 500 milliGRAM(s) Oral daily  multivitamin/minerals 1 Tablet(s) Oral daily  influenza  Vaccine (HIGH DOSE) 0.7 milliLiter(s) IntraMuscular once  chlorhexidine 2% Cloths 1 Application(s) Topical daily  lidocaine   4% Patch 2 Patch Transdermal daily            ICU Vital Signs Last 24 Hrs  T(C): 36.3 (21 Nov 2022 08:00), Max: 37.2 (20 Nov 2022 16:24)  T(F): 97.4 (21 Nov 2022 08:00), Max: 99 (20 Nov 2022 16:24)  HR: 100 (21 Nov 2022 07:17) (95 - 125)  BP: 89/60 (21 Nov 2022 07:17) (77/51 - 103/71)  BP(mean): 67 (21 Nov 2022 07:17) (67 - 67)  ABP: --  ABP(mean): --  RR: 20 (21 Nov 2022 07:17) (17 - 22)  SpO2: 100% (21 Nov 2022 07:17) (96% - 100%)    O2 Parameters below as of 21 Nov 2022 07:17  Patient On (Oxygen Delivery Method): nasal cannula  O2 Flow (L/min): 4        Vital Signs Last 24 Hrs  T(C): 36.3 (21 Nov 2022 08:00), Max: 37.2 (20 Nov 2022 16:24)  T(F): 97.4 (21 Nov 2022 08:00), Max: 99 (20 Nov 2022 16:24)  HR: 100 (21 Nov 2022 07:17) (95 - 125)  BP: 89/60 (21 Nov 2022 07:17) (77/51 - 103/71)  BP(mean): 67 (21 Nov 2022 07:17) (67 - 67)  RR: 20 (21 Nov 2022 07:17) (17 - 22)  SpO2: 100% (21 Nov 2022 07:17) (96% - 100%)    Parameters below as of 21 Nov 2022 07:17  Patient On (Oxygen Delivery Method): nasal cannula  O2 Flow (L/min): 4          I&O's Detail    20 Nov 2022 07:01  -  21 Nov 2022 07:00  --------------------------------------------------------  IN:  Total IN: 0 mL    OUT:    Incontinent per Condom Catheter (mL): 500 mL    Voided (mL): 2150 mL  Total OUT: 2650 mL    Total NET: -2650 mL            LABS:                        7.6    12.92 )-----------( 239      ( 21 Nov 2022 05:11 )             22.6     11-21    138  |  102  |  50.3<H>  ----------------------------<  107<H>  4.3   |  27.0  |  0.32<L>    Ca    8.4      21 Nov 2022 05:11    TPro  5.3<L>  /  Alb  2.9<L>  /  TBili  0.6  /  DBili  x   /  AST  42<H>  /  ALT  73<H>  /  AlkPhos  69  11-21          CAPILLARY BLOOD GLUCOSE      POCT Blood Glucose.: 101 mg/dL (21 Nov 2022 09:23)    PT/INR - ( 21 Nov 2022 05:11 )   PT: 21.9 sec;   INR: 1.88 ratio         PTT - ( 21 Nov 2022 05:11 )  PTT:32.7 sec    CULTURES:  Culture Results:   No growth at 5 days (11-14 @ 18:55)  Culture Results:   Testing in progress (11-14 @ 18:55)        Physical Examination:    General: No acute distress.  Alert, oriented, interactive, nonfocal    HEENT: Pupils equal, reactive to light.  Symmetric.    PULM: Clear to auscultation bilaterally, diminished at bases bilaterally, no significant sputum production    CVS: Regular rate and rhythm, no murmurs, rubs, or gallops    ABD: Soft, nondistended, nontender, normoactive bowel sounds, no masses    EXT: No edema, nontender    SKIN: Warm and well perfused, no rashes noted.    NEURO: A&Ox3, strength 5/5 all extremities, cranial nerves grossly intact, no focal deficits      RADIOLOGY:     < from: CT Abdomen and Pelvis w/wo IV Cont (11.21.22 @ 02:18) >  FINDINGS:    LOWER CHEST:  Moderate size left-sided pleural effusion and small right-sided pleural   effusion with associated compressive atelectasis at the lung bases.    Streak and respiratory motion artifact degrades image quality, limiting   evaluation.    LIVER: Within normal limits.  BILE DUCTS: Mild intrahepatic ductal prominence.  GALLBLADDER: Within normal limits.  SPLEEN: Within normal limits.  PANCREAS: Within normal limits.  ADRENALS: Within normal limits.  KIDNEYS/URETERS:  Multiple bilateral renal cysts and additional small indeterminate renal   hypodense lesions.  No hydronephrosis.    BLADDER: Bladder wall thickening.  REPRODUCTIVE ORGANS: Enlarged prostate.    BOWEL:   Limited by paucity of intraperitoneal fat.  Rectal fecal retention with mild distention, without bowel obstruction.  Large right inguinal hernia containing nonobstructed small bowel.  There is no intraluminal extravasation of contrast to suggest active   gastrointestinal bleeding.  PERITONEUM: No ascites.    VESSELS:  Nonocclusive thrombus in intrahepatic portion of the IVC again noted.  Atherosclerotic changes abdominal aorta with mural thrombus.    RETROPERITONEUM/LYMPH NODES: No lymphadenopathy.    ABDOMINAL WALL: Right inguinal hernia.    BONES: Degenerative changes.        IMPRESSION:    No CT evidence for active gastrointestinal bleeding.    Other findings as discussed above.    This study was preliminary reported by the ED radiologist on 11/21/2022.      VERTEBRAL BODY ANALYSIS: No Vertebral fracture or low bone density   identified.

## 2022-11-21 NOTE — CONSULT NOTE ADULT - NS PANP COMMENT GEN_ALL_CORE FT
67M smoker with no known past medical hx presented 11/7 with compliant of shortness of breath and was found to have diffuse ST elevations prompting LHC notable for a pericardial effusion requiring drain as well as 90% stenosis of the LAD deemed not a candidate for PCI. He was found to have 67M smoker with no known past medical hx presented 11/7 with compliant of shortness of breath and was found to have diffuse ST elevations prompting LHC notable for a pericardial effusion requiring drain as well as 90% stenosis of the LAD deemed not a candidate for PCI. CTA of the chest showed a 4.1cm LLL lung mass, mediastinal and hilar lymphadenopathy with resultant obstruction of the RML/RLL with associated atelectasis, REYNALDO segmental and subsegemental PE with occlusion of the SVC and thrombus in the intrahepatic IVC and bilateral effusions. Pericardial fluid cytology was positive for metastatic adenocarcinoma. He also underwent R chest tube placement 11/14 with removal of ~800cc of fluid s/p removal 11/17 with fluid cytology pending. He also underwent IR guided lung biopsy 11/15. He has been taking Eliquis for the PE/IVC thrombus. BP has been running ~90/60s over the last few days but pt was noted to have drop to SBP70s overnight with Hgb downtrending from 11->7 with reported 67M smoker with no known past medical hx presented 11/7 with compliant of shortness of breath and was found to have diffuse ST elevations prompting LHC notable for a pericardial effusion requiring drain as well as 90% stenosis of the LAD deemed not a candidate for PCI. CTA of the chest showed a 4.1cm LLL lung mass, mediastinal and hilar lymphadenopathy with resultant obstruction of the RML/RLL with associated atelectasis, REYNALDO segmental and subsegemental PE with occlusion of the SVC and thrombus in the intrahepatic IVC and bilateral effusions. Pericardial fluid cytology was positive for metastatic adenocarcinoma. He also underwent R chest tube placement 11/14 with removal of ~800cc of fluid s/p removal 11/17 with fluid cytology pending. He also underwent IR guided lung biopsy 11/15. He has been taking Eliquis for the PE/IVC thrombus. BP has been running ~90/60s over the last few days but pt was noted to have drop to SBP70s overnight with Hgb downtrending from 11->7 with reported dark stool overnight. Pt underwent CT Abd/pelvis with contrast with no reported RP hematoma and no evidence of active GI bleeding. Pt was awake, conversive and in NAD when seen. Pt received 1.5L fluids and was receiving 1st unit pRBC at time of evaluation. Pt denied any chest pain/nausea/vomiting/abd pain. UOP has been good. Bedside u/s with no large pericardial effusion, A line anteriorly and IVC with some collapsibility and thrombus noted.      Hypotension  likely in the setting of acute blood loss anemia   recommend continued resuscitation with pRBC for goal >8 or with signs of active bleeding w/ lasix prn  can give albumin 25g 25% q8h x 24h   hold Eliquis for now   if large volume bleeding, would need reversal with PCC   maintain 2 large bore IVs   trend CBC q6h   PPI BID   GI consult   telemetry monitoring   NPO for now   trend lactate   can obtain repeat CXR to ensure no interval change in previously noted pneumothorax but lung sliding noted on ultrasound   as pt is mentating at baseline with good UOP and additional room for resuscitation, does not require ICU level of care at this time but can upgrade to SDU for closer monitoring   if pt with ongoing hypotension despite resuscitation, please call back for re-evaluation

## 2022-11-21 NOTE — PROGRESS NOTE ADULT - SUBJECTIVE AND OBJECTIVE BOX
67 yr old male with no known medical history, current smoker presented with complaints of 1 week of shortness of breath. His EKG on arrival revealed diffuse ST elevations, a limited ECHO revealed circumferential pericardial effusion. He was emergently taken to cardiac cath lab where he underwent pericardiocentesis and coronary angiogram. A pericardial drain was placed, cath revealed a 90% LAD lesion, which was not felt to be acute and given pericardial effusion, PCI was deferred. His presentation was attributed to acute pericarditis He was placed on a NRB, transferred to ICU for further monitoring. His labs on admission revealed FILIBERTO and elevated LFTs, IVF were given. A US abdomen was ordered, revealed non occlusive thrombus in IVC and gall bladder sludge. CXR on admission revealed a left lung mass. Subsequent CTA chest, abdomen and pelvis was done, revealed left upper lobe segmental and subsegmental PE. Occlusion of the SVC and a thrombus within the intrahepatic IVC. Mediastinal and right hilar lymphadenopathy with resultant central  bronchial obstruction and atelectasis involving the right middle and lower lobes. 4.1 cm sized left lower lobe mass compatible with a pulmonary malignancy. Moderate right and a trace left pleural effusion. Concern for malignant cells in pericardial fluid, formal cytology report pending. ECHO revealed a EF 25%. Cardiology follow up was done, advised continue aspirin and statin, unable to initiate GDMT given hypotension. Heparin gtt was initiated, palliative care was consulted. He was transferred to medical floor on 11/9.      s/p IR guided Bx 11/15 c/w adenoca   however, had bloody bowel movement last night, became hypotensive and hgb dropped to 7.6. s/p transfusion now. BP better.  No other bleeding noted.   GI planning on performing EGD in am.       MEDICATIONS  (STANDING):  albumin human 25% IVPB 100 milliLiter(s) IV Intermittent once  ascorbic acid 500 milliGRAM(s) Oral daily  atorvastatin 80 milliGRAM(s) Oral at bedtime  budesonide 160 MICROgram(s)/formoterol 4.5 MICROgram(s) Inhaler 2 Puff(s) Inhalation two times a day  chlorhexidine 2% Cloths 1 Application(s) Topical daily  influenza  Vaccine (HIGH DOSE) 0.7 milliLiter(s) IntraMuscular once  levalbuterol Inhalation 1.25 milliGRAM(s) Inhalation every 6 hours  lidocaine   4% Patch 2 Patch Transdermal daily  multivitamin/minerals 1 Tablet(s) Oral daily  pantoprazole  Injectable 40 milliGRAM(s) IV Push two times a day  tiotropium 18 MICROgram(s) Capsule 1 Capsule(s) Inhalation daily    MEDICATIONS  (PRN):  acetaminophen     Tablet .. 650 milliGRAM(s) Oral every 8 hours PRN Mild Pain (1 - 3), Moderate Pain (4 - 6)  melatonin 5 milliGRAM(s) Oral at bedtime PRN Insomnia  oxycodone    5 mG/acetaminophen 325 mG 1 Tablet(s) Oral every 6 hours PRN Moderate Pain (4 - 6)    Vital Signs Last 24 Hrs  T(C): 36.3 (21 Nov 2022 08:00), Max: 37.2 (20 Nov 2022 16:24)  T(F): 97.4 (21 Nov 2022 08:00), Max: 99 (20 Nov 2022 16:24)  HR: 100 (21 Nov 2022 07:17) (95 - 125)  BP: 89/60 (21 Nov 2022 07:17) (77/51 - 103/71)  BP(mean): 67 (21 Nov 2022 07:17) (67 - 67)  RR: 20 (21 Nov 2022 07:17) (17 - 22)  SpO2: 100% (21 Nov 2022 07:17) (96% - 100%)    Parameters below as of 21 Nov 2022 07:17  Patient On (Oxygen Delivery Method): nasal cannula  O2 Flow (L/min): 4      Gen - alert and oriented  Heart - s1s2 rrr  Lung - Dec BS  ABD - SOFT nd nt  Ext - no edema      Labs:                          7.6    12.92 )-----------( 239      ( 21 Nov 2022 05:11 )             22.6                           11.2   12.01 )-----------( 284      ( 16 Nov 2022 04:40 )             33.0                         11.2   12.01 )-----------( 284      ( 16 Nov 2022 04:40 )             33.0                             11.4   15.65 )-----------( 263      ( 15 Nov 2022 04:45 )             33.2                           11.1   7.42  )-----------( 217      ( 14 Nov 2022 05:05 )             32.3                           11.4   6.04  )-----------( 128      ( 10 Nov 2022 05:00 )             32.7     11-13    138  |  100  |  24.3<H>  ----------------------------<  88  3.8   |  26.0  |  0.40<L>    Ca    8.2<L>      13 Nov 2022 06:20  Phos  2.1     11-13  Mg     2.3     11-13          11-10    134<L>  |  97  |  26.2<H>  ----------------------------<  92  3.7   |  27.0  |  0.54    Ca    8.3<L>      10 Nov 2022 05:00  Phos  1.8     11-10  Mg     2.3     11-10    TPro  5.7<L>  /  Alb  2.5<L>  /  TBili  1.7  /  DBili  x   /  AST  104<H>  /  ALT  279<H>  /  AlkPhos  92  11-10

## 2022-11-21 NOTE — PROGRESS NOTE ADULT - ASSESSMENT
A/P: 66 y/o old male , active heavy smoker, no known medical history was having shortness of breath for past 1 week. His friend got him to hospital as he was sob. On arrival his EKG showed diffuse ST elevations, and bedside limited echo showed circumferential pericardial effusion.A pericardial drain was placed, cath revealed a 90% LAD lesion, which was not felt to be acute and given pericardial effusion, PCI was deferred.      He was transferred to medical floor on 11/9. Pericardial drain removed, patient with findings concerning for metastatic cancer.      11/21/22 Cardio called for risk stratification for EGD/ severe anemia + FOB    Pre operative cardio risk stratification   No active chest pain,  decompensated CHF, significant valvular abnormality, or unstable arrhythmia and therefore no absolute cardiac contraindication to the planned surgical procedure,   however the patient has severe CAD with 90% LAD and 60% RCA , medically managed             Problem/Plan - 1:  ·  Problem: HFrEF (heart failure with reduced ejection fraction).   ·  Plan: - EF 20-25%, severely depressed LV systolic function, likely ischemic cardiomyopathy  - Now s/p LHC showing mLAD 90%, dRCA 60% OSVALDO flow 3  - Patient overall too unstable at this time for revascularization, no plans for repeat LHC at this time.   - Continue aspirin and statin.   - Start Toprol XL 12.5mg PO QHS.   - Will add Lisinopril 2.5mg PO qday when BP allows.   - For now will need medical therapy   - hold diuretics, pt w/ pericardial effusion s/p pericardiocentesis  - Diet/lifestyle modifications and medication compliance heavily reinforced   - Repeat echo with small effusion EF still 20-25%.  - for any complaint of chest pain please check trop, CPK, and EKG.    Problem/Plan - 2:  ·  Problem: Pericardial effusion, acute.   ·  Plan: - S/p pericardiocentesis with pericardial drain removed.  - Minimal output even with addition of heparin gtt at this time.   - Drain D/C'd 11/10.  - Repeat limited echocardiogram 24 hours after restarting the NOAC.    Problem/Plan - 3:  ·  Problem: Thrombus.   ·  Plan: - Restart heparing gtt when cleared by IR.   - Transition to NOAC after chest tube is removed.    Problem/Plan - 4:  ·  Problem: CAD (coronary artery disease).   ·  Plan: - S/p LHC 11/7/22 with pLAD 90% and dRCA 60%.   - However at this time the patient is acutely  ill with metastatic lung cancer as well as malignant pericardial effusion, and is not a candidate at this time for re-vascularization.   - Continue medical management.  - Restart aspirin 81mg PO qday.  - Continue statin.   - Start Toprol XL 12.5mg PO qday. A/P: 66 y/o old male , active heavy smoker, no known medical history was having shortness of breath for past 1 week. His friend got him to hospital as he was sob. On arrival his EKG showed diffuse ST elevations, and bedside limited echo showed circumferential pericardial effusion.A pericardial drain was placed, cath revealed a 90% LAD lesion, which was not felt to be acute and given pericardial effusion, PCI was deferred.      He was transferred to medical floor on 11/9. Pericardial drain removed, patient with findings concerning for metastatic cancer.      11/21/22 Cardio called for risk stratification for EGD/ severe anemia + FOB    Pre operative cardio risk stratification   No active chest pain,  decompensated CHF, significant valvular abnormality, or unstable arrhythmia    however the patient has severe CAD with 90% LAD and 60% RCA , medically managed/ unable to tolerate antiplatelets or BB due to anemia and hypotension, and is high risk due to multiple co morbidities   +  Lung cancer,pleural effusions  cachechxia, IVC thrombus , hypotension   -Ekg  no acute STTW changes   -Echo shows improvement in pericardial effusion and EF  severe anemia requiring transfusions , would recommend keeping HG >8 with severe CAD  -INR 1.88    Problem/Plan - 1:  ·  Problem: HFrEF (heart failure with reduced ejection fraction).   ·  Plan: - EF 20-25%, severely depressed LV systolic function, likely ischemic cardiomyopathy rep echo with improved EF 55-60%   - Now s/p LHC showing mLAD 90%, dRCA 60% OSVALDO flow 3  - Patient overall too unstable at this time for revascularization, no plans for repeat LHC at this time.   - Continue statin.  resume BB and ASA if and when able   - Will add Lisinopril 2.5mg PO qday when BP allows. / pt on midodrine for hypotension  - For now will continue medical therapy       Problem/Plan - 2:  ·  Problem: Pericardial effusion, acute.   · Improved on rep echo     Problem/Plan - 3:  ·  Problem: Thrombus.  -AC on hold due to GIB / anemia    ·  Problem/Plan - 4:  ·  Problem: CAD (coronary artery disease).   ·  Plan: - S/p LHC 11/7/22 with pLAD 90% and dRCA 60%.   - However at this time the patient is acutely  ill with metastatic lung cancer as well as malignant pericardial effusion, and is not a candidate at this time for re-vascularization.   - Continue medical management.    Seen and discussed with Dr. Olivares

## 2022-11-21 NOTE — CONSULT NOTE ADULT - SUBJECTIVE AND OBJECTIVE BOX
Patient is a 67y old  Male who presents with a chief complaint of Pericardial effusion (20 Nov 2022 14:51)    BRIEF HOSPITAL COURSE: 67M with no prior known medical history initially presented with SOB x1 week. On arrival to the ER, an ECG revealed diffuse CRISTAL and a limited TTE revealed a circumferential pericardial effusion. He was taken for cath which revealed non-obstructive CAD and he underwent pericardiocentesis. A pericardial drain was placed, cath revealed a 90% LAD lesion, which was not felt to be acute and given pericardial effusion, PCI was deferred. CRISTAL attributed to acute pericarditis; he was admitted to ICU. His labs on admission revealed FILIBERTO and elevated LFTs, IVF were given. A US abdomen was ordered, revealed non occlusive thrombus in IVC and gall bladder sludge. CXR on admission revealed a left lung mass. Subsequent CTA chest, abdomen and pelvis was done, revealed left upper lobe segmental and subsegmental PE. Occlusion of the SVC and a thrombus within the intrahepatic IVC. Mediastinal and right hilar lymphadenopathy with resultant central  bronchial obstruction and atelectasis involving the right middle and lower lobes. 4.1 cm sized left lower lobe mass compatible with a pulmonary malignancy. Moderate right and a trace left pleural effusion. Concern for malignant cells in pericardial fluid, formal cytology report pending. ECHO revealed a EF 50%. Cardiology follow up was done, advised continue aspirin and statin, unable to initiate GDMT given hypotension. Heparin gtt was initiated, palliative care was consulted. He was transferred to medical floor on 11/9. Pericardial drain subsequently removed and IR biopsy revealed +cytology from pericardial fluid for adenocarcinoma.    Events last 24 hours: Patient was pending discharge. He became acutely hypotensive this evening with BPs 103/70 --> 79/54. ICU consulted for hypotension. Noted to have received 1.5L total IVF and albumin x1. CBC resulted with acute drop in hgb from 10.7 --> 7.2. Noted to have had one episode of a dark, tarry stool this evening per  Patient resting comfortably in bed without acute complaints. Denies CP, SOB, abd pain.     PAST MEDICAL & SURGICAL HISTORY:  No pertinent past medical history  No significant past surgical history    Medications:  budesonide 160 MICROgram(s)/formoterol 4.5 MICROgram(s) Inhaler 2 Puff(s) Inhalation two times a day  levalbuterol Inhalation 1.25 milliGRAM(s) Inhalation every 6 hours  tiotropium 18 MICROgram(s) Capsule 1 Capsule(s) Inhalation daily    acetaminophen     Tablet .. 650 milliGRAM(s) Oral every 8 hours PRN  melatonin 5 milliGRAM(s) Oral at bedtime PRN  oxycodone    5 mG/acetaminophen 325 mG 1 Tablet(s) Oral every 6 hours PRN      aspirin enteric coated 81 milliGRAM(s) Oral daily    pantoprazole  Injectable 40 milliGRAM(s) IV Push two times a day      atorvastatin 80 milliGRAM(s) Oral at bedtime    ascorbic acid 500 milliGRAM(s) Oral daily  multivitamin/minerals 1 Tablet(s) Oral daily    influenza  Vaccine (HIGH DOSE) 0.7 milliLiter(s) IntraMuscular once    chlorhexidine 2% Cloths 1 Application(s) Topical daily  lidocaine   4% Patch 2 Patch Transdermal daily            ICU Vital Signs Last 24 Hrs  T(C): 37.1 (20 Nov 2022 20:36), Max: 37.2 (20 Nov 2022 16:24)  T(F): 98.7 (20 Nov 2022 20:36), Max: 99 (20 Nov 2022 16:24)  HR: 108 (21 Nov 2022 02:36) (89 - 125)  BP: 77/51 (21 Nov 2022 02:36) (77/51 - 103/71)  BP(mean): --  ABP: --  ABP(mean): --  RR: 21 (21 Nov 2022 02:36) (17 - 21)  SpO2: 100% (21 Nov 2022 02:36) (96% - 100%)    O2 Parameters below as of 21 Nov 2022 02:36  Patient On (Oxygen Delivery Method): nasal cannula  O2 Flow (L/min): 4    I&O's Detail    19 Nov 2022 07:01  -  20 Nov 2022 07:00  --------------------------------------------------------  IN:    Oral Fluid: 240 mL  Total IN: 240 mL    OUT:    Incontinent per Condom Catheter (mL): 2000 mL  Total OUT: 2000 mL    Total NET: -1760 mL      20 Nov 2022 07:01  -  21 Nov 2022 04:26  --------------------------------------------------------  IN:  Total IN: 0 mL    OUT:    Incontinent per Condom Catheter (mL): 150 mL    Voided (mL): 2150 mL  Total OUT: 2300 mL    Total NET: -2300 mL      LABS:                        7.2    15.73 )-----------( 274      ( 20 Nov 2022 22:30 )             21.5     11-20    138  |  102  |  32.6<H>  ----------------------------<  86  4.6   |  29.0  |  0.30<L>    Ca    8.6      20 Nov 2022 06:11    TPro  5.9<L>  /  Alb  2.4<L>  /  TBili  0.4  /  DBili  x   /  AST  55<H>  /  ALT  105<H>  /  AlkPhos  97  11-20    CULTURES:  Culture Results:   No growth at 5 days (11-14 @ 18:55)  Culture Results:   Testing in progress (11-14 @ 18:55)      Physical Examination:    General: No acute distress.     HEENT: Pupils equal, reactive to light. Symmetric.     PULM: Clear to auscultation bilaterally, no significant sputum production    NECK: Supple, no lymphadenopathy, trachea midline    CVS: Regular rate and rhythm, no murmurs, rubs, or gallops    GI: Soft, nondistended, nontender, normoactive bowel sounds, no masses    EXT: No edema, nontender    SKIN: Warm and well perfused, no rashes noted.    NEURO: Alert, oriented, interactive, nonfocal    DEVICES:     RADIOLOGY: ***    CRITICAL CARE TIME SPENT: ***   Patient is a 67y old  Male who presents with a chief complaint of Pericardial effusion (20 Nov 2022 14:51)    BRIEF HOSPITAL COURSE: 67M with no prior known medical history initially presented with SOB x1 week. On arrival to the ER, an ECG revealed diffuse CRISTAL and a limited TTE revealed a circumferential pericardial effusion. He was taken for cath which revealed non-obstructive CAD and he underwent pericardiocentesis. A pericardial drain was placed, cath revealed a 90% LAD lesion, which was not felt to be acute and given pericardial effusion, PCI was deferred. CRISTAL attributed to acute pericarditis; he was admitted to ICU. His labs on admission revealed FILIBERTO and elevated LFTs, IVF were given. A US abdomen was ordered, revealed non occlusive thrombus in IVC and gall bladder sludge. CXR on admission revealed a left lung mass. Subsequent CTA chest, abdomen and pelvis was done, revealed left upper lobe segmental and subsegmental PE. Occlusion of the SVC and a thrombus within the intrahepatic IVC. Mediastinal and right hilar lymphadenopathy with resultant central  bronchial obstruction and atelectasis involving the right middle and lower lobes. 4.1 cm sized left lower lobe mass compatible with a pulmonary malignancy. Moderate right and a trace left pleural effusion. Concern for malignant cells in pericardial fluid, formal cytology report pending. ECHO revealed a EF 50%. Cardiology follow up was done, advised continue aspirin and statin, unable to initiate GDMT given hypotension. Heparin gtt was initiated, palliative care was consulted. He was transferred to medical floor on 11/9. Pericardial drain subsequently removed and IR biopsy revealed +cytology from pericardial fluid for adenocarcinoma.    Events last 24 hours: Patient was pending discharge. He became acutely hypotensive this evening with BPs 103/70 --> 79/54. ICU consulted for hypotension. Noted to have received 1.5L total IVF and albumin x1. CBC resulted with acute drop in hgb from 10.7 --> 7.2. Noted to have had one episode of a dark, tarry stool this evening. Patient resting comfortably in bed without acute complaints. Denies CP, SOB, abd pain.     PAST MEDICAL & SURGICAL HISTORY:  No pertinent past medical history  No significant past surgical history    Medications:  budesonide 160 MICROgram(s)/formoterol 4.5 MICROgram(s) Inhaler 2 Puff(s) Inhalation two times a day  levalbuterol Inhalation 1.25 milliGRAM(s) Inhalation every 6 hours  tiotropium 18 MICROgram(s) Capsule 1 Capsule(s) Inhalation daily    acetaminophen     Tablet .. 650 milliGRAM(s) Oral every 8 hours PRN  melatonin 5 milliGRAM(s) Oral at bedtime PRN  oxycodone    5 mG/acetaminophen 325 mG 1 Tablet(s) Oral every 6 hours PRN      aspirin enteric coated 81 milliGRAM(s) Oral daily    pantoprazole  Injectable 40 milliGRAM(s) IV Push two times a day      atorvastatin 80 milliGRAM(s) Oral at bedtime    ascorbic acid 500 milliGRAM(s) Oral daily  multivitamin/minerals 1 Tablet(s) Oral daily    influenza  Vaccine (HIGH DOSE) 0.7 milliLiter(s) IntraMuscular once    chlorhexidine 2% Cloths 1 Application(s) Topical daily  lidocaine   4% Patch 2 Patch Transdermal daily        ICU Vital Signs Last 24 Hrs  T(C): 37.1 (20 Nov 2022 20:36), Max: 37.2 (20 Nov 2022 16:24)  T(F): 98.7 (20 Nov 2022 20:36), Max: 99 (20 Nov 2022 16:24)  HR: 108 (21 Nov 2022 02:36) (89 - 125)  BP: 77/51 (21 Nov 2022 02:36) (77/51 - 103/71)  BP(mean): --  ABP: --  ABP(mean): --  RR: 21 (21 Nov 2022 02:36) (17 - 21)  SpO2: 100% (21 Nov 2022 02:36) (96% - 100%)    O2 Parameters below as of 21 Nov 2022 02:36  Patient On (Oxygen Delivery Method): nasal cannula  O2 Flow (L/min): 4    I&O's Detail    19 Nov 2022 07:01  -  20 Nov 2022 07:00  --------------------------------------------------------  IN:    Oral Fluid: 240 mL  Total IN: 240 mL    OUT:    Incontinent per Condom Catheter (mL): 2000 mL  Total OUT: 2000 mL    Total NET: -1760 mL      20 Nov 2022 07:01  -  21 Nov 2022 04:26  --------------------------------------------------------  IN:  Total IN: 0 mL    OUT:    Incontinent per Condom Catheter (mL): 150 mL    Voided (mL): 2150 mL  Total OUT: 2300 mL    Total NET: -2300 mL      LABS:                        7.2    15.73 )-----------( 274      ( 20 Nov 2022 22:30 )             21.5     11-20    138  |  102  |  32.6<H>  ----------------------------<  86  4.6   |  29.0  |  0.30<L>    Ca    8.6      20 Nov 2022 06:11    TPro  5.9<L>  /  Alb  2.4<L>  /  TBili  0.4  /  DBili  x   /  AST  55<H>  /  ALT  105<H>  /  AlkPhos  97  11-20    CULTURES:  Culture Results:   No growth at 5 days (11-14 @ 18:55)  Culture Results:   Testing in progress (11-14 @ 18:55)      Physical Examination:    General: Resting comfortably. No acute distress.   HEENT: Pupils equal, reactive to light. Symmetric.   PULM: Clear to auscultation bilaterally, no significant sputum production  NECK: Supple, no lymphadenopathy, trachea midline  CVS: Regular rate and rhythm, no murmurs, rubs, or gallops  GI: Soft, nondistended, nontender, normoactive bowel sounds  EXT: No edema, nontender  SKIN: Warm and well perfused, no rashes noted.  NEURO: Alert, oriented, interactive, nonfocal

## 2022-11-21 NOTE — CONSULT NOTE ADULT - NS PANP OPT1 GEN_ALL_CORE
I attest my time as PA/NP is greater than 50% of the total combined time spent on qualifying patient care activities by the PA/NP and attending.
I independently performed the documented history, exam, and medical decision making.

## 2022-11-21 NOTE — PROGRESS NOTE ADULT - SUBJECTIVE AND OBJECTIVE BOX
[facility]                                                         CARDIOLOGY AT [facility]                                                                  [address] [facility], [place] [place] [address]                                                         Telephone: [phone]. Fax:[phone]                                                                             PROGRESS NOTE    Reason for follow up: Cardiology  Risk stratification for EDG   Update:  seen on 3T   denies complaints of chest pain/sob/dizziness/palps        Review of symptoms:   Cardiac:  No chest pain. No dyspnea. No palpitations.  Respiratory: no cough. No dyspnea  Gastrointestinal: No diarrhea. No abdominal pain. No bleeding.   Neuro: No focal neuro complaints.    Vitals:  T(C): 36.7 (11-21-22 @ 09:41), Max: 37.2 (11-20-22 @ 16:24)  HR: 93 (11-21-22 @ 12:00) (93 - 125)  BP: 83/51 (11-21-22 @ 12:00) (77/51 - 103/71)  RR: 22 (11-21-22 @ 12:00) (17 - 22)  SpO2: 100% (11-21-22 @ 12:00) (96% - 100%)  Wt(kg): --  I&O's Summary    20 Nov 2022 07:01  -  21 Nov 2022 07:00  --------------------------------------------------------  IN: 0 mL / OUT: 2650 mL / NET: -2650 mL          PHYSICAL EXAM: frail cachectic elderly appearing male NAD  Appearance: Comfortable. No acute distress  HEENT:  Atraumatic. Normocephalic. dry oral mucosa  Neurologic: A & O x 3, no gross focal deficits.  Cardiovascular: RRR S1 S2,RRR 93 No murmur, no rubs/gallops. No JVD  Respiratory: diminished  clear to auscultation, unlabored   Gastrointestinal:  Soft, Non-tender, + BS  Lower Extremities: 2+ Peripheral Pulses, No clubbing, cyanosis, or edema  Psychiatry: Patient is calm. No agitation.   Skin: warm and dry.    CURRENT CARDIAC MEDICATIONS:  midodrine. 5 milliGRAM(s) Oral three times a day      CURRENT OTHER MEDICATIONS:  budesonide 160 MICROgram(s)/formoterol 4.5 MICROgram(s) Inhaler 2 Puff(s) Inhalation two times a day  levalbuterol Inhalation 1.25 milliGRAM(s) Inhalation every 6 hours  tiotropium 18 MICROgram(s) Capsule 1 Capsule(s) Inhalation daily  acetaminophen     Tablet .. 650 milliGRAM(s) Oral every 8 hours PRN Mild Pain (1 - 3), Moderate Pain (4 - 6)  melatonin 5 milliGRAM(s) Oral at bedtime PRN Insomnia  oxycodone    5 mG/acetaminophen 325 mG 1 Tablet(s) Oral every 6 hours PRN Moderate Pain (4 - 6)  pantoprazole  Injectable 40 milliGRAM(s) IV Push two times a day  atorvastatin 80 milliGRAM(s) Oral at bedtime  albumin human 25% IVPB 25 Gram(s) IV Intermittent every 8 hours, Stop order after: 1 Days  ascorbic acid 500 milliGRAM(s) Oral daily  chlorhexidine 2% Cloths 1 Application(s) Topical daily  influenza  Vaccine (HIGH DOSE) 0.7 milliLiter(s) IntraMuscular once  lidocaine   4% Patch 2 Patch Transdermal daily  multivitamin/minerals 1 Tablet(s) Oral daily      LABS:	 	  ( 09 Nov 2022 04:25 )  Troponin T  0.06 ,  CPK  X    , CKMB  X    , BNP X        , ( 08 Nov 2022 10:50 )  Troponin T  0.11<H>,  CPK  477<H>, CKMB  X    , BNP X        , ( 08 Nov 2022 04:43 )  Troponin T  0.15<H>,  CPK  X    , CKMB  X    , BNP X                                  8.0    10.34 )-----------( 208      ( 21 Nov 2022 12:40 )             23.1     11-21    138  |  102  |  50.3<H>  ----------------------------<  107<H>  4.3   |  27.0  |  0.32<L>    Ca    8.4      21 Nov 2022 05:11    TPro  5.3<L>  /  Alb  2.9<L>  /  TBili  0.6  /  DBili  x   /  AST  42<H>  /  ALT  73<H>  /  AlkPhos  69  11-21    PT/INR/PTT ( 21 Nov 2022 05:11 )                       :                       :      21.9         :       32.7                  .        .                   .              .           .       1.88        .                                       Lipid Profile: Date: 11-08 @ 04:43  Total cholesterol 152; Direct LDL: --; HDL: 22; Triglycerides:129    HgA1c:   TSH:     TELEMETRY: RSR 93 no arrhythmias/ desats   ECG:    DIAGNOSTIC TESTING:  [ ] Echocardiogram:   < from: TTE Echo Limited or F/U (11.17.22 @ 12:32) >   FU TTE to reassess pericardial effusion.   2. Known IVC thrombus.   3. Left ventricular ejection fraction, by visual estimation, is 55 to   60%.   4. Normal global left ventricular systolic function.   5. Moderate pleural effusion in both left and right lateral regions.   6. Trivial pericardial effusion.   7. LVEF improved in compariosn to TTE from 11/10/2022.    < end of copied text >    [ ]  Catheterization:  < from: Cardiac Catheterization (11.07.22 @ 18:15) >  atient presented with shortness of breath   ECG showed ST elevations inferiorly.   POCUS showed a moderate to large sized pericardial effusion.   Patient brought to Cath lab emergently for Cath for a Cath and  possible pericardiocentesis.  Recommendations:     Normal flow in coronary arteries.   NOT a STEMI   Pericardiocentesis performed.   Drain left in   PCI of LAD once patient has stabilized.   	    < from: Cardiac Catheterization (11.07.22 @ 18:15) >  LM   Left main artery: The segment is visually normal in size and  structure.    LAD   Mid left anterior descending: There is a 90 % stenosis.      CX   Circumflex: Angiography shows mild atherosclerosis.      RCA   Proximal right coronary artery: The segment is moderately calcified.  There is a 30 % stenosis. Mid right coronary artery: There  is a 20 % stenosis. Distal right coronary artery: The segment is  severely calcified. There is a 60 % stenosis. OSVALDO Flow 3.    Left Heart Cath   Global left ventricular function is normal. Ejection fraction is 60%.    Valves   Aortic Valve: There is no aortic valve stenosis.    Mitral Valve: The mitral valve exhibits trivial (less than 1+)  regurgitation.    History and Risk Factors:   Tobacco Use:                                 Current - Every Day       < end of copied text >  < from: CT Abdomen and Pelvis w/wo IV Cont (11.21.22 @ 02:18) >  IMPRESSION:    No CT evidence for active gastrointestinal bleeding.    Other findings as discussed above.    < end of copied text >  < from: CT Angio Chest PE Protocol w/ IV Cont (11.08.22 @ 14:17) >  IMPRESSION:  Left upper lobe segmental and subsegmental PE.  Occlusion of the SVC and a thrombus within the intrahepatic IVC.  Mediastinal and right hilar lymphadenopathy with resultant central   bronchial obstruction and atelectasis involving the right middle and   lower lobes.  4.1 cm sized left lower lobe mass compatible with a pulmonary malignancy.  Moderate right and a trace left pleural effusion.  Bowel containing bilateral inguinal hernias, right more than left.                                                                [facility]                                                         CARDIOLOGY AT [facility]                                                                  [address] [facility], [place] [place] [address]                                                         Telephone: [phone]. Fax:[phone]                                                                             PROGRESS NOTE    Reason for follow up: Cardiology  Risk stratification for EDG   Update:  seen on 3T   denies complaints of chest pain/sob/dizziness/palps        Review of symptoms:   Cardiac:  No chest pain. No dyspnea. No palpitations.  Respiratory: no cough. No dyspnea  Gastrointestinal: No diarrhea. No abdominal pain. No bleeding.   Neuro: No focal neuro complaints.    Vitals:  T(C): 36.7 (22 @ 09:41), Max: 37.2 (22 @ 16:24)  HR: 93 (22 @ 12:00) (93 - 125)  BP: 83/51 (22 @ 12:00) (77/51 - 103/71)  RR: 22 (22 @ 12:00) (17 - 22)  SpO2: 100% (22 @ 12:00) (96% - 100%)  Wt(kg): --  I&O's Summary    2022 07:01  -  2022 07:00  --------------------------------------------------------  IN: 0 mL / OUT: 2650 mL / NET: -2650 mL          PHYSICAL EXAM: frail cachectic elderly appearing male NAD  Appearance: Comfortable. No acute distress  HEENT:  Atraumatic. Normocephalic. dry oral mucosa  Neurologic: A & O x 3, no gross focal deficits.  Cardiovascular: RRR S1 S2,RRR 93 No murmur, no rubs/gallops. No JVD  Respiratory: diminished  clear to auscultation, unlabored   Gastrointestinal:  Soft, Non-tender, + BS  Lower Extremities: 2+ Peripheral Pulses, No clubbing, cyanosis, or edema  Psychiatry: Patient is calm. No agitation.   Skin: warm and dry.    CURRENT CARDIAC MEDICATIONS:  midodrine. 5 milliGRAM(s) Oral three times a day      CURRENT OTHER MEDICATIONS:  budesonide 160 MICROgram(s)/formoterol 4.5 MICROgram(s) Inhaler 2 Puff(s) Inhalation two times a day  levalbuterol Inhalation 1.25 milliGRAM(s) Inhalation every 6 hours  tiotropium 18 MICROgram(s) Capsule 1 Capsule(s) Inhalation daily  acetaminophen     Tablet .. 650 milliGRAM(s) Oral every 8 hours PRN Mild Pain (1 - 3), Moderate Pain (4 - 6)  melatonin 5 milliGRAM(s) Oral at bedtime PRN Insomnia  oxycodone    5 mG/acetaminophen 325 mG 1 Tablet(s) Oral every 6 hours PRN Moderate Pain (4 - 6)  pantoprazole  Injectable 40 milliGRAM(s) IV Push two times a day  atorvastatin 80 milliGRAM(s) Oral at bedtime  albumin human 25% IVPB 25 Gram(s) IV Intermittent every 8 hours, Stop order after: 1 Days  ascorbic acid 500 milliGRAM(s) Oral daily  chlorhexidine 2% Cloths 1 Application(s) Topical daily  influenza  Vaccine (HIGH DOSE) 0.7 milliLiter(s) IntraMuscular once  lidocaine   4% Patch 2 Patch Transdermal daily  multivitamin/minerals 1 Tablet(s) Oral daily      LABS:	 	  ( 2022 04:25 )  Troponin T  0.06 ,  CPK  X    , CKMB  X    , BNP X        , ( 2022 10:50 )  Troponin T  0.11<H>,  CPK  477<H>, CKMB  X    , BNP X        , ( 2022 04:43 )  Troponin T  0.15<H>,  CPK  X    , CKMB  X    , BNP X                                  8.0    10.34 )-----------( 208      ( 2022 12:40 )             23.1     11    138  |  102  |  50.3<H>  ----------------------------<  107<H>  4.3   |  27.0  |  0.32<L>    Ca    8.4      2022 05:11    TPro  5.3<L>  /  Alb  2.9<L>  /  TBili  0.6  /  DBili  x   /  AST  42<H>  /  ALT  73<H>  /  AlkPhos  69  11    PT/INR/PTT ( 2022 05:11 )                       :                       :      21.9         :       32.7                  .        .                   .              .           .       1.88        .                                       Lipid Profile: Date:  @ 04:43  Total cholesterol 152; Direct LDL: --; HDL: 22; Triglycerides:129    HgA1c:   TSH:     TELEMETRY: RSR 93 no arrhythmias/ desats   EC22: RSRT 123 no acute STTW changes / improved from 22 on admisson    DIAGNOSTIC TESTING:  [ ] Echocardiogram:   < from: TTE Echo Limited or F/U (22 @ 12:32) >   FU TTE to reassess pericardial effusion.   2. Known IVC thrombus.   3. Left ventricular ejection fraction, by visual estimation, is 55 to   60%.   4. Normal global left ventricular systolic function.   5. Moderate pleural effusion in both left and right lateral regions.   6. Trivial pericardial effusion.   7. LVEF improved in compariosn to TTE from 11/10/2022.    < end of copied text >    [ ]  Catheterization:  < from: Cardiac Catheterization (22 @ 18:15) >  atient presented with shortness of breath   ECG showed ST elevations inferiorly.   POCUS showed a moderate to large sized pericardial effusion.   Patient brought to Cath lab emergently for Cath for a Cath and  possible pericardiocentesis.  Recommendations:     Normal flow in coronary arteries.   NOT a STEMI   Pericardiocentesis performed.   Drain left in   PCI of LAD once patient has stabilized.   	    < from: Cardiac Catheterization (22 @ 18:15) >  LM   Left main artery: The segment is visually normal in size and  structure.    LAD   Mid left anterior descending: There is a 90 % stenosis.      CX   Circumflex: Angiography shows mild atherosclerosis.      RCA   Proximal right coronary artery: The segment is moderately calcified.  There is a 30 % stenosis. Mid right coronary artery: There  is a 20 % stenosis. Distal right coronary artery: The segment is  severely calcified. There is a 60 % stenosis. OSVALDO Flow 3.    Left Heart Cath   Global left ventricular function is normal. Ejection fraction is 60%.    Valves   Aortic Valve: There is no aortic valve stenosis.    Mitral Valve: The mitral valve exhibits trivial (less than 1+)  regurgitation.    History and Risk Factors:   Tobacco Use:                                 Current - Every Day       < end of copied text >  < from: CT Abdomen and Pelvis w/wo IV Cont (11.21.22 @ 02:18) >  IMPRESSION:    No CT evidence for active gastrointestinal bleeding.    Other findings as discussed above.    < end of copied text >  < from: CT Angio Chest PE Protocol w/ IV Cont (22 @ 14:17) >  IMPRESSION:  Left upper lobe segmental and subsegmental PE.  Occlusion of the SVC and a thrombus within the intrahepatic IVC.  Mediastinal and right hilar lymphadenopathy with resultant central   bronchial obstruction and atelectasis involving the right middle and   lower lobes.  4.1 cm sized left lower lobe mass compatible with a pulmonary malignancy.  Moderate right and a trace left pleural effusion.  Bowel containing bilateral inguinal hernias, right more than left.

## 2022-11-21 NOTE — CONSULT NOTE ADULT - SUBJECTIVE AND OBJECTIVE BOX
Patient is a 67y old  Male who presents with a chief complaint of Pericardial effusion (21 Nov 2022 04:22)      HPI:  68 y/o M with no known PMH (does not see doctors), active smoker, presents to the ED with complaints of SOB. Initial ECG revealed diffuse ST-elevations and the patient was taken for emergent LHC where he was found to have nonobstructive CAD, however a large circumferential pericardial effusion was seen. Pericardiocentesis was performed with approx 800cc bloody fluid drained. Of note, there is a 3cm mass in his left upper lobe on CXR. GI evaluation being requested for drop in Hb and + FOBT. He states that he has had no previous EGD's or colonoscopies and currently denies abdominal pain or N/V or hematemesis. His stools are dark.      REVIEW OF SYSTEMS:  Constitutional: As per HPI.  ENMT:  No difficulty hearing, tinnitus, vertigo; No sinus or throat pain  Respiratory: No cough, wheezing, chills or hemoptysis  Cardiovascular: No chest pain, palpitations, dizziness or leg swelling  Gastrointestinal: As per HPI.  Skin: No itching, burning, rashes or lesions   Musculoskeletal: No joint pain or swelling; No muscle, back or extremity pain  Patient has no peripheral vascular, musculoskeletal, dermatological, neurological, or psychological symptoms or complaints at this time.    PAST MEDICAL & SURGICAL HISTORY:  No pertinent past medical history      No significant past surgical history          FAMILY HISTORY:      SOCIAL HISTORY:  Smoking Status: [x ] Current, [ ] Former, [ ] Never  Pack Years: > 100 ( 2ppd. x 55 years). social ETOH. No drug abuse history.    MEDICATIONS:  MEDICATIONS  (STANDING):  albumin human 25% IVPB 100 milliLiter(s) IV Intermittent once  ascorbic acid 500 milliGRAM(s) Oral daily  atorvastatin 80 milliGRAM(s) Oral at bedtime  budesonide 160 MICROgram(s)/formoterol 4.5 MICROgram(s) Inhaler 2 Puff(s) Inhalation two times a day  chlorhexidine 2% Cloths 1 Application(s) Topical daily  influenza  Vaccine (HIGH DOSE) 0.7 milliLiter(s) IntraMuscular once  levalbuterol Inhalation 1.25 milliGRAM(s) Inhalation every 6 hours  lidocaine   4% Patch 2 Patch Transdermal daily  multivitamin/minerals 1 Tablet(s) Oral daily  pantoprazole  Injectable 40 milliGRAM(s) IV Push two times a day  tiotropium 18 MICROgram(s) Capsule 1 Capsule(s) Inhalation daily    MEDICATIONS  (PRN):  acetaminophen     Tablet .. 650 milliGRAM(s) Oral every 8 hours PRN Mild Pain (1 - 3), Moderate Pain (4 - 6)  melatonin 5 milliGRAM(s) Oral at bedtime PRN Insomnia  oxycodone    5 mG/acetaminophen 325 mG 1 Tablet(s) Oral every 6 hours PRN Moderate Pain (4 - 6)      Allergies    No Known Allergies    Intolerances        Vital Signs Last 24 Hrs  T(C): 36.3 (21 Nov 2022 08:00), Max: 37.2 (20 Nov 2022 16:24)  T(F): 97.4 (21 Nov 2022 08:00), Max: 99 (20 Nov 2022 16:24)  HR: 100 (21 Nov 2022 07:17) (95 - 125)  BP: 89/60 (21 Nov 2022 07:17) (77/51 - 103/71)  BP(mean): 67 (21 Nov 2022 07:17) (67 - 67)  RR: 20 (21 Nov 2022 07:17) (17 - 22)  SpO2: 100% (21 Nov 2022 07:17) (96% - 100%)    Parameters below as of 21 Nov 2022 07:17  Patient On (Oxygen Delivery Method): nasal cannula  O2 Flow (L/min): 4      11-20 @ 07:01  -  11-21 @ 07:00  --------------------------------------------------------  IN: 0 mL / OUT: 2650 mL / NET: -2650 mL          PHYSICAL EXAM:    General: Well developed; malnourished and cachectic appearing; in no acute distress  HEENT: MMM, conjunctiva pink and sclera anicteric.  Lungs: Clear bilaterally.  Cor: RRR S1, S2 only.  Gastrointestinal: Abdomen: Soft, non-tender non-distended; Normal bowel sounds; No rebound or guarding or HSM.  FIDELIA: Dark brown, non-melenic stool in the rectal vault.  Extremities: Normal range of motion, No clubbing, cyanosis or edema  Neurological: Alert and oriented x3  Skin: Warm and dry. No obvious rash      LABS:                        7.6    12.92 )-----------( 239      ( 21 Nov 2022 05:11 )             22.6     11-21    138  |  102  |  50.3<H>  ----------------------------<  107<H>  4.3   |  27.0  |  0.32<L>    Ca    8.4      21 Nov 2022 05:11    TPro  5.3<L>  /  Alb  2.9<L>  /  TBili  0.6  /  DBili  x   /  AST  42<H>  /  ALT  73<H>  /  AlkPhos  69  11-21          RADIOLOGY & ADDITIONAL STUDIES:   < from: CT Abdomen and Pelvis w/wo IV Cont (11.21.22 @ 02:18) >  ALCIDES REPORT******      ******PRELIMINARY REPORT******       ACC: [id number] EXAM:  CT ABDOMEN AND PELVIS WAW IC                          PROCEDURE DATE:  11/21/2022    ******PRELIMINARY REPORT******      ******PRELIMINARY REPORT******           INTERPRETATION:  Incompletely visualized large bowel containing right   inguinal hernia. No proximal bowel obstruction. Moderate stool burden in   the colon, particularly in the rectum    Moderate left and small rgiht pleural effusion with associated   compressive atelectasis.        ******PRELIMINARY REPORT******      ******PRELIMINARY REPORT******         GURMEET LEDEZMA DO; Attending Radiologist  This document is a PRELIMINARY interpretation and is pending final   attending approval. Nov 21 2022  4:08AM    < end of copied text >

## 2022-11-21 NOTE — PROGRESS NOTE ADULT - ASSESSMENT
68yo M w/ no known PMH who p/w SOB and found to have diffuse ST elevations and taken for emergent LHC where found to have a large pericardial effusion s/p percardiocentesis during which bloody fluid was drained (a 90% LAD lesion also seen but PCI deferred until further stabilized).  Course further c/b dx of HFrEF with EF 20-25% and imaging revealing REYNALDO PE, SVC occlusion, IVC thrombus, mediastinal/rt hilar LAD with bronchial obstruxn in RML/RLL, a 4.1cm LLL mass c/w lung malignancy, and mod rt effusion.  We are consulted for assistance with goals of care.   #Goals of care  - Pt named friend as HCP- Samuel Chaudhry - [phone] (alternate HCP friend Rios Maza - [phone])  - Picture consistent with metastatic lung cancer - s/p lung bx 11/15- would discuss code status and broader goals of care once final bx results back and treatment options can be presented  - Condition remains tenuous given now with GIB - pt with known IVC thrombus and a 90% LAD lesion so cannot currently anticoagulate and anti-platelet therapy would be contraindicated so no PCI.  - Will look to coordinate a goals of care discussion with pt along with his HCP Samuel     #Rt shoulder/neck pain  - CT of C-spine and rt shoulder did not reveal any explanatory findings  - Cont tylenol 650mg PO TID prn (this is under 2gm limit for pts with hepatic impairment)  - Heat pad may help as well    #Depression  - pt cited personal losses in prior discussion but he didn't care to elaborate  - will cont to provide support  - pt does not wish to trial any medications for his mood nor does he wish to engage in psychotherapy

## 2022-11-21 NOTE — CHART NOTE - NSCHARTNOTEFT_GEN_A_CORE
Pt has been anemic after episode of melena  CBC q 6 hrs  CBC 6.2/18.1  No signs of active bleeding at this time  VSS B/P 87/51 P 91 R 20 % T 98.4  1 unit PRBCs ordered  CBC 8am  Continue to monitor

## 2022-11-21 NOTE — CONSULT NOTE ADULT - NS ATTEND AMEND GEN_ALL_CORE FT
I evaluated this pt. with my NP and agree with the above assessment and management plan. Will proceed with EGD tomorrow as pt is at high risk as per Cardiology and EGD is a short procedure. Hopefully  EGD will be diagnostic and there will be no need for lower GI interventions. Transfuse to Hb of 8 grams or higher. Continue current Pantoprazole therapy.
Pt with pleural and pericardial effusions.  Will plan pleural drain.
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   -low BP not tolerating addition of GDMT  -overall prognosis guarded with cachexia and poor functional status unlikely candidate for systemic chemotherapy, need Hem/Onc eval.  -goals of care discussion        Hany Hutton DO, [facility]  Faculty Non-Invasive Cardiologist  [phone]

## 2022-11-21 NOTE — CONSULT NOTE ADULT - NS PANP COMMENT GEN_ALL_CORE FT
67-year-old male with unknown past medical history besides tobacco use disorder presented on November 7 with complaints of shortness of breath was admitted  for large pericardial effusion s/p  pericardiocentesis also found to have 90% LAD lesion which was not intervened due to pericardial effusion, pathology of the fluid revealing adenocarcinoma;  also found to have SVC/intrahepatic IVC thrombi left upper lobe segmental and subsegmental PE with a left lung mass and bilateral pleural effusion with overnight started having bloody bowel movement hence medical ICU consulted who recommended 2 units of PRBC , IV PPI, GI consult with blood pressure monitoring and management in stepdown unit, CT angio.  This morning patient was transferred to stepdown unit  and medical ICU consulted again as patient blood pressure was in the 80s systolic with MAP in low 60s which has been the case over last  3 to 4 days as well as patient has been more than 10 L net negative since hospitalization.  We performed bedside point-of-care ultrasound with minimal pericardial effusion with significant left-sided pleural effusion with trace right-sided pleural effusion and IVC collapsing with hyperdynamic LV with kissing sign.  2 L crystalloid/LR bolus ordered and recommend maintenance fluid going forward, consider adding midodrine.  Improving WBC no overt indication for antibiotics for now but would obtain blood culture and urine analysis.  Will defer management of pleural effusion as per primary team and further evaluation and management of possible metastatic lung cancer as per primary team/hematology oncology.  Patient's current blood pressure he is mentating fine, no active bleeding clinically as well as based on CT angio, continue with PPI and noted GI plan for endoscopy tomorrow.  Avoid antihypertensives.  Patient is making good urine.  Would recommend trending lactate/BUN/creatinine.    Thank you for your consult.   Please call us back with any worsening clinical status or with concerns & questions.   We will Sign Off for now.     Tony Martinez MD   Division of Critical Care Medicine  Department of Medicine   [facility]   Cell [phone]

## 2022-11-21 NOTE — PROGRESS NOTE ADULT - ASSESSMENT
67 yr old male with no known medical history, current smoker presented with complaints of 1 week of shortness of breath. His EKG on arrival revealed diffuse ST elevations, a limited ECHO revealed circumferential pericardial effusion. He was emergently taken to cardiac cath lab where he underwent pericardiocentesis and coronary angiogram. A pericardial drain was placed, cath revealed a 90% LAD lesion, which was not felt to be acute and given pericardial effusion, PCI was deferred. His presentation was attributed to acute pericarditis He was placed on a NRB, transferred to ICU for further monitoring. His labs on admission revealed FILIBERTO and elevated LFTs, IVF were given. A US abdomen was ordered, revealed non occlusive thrombus in IVC and gall bladder sludge. CXR on admission revealed a left lung mass. Subsequent CTA chest, abdomen and pelvis was done, revealed left upper lobe segmental and subsegmental PE. Occlusion of the SVC and a thrombus within the intrahepatic IVC. Mediastinal and right hilar lymphadenopathy with resultant central  bronchial obstruction and atelectasis involving the right middle and lower lobes. 4.1 cm sized left lower lobe mass compatible with a pulmonary malignancy. Moderate right and a trace left pleural effusion. Concern for malignant cells in pericardial fluid, formal cytology report pending. ECHO revealed a EF 50%. Cardiology follow up was done, advised continue aspirin and statin, unable to initiate GDMT given hypotension. Heparin gtt was initiated, palliative care was consulted. He was transferred to medical floor on 11/9. C/b symptomatic anemia in the setting of GI bleed and hypotension.    #Melena- Patient with reported dark stool overnight  - CT A/P w/o source of bleeding  - Holding ASA and eliquis  - Started IV protonix BID  - Discussed with GI- will hold off CTA A/P to r/o bleeding source  - Plan for EGD tomorrow  - Cards c/s placed for medical clearance    #Hypotension  - Likely in the setting of GIB  - MICU re-consulted. Deemed not a candidate  - Will transfuse albumin 25% 25g Q8H, x1 day  - Will keep Hg> 8    Acute pericardial effusion s/p pericardiocentesis  - Pericardial drain removed  - Outpatient PET scan  - C/w Aspirin  - s/p IR lung biopsy- cytology positive from pericardial fluid of adenocarcinoma  repeat echo w/o pericardial effusion reaccumulation  - F/u path report (11/15)    Right pleural effusion  - Thoracic surgery following   - s/p pigtail drainage  home o2 evaluation    Acute pulmonary embolism/ IVC thrombus   - eliquis on hold in the setting of GIB    HFrEF with wall motion abnormalities   PCI not performed on admission given effusion  90% LAD stenosis   - Appreciate cardio recs- if chest pain, will obtain CE and EKG  - Will hold toprol XL and lisinopril given soft BP    Lung mass /cancer   - Oncology recs appreciated  - Palliative consult appreciated    Smoker, likely COPD    symbicort/spiriva    prn nebs     Diet- NPO pending GI evaluation  PT evaluation --roslyn   dc planning. Patient is uninsured. Not on home oxygen.     Unable to reach family.  67 yr old male with no known medical history, current smoker presented with complaints of 1 week of shortness of breath. His EKG on arrival revealed diffuse ST elevations, a limited ECHO revealed circumferential pericardial effusion. He was emergently taken to cardiac cath lab where he underwent pericardiocentesis and coronary angiogram. A pericardial drain was placed, cath revealed a 90% LAD lesion, which was not felt to be acute and given pericardial effusion, PCI was deferred. His presentation was attributed to acute pericarditis He was placed on a NRB, transferred to ICU for further monitoring. His labs on admission revealed FILIBERTO and elevated LFTs, IVF were given. A US abdomen was ordered, revealed non occlusive thrombus in IVC and gall bladder sludge. CXR on admission revealed a left lung mass. Subsequent CTA chest, abdomen and pelvis was done, revealed left upper lobe segmental and subsegmental PE. Occlusion of the SVC and a thrombus within the intrahepatic IVC. Mediastinal and right hilar lymphadenopathy with resultant central  bronchial obstruction and atelectasis involving the right middle and lower lobes. 4.1 cm sized left lower lobe mass compatible with a pulmonary malignancy. Moderate right and a trace left pleural effusion. Concern for malignant cells in pericardial fluid, formal cytology report pending. ECHO revealed a EF 50%. Cardiology follow up was done, advised continue aspirin and statin, unable to initiate GDMT given hypotension. Heparin gtt was initiated, palliative care was consulted. He was transferred to medical floor on 11/9. C/b symptomatic anemia in the setting of GI bleed and hypotension.    #Melena- Patient with reported dark stool overnight  - CT A/P w/o source of bleeding  - Holding ASA and eliquis  - Started IV protonix BID  - Discussed with GI- will hold off CTA A/P to r/o bleeding source given no active bleeding  - Plan for EGD tomorrow (11/21)  - Cards c/s placed for medical clearance    #Hypotension  - Likely in the setting of GIB  - MICU re-consulted. Deemed not a candidate  - F/u U/A and blood cx. Will hold off abx given no fever  - Will transfuse albumin 25% 25g Q8H, x1 day  - Will keep Hg> 8  - Low threshold for ICU consult if hypotensive    Acute pericardial effusion s/p pericardiocentesis  - Pericardial drain removed  - Outpatient PET scan  - C/w Aspirin  - s/p IR lung biopsy- cytology positive from pericardial fluid of adenocarcinoma  repeat echo w/o pericardial effusion reaccumulation  - F/u path report (11/15)    Right pleural effusion  - Thoracic surgery following   - s/p pigtail drainage  home o2 evaluation    Acute pulmonary embolism/ IVC thrombus   - eliquis on hold in the setting of GIB    HFrEF with wall motion abnormalities   PCI not performed on admission given effusion  90% LAD stenosis   - Appreciate cardio recs- if chest pain, will obtain CE and EKG  - Will hold toprol XL and lisinopril given soft BP    Lung mass /cancer   - Oncology recs appreciated  - Palliative consult appreciated    Smoker, likely COPD    symbicort/spiriva    prn nebs     Diet- NPO pending GI evaluation  PT evaluation --roslyn   Medically active. Patient is uninsured. Not on home oxygen.     Updated HCP friend Rios Maza - [phone]) on 11/21

## 2022-11-21 NOTE — CONSULT NOTE ADULT - ASSESSMENT
67M with no prior medical history now with acute pericarditis/effusion, newly diagnosed lung CA, acute PE/IVC thrombus, HFrEF, CAD, smoker, now with hypotension (MAPs low 60s) and acute drop in h/h this evening.  67M with no prior medical history now with acute pericarditis/effusion, newly diagnosed lung CA, acute PE/IVC thrombus, HFrEF, CAD, smoker, now with hypotension (MAPs low 60s) and acute drop in h/h this evening.     - Has received one unit of blood so far. TTE without significantly reduced EF or diastolic dysfunction; would recommend transfusion of additional 1-2u PRBCs at this time and can run blood over 1-2 hours.   - Can augment blood products with albumin as needed   - Obtain stool FOBT. Would hold asa at this time.   - CTAP w/wo IV contrast performed and without source of bleeding. May need CTA to better evaluate for source of bleed.   - Rec GI consult   - Can upgrade to step down for closer BP monitoring  - Pt does not require MICU level of care at this time. Please reconsult should clinical condition change. 67M with no prior medical history now admitted with acute pericarditis/effusion, newly diagnosed lung CA, acute PE/IVC thrombus, HFrEF, CAD, smoker, now with hypotension (MAPs low 60s) and acute drop in h/h this evening. He is now with anemia, suspect due to acute blood loss anemia 2/2 ?GIB?.    - Has received one unit of blood so far. Repeat TTE 11/17 with improvement in EF to 50-55%; would recommend transfusion of additional 1-2u PRBCs at this time and can run blood over 1-2 hours. Can use lasix between transfusions if needed. Recheck h/h.   - Can augment blood products with albumin as needed   - Obtain stool FOBT. Would hold asa at this time.   - Can start protonix IVP BID    - CTAP w/wo IV contrast performed and without source of bleeding. May need CTA to better evaluate for source of bleed.   - Rec GI consult   - Can upgrade to step down for closer BP monitoring  - Pt does not require MICU level of care at this time. Please reconsult should clinical condition change. 67M with no prior medical history now admitted with acute pericarditis/effusion, newly diagnosed lung CA, acute PE/IVC thrombus, HFrEF, CAD, smoker, now with hypotension (MAPs low 60s) and acute drop in h/h this evening. He is now with anemia, suspect due to acute blood loss anemia 2/2 ?GIB?.    - Has received one unit of blood so far. Repeat TTE 11/17 with improvement in EF to 50-55%; would recommend transfusion of additional 1-2u PRBCs at this time and can run blood over 1-2 hours. Can use lasix between transfusions if needed. Recheck h/h.   - Can augment blood products with albumin as needed   - Obtain stool FOBT. Would hold asa at this time.   - Can start protonix IVP BID    - CTAP w/wo IV contrast performed and without source of bleeding. May need CTA to better evaluate for source of bleed.   - Rec GI consult   - Can upgrade to step down for closer BP monitoring  - Pt does not require MICU level of care at this time. Please reconsult should clinical condition change.    Above d/w Dr. Figueroa

## 2022-11-21 NOTE — PROGRESS NOTE ADULT - SUBJECTIVE AND OBJECTIVE BOX
Interval History:  Pt seen lying in bed in NAD  Denies pain or SOB    Present Symptoms:     Dyspnea: No  Nausea/Vomiting: No  Anxiety:  No  Depression: Yes   Fatigue: No  Loss of appetite: No  Constipation: No    Pain: denies            Character-            Duration-            Effect-            Factors-            Frequency-            Location-            Severity-    Review of Systems:   As above - otherwise negative     MEDICATIONS  (STANDING):  albumin human 25% IVPB 25 Gram(s) IV Intermittent every 8 hours  ascorbic acid 500 milliGRAM(s) Oral daily  atorvastatin 80 milliGRAM(s) Oral at bedtime  budesonide 160 MICROgram(s)/formoterol 4.5 MICROgram(s) Inhaler 2 Puff(s) Inhalation two times a day  chlorhexidine 2% Cloths 1 Application(s) Topical daily  influenza  Vaccine (HIGH DOSE) 0.7 milliLiter(s) IntraMuscular once  levalbuterol Inhalation 1.25 milliGRAM(s) Inhalation every 6 hours  lidocaine   4% Patch 2 Patch Transdermal daily  midodrine. 5 milliGRAM(s) Oral three times a day  multivitamin/minerals 1 Tablet(s) Oral daily  pantoprazole  Injectable 40 milliGRAM(s) IV Push two times a day  phytonadione   Solution 10 milliGRAM(s) Oral once  tiotropium 18 MICROgram(s) Capsule 1 Capsule(s) Inhalation daily    MEDICATIONS  (PRN):  acetaminophen     Tablet .. 650 milliGRAM(s) Oral every 8 hours PRN Mild Pain (1 - 3), Moderate Pain (4 - 6)  melatonin 5 milliGRAM(s) Oral at bedtime PRN Insomnia  oxycodone    5 mG/acetaminophen 325 mG 1 Tablet(s) Oral every 6 hours PRN Moderate Pain (4 - 6)    PHYSICAL EXAM:  Vital Signs Last 24 Hrs  T(C): 36.7 (21 Nov 2022 09:41), Max: 37.2 (20 Nov 2022 16:24)  T(F): 98.1 (21 Nov 2022 09:41), Max: 99 (20 Nov 2022 16:24)  HR: 93 (21 Nov 2022 12:00) (93 - 125)  BP: 83/51 (21 Nov 2022 12:00) (77/51 - 103/71)  BP(mean): 59 (21 Nov 2022 12:00) (59 - 67)  RR: 22 (21 Nov 2022 12:00) (17 - 22)  SpO2: 100% (21 Nov 2022 12:00) (96% - 100%)    Parameters below as of 21 Nov 2022 12:00  Patient On (Oxygen Delivery Method): nasal cannula  O2 Flow (L/min): 4    General: Pt lying in bed in NAD - cachectic  HEENT: NCAT; MM dry; poor dentition; temporal wasting  Resp: breathing unlabored; symmetric chest expansion B/L  MSK: no contractures/deformities  Skin: no rash  Neuro: awake and oriented x 3; no myoclonus  Psych: calm; flat affect    LABS:                        8.0    10.34 )-----------( 208      ( 21 Nov 2022 12:40 )             23.1     11-21    138  |  102  |  50.3<H>  ----------------------------<  107<H>  4.3   |  27.0  |  0.32<L>    Ca    8.4      21 Nov 2022 05:11    TPro  5.3<L>  /  Alb  2.9<L>  /  TBili  0.6  /  DBili  x   /  AST  42<H>  /  ALT  73<H>  /  AlkPhos  69  11-21    PT/INR - ( 21 Nov 2022 05:11 )   PT: 21.9 sec;   INR: 1.88 ratio         PTT - ( 21 Nov 2022 05:11 )  PTT:32.7 sec    I&O's Summary    20 Nov 2022 07:01  -  21 Nov 2022 07:00  --------------------------------------------------------  IN: 0 mL / OUT: 2650 mL / NET: -2650 mL      ADVANCE DIRECTIVES/TREATMENT PREFERENCES:  Code Status: full code  MOLST (if not Full Code):  HCP/Surrogate: friend Adi Chaudhry

## 2022-11-21 NOTE — PROGRESS NOTE ADULT - SUBJECTIVE AND OBJECTIVE BOX
[facility] Division of Hospital Medicine    Chief Complaint: metastatic lung cancer, PE, IVC thrombus and malignant pericardial effusion         SUBJECTIVE / OVERNIGHT EVENTS: Overnight, patient was hypotensive to 70s/50s. Patient was given 500 cc bolus and albumin, x 1. Patient received 2U PRBC. No episodes of melena today morning. Patient was hypotensive. ICU re-consulted.     MEDICATIONS  (STANDING):  ascorbic acid 500 milliGRAM(s) Oral daily  atorvastatin 80 milliGRAM(s) Oral at bedtime  budesonide 160 MICROgram(s)/formoterol 4.5 MICROgram(s) Inhaler 2 Puff(s) Inhalation two times a day  chlorhexidine 2% Cloths 1 Application(s) Topical daily  influenza  Vaccine (HIGH DOSE) 0.7 milliLiter(s) IntraMuscular once  levalbuterol Inhalation 1.25 milliGRAM(s) Inhalation every 6 hours  lidocaine   4% Patch 2 Patch Transdermal daily  multivitamin/minerals 1 Tablet(s) Oral daily  pantoprazole  Injectable 40 milliGRAM(s) IV Push two times a day  tiotropium 18 MICROgram(s) Capsule 1 Capsule(s) Inhalation daily    MEDICATIONS  (PRN):  acetaminophen     Tablet .. 650 milliGRAM(s) Oral every 8 hours PRN Mild Pain (1 - 3), Moderate Pain (4 - 6)  melatonin 5 milliGRAM(s) Oral at bedtime PRN Insomnia  oxycodone    5 mG/acetaminophen 325 mG 1 Tablet(s) Oral every 6 hours PRN Moderate Pain (4 - 6)        I&O's Summary    20 Nov 2022 07:01  -  21 Nov 2022 07:00  --------------------------------------------------------  IN: 0 mL / OUT: 2650 mL / NET: -2650 mL        PHYSICAL EXAM:  Vital Signs Last 24 Hrs  T(C): 36.3 (21 Nov 2022 08:00), Max: 37.2 (20 Nov 2022 16:24)  T(F): 97.4 (21 Nov 2022 08:00), Max: 99 (20 Nov 2022 16:24)  HR: 100 (21 Nov 2022 07:17) (95 - 125)  BP: 89/60 (21 Nov 2022 07:17) (77/51 - 103/71)  BP(mean): 67 (21 Nov 2022 07:17) (67 - 67)  RR: 20 (21 Nov 2022 07:17) (17 - 22)  SpO2: 100% (21 Nov 2022 07:17) (96% - 100%)    Parameters below as of 21 Nov 2022 07:17  Patient On (Oxygen Delivery Method): nasal cannula  O2 Flow (L/min): 4    CONSTITUTIONAL: ill-appearing, frail  RESPIRATORY: Normal respiratory effort; lungs are clear to auscultation bilaterally  CARDIOVASCULAR: Regular rate and rhythm, normal S1 and S2  ABDOMEN: Nontender to palpation, normoactive bowel sounds, no rebound/guarding  PSYCH: A+O to person, place, and time; affect appropriate  NEUROLOGY: CN 2-12 are intact and symmetric; no gross sensory deficits    LABS:                        7.6    12.92 )-----------( 239      ( 21 Nov 2022 05:11 )             22.6     11-21    138  |  102  |  50.3<H>  ----------------------------<  107<H>  4.3   |  27.0  |  0.32<L>    Ca    8.4      21 Nov 2022 05:11    TPro  5.3<L>  /  Alb  2.9<L>  /  TBili  0.6  /  DBili  x   /  AST  42<H>  /  ALT  73<H>  /  AlkPhos  69  11-21    PT/INR - ( 21 Nov 2022 05:11 )   PT: 21.9 sec;   INR: 1.88 ratio         PTT - ( 21 Nov 2022 05:11 )  PTT:32.7 sec          CAPILLARY BLOOD GLUCOSE      POCT Blood Glucose.: 101 mg/dL (21 Nov 2022 09:23)        RADIOLOGY & ADDITIONAL TESTS:  Results Reviewed:   Imaging Personally Reviewed:  Electrocardiogram Personally Reviewed:                                           [facility] Division of Hospital Medicine    Chief Complaint: metastatic lung cancer, PE, IVC thrombus and malignant pericardial effusion         SUBJECTIVE / OVERNIGHT EVENTS: Overnight, patient was hypotensive to 70s/50s. Patient was given 500 cc bolus and albumin, x 1. Patient received 2U PRBC. No episodes of melena today morning. Patient was hypotensive. ICU re-consulted.     MEDICATIONS  (STANDING):  ascorbic acid 500 milliGRAM(s) Oral daily  atorvastatin 80 milliGRAM(s) Oral at bedtime  budesonide 160 MICROgram(s)/formoterol 4.5 MICROgram(s) Inhaler 2 Puff(s) Inhalation two times a day  chlorhexidine 2% Cloths 1 Application(s) Topical daily  influenza  Vaccine (HIGH DOSE) 0.7 milliLiter(s) IntraMuscular once  levalbuterol Inhalation 1.25 milliGRAM(s) Inhalation every 6 hours  lidocaine   4% Patch 2 Patch Transdermal daily  multivitamin/minerals 1 Tablet(s) Oral daily  pantoprazole  Injectable 40 milliGRAM(s) IV Push two times a day  tiotropium 18 MICROgram(s) Capsule 1 Capsule(s) Inhalation daily    MEDICATIONS  (PRN):  acetaminophen     Tablet .. 650 milliGRAM(s) Oral every 8 hours PRN Mild Pain (1 - 3), Moderate Pain (4 - 6)  melatonin 5 milliGRAM(s) Oral at bedtime PRN Insomnia  oxycodone    5 mG/acetaminophen 325 mG 1 Tablet(s) Oral every 6 hours PRN Moderate Pain (4 - 6)    I&O's Summary    20 Nov 2022 07:01  -  21 Nov 2022 07:00  --------------------------------------------------------  IN: 0 mL / OUT: 2650 mL / NET: -2650 mL    PHYSICAL EXAM:  Vital Signs Last 24 Hrs  T(C): 36.3 (21 Nov 2022 08:00), Max: 37.2 (20 Nov 2022 16:24)  T(F): 97.4 (21 Nov 2022 08:00), Max: 99 (20 Nov 2022 16:24)  HR: 100 (21 Nov 2022 07:17) (95 - 125)  BP: 89/60 (21 Nov 2022 07:17) (77/51 - 103/71)  BP(mean): 67 (21 Nov 2022 07:17) (67 - 67)  RR: 20 (21 Nov 2022 07:17) (17 - 22)  SpO2: 100% (21 Nov 2022 07:17) (96% - 100%)    Parameters below as of 21 Nov 2022 07:17  Patient On (Oxygen Delivery Method): nasal cannula  O2 Flow (L/min): 4    CONSTITUTIONAL: ill-appearing, frail  RESPIRATORY: Normal respiratory effort; lungs are clear to auscultation bilaterally  CARDIOVASCULAR: Regular rate and rhythm, normal S1 and S2  ABDOMEN: Nontender to palpation, normoactive bowel sounds, no rebound/guarding  PSYCH: A+O to person, place, and time; affect appropriate  NEUROLOGY: CN 2-12 are intact and symmetric; no gross sensory deficits    LABS:                        7.6    12.92 )-----------( 239      ( 21 Nov 2022 05:11 )             22.6     11-21    138  |  102  |  50.3<H>  ----------------------------<  107<H>  4.3   |  27.0  |  0.32<L>    Ca    8.4      21 Nov 2022 05:11    TPro  5.3<L>  /  Alb  2.9<L>  /  TBili  0.6  /  DBili  x   /  AST  42<H>  /  ALT  73<H>  /  AlkPhos  69  11-21    PT/INR - ( 21 Nov 2022 05:11 )   PT: 21.9 sec;   INR: 1.88 ratio         PTT - ( 21 Nov 2022 05:11 )  PTT:32.7 sec          CAPILLARY BLOOD GLUCOSE      POCT Blood Glucose.: 101 mg/dL (21 Nov 2022 09:23)        RADIOLOGY & ADDITIONAL TESTS:  Results Reviewed:   Imaging Personally Reviewed:  Electrocardiogram Personally Reviewed:

## 2022-11-21 NOTE — CONSULT NOTE ADULT - ASSESSMENT
68 y/o M with a h/o no prior known medical history, with:    # Hypotension  # Hypovolemia  # GIB  # Malignant pericardial effusion/adenocarcinoma    Patient does not require MICU level of care at this time. Please reconsult as appropriate if condition deteriorates.    The patient seems quite intravascularly volume deplete. He has had poor PO intake recently and unofficial bedside POCUS reveals trace pericardial effusion, thin IVC, no major ventricular dysfunction. He has approx 11L net neg fluid balance for the entirety of this admission with approx 5L net neg over the past 48 hours as per documentation. No evidence of active bleeding clinically and CTA A/P from overnight did not reveal any intraluminal extravasation of IV contrast. No strong evidence for active infectious process at this time. OF NOTE, HIS SBP HAS PRIMARILY BEEN IN THE 80S/90S SINCE ADMISSION.    - recommend 2L crystalloid bolus now  - can consider low dose midodrine for added vascular tone  - consider infectious workup with blood cultures/UA in the setting of malignancy/immunocompromise, however no good indication for starting antibiotic therapy at this time  - trend CBC Q 6 hours for now, transfuse PRBC to maintain Hgb > 7  - EGD seems to be planned for tomorrow pending cardiac clearance, no urgent indication for this  - resume anticoagulation as soon as safely possible given evidence of diffuse vascular thrombosis    Case discussed with MICU physician, Dr. Mratinez.

## 2022-11-21 NOTE — PROGRESS NOTE ADULT - ASSESSMENT
67 yr old male with no known medical history, current smoker presented with complaints of 1 week of shortness of breath. His EKG on arrival revealed diffuse ST elevations, a limited ECHO revealed circumferential pericardial effusion. He was emergently taken to cardiac cath lab where he underwent pericardiocentesis and coronary angiogram. A pericardial drain was placed, cath revealed a 90% LAD lesion, which was not felt to be acute and given pericardial effusion, PCI was deferred. His presentation was attributed to acute pericarditis He was placed on a NRB, transferred to ICU for further monitoring. His labs on admission revealed FILIBERTO and elevated LFTs, IVF were given. A US abdomen was ordered, revealed non occlusive thrombus in IVC and gall bladder sludge. CXR on admission revealed a left lung mass. Subsequent CTA chest, abdomen and pelvis was done, revealed left upper lobe segmental and subsegmental PE. Occlusion of the SVC and a thrombus within the intrahepatic IVC. Mediastinal and right hilar lymphadenopathy with resultant central  bronchial obstruction and atelectasis involving the right middle and lower lobes. 4.1 cm sized left lower lobe mass compatible with a pulmonary malignancy. Moderate right and a trace left pleural effusion. Concern for malignant cells in pericardial fluid, formal cytology report pending. ECHO revealed a EF 25%. Cardiology follow up was done, advised continue aspirin and statin, unable to initiate GDMT given hypotension. Heparin gtt was initiated, palliative care was consulted. He was transferred to medical floor on 11/9.    1) Concern for malignant pleural effusion  findings concerning for metastatic lung cancer, pericardial fluid cytology consistent with adenocarcinoma, PDL1 0  s/p IR guided lung biopsy 11/15 for confirmative dx and to test tissue for additional NGS  outpatient PET CT scan   will need systemic therapy, based on pathology and NGS result    2) PE  in setting of malignancy?  off  heparin gtt  Eliquis on hold due to bleed for now      3) Anemia  multifactorial although drop in hgb due to apparent GIB.   adequate iron stores, vit b12, folate  transfuse if hgb<7.0.  GI on board. EGD planned for am.        He will also need f/up with cardiology and to get cardiac clearance prior to initiatiating any form of systemic chemotherapy for stage IV malignancy  f/u with Dr Olivares in office

## 2022-11-21 NOTE — CONSULT NOTE ADULT - ASSESSMENT
Symptomatic anemia with + FOBT r/o upper GI source. Will tentatively schedule pt. for EGD tomorrow. Cardiac clearance for EGD is required. NPO after MN tonight. Transfuse to Hb of 8 grams or higher. IV Pantoprazole. Repeat labs ordered for the AM. Symptomatic anemia with + FOBT r/o upper GI source. Will tentatively schedule pt. for EGD tomorrow. Cardiac clearance for EGD is required. NPO after MN tonight. Transfuse to Hb of 8 grams or higher. IV Pantoprazole. Repeat labs ordered for the AM. As he is not actively bleeding he can eat today.

## 2022-11-22 LAB
ALBUMIN SERPL ELPH-MCNC: 3.3 G/DL — SIGNIFICANT CHANGE UP (ref 3.3–5.2)
ALP SERPL-CCNC: 57 U/L — SIGNIFICANT CHANGE UP (ref 40–120)
ALT FLD-CCNC: 61 U/L — HIGH
ANION GAP SERPL CALC-SCNC: 10 MMOL/L — SIGNIFICANT CHANGE UP (ref 5–17)
AST SERPL-CCNC: 51 U/L — HIGH
BASOPHILS # BLD AUTO: 0.02 K/UL — SIGNIFICANT CHANGE UP (ref 0–0.2)
BASOPHILS NFR BLD AUTO: 0.2 % — SIGNIFICANT CHANGE UP (ref 0–2)
BILIRUB SERPL-MCNC: 1 MG/DL — SIGNIFICANT CHANGE UP (ref 0.4–2)
BUN SERPL-MCNC: 28.8 MG/DL — HIGH (ref 8–20)
CALCIUM SERPL-MCNC: 8.7 MG/DL — SIGNIFICANT CHANGE UP (ref 8.4–10.5)
CHLORIDE SERPL-SCNC: 107 MMOL/L — SIGNIFICANT CHANGE UP (ref 96–108)
CO2 SERPL-SCNC: 27 MMOL/L — SIGNIFICANT CHANGE UP (ref 22–29)
CREAT SERPL-MCNC: 0.33 MG/DL — LOW (ref 0.5–1.3)
EGFR: 126 ML/MIN/1.73M2 — SIGNIFICANT CHANGE UP
EOSINOPHIL # BLD AUTO: 0.03 K/UL — SIGNIFICANT CHANGE UP (ref 0–0.5)
EOSINOPHIL NFR BLD AUTO: 0.3 % — SIGNIFICANT CHANGE UP (ref 0–6)
GLUCOSE SERPL-MCNC: 89 MG/DL — SIGNIFICANT CHANGE UP (ref 70–99)
HCT VFR BLD CALC: 21.9 % — LOW (ref 39–50)
HCT VFR BLD CALC: 22.2 % — LOW (ref 39–50)
HGB BLD-MCNC: 7.5 G/DL — LOW (ref 13–17)
HGB BLD-MCNC: 7.8 G/DL — LOW (ref 13–17)
IMM GRANULOCYTES NFR BLD AUTO: 0.4 % — SIGNIFICANT CHANGE UP (ref 0–0.9)
INR BLD: 1.33 RATIO — HIGH (ref 0.88–1.16)
LACTATE SERPL-SCNC: 0.7 MMOL/L — SIGNIFICANT CHANGE UP (ref 0.5–2)
LYMPHOCYTES # BLD AUTO: 0.65 K/UL — LOW (ref 1–3.3)
LYMPHOCYTES # BLD AUTO: 7.3 % — LOW (ref 13–44)
MCHC RBC-ENTMCNC: 30.2 PG — SIGNIFICANT CHANGE UP (ref 27–34)
MCHC RBC-ENTMCNC: 31 PG — SIGNIFICANT CHANGE UP (ref 27–34)
MCHC RBC-ENTMCNC: 34.2 GM/DL — SIGNIFICANT CHANGE UP (ref 32–36)
MCHC RBC-ENTMCNC: 35.1 GM/DL — SIGNIFICANT CHANGE UP (ref 32–36)
MCV RBC AUTO: 88.1 FL — SIGNIFICANT CHANGE UP (ref 80–100)
MCV RBC AUTO: 88.3 FL — SIGNIFICANT CHANGE UP (ref 80–100)
MONOCYTES # BLD AUTO: 0.67 K/UL — SIGNIFICANT CHANGE UP (ref 0–0.9)
MONOCYTES NFR BLD AUTO: 7.5 % — SIGNIFICANT CHANGE UP (ref 2–14)
NEUTROPHILS # BLD AUTO: 7.55 K/UL — HIGH (ref 1.8–7.4)
NEUTROPHILS NFR BLD AUTO: 84.3 % — HIGH (ref 43–77)
PLATELET # BLD AUTO: 193 K/UL — SIGNIFICANT CHANGE UP (ref 150–400)
PLATELET # BLD AUTO: 202 K/UL — SIGNIFICANT CHANGE UP (ref 150–400)
POTASSIUM SERPL-MCNC: 3.8 MMOL/L — SIGNIFICANT CHANGE UP (ref 3.5–5.3)
POTASSIUM SERPL-SCNC: 3.8 MMOL/L — SIGNIFICANT CHANGE UP (ref 3.5–5.3)
PROT SERPL-MCNC: 5.4 G/DL — LOW (ref 6.6–8.7)
PROTHROM AB SERPL-ACNC: 15.5 SEC — HIGH (ref 10.5–13.4)
RBC # BLD: 2.48 M/UL — LOW (ref 4.2–5.8)
RBC # BLD: 2.52 M/UL — LOW (ref 4.2–5.8)
RBC # FLD: 14.9 % — HIGH (ref 10.3–14.5)
RBC # FLD: 14.9 % — HIGH (ref 10.3–14.5)
SODIUM SERPL-SCNC: 144 MMOL/L — SIGNIFICANT CHANGE UP (ref 135–145)
SURGICAL PATHOLOGY STUDY: SIGNIFICANT CHANGE UP
WBC # BLD: 8.96 K/UL — SIGNIFICANT CHANGE UP (ref 3.8–10.5)
WBC # BLD: 9.22 K/UL — SIGNIFICANT CHANGE UP (ref 3.8–10.5)
WBC # FLD AUTO: 8.96 K/UL — SIGNIFICANT CHANGE UP (ref 3.8–10.5)
WBC # FLD AUTO: 9.22 K/UL — SIGNIFICANT CHANGE UP (ref 3.8–10.5)

## 2022-11-22 PROCEDURE — 99223 1ST HOSP IP/OBS HIGH 75: CPT

## 2022-11-22 PROCEDURE — 99497 ADVNCD CARE PLAN 30 MIN: CPT | Mod: 25

## 2022-11-22 PROCEDURE — 88342 IMHCHEM/IMCYTCHM 1ST ANTB: CPT | Mod: 26

## 2022-11-22 PROCEDURE — 99233 SBSQ HOSP IP/OBS HIGH 50: CPT

## 2022-11-22 PROCEDURE — 43239 EGD BIOPSY SINGLE/MULTIPLE: CPT

## 2022-11-22 PROCEDURE — 99232 SBSQ HOSP IP/OBS MODERATE 35: CPT

## 2022-11-22 PROCEDURE — 88305 TISSUE EXAM BY PATHOLOGIST: CPT | Mod: 26

## 2022-11-22 RX ORDER — SODIUM CHLORIDE 9 MG/ML
1000 INJECTION, SOLUTION INTRAVENOUS
Refills: 0 | Status: DISCONTINUED | OUTPATIENT
Start: 2022-11-22 | End: 2022-11-22

## 2022-11-22 RX ORDER — SODIUM CHLORIDE 9 MG/ML
1000 INJECTION, SOLUTION INTRAVENOUS
Refills: 0 | Status: DISCONTINUED | OUTPATIENT
Start: 2022-11-22 | End: 2022-11-23

## 2022-11-22 RX ADMIN — MIDODRINE HYDROCHLORIDE 10 MILLIGRAM(S): 2.5 TABLET ORAL at 21:31

## 2022-11-22 RX ADMIN — BUDESONIDE AND FORMOTEROL FUMARATE DIHYDRATE 2 PUFF(S): 160; 4.5 AEROSOL RESPIRATORY (INHALATION) at 21:13

## 2022-11-22 RX ADMIN — LEVALBUTEROL 1.25 MILLIGRAM(S): 1.25 SOLUTION, CONCENTRATE RESPIRATORY (INHALATION) at 09:01

## 2022-11-22 RX ADMIN — MIDODRINE HYDROCHLORIDE 10 MILLIGRAM(S): 2.5 TABLET ORAL at 14:18

## 2022-11-22 RX ADMIN — LEVALBUTEROL 1.25 MILLIGRAM(S): 1.25 SOLUTION, CONCENTRATE RESPIRATORY (INHALATION) at 05:05

## 2022-11-22 RX ADMIN — Medication 1 TABLET(S): at 14:20

## 2022-11-22 RX ADMIN — Medication 500 MILLIGRAM(S): at 14:18

## 2022-11-22 RX ADMIN — LIDOCAINE 2 PATCH: 4 CREAM TOPICAL at 19:57

## 2022-11-22 RX ADMIN — ATORVASTATIN CALCIUM 80 MILLIGRAM(S): 80 TABLET, FILM COATED ORAL at 21:31

## 2022-11-22 RX ADMIN — LIDOCAINE 2 PATCH: 4 CREAM TOPICAL at 00:57

## 2022-11-22 RX ADMIN — MIDODRINE HYDROCHLORIDE 10 MILLIGRAM(S): 2.5 TABLET ORAL at 05:24

## 2022-11-22 RX ADMIN — PANTOPRAZOLE SODIUM 40 MILLIGRAM(S): 20 TABLET, DELAYED RELEASE ORAL at 05:24

## 2022-11-22 RX ADMIN — LIDOCAINE 2 PATCH: 4 CREAM TOPICAL at 14:22

## 2022-11-22 RX ADMIN — TIOTROPIUM BROMIDE 1 CAPSULE(S): 18 CAPSULE ORAL; RESPIRATORY (INHALATION) at 09:00

## 2022-11-22 RX ADMIN — PANTOPRAZOLE SODIUM 40 MILLIGRAM(S): 20 TABLET, DELAYED RELEASE ORAL at 18:06

## 2022-11-22 RX ADMIN — CHLORHEXIDINE GLUCONATE 1 APPLICATION(S): 213 SOLUTION TOPICAL at 14:23

## 2022-11-22 RX ADMIN — LEVALBUTEROL 1.25 MILLIGRAM(S): 1.25 SOLUTION, CONCENTRATE RESPIRATORY (INHALATION) at 21:12

## 2022-11-22 RX ADMIN — BUDESONIDE AND FORMOTEROL FUMARATE DIHYDRATE 2 PUFF(S): 160; 4.5 AEROSOL RESPIRATORY (INHALATION) at 09:01

## 2022-11-22 RX ADMIN — Medication 100 GRAM(S): at 05:24

## 2022-11-22 NOTE — PROGRESS NOTE ADULT - ASSESSMENT
67 yr old male with no known medical history, current smoker presented with complaints of 1 week of shortness of breath. His EKG on arrival revealed diffuse ST elevations, a limited ECHO revealed circumferential pericardial effusion. He was emergently taken to cardiac cath lab where he underwent pericardiocentesis and coronary angiogram. A pericardial drain was placed, cath revealed a 90% LAD lesion, which was not felt to be acute and given pericardial effusion, PCI was deferred. His presentation was attributed to acute pericarditis He was placed on a NRB, transferred to ICU for further monitoring. His labs on admission revealed FILIBERTO and elevated LFTs, IVF were given. A US abdomen was ordered, revealed non occlusive thrombus in IVC and gall bladder sludge. CXR on admission revealed a left lung mass. Subsequent CTA chest, abdomen and pelvis was done, revealed left upper lobe segmental and subsegmental PE. Occlusion of the SVC and a thrombus within the intrahepatic IVC. Mediastinal and right hilar lymphadenopathy with resultant central  bronchial obstruction and atelectasis involving the right middle and lower lobes. 4.1 cm sized left lower lobe mass compatible with a pulmonary malignancy. Moderate right and a trace left pleural effusion. Concern for malignant cells in pericardial fluid, formal cytology report pending. ECHO revealed a EF 50%. Cardiology follow up was done, advised continue aspirin and statin, unable to initiate GDMT given hypotension. Heparin gtt was initiated, palliative care was consulted. He was transferred to medical floor on 11/9. C/b symptomatic anemia in the setting of GI bleed and hypotension.    #Melena- Patient with reported dark stool overnight  - CT A/P w/o source of bleeding  - Holding ASA and eliquis  - C/w IV protonix BID  - Discussed with GI- will hold off CTA A/P given no active bleeding  - Plan for EGD today  - Will transfuse 1 additional unit PRBC to maintain Hg> 8  - Appreciate cards c/s for medical clearance    #Hypotension  - Likely in the setting of GIB  - MICU re-consulted. Deemed not a candidate  - F/u U/A and blood cx. Will hold off abx given no fever  - C/w maintenance LR at 75 cc/hr  - Will keep Hg> 8  - Low threshold for ICU consult if hypotensive    Acute pericardial effusion s/p pericardiocentesis  - Pericardial drain removed  - Outpatient PET scan  - C/w Aspirin  - s/p IR lung biopsy- cytology positive from pericardial fluid of adenocarcinoma  repeat echo w/o pericardial effusion reaccumulation  - F/u path report (11/15)  - Appreciate heme/onc recs- will need systemic chemotherapy for Stage 4 malignancy once medically optimized    Right pleural effusion  - Thoracic surgery following   - s/p pigtail drainage  home o2 evaluation    Acute pulmonary embolism/ IVC thrombus   - eliquis on hold in the setting of sx anemia    HFrEF with wall motion abnormalities   PCI not performed on admission given effusion  90% LAD stenosis   - Appreciate cardio recs- if chest pain, will obtain CE and EKG  - Will hold toprol XL and lisinopril given soft BP    Lung mass /cancer   - Oncology recs appreciated  - Palliative consult appreciated    Smoker, likely COPD    symbicort/spiriva    prn nebs     Diet- NPO pending GI evaluation  PT evaluation --roslyn   Medically active. Patient is uninsured. Not on home oxygen.   CODE STATUS- FULL CODE.     Updated HCP friend Rios Link - [phone]) on 11/22.

## 2022-11-22 NOTE — CONSULT NOTE ADULT - PROVIDER SPECIALTY LIST ADULT
Intervent Cardiology
MICU
Critical Care
Intervent Cardiology
Palliative Care
Critical Care
Gastroenterology
Cardiology
Heme/Onc
Intervent Radiology
Thoracic Surgery

## 2022-11-22 NOTE — CONSULT NOTE ADULT - CONSULT REASON
hypotension/anemia
Lung Biopsy
lung mass
Hypotension
Symptomatic anemia / + FOBT
diffuse mets
Follow up note for the pericardial drain
Low Blood Pressure numbers
Post CAth
RT pleural effusion
Pericardial Effusion

## 2022-11-22 NOTE — CONSULT NOTE ADULT - REASON FOR ADMISSION
Pericardial effusion

## 2022-11-22 NOTE — CONSULT NOTE ADULT - CONSULT REQUESTED BY NAME
Dr. Baron
Dr. Baron
Dr. Olguin
Dr Cam
Dr. Olivares
Dr Cam
Hospitalist
Medicine PA
Olivares
Dr Hatfield
Dr. Cam

## 2022-11-22 NOTE — CHART NOTE - NSCHARTNOTEFT_GEN_A_CORE
EGD: Non-erosive diffuse gastritis seen. Gastric biopsies taken for H. Pylori and duodenal biopsies taken for Celiac Disease. This is in part responsible for his symptomatic anemia and + FOBT. His anemia is likely multifactorial and most likely secondary to his chronic medical diseases. + FOBT likely secondary to his gastritis. Continue current DASH diet and Pantoprazole 40 mg. BID. No plans for colonoscopy as this pt. would never be able to tolerate colonoscopy. No absolute GI contraindications exist to AC or DAP therapy if needed.

## 2022-11-22 NOTE — PROGRESS NOTE ADULT - SUBJECTIVE AND OBJECTIVE BOX
[facility] Division of Hospital Medicine    Chief Complaint: metastatic lung cancer, PE, IVC thrombus and malignant pericardial effusion           SUBJECTIVE / OVERNIGHT EVENTS: Overnight, patient was hypotensive to 80s/50s and Hg- 6.2. Patient was given 1U PRBC. Today morning, patient was hypotensive to 80s/50s. Patient will be transfused one additional unit PRBC. ICU reconsulted.     Patient denies chest pain, SOB, abd pain, N/V, fever, chills, dysuria or any other complaints. All remainder ROS negative.     MEDICATIONS  (STANDING):  ascorbic acid 500 milliGRAM(s) Oral daily  atorvastatin 80 milliGRAM(s) Oral at bedtime  budesonide 160 MICROgram(s)/formoterol 4.5 MICROgram(s) Inhaler 2 Puff(s) Inhalation two times a day  chlorhexidine 2% Cloths 1 Application(s) Topical daily  influenza  Vaccine (HIGH DOSE) 0.7 milliLiter(s) IntraMuscular once  lactated ringers. 1000 milliLiter(s) (125 mL/Hr) IV Continuous <Continuous>  levalbuterol Inhalation 1.25 milliGRAM(s) Inhalation every 6 hours  lidocaine   4% Patch 2 Patch Transdermal daily  midodrine 10 milliGRAM(s) Oral every 8 hours  multivitamin/minerals 1 Tablet(s) Oral daily  pantoprazole  Injectable 40 milliGRAM(s) IV Push two times a day  tiotropium 18 MICROgram(s) Capsule 1 Capsule(s) Inhalation daily    MEDICATIONS  (PRN):  acetaminophen     Tablet .. 650 milliGRAM(s) Oral every 8 hours PRN Mild Pain (1 - 3), Moderate Pain (4 - 6)  melatonin 5 milliGRAM(s) Oral at bedtime PRN Insomnia  oxycodone    5 mG/acetaminophen 325 mG 1 Tablet(s) Oral every 6 hours PRN Moderate Pain (4 - 6)        I&O's Summary    21 Nov 2022 07:01  -  22 Nov 2022 07:00  --------------------------------------------------------  IN: 3700 mL / OUT: 3050 mL / NET: 650 mL        PHYSICAL EXAM:  Vital Signs Last 24 Hrs  T(C): 36.7 (22 Nov 2022 08:10), Max: 37.1 (21 Nov 2022 15:43)  T(F): 98 (22 Nov 2022 08:10), Max: 98.7 (21 Nov 2022 15:43)  HR: 86 (22 Nov 2022 09:00) (72 - 93)  BP: 83/51 (22 Nov 2022 08:10) (74/44 - 91/56)  BP(mean): 59 (22 Nov 2022 08:10) (51 - 64)  RR: 18 (22 Nov 2022 08:10) (17 - 22)  SpO2: 100% (22 Nov 2022 09:00) (99% - 100%)    Parameters below as of 22 Nov 2022 09:00  Patient On (Oxygen Delivery Method): nasal cannula,3L    CONSTITUTIONAL: ill-appearing, frail, cachetic  RESPIRATORY: Normal respiratory effort; lungs are clear to auscultation bilaterally  CARDIOVASCULAR: Regular rate and rhythm, normal S1 and S2  ABDOMEN: Nontender to palpation, normoactive bowel sounds, no rebound/guarding  PSYCH: A+O to person, place, and time; affect appropriate  NEUROLOGY: CN 2-12 are intact and symmetric; no gross sensory deficits    LABS:                        7.5    9.22  )-----------( 202      ( 22 Nov 2022 08:40 )             21.9     11-22    144  |  107  |  28.8<H>  ----------------------------<  89  3.8   |  27.0  |  0.33<L>    Ca    8.7      22 Nov 2022 05:00    TPro  5.4<L>  /  Alb  3.3  /  TBili  1.0  /  DBili  x   /  AST  51<H>  /  ALT  61<H>  /  AlkPhos  57  11-22    PT/INR - ( 22 Nov 2022 05:00 )   PT: 15.5 sec;   INR: 1.33 ratio         PTT - ( 21 Nov 2022 05:11 )  PTT:32.7 sec          CAPILLARY BLOOD GLUCOSE            RADIOLOGY & ADDITIONAL TESTS:  Results Reviewed:   Imaging Personally Reviewed:  Electrocardiogram Personally Reviewed:

## 2022-11-22 NOTE — PROGRESS NOTE ADULT - SUBJECTIVE AND OBJECTIVE BOX
Interval History:  Pt with GI bleed requiring transfusions    Present Symptoms:     Dyspnea: No  Nausea/Vomiting: No  Anxiety:  No  Depression: Yes   Fatigue: Yes   Loss of appetite: Yes   Constipation: No    Pain: rt shoulder/neck            Character-            Duration-            Effect-            Factors-            Frequency-            Location-            Severity-    Review of Systems:   As above - otherwise negative     MEDICATIONS  (STANDING):  ascorbic acid 500 milliGRAM(s) Oral daily  atorvastatin 80 milliGRAM(s) Oral at bedtime  budesonide 160 MICROgram(s)/formoterol 4.5 MICROgram(s) Inhaler 2 Puff(s) Inhalation two times a day  chlorhexidine 2% Cloths 1 Application(s) Topical daily  influenza  Vaccine (HIGH DOSE) 0.7 milliLiter(s) IntraMuscular once  lactated ringers. 1000 milliLiter(s) (75 mL/Hr) IV Continuous <Continuous>  levalbuterol Inhalation 1.25 milliGRAM(s) Inhalation every 6 hours  lidocaine   4% Patch 2 Patch Transdermal daily  midodrine 10 milliGRAM(s) Oral every 8 hours  multivitamin/minerals 1 Tablet(s) Oral daily  pantoprazole  Injectable 40 milliGRAM(s) IV Push two times a day  tiotropium 18 MICROgram(s) Capsule 1 Capsule(s) Inhalation daily    MEDICATIONS  (PRN):  acetaminophen     Tablet .. 650 milliGRAM(s) Oral every 8 hours PRN Mild Pain (1 - 3), Moderate Pain (4 - 6)  melatonin 5 milliGRAM(s) Oral at bedtime PRN Insomnia  oxycodone    5 mG/acetaminophen 325 mG 1 Tablet(s) Oral every 6 hours PRN Moderate Pain (4 - 6)    PHYSICAL EXAM:  Vital Signs Last 24 Hrs  T(C): 36.7 (22 Nov 2022 08:10), Max: 37.1 (21 Nov 2022 15:43)  T(F): 98 (22 Nov 2022 08:10), Max: 98.7 (21 Nov 2022 15:43)  HR: 81 (22 Nov 2022 06:00) (72 - 99)  BP: 83/51 (22 Nov 2022 08:10) (74/44 - 91/56)  BP(mean): 59 (22 Nov 2022 08:10) (51 - 64)  RR: 18 (22 Nov 2022 08:10) (17 - 22)  SpO2: 100% (22 Nov 2022 08:10) (98% - 100%)    Parameters below as of 22 Nov 2022 08:10  Patient On (Oxygen Delivery Method): nasal cannula  O2 Flow (L/min): 4    General: Pt lying in bed in NAD - cachectic  HEENT: NCAT; MM dry; temporal wasting  Resp: breathing unlabored; symmetric chest expansion B/L  MSK: no contractures/deformities  Skin: no rash  Neuro: awake and oriented x 3; no myoclonus  Psych: calm; flat affect    LABS:                        7.5    9.22  )-----------( 202      ( 22 Nov 2022 08:40 )             21.9     11-22    144  |  107  |  28.8<H>  ----------------------------<  89  3.8   |  27.0  |  0.33<L>    Ca    8.7      22 Nov 2022 05:00    TPro  5.4<L>  /  Alb  3.3  /  TBili  1.0  /  DBili  x   /  AST  51<H>  /  ALT  61<H>  /  AlkPhos  57  11-22    PT/INR - ( 22 Nov 2022 05:00 )   PT: 15.5 sec;   INR: 1.33 ratio         PTT - ( 21 Nov 2022 05:11 )  PTT:32.7 sec    I&O's Summary    21 Nov 2022 07:01  -  22 Nov 2022 07:00  --------------------------------------------------------  IN: 3700 mL / OUT: 3050 mL / NET: 650 mL    NEUROLOGIC MEDICATIONS/OPIOIDS/BENZODIAZEPINES IN PAST 24HRS:    ADVANCE DIRECTIVES/TREATMENT PREFERENCES:  Code Status: full code  MOLST (if not Full Code):  HCP/Surrogate: friend Adi Chaudhry Interval History:  Pt with GI bleed requiring transfusions  Pt seen lying in bed in NAD - he said he is waiting to be taken for EGD    Present Symptoms:     Dyspnea: No  Nausea/Vomiting: No  Anxiety:  No  Depression: Yes   Fatigue: Yes   Loss of appetite: Yes   Constipation: No    Pain: rt shoulder/neck            Character-            Duration-            Effect-            Factors-            Frequency-            Location-            Severity-    Review of Systems:   As above - otherwise negative     MEDICATIONS  (STANDING):  ascorbic acid 500 milliGRAM(s) Oral daily  atorvastatin 80 milliGRAM(s) Oral at bedtime  budesonide 160 MICROgram(s)/formoterol 4.5 MICROgram(s) Inhaler 2 Puff(s) Inhalation two times a day  chlorhexidine 2% Cloths 1 Application(s) Topical daily  influenza  Vaccine (HIGH DOSE) 0.7 milliLiter(s) IntraMuscular once  lactated ringers. 1000 milliLiter(s) (75 mL/Hr) IV Continuous <Continuous>  levalbuterol Inhalation 1.25 milliGRAM(s) Inhalation every 6 hours  lidocaine   4% Patch 2 Patch Transdermal daily  midodrine 10 milliGRAM(s) Oral every 8 hours  multivitamin/minerals 1 Tablet(s) Oral daily  pantoprazole  Injectable 40 milliGRAM(s) IV Push two times a day  tiotropium 18 MICROgram(s) Capsule 1 Capsule(s) Inhalation daily    MEDICATIONS  (PRN):  acetaminophen     Tablet .. 650 milliGRAM(s) Oral every 8 hours PRN Mild Pain (1 - 3), Moderate Pain (4 - 6)  melatonin 5 milliGRAM(s) Oral at bedtime PRN Insomnia  oxycodone    5 mG/acetaminophen 325 mG 1 Tablet(s) Oral every 6 hours PRN Moderate Pain (4 - 6)    PHYSICAL EXAM:  Vital Signs Last 24 Hrs  T(C): 36.7 (22 Nov 2022 08:10), Max: 37.1 (21 Nov 2022 15:43)  T(F): 98 (22 Nov 2022 08:10), Max: 98.7 (21 Nov 2022 15:43)  HR: 81 (22 Nov 2022 06:00) (72 - 99)  BP: 83/51 (22 Nov 2022 08:10) (74/44 - 91/56)  BP(mean): 59 (22 Nov 2022 08:10) (51 - 64)  RR: 18 (22 Nov 2022 08:10) (17 - 22)  SpO2: 100% (22 Nov 2022 08:10) (98% - 100%)    Parameters below as of 22 Nov 2022 08:10  Patient On (Oxygen Delivery Method): nasal cannula  O2 Flow (L/min): 4    General: Pt lying in bed in NAD - cachectic  HEENT: NCAT; MM dry; temporal wasting  Resp: breathing unlabored; symmetric chest expansion B/L  MSK: no contractures/deformities  Skin: no rash  Neuro: awake and oriented x 3; no myoclonus  Psych: calm; flat affect    LABS:                        7.5    9.22  )-----------( 202      ( 22 Nov 2022 08:40 )             21.9     11-22    144  |  107  |  28.8<H>  ----------------------------<  89  3.8   |  27.0  |  0.33<L>    Ca    8.7      22 Nov 2022 05:00    TPro  5.4<L>  /  Alb  3.3  /  TBili  1.0  /  DBili  x   /  AST  51<H>  /  ALT  61<H>  /  AlkPhos  57  11-22    PT/INR - ( 22 Nov 2022 05:00 )   PT: 15.5 sec;   INR: 1.33 ratio         PTT - ( 21 Nov 2022 05:11 )  PTT:32.7 sec    I&O's Summary    21 Nov 2022 07:01  -  22 Nov 2022 07:00  --------------------------------------------------------  IN: 3700 mL / OUT: 3050 mL / NET: 650 mL    NEUROLOGIC MEDICATIONS/OPIOIDS/BENZODIAZEPINES IN PAST 24HRS:    ADVANCE DIRECTIVES/TREATMENT PREFERENCES:  Code Status: full code  MOLST (if not Full Code):  HCP/Surrogate: friend Adi Chaudhry

## 2022-11-22 NOTE — PROGRESS NOTE ADULT - ASSESSMENT
68yo M w/ no known PMH who p/w SOB and found to have diffuse ST elevations and taken for emergent LHC where found to have a large pericardial effusion s/p percardiocentesis during which bloody fluid was drained (a 90% LAD lesion also seen but PCI deferred until further stabilized).  Course further c/b dx of HFrEF with EF 20-25% and imaging revealing REYNALDO PE, SVC occlusion, IVC thrombus, mediastinal/rt hilar LAD with bronchial obstruxn in RML/RLL, a 4.1cm LLL mass c/w lung malignancy, and mod rt effusion.  We are consulted for assistance with goals of care.   #Goals of care  - Pt named friend as HCP- Samuel Chaudhry - [phone] (alternate HCP friend Rios Maza - [phone])  - Picture consistent with metastatic lung cancer - s/p lung bx 11/15- would discuss code status and broader goals of care once final bx results back and treatment options can be presented  - Condition remains tenuous given now with GIB - pt with known IVC thrombus and a 90% LAD lesion so cannot currently anticoagulate and anti-platelet therapy would be contraindicated so no PCI.  - Will look to coordinate a goals of care discussion with pt along with his HCP Samuel     #Rt shoulder/neck pain  - CT of C-spine and rt shoulder did not reveal any explanatory findings  - Cont tylenol 650mg PO TID prn (this is under 2gm limit for pts with hepatic impairment)  - Heat pad may help as well    #Depression  - pt cited personal losses in prior discussion but he didn't care to elaborate  - will cont to provide support  - pt does not wish to trial any medications for his mood nor does he wish to engage in psychotherapy 66yo M w/ no known PMH who p/w SOB and found to have diffuse ST elevations and taken for emergent LHC where found to have a large pericardial effusion s/p percardiocentesis during which bloody fluid was drained (a 90% LAD lesion also seen but PCI deferred until further stabilized).  Course further c/b dx of HFrEF with EF 20-25% and imaging revealing REYNALDO PE, SVC occlusion, IVC thrombus, mediastinal/rt hilar LAD with bronchial obstruxn in RML/RLL, a 4.1cm LLL mass c/w lung malignancy, and mod rt effusion.  We are consulted for assistance with goals of care.   #Goals of care  - Pt named friend as HCP- Samuel Chaudhry - [phone] (alternate HCP friend Rios Maza - [phone])  - Picture consistent with metastatic lung cancer - pericardial effusion cytopathology c/w adenoCA.  Pt is s/p lung bx 11/15 - would discuss code status and broader goals of care once final bx results back and treatment options can be presented  - Condition remains tenuous given now with GIB - pt with known IVC thrombus and a 90% LAD lesion so cannot currently anticoagulate and anti-platelet therapy would be contraindicated so no PCI.  - Pt going for EGD today  - I spoke to pt today and he is agreeable to discussing current clinical condition, concerns and treatment options tomorrow with his HCP Adi Cardozo  - I called and spoke to HCP Adi Cardozo and updated him on above - he and his wife did not feel that pt would ultimately want treatment for the cancer and asked what we would do if he didn't wish to pursue cancer therapy.  We reviewed switching focus to comfort through hospice services either at home or SNF given he is not particularly symptomatic and wouldn't be appropriate for inpatient hospice.  Pt only has Medicaid so I shared that this would certainly complicate things and limit options.  They are interested in having him move down to [place] to be closer to them whether he chooses to pursue cancer therapy or hospice.  - will have a discussion with pt and dAi tomorrow at 11am and will review current tenuous clinical status, pt's interest in cancer treatment and code status.    #Rt shoulder/neck pain  - CT of C-spine and rt shoulder did not reveal any explanatory findings  - Cont tylenol 650mg PO TID prn (this is under 2gm limit for pts with hepatic impairment)  - Heat pad may help as well    #Depression  - pt cited personal losses in prior discussion but he didn't care to elaborate  - will cont to provide support  - pt does not wish to trial any medications for his mood nor does he wish to engage in psychotherapy

## 2022-11-22 NOTE — CONSULT NOTE ADULT - CONSULT REQUESTED DATE/TIME
08-Nov-2022 12:36
09-Nov-2022 10:52
07-Nov-2022 20:07
21-Nov-2022 08:40
10-Nov-2022 09:59
21-Nov-2022 03:00
22-Nov-2022 12:07
21-Nov-2022 10:00
10-Nov-2022 13:45
14-Nov-2022 16:47
08-Nov-2022 14:55

## 2022-11-22 NOTE — CONSULT NOTE ADULT - SUBJECTIVE AND OBJECTIVE BOX
Assessment and plan:     67-year-old male      _________________________________________________  Reason for consult: Persistent low blood pressure numbers  Review of systems: Patient seen again by me today as well as on November 21, both days patient denies any other complaints besides chronic right shoulder pain that seems to be bothering him for some time, denied any palpitation chest pain dizziness blurry vision shortness of breath fever or chills diarrhea dysuria hematuria frequency acute rash wheezing.  He does have chronic cough.  Overall he says that he feels fine  ICU Vital Signs Last 24 Hrs  T(C): 36.7 (22 Nov 2022 08:10), Max: 37.1 (21 Nov 2022 15:43)  T(F): 98 (22 Nov 2022 08:10), Max: 98.7 (21 Nov 2022 15:43)  HR: 86 (22 Nov 2022 09:00) (72 - 91)  BP: 83/51 (22 Nov 2022 08:10) (74/44 - 91/56)  BP(mean): 59 (22 Nov 2022 08:10) (51 - 64)  RR: 18 (22 Nov 2022 08:10) (17 - 22)  SpO2: 100% (22 Nov 2022 09:00) (99% - 100%)  O2 Parameters below as of 22 Nov 2022 09:00  Patient On (Oxygen Delivery Method): nasal cannula,3L         67-year-old cachectic male in no apparent distress alert awake oriented x3 with no obvious neurological deficit air entry equal bilaterally except minimally decreased on the left side, S1-S2 heard regular rate and rhythm, 1/6 systolic murmur, nondistended soft abdomen without any tenderness, pedal pulses present, peripheral skin warm to touch, indwelling urinary catheter present, no pedal edema or evidence of anasarca, minimal bilateral upper extremity especially hand swelling without any cellulitic changes, no obvious lymphadenopathy    PAST MEDICAL & SURGICAL HISTORY:  No pertinent past medical history, No significant past surgical history    Allergies: No Known Allergies    Social History:   lifelong smoker up until admission  No alcohol or recreational substance abuse  his friend is his healthcare proxy    FAMILY HISTORY:   not pertinent to presenting illness     labs/imaging/microbiology/radiology reports: Reviewed     plan discussed with patient and bedside nursing, all questions answered  plan discussed with primary hospitalist   plan discussed with palliative care team  Thank you for your consult.   Please call us back with any worsening clinical status or with concerns & questions.   We will Sign Off for now.     Tony Martinez MD   Division of Critical Care Medicine  Department of Medicine   [facility]   Cell [phone]            Assessment and plan:     67-year-old male With no known medical history with lack of medical follow-up with tobacco use disorder presented on November 7 with difficulty breathing found to have diffuse ST elevation taken for emergent left heart cath found to have 90% stenosis of mid LAD with no acute lesion with no intervention but s/p pericardial drain for moderate to large pericardial effusion was initially admitted to medical ICU eventually found to have metastatic adenocarcinoma most likely from lung as primary based on pericardial fluid pathology, through hospital course patient was also found to have SVC and intrahepatic IVC thrombi as well as left-sided segmental subsegmental PE, mediastinal lymphadenopathy evaluated by hematology oncology/cardiology/palliative care.    Hospital course complicated by GI bleed and on overnight from 20th November through early morning off 21st November while patient was on Eliquis receiving blood products, underwent CT abdomen pelvis with contrast which did not reveal active bleeding and resuscitated for 2 units PRBC along with colloid and crystalloid resuscitation.  Medical ICU consulted on November 21 morning again with low blood pressure number with systolic in 80s and MAP in high 50s to low 60s with no endorgan perfusion problems/damages.  Went through extensive chart review and point-of-care ultrasound reviewing hyperactive and collapsible LV with kissing side, flat IVC, left-sided moderate to large pleural effusion with no significant pericardial effusion and atelectasis on the right side with normal mentation, good urine output.  Recommended to give 2 L of IV  lactated Ringer's followed by maintenance fluid as patient was about 11 L net negative since admission on average maintaining his blood pressure in the systolic 80s without significant problem.  Called again today for similar systolic blood pressure in 80s with MAP in high 50s to low 60s with no acute change in clinical status.  Reviewed the patient, no active GI bleed, no clinical change in symptoms or signs, normal lactate, good urine output, good  skin turgor and temperature.  Once again recommend following the clinical status especially mentation, urine output, lactate and skin temperature to look for signs and symptoms of an organ malperfusion as artificially elevating the blood pressure number with advanced means as IV vasoactive agents could potentially cause more complication then help.  Agree with transfusing packed RBC to try to keep hemoglobin more than 8.  Considering larger picture, with patient's advanced metastatic possible lung cancer/adenocarcinoma with poor functional status and what appears to be limited social support, aggressive goals of care recommended and based on our experience, would advocate for possible hospice evaluation.    _________________________________________________  Reason for consult: Persistent low blood pressure numbers  Review of systems: Patient seen again by me today as well as on November 21, both days patient denies any other complaints besides chronic right shoulder pain that seems to be bothering him for some time, denied any palpitation chest pain dizziness blurry vision shortness of breath fever or chills diarrhea dysuria hematuria frequency acute rash wheezing.  He does have chronic cough.  Overall he says that he feels fine  ICU Vital Signs Last 24 Hrs  T(C): 36.7 (22 Nov 2022 08:10), Max: 37.1 (21 Nov 2022 15:43)  T(F): 98 (22 Nov 2022 08:10), Max: 98.7 (21 Nov 2022 15:43)  HR: 86 (22 Nov 2022 09:00) (72 - 91)  BP: 83/51 (22 Nov 2022 08:10) (74/44 - 91/56)  BP(mean): 59 (22 Nov 2022 08:10) (51 - 64)  RR: 18 (22 Nov 2022 08:10) (17 - 22)  SpO2: 100% (22 Nov 2022 09:00) (99% - 100%)  O2 Parameters below as of 22 Nov 2022 09:00  Patient On (Oxygen Delivery Method): nasal cannula,3L         67-year-old cachectic male in no apparent distress alert awake oriented x3 with no obvious neurological deficit air entry equal bilaterally except minimally decreased on the left side, S1-S2 heard regular rate and rhythm, 1/6 systolic murmur, nondistended soft abdomen without any tenderness, pedal pulses present, peripheral skin warm to touch, indwelling urinary catheter present, no pedal edema or evidence of anasarca, minimal bilateral upper extremity especially hand swelling without any cellulitic changes, no obvious lymphadenopathy    PAST MEDICAL & SURGICAL HISTORY:  No pertinent past medical history, No significant past surgical history    Allergies: No Known Allergies    Social History:   lifelong smoker up until admission  No alcohol or recreational substance abuse  his friend is his healthcare proxy    FAMILY HISTORY:   not pertinent to presenting illness     labs/imaging/microbiology/radiology reports: Reviewed     plan discussed with patient and bedside nursing, all questions answered  plan discussed with primary hospitalist   plan discussed with palliative care team  Thank you for your consult.   Please call us back with any worsening clinical status or with concerns & questions.   We will Sign Off for now.     Tony Martinez MD   Division of Critical Care Medicine  Department of Medicine   [facility]   Cell [phone]

## 2022-11-23 DIAGNOSIS — D64.9 ANEMIA, UNSPECIFIED: ICD-10-CM

## 2022-11-23 LAB
ALBUMIN SERPL ELPH-MCNC: 3.4 G/DL — SIGNIFICANT CHANGE UP (ref 3.3–5.2)
ALP SERPL-CCNC: 75 U/L — SIGNIFICANT CHANGE UP (ref 40–120)
ALT FLD-CCNC: 62 U/L — HIGH
ANION GAP SERPL CALC-SCNC: 8 MMOL/L — SIGNIFICANT CHANGE UP (ref 5–17)
AST SERPL-CCNC: 43 U/L — HIGH
BILIRUB SERPL-MCNC: 1.2 MG/DL — SIGNIFICANT CHANGE UP (ref 0.4–2)
BUN SERPL-MCNC: 21.5 MG/DL — HIGH (ref 8–20)
CALCIUM SERPL-MCNC: 8.7 MG/DL — SIGNIFICANT CHANGE UP (ref 8.4–10.5)
CHLORIDE SERPL-SCNC: 104 MMOL/L — SIGNIFICANT CHANGE UP (ref 96–108)
CO2 SERPL-SCNC: 29 MMOL/L — SIGNIFICANT CHANGE UP (ref 22–29)
CREAT SERPL-MCNC: 0.28 MG/DL — LOW (ref 0.5–1.3)
EGFR: 132 ML/MIN/1.73M2 — SIGNIFICANT CHANGE UP
GLUCOSE SERPL-MCNC: 93 MG/DL — SIGNIFICANT CHANGE UP (ref 70–99)
HCT VFR BLD CALC: 30.7 % — LOW (ref 39–50)
HGB BLD-MCNC: 10.4 G/DL — LOW (ref 13–17)
MCHC RBC-ENTMCNC: 30.4 PG — SIGNIFICANT CHANGE UP (ref 27–34)
MCHC RBC-ENTMCNC: 33.9 GM/DL — SIGNIFICANT CHANGE UP (ref 32–36)
MCV RBC AUTO: 89.8 FL — SIGNIFICANT CHANGE UP (ref 80–100)
PLATELET # BLD AUTO: 266 K/UL — SIGNIFICANT CHANGE UP (ref 150–400)
POTASSIUM SERPL-MCNC: 3.5 MMOL/L — SIGNIFICANT CHANGE UP (ref 3.5–5.3)
POTASSIUM SERPL-SCNC: 3.5 MMOL/L — SIGNIFICANT CHANGE UP (ref 3.5–5.3)
PROT SERPL-MCNC: 5.9 G/DL — LOW (ref 6.6–8.7)
RBC # BLD: 3.42 M/UL — LOW (ref 4.2–5.8)
RBC # FLD: 15.2 % — HIGH (ref 10.3–14.5)
SODIUM SERPL-SCNC: 141 MMOL/L — SIGNIFICANT CHANGE UP (ref 135–145)
WBC # BLD: 10.93 K/UL — HIGH (ref 3.8–10.5)
WBC # FLD AUTO: 10.93 K/UL — HIGH (ref 3.8–10.5)

## 2022-11-23 PROCEDURE — 99497 ADVNCD CARE PLAN 30 MIN: CPT | Mod: 25

## 2022-11-23 PROCEDURE — 99498 ADVNCD CARE PLAN ADDL 30 MIN: CPT | Mod: 25

## 2022-11-23 PROCEDURE — 99233 SBSQ HOSP IP/OBS HIGH 50: CPT

## 2022-11-23 RX ORDER — ASPIRIN/CALCIUM CARB/MAGNESIUM 324 MG
81 TABLET ORAL DAILY
Refills: 0 | Status: DISCONTINUED | OUTPATIENT
Start: 2022-11-23 | End: 2022-11-27

## 2022-11-23 RX ORDER — FUROSEMIDE 40 MG
20 TABLET ORAL ONCE
Refills: 0 | Status: DISCONTINUED | OUTPATIENT
Start: 2022-11-23 | End: 2022-11-25

## 2022-11-23 RX ORDER — APIXABAN 2.5 MG/1
5 TABLET, FILM COATED ORAL EVERY 12 HOURS
Refills: 0 | Status: DISCONTINUED | OUTPATIENT
Start: 2022-11-23 | End: 2022-11-27

## 2022-11-23 RX ADMIN — APIXABAN 5 MILLIGRAM(S): 2.5 TABLET, FILM COATED ORAL at 18:19

## 2022-11-23 RX ADMIN — CHLORHEXIDINE GLUCONATE 1 APPLICATION(S): 213 SOLUTION TOPICAL at 17:48

## 2022-11-23 RX ADMIN — LIDOCAINE 2 PATCH: 4 CREAM TOPICAL at 19:00

## 2022-11-23 RX ADMIN — Medication 500 MILLIGRAM(S): at 13:57

## 2022-11-23 RX ADMIN — PANTOPRAZOLE SODIUM 40 MILLIGRAM(S): 20 TABLET, DELAYED RELEASE ORAL at 17:47

## 2022-11-23 RX ADMIN — LEVALBUTEROL 1.25 MILLIGRAM(S): 1.25 SOLUTION, CONCENTRATE RESPIRATORY (INHALATION) at 09:38

## 2022-11-23 RX ADMIN — BUDESONIDE AND FORMOTEROL FUMARATE DIHYDRATE 2 PUFF(S): 160; 4.5 AEROSOL RESPIRATORY (INHALATION) at 21:45

## 2022-11-23 RX ADMIN — MIDODRINE HYDROCHLORIDE 10 MILLIGRAM(S): 2.5 TABLET ORAL at 05:15

## 2022-11-23 RX ADMIN — Medication 81 MILLIGRAM(S): at 13:57

## 2022-11-23 RX ADMIN — MIDODRINE HYDROCHLORIDE 10 MILLIGRAM(S): 2.5 TABLET ORAL at 13:56

## 2022-11-23 RX ADMIN — ATORVASTATIN CALCIUM 80 MILLIGRAM(S): 80 TABLET, FILM COATED ORAL at 21:46

## 2022-11-23 RX ADMIN — Medication 1 TABLET(S): at 13:57

## 2022-11-23 RX ADMIN — LEVALBUTEROL 1.25 MILLIGRAM(S): 1.25 SOLUTION, CONCENTRATE RESPIRATORY (INHALATION) at 21:44

## 2022-11-23 RX ADMIN — MIDODRINE HYDROCHLORIDE 10 MILLIGRAM(S): 2.5 TABLET ORAL at 21:46

## 2022-11-23 RX ADMIN — PANTOPRAZOLE SODIUM 40 MILLIGRAM(S): 20 TABLET, DELAYED RELEASE ORAL at 05:15

## 2022-11-23 RX ADMIN — LEVALBUTEROL 1.25 MILLIGRAM(S): 1.25 SOLUTION, CONCENTRATE RESPIRATORY (INHALATION) at 03:53

## 2022-11-23 RX ADMIN — LIDOCAINE 2 PATCH: 4 CREAM TOPICAL at 02:42

## 2022-11-23 RX ADMIN — BUDESONIDE AND FORMOTEROL FUMARATE DIHYDRATE 2 PUFF(S): 160; 4.5 AEROSOL RESPIRATORY (INHALATION) at 09:42

## 2022-11-23 RX ADMIN — TIOTROPIUM BROMIDE 1 CAPSULE(S): 18 CAPSULE ORAL; RESPIRATORY (INHALATION) at 09:39

## 2022-11-23 RX ADMIN — LIDOCAINE 2 PATCH: 4 CREAM TOPICAL at 13:57

## 2022-11-23 NOTE — PROGRESS NOTE ADULT - ASSESSMENT
67 yr old male with no known medical history, current smoker presented with complaints of 1 week of shortness of breath. His EKG on arrival revealed diffuse ST elevations, a limited ECHO revealed circumferential pericardial effusion. He was emergently taken to cardiac cath lab where he underwent pericardiocentesis and coronary angiogram. A pericardial drain was placed, cath revealed a 90% LAD lesion, which was not felt to be acute and given pericardial effusion, PCI was deferred. His presentation was attributed to acute pericarditis He was placed on a NRB, transferred to ICU for further monitoring. His labs on admission revealed FILIBERTO and elevated LFTs, IVF were given. A US abdomen was ordered, revealed non occlusive thrombus in IVC and gall bladder sludge. CXR on admission revealed a left lung mass. Subsequent CTA chest, abdomen and pelvis was done, revealed left upper lobe segmental and subsegmental PE. Occlusion of the SVC and a thrombus within the intrahepatic IVC. Mediastinal and right hilar lymphadenopathy with resultant central  bronchial obstruction and atelectasis involving the right middle and lower lobes. 4.1 cm sized left lower lobe mass compatible with a pulmonary malignancy. Moderate right and a trace left pleural effusion. Concern for malignant cells in pericardial fluid, formal cytology report pending. ECHO revealed a EF 50%. Cardiology follow up was done, advised continue aspirin and statin, unable to initiate GDMT given hypotension. Heparin gtt was initiated, palliative care was consulted. He was transferred to medical floor on 11/9. C/b symptomatic anemia in the setting of GI bleed and hypotension.    #Melena- Patient with reported dark stool overnight  - CT A/P w/o source of bleeding  - Holding ASA and eliquis  - C/w IV protonix BID  - Appreciate GI recs- S/p EGD w/ non-erosive diffuse gastritis. F/u H. pylori and duodenal biopsies  - Will c/w protonix 40 BID  - Will transfuse to keep Hg> 8  - Appreciate cards c/s for medical clearance    #Hypotension  - Likely in the setting of GIB  - MICU re-consulted. Deemed not a candidate  - F/u blood cx- NGTD  - Will keep Hg> 8  - Low threshold for ICU consult if hypotensive    Acute pericardial effusion s/p pericardiocentesis  - Pericardial drain removed  - Outpatient PET scan  - C/w Aspirin  - s/p IR lung biopsy- NSCLC  repeat echo w/o pericardial effusion reaccumulation    #Stage 4 adenocarcinoma  - Appreciate heme/onc recs- will need systemic chemotherapy for Stage 4 malignancy once medically optimized  - MRI-H to r/o metastatic disease  - will need next generation sequencing and outpatient PET-CT scan    Right pleural effusion  - Thoracic surgery following   - s/p pigtail drainage  - Will give IV Lasix 20 mg, x 1  home o2 evaluation    Acute pulmonary embolism/ IVC thrombus   - eliquis restarted    HFrEF with wall motion abnormalities   PCI not performed on admission given effusion  90% LAD stenosis   - Appreciate cardio recs- if chest pain, will obtain CE and EKG  - Will hold toprol XL and lisinopril given soft BP    Lung mass /cancer   - Oncology recs appreciated  - Palliative consult appreciated    Smoker, likely COPD    symbicort/spiriva    prn nebs     Diet- DASH  PT evaluation --roslyn   Medically active. Patient is uninsured. Not on home oxygen. Will need oxygen  CODE STATUS- DNR/DNI.     Updated HCP friend Rios Maza - [phone]) on 11/23.

## 2022-11-23 NOTE — PROGRESS NOTE ADULT - ASSESSMENT
67-year-old male With no known medical history with lack of medical follow-up with tobacco use disorder presented on November 7 with difficulty breathing found to have diffuse ST elevation taken for emergent left heart cath found to have 90% stenosis of mid LAD with no acute lesion with no intervention but s/p pericardial drain for moderate to large pericardial effusion was initially admitted to medical ICU eventually found to have metastatic adenocarcinoma most likely from lung as primary based on pericardial fluid pathology, through hospital course patient was also found to have SVC and intrahepatic IVC thrombi as well as left-sided segmental subsegmental PE, mediastinal lymphadenopathy.  Hospital course complicated by GI bleed and on overnight from 20th November through early morning off 21st November while patient was on Eliquis receiving blood products, underwent CT abdomen pelvis with contrast which did not reveal active bleeding and resuscitated for 2 units PRBC along with colloid and crystalloid resuscitation.     Stage 4 adenocardircinoma:  -once stable will need systemic therapy  -NGS to be sent on the pathology specimen to check for actionable mutations including PDL1 analysis  -will need outpatient PET-CT scan    Right pleural effusion  - Thoracic surgery following   - s/p pigtail drainage  home o2 evaluation    Follow up GI recommendations   67-year-old male With no known medical history with lack of medical follow-up with tobacco use disorder presented on November 7 with difficulty breathing found to have diffuse ST elevation taken for emergent left heart cath found to have 90% stenosis of mid LAD with no acute lesion with no intervention but s/p pericardial drain for moderate to large pericardial effusion was initially admitted to medical ICU eventually found to have metastatic adenocarcinoma most likely from lung as primary based on pericardial fluid pathology, through hospital course patient was also found to have SVC and intrahepatic IVC thrombi as well as left-sided segmental subsegmental PE, mediastinal lymphadenopathy.  Hospital course complicated by GI bleed and on overnight from 20th November through early morning off 21st November while patient was on Eliquis receiving blood products, underwent CT abdomen pelvis with contrast which did not reveal active bleeding and resuscitated for 2 units PRBC along with colloid and crystalloid resuscitation.     Stage 4 adenocardircinoma:  -once stable will need systemic therapy  -NGS to be sent on the pathology specimen to check for actionable mutations including PDL1 analysis  -will need outpatient PET-CT scan  -please obtain MRI brain with contrast to rule out brain mets    Right pleural effusion  - Thoracic surgery following   - s/p pigtail drainage  home o2 evaluation    Follow up GI recommendations

## 2022-11-23 NOTE — PROGRESS NOTE ADULT - PROBLEM SELECTOR PROBLEM 1
HFrEF (heart failure with reduced ejection fraction)
Anemia
Pleural effusion, right
HFrEF (heart failure with reduced ejection fraction)
Pleural effusion, right
Pleural effusion, right
HFrEF (heart failure with reduced ejection fraction)

## 2022-11-23 NOTE — PROGRESS NOTE ADULT - SUBJECTIVE AND OBJECTIVE BOX
MEDICATIONS  (STANDING):  ascorbic acid 500 milliGRAM(s) Oral daily  atorvastatin 80 milliGRAM(s) Oral at bedtime  budesonide 160 MICROgram(s)/formoterol 4.5 MICROgram(s) Inhaler 2 Puff(s) Inhalation two times a day  chlorhexidine 2% Cloths 1 Application(s) Topical daily  influenza  Vaccine (HIGH DOSE) 0.7 milliLiter(s) IntraMuscular once  lactated ringers. 1000 milliLiter(s) (125 mL/Hr) IV Continuous <Continuous>  levalbuterol Inhalation 1.25 milliGRAM(s) Inhalation every 6 hours  lidocaine   4% Patch 2 Patch Transdermal daily  midodrine 10 milliGRAM(s) Oral every 8 hours  multivitamin/minerals 1 Tablet(s) Oral daily  pantoprazole  Injectable 40 milliGRAM(s) IV Push two times a day  tiotropium 18 MICROgram(s) Capsule 1 Capsule(s) Inhalation daily    MEDICATIONS  (PRN):  acetaminophen     Tablet .. 650 milliGRAM(s) Oral every 8 hours PRN Mild Pain (1 - 3), Moderate Pain (4 - 6)  melatonin 5 milliGRAM(s) Oral at bedtime PRN Insomnia  oxycodone    5 mG/acetaminophen 325 mG 1 Tablet(s) Oral every 6 hours PRN Moderate Pain (4 - 6)        I&O's Summary    21 Nov 2022 07:01  -  22 Nov 2022 07:00  --------------------------------------------------------  IN: 3700 mL / OUT: 3050 mL / NET: 650 mL        PHYSICAL EXAM:  Vital Signs Last 24 Hrs  T(C): 36.7 (22 Nov 2022 08:10), Max: 37.1 (21 Nov 2022 15:43)  T(F): 98 (22 Nov 2022 08:10), Max: 98.7 (21 Nov 2022 15:43)  HR: 86 (22 Nov 2022 09:00) (72 - 93)  BP: 83/51 (22 Nov 2022 08:10) (74/44 - 91/56)  BP(mean): 59 (22 Nov 2022 08:10) (51 - 64)  RR: 18 (22 Nov 2022 08:10) (17 - 22)  SpO2: 100% (22 Nov 2022 09:00) (99% - 100%)    Parameters below as of 22 Nov 2022 09:00  Patient On (Oxygen Delivery Method): nasal cannula,3L    CONSTITUTIONAL: ill-appearing, frail, cachetic  RESPIRATORY: Normal respiratory effort; lungs are clear to auscultation bilaterally  CARDIOVASCULAR: Regular rate and rhythm, normal S1 and S2  ABDOMEN: Nontender to palpation, normoactive bowel sounds, no rebound/guarding  PSYCH: A+O to person, place, and time; affect appropriate  NEUROLOGY: CN 2-12 are intact and symmetric; no gross sensory deficits    LABS:                        7.5    9.22  )-----------( 202      ( 22 Nov 2022 08:40 )             21.9     11-22    144  |  107  |  28.8<H>  ----------------------------<  89  3.8   |  27.0  |  0.33<L>    Ca    8.7      22 Nov 2022 05:00    TPro  5.4<L>  /  Alb  3.3  /  TBili  1.0  /  DBili  x   /  AST  51<H>  /  ALT  61<H>  /  AlkPhos  57  11-22 Discussed with the patient regarding diagnosis of adenocarcinoma likely lung origin, and treatment options based on next generation sequencing.   discussed with the patient, once medically stable would be a candidate for such treatment options, likely on outpatient basis.  Patient verbalized understanding.    MEDICATIONS  (STANDING):  ascorbic acid 500 milliGRAM(s) Oral daily  atorvastatin 80 milliGRAM(s) Oral at bedtime  budesonide 160 MICROgram(s)/formoterol 4.5 MICROgram(s) Inhaler 2 Puff(s) Inhalation two times a day  chlorhexidine 2% Cloths 1 Application(s) Topical daily  influenza  Vaccine (HIGH DOSE) 0.7 milliLiter(s) IntraMuscular once  lactated ringers. 1000 milliLiter(s) (125 mL/Hr) IV Continuous <Continuous>  levalbuterol Inhalation 1.25 milliGRAM(s) Inhalation every 6 hours  lidocaine   4% Patch 2 Patch Transdermal daily  midodrine 10 milliGRAM(s) Oral every 8 hours  multivitamin/minerals 1 Tablet(s) Oral daily  pantoprazole  Injectable 40 milliGRAM(s) IV Push two times a day  tiotropium 18 MICROgram(s) Capsule 1 Capsule(s) Inhalation daily    MEDICATIONS  (PRN):  acetaminophen     Tablet .. 650 milliGRAM(s) Oral every 8 hours PRN Mild Pain (1 - 3), Moderate Pain (4 - 6)  melatonin 5 milliGRAM(s) Oral at bedtime PRN Insomnia  oxycodone    5 mG/acetaminophen 325 mG 1 Tablet(s) Oral every 6 hours PRN Moderate Pain (4 - 6)        I&O's Summary    21 Nov 2022 07:01  -  22 Nov 2022 07:00  --------------------------------------------------------  IN: 3700 mL / OUT: 3050 mL / NET: 650 mL        PHYSICAL EXAM:  Vital Signs Last 24 Hrs  T(C): 36.7 (22 Nov 2022 08:10), Max: 37.1 (21 Nov 2022 15:43)  T(F): 98 (22 Nov 2022 08:10), Max: 98.7 (21 Nov 2022 15:43)  HR: 86 (22 Nov 2022 09:00) (72 - 93)  BP: 83/51 (22 Nov 2022 08:10) (74/44 - 91/56)  BP(mean): 59 (22 Nov 2022 08:10) (51 - 64)  RR: 18 (22 Nov 2022 08:10) (17 - 22)  SpO2: 100% (22 Nov 2022 09:00) (99% - 100%)    Parameters below as of 22 Nov 2022 09:00  Patient On (Oxygen Delivery Method): nasal cannula,3L    CONSTITUTIONAL: ill-appearing, frail, cachetic  RESPIRATORY: Normal respiratory effort; lungs are clear to auscultation bilaterally  CARDIOVASCULAR: Regular rate and rhythm, normal S1 and S2  ABDOMEN: Nontender to palpation, normoactive bowel sounds, no rebound/guarding  PSYCH: A+O to person, place, and time; affect appropriate  NEUROLOGY: CN 2-12 are intact and symmetric; no gross sensory deficits    LABS:                        7.5    9.22  )-----------( 202      ( 22 Nov 2022 08:40 )             21.9     11-22    144  |  107  |  28.8<H>  ----------------------------<  89  3.8   |  27.0  |  0.33<L>    Ca    8.7      22 Nov 2022 05:00    TPro  5.4<L>  /  Alb  3.3  /  TBili  1.0  /  DBili  x   /  AST  51<H>  /  ALT  61<H>  /  AlkPhos  57  11-22

## 2022-11-23 NOTE — PROGRESS NOTE ADULT - SUBJECTIVE AND OBJECTIVE BOX
[facility] Division of Hospital Medicine    Chief Complaint: Pericardial effusion     SUBJECTIVE / OVERNIGHT EVENTS: No acute events overnight. HD stable. Denies n/v/f/c/h/d.     Patient denies chest pain, SOB, abd pain, N/V, fever, chills, dysuria or any other complaints. All remainder ROS negative.     MEDICATIONS  (STANDING):  apixaban 5 milliGRAM(s) Oral every 12 hours  ascorbic acid 500 milliGRAM(s) Oral daily  aspirin enteric coated 81 milliGRAM(s) Oral daily  atorvastatin 80 milliGRAM(s) Oral at bedtime  budesonide 160 MICROgram(s)/formoterol 4.5 MICROgram(s) Inhaler 2 Puff(s) Inhalation two times a day  chlorhexidine 2% Cloths 1 Application(s) Topical daily  furosemide   Injectable 20 milliGRAM(s) IV Push once  influenza  Vaccine (HIGH DOSE) 0.7 milliLiter(s) IntraMuscular once  lactated ringers. 1000 milliLiter(s) (75 mL/Hr) IV Continuous <Continuous>  levalbuterol Inhalation 1.25 milliGRAM(s) Inhalation every 6 hours  lidocaine   4% Patch 2 Patch Transdermal daily  midodrine 10 milliGRAM(s) Oral every 8 hours  multivitamin/minerals 1 Tablet(s) Oral daily  pantoprazole  Injectable 40 milliGRAM(s) IV Push two times a day  tiotropium 18 MICROgram(s) Capsule 1 Capsule(s) Inhalation daily    MEDICATIONS  (PRN):  acetaminophen     Tablet .. 650 milliGRAM(s) Oral every 8 hours PRN Mild Pain (1 - 3), Moderate Pain (4 - 6)  melatonin 5 milliGRAM(s) Oral at bedtime PRN Insomnia        I&O's Summary    22 Nov 2022 07:01  -  23 Nov 2022 07:00  --------------------------------------------------------  IN: 600 mL / OUT: 1050 mL / NET: -450 mL        PHYSICAL EXAM:  Vital Signs Last 24 Hrs  T(C): 36.7 (23 Nov 2022 13:01), Max: 36.9 (23 Nov 2022 04:00)  T(F): 98 (23 Nov 2022 13:01), Max: 98.5 (23 Nov 2022 07:57)  HR: 86 (23 Nov 2022 14:00) (84 - 94)  BP: 104/72 (23 Nov 2022 14:00) (100/60 - 115/77)  BP(mean): 80 (23 Nov 2022 14:00) (68 - 86)  RR: 20 (23 Nov 2022 08:00) (16 - 20)  SpO2: 98% (23 Nov 2022 14:00) (95% - 100%)    Parameters below as of 23 Nov 2022 14:00  Patient On (Oxygen Delivery Method): nasal cannula  O2 Flow (L/min): 4          CONSTITUTIONAL: NAD, well-developed, well-groomed  ENMT: Moist oral mucosa, no pharyngeal injection or exudates; normal dentition  RESPIRATORY: Normal respiratory effort; lungs are clear to auscultation bilaterally  CARDIOVASCULAR: Regular rate and rhythm, normal S1 and S2, no murmur/rub/gallop; No lower extremity edema; Peripheral pulses are 2+ bilaterally  ABDOMEN: Nontender to palpation, normoactive bowel sounds, no rebound/guarding; No hepatosplenomegaly  MUSCLOSKELETAL:  Normal gait; no clubbing or cyanosis of digits; no joint swelling or tenderness to palpation  PSYCH: A+O to person, place, and time; affect appropriate  NEUROLOGY: CN 2-12 are intact and symmetric; no gross sensory deficits;   SKIN: No rashes; no palpable lesions    LABS:                        10.4   10.93 )-----------( 266      ( 23 Nov 2022 06:02 )             30.7     11-23    141  |  104  |  21.5<H>  ----------------------------<  93  3.5   |  29.0  |  0.28<L>    Ca    8.7      23 Nov 2022 06:02    TPro  5.9<L>  /  Alb  3.4  /  TBili  1.2  /  DBili  x   /  AST  43<H>  /  ALT  62<H>  /  AlkPhos  75  11-23    PT/INR - ( 22 Nov 2022 05:00 )   PT: 15.5 sec;   INR: 1.33 ratio                   Culture - Blood (collected 21 Nov 2022 12:45)  Source: .Blood Blood-Venous  Preliminary Report (22 Nov 2022 20:01):    No growth to date.    Culture - Blood (collected 21 Nov 2022 12:40)  Source: .Blood Blood-Peripheral  Preliminary Report (22 Nov 2022 20:01):    No growth to date.      CAPILLARY BLOOD GLUCOSE            RADIOLOGY & ADDITIONAL TESTS:  Results Reviewed:   Imaging Personally Reviewed:  Electrocardiogram Personally Reviewed:

## 2022-11-23 NOTE — PROGRESS NOTE ADULT - SUBJECTIVE AND OBJECTIVE BOX
Chief Complaint:  Patient is a 67y old  Male who presents with a chief complaint of Pericardial effusion (22 Nov 2022 12:06)      HPI/ 24 hr events: Patient seen and examined at bedside, no overnight events. S/p EGD yesterday revealing Non-erosive diffuse gastritis. Gastric biopsies taken for H. Pylori and duodenal biopsies taken for Celiac Disease. Pt feeling well this morning. Tolerating diet. Vitals are stable, Hemoglobin stable at 10.4gm. Denies nausea, vomiting, abdominal pain.         REVIEW OF SYSTEMS:   General: Negative  HEENT: Negative  CV: Negative  Respiratory: Negative  GI: See HPI  : Negative  MSK: Negative  Hematologic: Negative  Skin: Negative    MEDICATIONS:   MEDICATIONS  (STANDING):  apixaban 5 milliGRAM(s) Oral every 12 hours  ascorbic acid 500 milliGRAM(s) Oral daily  aspirin enteric coated 81 milliGRAM(s) Oral daily  atorvastatin 80 milliGRAM(s) Oral at bedtime  budesonide 160 MICROgram(s)/formoterol 4.5 MICROgram(s) Inhaler 2 Puff(s) Inhalation two times a day  chlorhexidine 2% Cloths 1 Application(s) Topical daily  influenza  Vaccine (HIGH DOSE) 0.7 milliLiter(s) IntraMuscular once  lactated ringers. 1000 milliLiter(s) (75 mL/Hr) IV Continuous <Continuous>  levalbuterol Inhalation 1.25 milliGRAM(s) Inhalation every 6 hours  lidocaine   4% Patch 2 Patch Transdermal daily  midodrine 10 milliGRAM(s) Oral every 8 hours  multivitamin/minerals 1 Tablet(s) Oral daily  pantoprazole  Injectable 40 milliGRAM(s) IV Push two times a day  tiotropium 18 MICROgram(s) Capsule 1 Capsule(s) Inhalation daily    MEDICATIONS  (PRN):  acetaminophen     Tablet .. 650 milliGRAM(s) Oral every 8 hours PRN Mild Pain (1 - 3), Moderate Pain (4 - 6)  melatonin 5 milliGRAM(s) Oral at bedtime PRN Insomnia      ALLERGIES:   Allergies    No Known Allergies    Intolerances        VITAL SIGNS:   Vital Signs Last 24 Hrs  T(C): 36.9 (23 Nov 2022 07:57), Max: 36.9 (23 Nov 2022 04:00)  T(F): 98.5 (23 Nov 2022 07:57), Max: 98.5 (23 Nov 2022 07:57)  HR: 84 (23 Nov 2022 08:00) (84 - 94)  BP: 112/74 (23 Nov 2022 08:00) (87/54 - 115/77)  BP(mean): 82 (23 Nov 2022 06:00) (62 - 86)  RR: 20 (23 Nov 2022 08:00) (16 - 20)  SpO2: 98% (23 Nov 2022 08:00) (95% - 100%)    Parameters below as of 23 Nov 2022 08:00  Patient On (Oxygen Delivery Method): nasal cannula  O2 Flow (L/min): 4    I&O's Summary    22 Nov 2022 07:01  -  23 Nov 2022 07:00  --------------------------------------------------------  IN: 600 mL / OUT: 1050 mL / NET: -450 mL        PHYSICAL EXAM:   GENERAL: Thin male, No acute distress  HEENT:  NC/AT, conjunctiva clear, sclera anicteric  CHEST:  No increased effort, breath sounds clear  HEART:  Regular rhythm, S1, S2,   ABDOMEN:  Soft, non-tender, non-distended, normoactive bowel sounds, no rebound or guarding  EXTREMITIES: No edema  SKIN:  Warm, dry  NEURO:  Calm, cooperative      LABS:  CBC Full  -  ( 23 Nov 2022 06:02 )  WBC Count : 10.93 K/uL  RBC Count : 3.42 M/uL  Hemoglobin : 10.4 g/dL  Hematocrit : 30.7 %  Platelet Count - Automated : 266 K/uL  Mean Cell Volume : 89.8 fl  Mean Cell Hemoglobin : 30.4 pg  Mean Cell Hemoglobin Concentration : 33.9 gm/dL  Auto Neutrophil # : x  Auto Lymphocyte # : x  Auto Monocyte # : x  Auto Eosinophil # : x  Auto Basophil # : x  Auto Neutrophil % : x  Auto Lymphocyte % : x  Auto Monocyte % : x  Auto Eosinophil % : x  Auto Basophil % : x    11-23    141  |  104  |  21.5<H>  ----------------------------<  93  3.5   |  29.0  |  0.28<L>    Ca    8.7      23 Nov 2022 06:02    TPro  5.9<L>  /  Alb  3.4  /  TBili  1.2  /  DBili  x   /  AST  43<H>  /  ALT  62<H>  /  AlkPhos  75  11-23    LIVER FUNCTIONS - ( 23 Nov 2022 06:02 )  Alb: 3.4 g/dL / Pro: 5.9 g/dL / ALK PHOS: 75 U/L / ALT: 62 U/L / AST: 43 U/L / GGT: x           PT/INR - ( 22 Nov 2022 05:00 )   PT: 15.5 sec;   INR: 1.33 ratio               Culture - Blood (collected 21 Nov 2022 12:45)  Source: .Blood Blood-Venous  Preliminary Report (22 Nov 2022 20:01):    No growth to date.    Culture - Blood (collected 21 Nov 2022 12:40)  Source: .Blood Blood-Peripheral  Preliminary Report (22 Nov 2022 20:01):    No growth to date.          RADIOLOGY & ADDITIONAL STUDIES:        EGD Report    Date: 11/22/2022    Patient Name: STEVE DENISE    MRN: [id number]        History of Present Illness:    Pt with symptomatic anemia and + FOBT      Findings:      Esophagus Mucosa Normal mucosa was noted in the whole esophagus. No esophagitis    or Z-line disruption or hiatal hernia seen. G-E jncn. @ 40 cm.        Stomach Mucosa Diffuse continuous erythema of the mucosa with no bleeding was    noted in the whole stomach. These findings are compatible with non-erosive    gastritis. Biopsies were obtained to evaluate for H.Pylori infection.Multiple    cold forceps biopsies were performed for histology.        Abnormal mucosa Non-erosive gastritis) was noted in the whole stomach. On    retroflexed view, the stomach appeared to be normal. Laxity of the G-E jncn.    Noted on retroflexed view of the cardia. No hiatal hernia appreciated.        Duodenum Mucosa Normal mucosa was noted in the whole duodenum. Random mucosal    biopsies were obtained to evaluate for histologic features of celiac    disease.Multiple cold forceps biopsies were performed for histology in the    second part of the duodenum.        Impressions:      Normal mucosa in the whole examined duodenum. (Biopsy).    Erythema in the whole stomach compatible with non-erosive gastritis. (Biopsy).      Abnormal mucosa Non-erosive gastritis) in the cardia and fundus.      Normal mucosa in the whole esophagus.        Plan:    Await pathology results. No plans or need for colonoscopy which this pt. would    not be able to tolerate. Rx Gastritis with Pantoprazole 40 mg. BID.      Advance diet as tolerated    Pantoprazole 40 mg PO BID        Additional Notes:        Non-erosive diffuse gastritis likely responsible for his symptomatic anemia and    + FOBT. Pt. would never be able to tolerate colonoscopy or even CT Colonography.    See above management plan recommendations.        Attending Participation:        I was present and participated during the entire procedure, including non-key    portions.        Nate Renae MD        Version 1, Electronically signed on 11/22/2022 4:10:26 PM by Nate Renae MD

## 2022-11-23 NOTE — PROGRESS NOTE ADULT - PROBLEM SELECTOR PLAN 1
Normocytic anemia with + FOBT likely multifactorial and secondary to his chronic medical diseases. + FOBT likely secondary to his gastritis. . S/p EGD yesterday revealing Non-erosive diffuse gastritis. Gastric biopsies taken for H. Pylori and duodenal biopsies taken for Celiac Disease. Hemoglobin stable at 10.4gm. No evidence of overt GI bleeding.     Plan:  Continue Dash diet   Pantoprazole 40mg BID   No plans for colonoscopy as this pt. would never be able to tolerate colonoscopy.  No GI contraindications to AC or DAP therapy if needed  No further GI recommendations at this time Normocytic anemia with + FOBT likely multifactorial and secondary to his chronic medical diseases. + FOBT likely secondary to his gastritis. . S/p EGD yesterday revealing Non-erosive diffuse gastritis. Gastric biopsies taken for H. Pylori and duodenal biopsies taken for Celiac Disease. Hemoglobin stable at 10.4gm. No evidence of overt GI bleeding.     Plan:  Continue Dash diet   Pantoprazole 40mg BID   No plans for colonoscopy as this pt. would never be able to tolerate colonoscopy.  No GI contraindications to AC or DAP therapy if needed  No further GI recommendations at this time. GI to sign-off, please reconsult as needed.

## 2022-11-24 LAB
ALBUMIN SERPL ELPH-MCNC: 3.4 G/DL — SIGNIFICANT CHANGE UP (ref 3.3–5.2)
ALP SERPL-CCNC: 84 U/L — SIGNIFICANT CHANGE UP (ref 40–120)
ALT FLD-CCNC: 60 U/L — HIGH
ANION GAP SERPL CALC-SCNC: 10 MMOL/L — SIGNIFICANT CHANGE UP (ref 5–17)
AST SERPL-CCNC: 39 U/L — SIGNIFICANT CHANGE UP
BILIRUB SERPL-MCNC: 0.7 MG/DL — SIGNIFICANT CHANGE UP (ref 0.4–2)
BUN SERPL-MCNC: 21.1 MG/DL — HIGH (ref 8–20)
CALCIUM SERPL-MCNC: 8.9 MG/DL — SIGNIFICANT CHANGE UP (ref 8.4–10.5)
CHLORIDE SERPL-SCNC: 100 MMOL/L — SIGNIFICANT CHANGE UP (ref 96–108)
CO2 SERPL-SCNC: 30 MMOL/L — HIGH (ref 22–29)
CREAT SERPL-MCNC: 0.4 MG/DL — LOW (ref 0.5–1.3)
EGFR: 119 ML/MIN/1.73M2 — SIGNIFICANT CHANGE UP
GLUCOSE SERPL-MCNC: 132 MG/DL — HIGH (ref 70–99)
HCT VFR BLD CALC: 31.8 % — LOW (ref 39–50)
HGB BLD-MCNC: 10.6 G/DL — LOW (ref 13–17)
MCHC RBC-ENTMCNC: 30.6 PG — SIGNIFICANT CHANGE UP (ref 27–34)
MCHC RBC-ENTMCNC: 33.3 GM/DL — SIGNIFICANT CHANGE UP (ref 32–36)
MCV RBC AUTO: 91.9 FL — SIGNIFICANT CHANGE UP (ref 80–100)
PLATELET # BLD AUTO: 296 K/UL — SIGNIFICANT CHANGE UP (ref 150–400)
POTASSIUM SERPL-MCNC: 3.6 MMOL/L — SIGNIFICANT CHANGE UP (ref 3.5–5.3)
POTASSIUM SERPL-SCNC: 3.6 MMOL/L — SIGNIFICANT CHANGE UP (ref 3.5–5.3)
PROT SERPL-MCNC: 6 G/DL — LOW (ref 6.6–8.7)
RBC # BLD: 3.46 M/UL — LOW (ref 4.2–5.8)
RBC # FLD: 15.4 % — HIGH (ref 10.3–14.5)
SODIUM SERPL-SCNC: 140 MMOL/L — SIGNIFICANT CHANGE UP (ref 135–145)
WBC # BLD: 9.37 K/UL — SIGNIFICANT CHANGE UP (ref 3.8–10.5)
WBC # FLD AUTO: 9.37 K/UL — SIGNIFICANT CHANGE UP (ref 3.8–10.5)

## 2022-11-24 PROCEDURE — 70553 MRI BRAIN STEM W/O & W/DYE: CPT | Mod: 26

## 2022-11-24 PROCEDURE — 99233 SBSQ HOSP IP/OBS HIGH 50: CPT

## 2022-11-24 RX ADMIN — LIDOCAINE 2 PATCH: 4 CREAM TOPICAL at 01:00

## 2022-11-24 RX ADMIN — APIXABAN 5 MILLIGRAM(S): 2.5 TABLET, FILM COATED ORAL at 17:04

## 2022-11-24 RX ADMIN — PANTOPRAZOLE SODIUM 40 MILLIGRAM(S): 20 TABLET, DELAYED RELEASE ORAL at 17:04

## 2022-11-24 RX ADMIN — APIXABAN 5 MILLIGRAM(S): 2.5 TABLET, FILM COATED ORAL at 05:22

## 2022-11-24 RX ADMIN — TIOTROPIUM BROMIDE 1 CAPSULE(S): 18 CAPSULE ORAL; RESPIRATORY (INHALATION) at 09:39

## 2022-11-24 RX ADMIN — LEVALBUTEROL 1.25 MILLIGRAM(S): 1.25 SOLUTION, CONCENTRATE RESPIRATORY (INHALATION) at 20:25

## 2022-11-24 RX ADMIN — MIDODRINE HYDROCHLORIDE 10 MILLIGRAM(S): 2.5 TABLET ORAL at 05:22

## 2022-11-24 RX ADMIN — ATORVASTATIN CALCIUM 80 MILLIGRAM(S): 80 TABLET, FILM COATED ORAL at 22:27

## 2022-11-24 RX ADMIN — LEVALBUTEROL 1.25 MILLIGRAM(S): 1.25 SOLUTION, CONCENTRATE RESPIRATORY (INHALATION) at 09:43

## 2022-11-24 RX ADMIN — LEVALBUTEROL 1.25 MILLIGRAM(S): 1.25 SOLUTION, CONCENTRATE RESPIRATORY (INHALATION) at 04:35

## 2022-11-24 RX ADMIN — PANTOPRAZOLE SODIUM 40 MILLIGRAM(S): 20 TABLET, DELAYED RELEASE ORAL at 05:22

## 2022-11-24 RX ADMIN — LEVALBUTEROL 1.25 MILLIGRAM(S): 1.25 SOLUTION, CONCENTRATE RESPIRATORY (INHALATION) at 14:02

## 2022-11-24 RX ADMIN — Medication 500 MILLIGRAM(S): at 09:30

## 2022-11-24 RX ADMIN — BUDESONIDE AND FORMOTEROL FUMARATE DIHYDRATE 2 PUFF(S): 160; 4.5 AEROSOL RESPIRATORY (INHALATION) at 20:25

## 2022-11-24 RX ADMIN — MIDODRINE HYDROCHLORIDE 10 MILLIGRAM(S): 2.5 TABLET ORAL at 14:22

## 2022-11-24 RX ADMIN — Medication 1 TABLET(S): at 09:30

## 2022-11-24 RX ADMIN — CHLORHEXIDINE GLUCONATE 1 APPLICATION(S): 213 SOLUTION TOPICAL at 09:30

## 2022-11-24 RX ADMIN — MIDODRINE HYDROCHLORIDE 10 MILLIGRAM(S): 2.5 TABLET ORAL at 22:27

## 2022-11-24 RX ADMIN — Medication 81 MILLIGRAM(S): at 09:30

## 2022-11-24 RX ADMIN — BUDESONIDE AND FORMOTEROL FUMARATE DIHYDRATE 2 PUFF(S): 160; 4.5 AEROSOL RESPIRATORY (INHALATION) at 09:40

## 2022-11-24 NOTE — PROGRESS NOTE ADULT - SUBJECTIVE AND OBJECTIVE BOX
Discussed with the patient regarding diagnosis of adenocarcinoma likely lung origin, and treatment options based on next generation sequencing.   discussed with the patient, once medically stable would be a candidate for such treatment options, likely on outpatient basis.  Patient verbalized understanding.  events noted, GIB , drop in Hgb, s/p EGD showing gastritis  now Hgb stable    MEDICATIONS  (STANDING):  apixaban 5 milliGRAM(s) Oral every 12 hours  ascorbic acid 500 milliGRAM(s) Oral daily  aspirin enteric coated 81 milliGRAM(s) Oral daily  atorvastatin 80 milliGRAM(s) Oral at bedtime  budesonide 160 MICROgram(s)/formoterol 4.5 MICROgram(s) Inhaler 2 Puff(s) Inhalation two times a day  chlorhexidine 2% Cloths 1 Application(s) Topical daily  furosemide   Injectable 20 milliGRAM(s) IV Push once  influenza  Vaccine (HIGH DOSE) 0.7 milliLiter(s) IntraMuscular once  levalbuterol Inhalation 1.25 milliGRAM(s) Inhalation every 6 hours  lidocaine   4% Patch 2 Patch Transdermal daily  midodrine 10 milliGRAM(s) Oral every 8 hours  multivitamin/minerals 1 Tablet(s) Oral daily  pantoprazole  Injectable 40 milliGRAM(s) IV Push two times a day  tiotropium 18 MICROgram(s) Capsule 1 Capsule(s) Inhalation daily          ICU Vital Signs Last 24 Hrs  T(C): 36.5 (24 Nov 2022 07:44), Max: 36.7 (23 Nov 2022 13:01)  T(F): 97.7 (24 Nov 2022 07:44), Max: 98 (23 Nov 2022 13:01)  HR: 88 (24 Nov 2022 08:00) (86 - 91)  BP: 91/59 (24 Nov 2022 08:00) (88/62 - 106/73)  BP(mean): 67 (24 Nov 2022 08:00) (67 - 81)  ABP: --  ABP(mean): --  RR: 22 (24 Nov 2022 08:00) (19 - 23)  SpO2: 97% (24 Nov 2022 08:00) (95% - 99%)    O2 Parameters below as of 24 Nov 2022 08:00  Patient On (Oxygen Delivery Method): nasal cannula  O2 Flow (L/min): 2          CONSTITUTIONAL: ill-appearing, frail, cachetic  RESPIRATORY: Normal respiratory effort; lungs are clear to auscultation bilaterally  CARDIOVASCULAR: Regular rate and rhythm, normal S1 and S2  ABDOMEN: Nontender to palpation, normoactive bowel sounds, no rebound/guarding  PSYCH: A+O to person, place, and time; affect appropriate  NEUROLOGY: CN 2-12 are intact and symmetric; no gross sensory deficits    LABS:                           10.6   9.37  )-----------( 296      ( 24 Nov 2022 06:38 )             31.8                        7.5    9.22  )-----------( 202      ( 22 Nov 2022 08:40 )             21.9     11-24    140  |  100  |  21.1<H>  ----------------------------<  132<H>  3.6   |  30.0<H>  |  0.40<L>    Ca    8.9      24 Nov 2022 06:38    TPro  6.0<L>  /  Alb  3.4  /  TBili  0.7  /  DBili  x   /  AST  39  /  ALT  60<H>  /  AlkPhos  84  11-24 11-22    144  |  107  |  28.8<H>  ----------------------------<  89  3.8   |  27.0  |  0.33<L>    Ca    8.7      22 Nov 2022 05:00    TPro  5.4<L>  /  Alb  3.3  /  TBili  1.0  /  DBili  x   /  AST  51<H>  /  ALT  61<H>  /  AlkPhos  57  11-22

## 2022-11-24 NOTE — PROGRESS NOTE ADULT - ASSESSMENT
67-year-old male With no known medical history with lack of medical follow-up with tobacco use disorder presented on November 7 with difficulty breathing found to have diffuse ST elevation taken for emergent left heart cath found to have 90% stenosis of mid LAD with no acute lesion with no intervention but s/p pericardial drain for moderate to large pericardial effusion was initially admitted to medical ICU eventually found to have metastatic adenocarcinoma most likely from lung as primary based on pericardial fluid pathology, through hospital course patient was also found to have SVC and intrahepatic IVC thrombi as well as left-sided segmental subsegmental PE, mediastinal lymphadenopathy.  Hospital course complicated by GI bleed and on overnight from 20th November through early morning off 21st November while patient was on Eliquis receiving blood products, underwent CT abdomen pelvis with contrast which did not reveal active bleeding and resuscitated for 2 units PRBC along with colloid and crystalloid resuscitation.     Stage 4 adenocardircinoma:  -once stable will need systemic therapy  -NGS to be sent on the pathology specimen to check for actionable mutations including PDL1 analysis  -will need outpatient PET-CT scan  -please obtain MRI brain with contrast to rule out brain mets    Right pleural effusion  - Thoracic surgery following   - s/p pigtail drainage  home o2 evaluation    GIB  s/p EGD showing gastritis  s/p PRBC, hgb > 10 now  on protonix, eliquis restarted  monitor for bleed    DNR/DNI  palliative care evaluation

## 2022-11-24 NOTE — PROGRESS NOTE ADULT - SUBJECTIVE AND OBJECTIVE BOX
[facility] Division of Hospital Medicine    Chief Complaint: Pericardial effusion      SUBJECTIVE / OVERNIGHT EVENTS: No acute events overnight. Patient continues to require NC 4L. Denies n/v/f/c/h. Has SOB    Patient denies chest pain, abd pain, N/V, fever, chills, dysuria or any other complaints. All remainder ROS negative.     MEDICATIONS  (STANDING):  apixaban 5 milliGRAM(s) Oral every 12 hours  ascorbic acid 500 milliGRAM(s) Oral daily  aspirin enteric coated 81 milliGRAM(s) Oral daily  atorvastatin 80 milliGRAM(s) Oral at bedtime  budesonide 160 MICROgram(s)/formoterol 4.5 MICROgram(s) Inhaler 2 Puff(s) Inhalation two times a day  chlorhexidine 2% Cloths 1 Application(s) Topical daily  furosemide   Injectable 20 milliGRAM(s) IV Push once  influenza  Vaccine (HIGH DOSE) 0.7 milliLiter(s) IntraMuscular once  levalbuterol Inhalation 1.25 milliGRAM(s) Inhalation every 6 hours  lidocaine   4% Patch 2 Patch Transdermal daily  midodrine 10 milliGRAM(s) Oral every 8 hours  multivitamin/minerals 1 Tablet(s) Oral daily  pantoprazole  Injectable 40 milliGRAM(s) IV Push two times a day  tiotropium 18 MICROgram(s) Capsule 1 Capsule(s) Inhalation daily    MEDICATIONS  (PRN):  acetaminophen     Tablet .. 650 milliGRAM(s) Oral every 8 hours PRN Mild Pain (1 - 3), Moderate Pain (4 - 6)  melatonin 5 milliGRAM(s) Oral at bedtime PRN Insomnia        I&O's Summary    23 Nov 2022 07:01  -  24 Nov 2022 07:00  --------------------------------------------------------  IN: 0 mL / OUT: 2400 mL / NET: -2400 mL        PHYSICAL EXAM:  Vital Signs Last 24 Hrs  T(C): 36.9 (24 Nov 2022 12:00), Max: 36.9 (24 Nov 2022 12:00)  T(F): 98.5 (24 Nov 2022 12:00), Max: 98.5 (24 Nov 2022 12:00)  HR: 99 (24 Nov 2022 12:00) (86 - 99)  BP: 95/72 (24 Nov 2022 12:00) (88/62 - 106/73)  BP(mean): 76 (24 Nov 2022 12:00) (67 - 81)  RR: 23 (24 Nov 2022 12:00) (19 - 25)  SpO2: 93% (24 Nov 2022 12:00) (93% - 99%)    Parameters below as of 24 Nov 2022 12:00  Patient On (Oxygen Delivery Method): nasal cannula  O2 Flow (L/min): 4    CONSTITUTIONAL: ill-appearing, frail  RESPIRATORY: Decreased breath sounds; no wheezing  CARDIOVASCULAR: Regular rate and rhythm, normal S1 and S2  ABDOMEN: Nontender to palpation, normoactive bowel sounds, no rebound/guarding  PSYCH: A+O to person, place, and time; affect appropriate  NEUROLOGY: CN 2-12 are intact and symmetric; no gross sensory deficits    LABS:                        10.6   9.37  )-----------( 296      ( 24 Nov 2022 06:38 )             31.8     11-24    140  |  100  |  21.1<H>  ----------------------------<  132<H>  3.6   |  30.0<H>  |  0.40<L>    Ca    8.9      24 Nov 2022 06:38    TPro  6.0<L>  /  Alb  3.4  /  TBili  0.7  /  DBili  x   /  AST  39  /  ALT  60<H>  /  AlkPhos  84  11-24              CAPILLARY BLOOD GLUCOSE            RADIOLOGY & ADDITIONAL TESTS:  Results Reviewed:   Imaging Personally Reviewed:  Electrocardiogram Personally Reviewed:

## 2022-11-24 NOTE — PROGRESS NOTE ADULT - ASSESSMENT
67 yr old male with no known medical history, current smoker presented with complaints of 1 week of shortness of breath. His EKG on arrival revealed diffuse ST elevations, a limited ECHO revealed circumferential pericardial effusion. He was emergently taken to cardiac cath lab where he underwent pericardiocentesis and coronary angiogram. A pericardial drain was placed, cath revealed a 90% LAD lesion, which was not felt to be acute and given pericardial effusion, PCI was deferred. His presentation was attributed to acute pericarditis He was placed on a NRB, transferred to ICU for further monitoring. His labs on admission revealed FILIBERTO and elevated LFTs, IVF were given. A US abdomen was ordered, revealed non occlusive thrombus in IVC and gall bladder sludge. CXR on admission revealed a left lung mass. Subsequent CTA chest, abdomen and pelvis was done, revealed left upper lobe segmental and subsegmental PE. Occlusion of the SVC and a thrombus within the intrahepatic IVC. Mediastinal and right hilar lymphadenopathy with resultant central  bronchial obstruction and atelectasis involving the right middle and lower lobes. 4.1 cm sized left lower lobe mass compatible with a pulmonary malignancy. Moderate right and a trace left pleural effusion. Concern for malignant cells in pericardial fluid, formal cytology report pending. ECHO revealed a EF 50%. Cardiology follow up was done, advised continue aspirin and statin, unable to initiate GDMT given hypotension. Heparin gtt was initiated, palliative care was consulted. He was transferred to medical floor on 11/9. C/b symptomatic anemia in the setting of GI bleed and hypotension.    Right pleural effusion  - Thoracic surgery following   - s/p pigtail drainage  - Will give IV Lasix 20 mg, x 1 (11/240  - F/u CXR to r/o volume overload  home o2 evaluation    #Stage 4 adenocarcinoma  - Appreciate heme/onc recs- will need systemic chemotherapy for Stage 4 malignancy once medically optimized  - MRI-H to r/o metastatic disease  - will need next generation sequencing and outpatient PET-CT scan    #Melena- Patient with reported dark stool overnight  - CT A/P w/o source of bleeding  - Holding ASA and eliquis  - C/w IV protonix BID  - Appreciate GI recs- S/p EGD w/ non-erosive diffuse gastritis. F/u H. pylori and duodenal biopsies  - Will c/w protonix 40 BID  - Will transfuse to keep Hg> 8  - Appreciate cards c/s for medical clearance    #Hypotension  - Likely in the setting of GIB  - MICU re-consulted. Deemed not a candidate  - F/u blood cx- NGTD  - Will keep Hg> 8  - Low threshold for ICU consult if hypotensive    Acute pericardial effusion s/p pericardiocentesis  - Pericardial drain removed  - Outpatient PET scan  - C/w Aspirin  - s/p IR lung biopsy- NSCLC  repeat echo w/o pericardial effusion reaccumulation    Acute pulmonary embolism/ IVC thrombus   - c/w eliquis    HFrEF with wall motion abnormalities   PCI not performed on admission given effusion  90% LAD stenosis   - Appreciate cardio recs- if chest pain, will obtain CE and EKG  - Will hold toprol XL and lisinopril given soft BP    Lung mass /cancer   - Oncology recs appreciated  - Palliative consult appreciated    Smoker, likely COPD    symbicort/spiriva    prn nebs     Diet- DASH  PT evaluation --[facility]   Dispo- Pending MRI-H. Plan for discharge to [facility].     Patient is uninsured and homeless. Not on home oxygen. Will need oxygen  CODE STATUS- DNR/DNI.     Updated HCP friend Rios Maza - [phone]) on 11/24.

## 2022-11-25 LAB
ALBUMIN SERPL ELPH-MCNC: 3.3 G/DL — SIGNIFICANT CHANGE UP (ref 3.3–5.2)
ALP SERPL-CCNC: 85 U/L — SIGNIFICANT CHANGE UP (ref 40–120)
ALT FLD-CCNC: 51 U/L — HIGH
ANION GAP SERPL CALC-SCNC: 8 MMOL/L — SIGNIFICANT CHANGE UP (ref 5–17)
AST SERPL-CCNC: 30 U/L — SIGNIFICANT CHANGE UP
BILIRUB SERPL-MCNC: 0.8 MG/DL — SIGNIFICANT CHANGE UP (ref 0.4–2)
BUN SERPL-MCNC: 27.7 MG/DL — HIGH (ref 8–20)
CALCIUM SERPL-MCNC: 8.9 MG/DL — SIGNIFICANT CHANGE UP (ref 8.4–10.5)
CHLORIDE SERPL-SCNC: 102 MMOL/L — SIGNIFICANT CHANGE UP (ref 96–108)
CO2 SERPL-SCNC: 31 MMOL/L — HIGH (ref 22–29)
CREAT SERPL-MCNC: 0.34 MG/DL — LOW (ref 0.5–1.3)
EGFR: 125 ML/MIN/1.73M2 — SIGNIFICANT CHANGE UP
GLUCOSE SERPL-MCNC: 96 MG/DL — SIGNIFICANT CHANGE UP (ref 70–99)
HCT VFR BLD CALC: 32.2 % — LOW (ref 39–50)
HGB BLD-MCNC: 10.4 G/DL — LOW (ref 13–17)
MCHC RBC-ENTMCNC: 30.1 PG — SIGNIFICANT CHANGE UP (ref 27–34)
MCHC RBC-ENTMCNC: 32.3 GM/DL — SIGNIFICANT CHANGE UP (ref 32–36)
MCV RBC AUTO: 93.3 FL — SIGNIFICANT CHANGE UP (ref 80–100)
PLATELET # BLD AUTO: 315 K/UL — SIGNIFICANT CHANGE UP (ref 150–400)
POTASSIUM SERPL-MCNC: 4 MMOL/L — SIGNIFICANT CHANGE UP (ref 3.5–5.3)
POTASSIUM SERPL-SCNC: 4 MMOL/L — SIGNIFICANT CHANGE UP (ref 3.5–5.3)
PROT SERPL-MCNC: 6.2 G/DL — LOW (ref 6.6–8.7)
RBC # BLD: 3.45 M/UL — LOW (ref 4.2–5.8)
RBC # FLD: 15.9 % — HIGH (ref 10.3–14.5)
SODIUM SERPL-SCNC: 141 MMOL/L — SIGNIFICANT CHANGE UP (ref 135–145)
WBC # BLD: 9.33 K/UL — SIGNIFICANT CHANGE UP (ref 3.8–10.5)
WBC # FLD AUTO: 9.33 K/UL — SIGNIFICANT CHANGE UP (ref 3.8–10.5)

## 2022-11-25 PROCEDURE — 99223 1ST HOSP IP/OBS HIGH 75: CPT

## 2022-11-25 PROCEDURE — 99233 SBSQ HOSP IP/OBS HIGH 50: CPT

## 2022-11-25 RX ORDER — ALBUMIN HUMAN 25 %
100 VIAL (ML) INTRAVENOUS ONCE
Refills: 0 | Status: COMPLETED | OUTPATIENT
Start: 2022-11-25 | End: 2022-11-25

## 2022-11-25 RX ORDER — MIRTAZAPINE 45 MG/1
7.5 TABLET, ORALLY DISINTEGRATING ORAL AT BEDTIME
Refills: 0 | Status: DISCONTINUED | OUTPATIENT
Start: 2022-11-25 | End: 2022-12-10

## 2022-11-25 RX ADMIN — Medication 50 MILLILITER(S): at 13:46

## 2022-11-25 RX ADMIN — ATORVASTATIN CALCIUM 80 MILLIGRAM(S): 80 TABLET, FILM COATED ORAL at 22:02

## 2022-11-25 RX ADMIN — APIXABAN 5 MILLIGRAM(S): 2.5 TABLET, FILM COATED ORAL at 05:14

## 2022-11-25 RX ADMIN — PANTOPRAZOLE SODIUM 40 MILLIGRAM(S): 20 TABLET, DELAYED RELEASE ORAL at 05:14

## 2022-11-25 RX ADMIN — BUDESONIDE AND FORMOTEROL FUMARATE DIHYDRATE 2 PUFF(S): 160; 4.5 AEROSOL RESPIRATORY (INHALATION) at 09:35

## 2022-11-25 RX ADMIN — Medication 1 TABLET(S): at 11:48

## 2022-11-25 RX ADMIN — PANTOPRAZOLE SODIUM 40 MILLIGRAM(S): 20 TABLET, DELAYED RELEASE ORAL at 17:04

## 2022-11-25 RX ADMIN — BUDESONIDE AND FORMOTEROL FUMARATE DIHYDRATE 2 PUFF(S): 160; 4.5 AEROSOL RESPIRATORY (INHALATION) at 21:42

## 2022-11-25 RX ADMIN — APIXABAN 5 MILLIGRAM(S): 2.5 TABLET, FILM COATED ORAL at 17:03

## 2022-11-25 RX ADMIN — MIRTAZAPINE 7.5 MILLIGRAM(S): 45 TABLET, ORALLY DISINTEGRATING ORAL at 22:03

## 2022-11-25 RX ADMIN — CHLORHEXIDINE GLUCONATE 1 APPLICATION(S): 213 SOLUTION TOPICAL at 11:45

## 2022-11-25 RX ADMIN — LEVALBUTEROL 1.25 MILLIGRAM(S): 1.25 SOLUTION, CONCENTRATE RESPIRATORY (INHALATION) at 04:20

## 2022-11-25 RX ADMIN — LEVALBUTEROL 1.25 MILLIGRAM(S): 1.25 SOLUTION, CONCENTRATE RESPIRATORY (INHALATION) at 21:42

## 2022-11-25 RX ADMIN — Medication 81 MILLIGRAM(S): at 11:44

## 2022-11-25 RX ADMIN — LEVALBUTEROL 1.25 MILLIGRAM(S): 1.25 SOLUTION, CONCENTRATE RESPIRATORY (INHALATION) at 14:40

## 2022-11-25 RX ADMIN — MIDODRINE HYDROCHLORIDE 10 MILLIGRAM(S): 2.5 TABLET ORAL at 05:14

## 2022-11-25 RX ADMIN — LEVALBUTEROL 1.25 MILLIGRAM(S): 1.25 SOLUTION, CONCENTRATE RESPIRATORY (INHALATION) at 09:29

## 2022-11-25 RX ADMIN — TIOTROPIUM BROMIDE 1 CAPSULE(S): 18 CAPSULE ORAL; RESPIRATORY (INHALATION) at 09:30

## 2022-11-25 RX ADMIN — MIDODRINE HYDROCHLORIDE 10 MILLIGRAM(S): 2.5 TABLET ORAL at 13:46

## 2022-11-25 RX ADMIN — Medication 500 MILLIGRAM(S): at 11:44

## 2022-11-25 RX ADMIN — MIDODRINE HYDROCHLORIDE 10 MILLIGRAM(S): 2.5 TABLET ORAL at 22:02

## 2022-11-25 NOTE — BH CONSULTATION LIAISON ASSESSMENT NOTE - SUMMARY
Patient is a 67 year old male who is unemployed, domiciled, with no known psychiatric history  and no PMH who presented with 1 week of SOB who was found to have a likely adenocarcinoma. Psychiatry called to eval for depression     Recs   Does not require inpatient psych admission   Recommend to start Remeron 7.5mg po at bedtime for mood, sleep and appetite stimulation   Unclear what baseline cognition is, recommend to keep delirium precautions   If DC to SNF, recommend continued psych f/u

## 2022-11-25 NOTE — BH CONSULTATION LIAISON ASSESSMENT NOTE - NSBHCHARTREVIEWINVESTIGATE_PSY_A_CORE FT
< from: 12 Lead ECG (11.20.22 @ 16:13) >    Ventricular Rate 123 BPM    Atrial Rate 123 BPM    P-R Interval 166 ms    QRS Duration 80 ms    Q-T Interval 304 ms    QTC Calculation(Bazett) 435 ms    P Axis 77 degrees    R Axis 21 degrees    T Axis 70 degrees    Diagnosis Line Sinus tachycardia  Possible Anterior infarct , age undetermined  Abnormal ECG    Confirmed by Rios Valerio ([id number]) on 11/20/2022 5:46:26 PM    < end of copied text >

## 2022-11-25 NOTE — BH CONSULTATION LIAISON ASSESSMENT NOTE - HPI (INCLUDE ILLNESS QUALITY, SEVERITY, DURATION, TIMING, CONTEXT, MODIFYING FACTORS, ASSOCIATED SIGNS AND SYMPTOMS)
Patient is a 67 year old male who is unemployed, domiciled, with no known psychiatric history  and no PMH who presented with 1 week of SOB who was found to have a likely adenocarcinoma. Psychiatry called to [facility]na for depression     Patient seen and evaluated and found to be calm and cooperative. Patient pleasant but oddly related and difficult to understand at times due to accent and also with some garbles speech but oriented x3. Patient states he is in the hospital and told he likely has an "uncurable condition". Patients states he is trying to deal with the news and when asked bout depression, he states he has been depressed his "whole life" but denies any past treatment or SA. currently reporting poor sleep, poor appetite, denies any s/h ideation, denies symptoms of ariadna or aVH 
0.22

## 2022-11-25 NOTE — PROGRESS NOTE ADULT - SUBJECTIVE AND OBJECTIVE BOX
[facility] Division of Hospital Medicine    Chief Complaint: pericardial effusion      SUBJECTIVE / OVERNIGHT EVENTS: No acute events overnight. Patient continue to require NC 4L. Patient was hypotensive to 70s/60s and will be given Albumin, x 1.      Patient denies chest pain, SOB, abd pain, N/V, fever, chills, dysuria or any other complaints. All remainder ROS negative.     MEDICATIONS  (STANDING):  apixaban 5 milliGRAM(s) Oral every 12 hours  ascorbic acid 500 milliGRAM(s) Oral daily  aspirin enteric coated 81 milliGRAM(s) Oral daily  atorvastatin 80 milliGRAM(s) Oral at bedtime  budesonide 160 MICROgram(s)/formoterol 4.5 MICROgram(s) Inhaler 2 Puff(s) Inhalation two times a day  chlorhexidine 2% Cloths 1 Application(s) Topical daily  furosemide   Injectable 20 milliGRAM(s) IV Push once  influenza  Vaccine (HIGH DOSE) 0.7 milliLiter(s) IntraMuscular once  levalbuterol Inhalation 1.25 milliGRAM(s) Inhalation every 6 hours  lidocaine   4% Patch 2 Patch Transdermal daily  midodrine 10 milliGRAM(s) Oral every 8 hours  multivitamin/minerals 1 Tablet(s) Oral daily  pantoprazole  Injectable 40 milliGRAM(s) IV Push two times a day  tiotropium 18 MICROgram(s) Capsule 1 Capsule(s) Inhalation daily    MEDICATIONS  (PRN):  acetaminophen     Tablet .. 650 milliGRAM(s) Oral every 8 hours PRN Mild Pain (1 - 3), Moderate Pain (4 - 6)  melatonin 5 milliGRAM(s) Oral at bedtime PRN Insomnia        I&O's Summary    24 Nov 2022 07:01  -  25 Nov 2022 07:00  --------------------------------------------------------  IN: 0 mL / OUT: 1250 mL / NET: -1250 mL        PHYSICAL EXAM:  Vital Signs Last 24 Hrs  T(C): 36.8 (25 Nov 2022 08:15), Max: 36.9 (24 Nov 2022 20:00)  T(F): 98.3 (25 Nov 2022 08:15), Max: 98.5 (25 Nov 2022 00:00)  HR: 100 (25 Nov 2022 11:07) (92 - 100)  BP: 76/61 (25 Nov 2022 11:07) (76/61 - 117/70)  BP(mean): 77 (25 Nov 2022 06:00) (65 - 82)  RR: 20 (25 Nov 2022 11:07) (18 - 27)  SpO2: 94% (25 Nov 2022 11:07) (92% - 98%)    Parameters below as of 25 Nov 2022 11:07  Patient On (Oxygen Delivery Method): nasal cannula  O2 Flow (L/min): 4    CONSTITUTIONAL: ill-appearing, sitting in the chair and eating breakfast  RESPIRATORY: Decreased breath sounds; no wheezing  CARDIOVASCULAR: Regular rate and rhythm, normal S1 and S2  ABDOMEN: Nontender to palpation, normoactive bowel sounds, no rebound/guarding  PSYCH: A+O to person, place, and time; affect appropriate  NEUROLOGY: CN 2-12 are intact and symmetric; no gross sensory deficits    LABS:                        10.4   9.33  )-----------( 315      ( 25 Nov 2022 06:21 )             32.2     11-25    141  |  102  |  27.7<H>  ----------------------------<  96  4.0   |  31.0<H>  |  0.34<L>    Ca    8.9      25 Nov 2022 06:21    TPro  6.2<L>  /  Alb  3.3  /  TBili  0.8  /  DBili  x   /  AST  30  /  ALT  51<H>  /  AlkPhos  85  11-25              CAPILLARY BLOOD GLUCOSE            RADIOLOGY & ADDITIONAL TESTS:  Results Reviewed:   Imaging Personally Reviewed:  < from: MR Head w/wo IV Cont (11.24.22 @ 22:03) >  IMPRESSION: Incomplete severely motion degraded study. Multiple   intracranial metastatic lesions. Repeat imaging with anesthesia sedation   and IV contrast is recommended.    --- End of Report ---            REBECCA BOLDEN MD; Attending Radiologist  This document has been electronically signed. Nov 25 2022  9:39AM    < end of copied text >      Electrocardiogram Personally Reviewed:

## 2022-11-25 NOTE — PROGRESS NOTE ADULT - ASSESSMENT
67-year-old male With no known medical history with lack of medical follow-up with tobacco use disorder presented on November 7 with difficulty breathing found to have diffuse ST elevation taken for emergent left heart cath found to have 90% stenosis of mid LAD with no acute lesion with no intervention but s/p pericardial drain for moderate to large pericardial effusion was initially admitted to medical ICU eventually found to have metastatic adenocarcinoma most likely from lung as primary based on pericardial fluid pathology, through hospital course patient was also found to have SVC and intrahepatic IVC thrombi as well as left-sided segmental subsegmental PE, mediastinal lymphadenopathy.  Hospital course complicated by GI bleed and on overnight from 20th November through early morning off 21st November while patient was on Eliquis receiving blood products, underwent CT abdomen pelvis with contrast which did not reveal active bleeding and resuscitated for 2 units PRBC along with colloid and crystalloid resuscitation.     Stage 4 adenocardircinoma:  -once stable will need systemic therapy  -NGS to be sent on the pathology specimen to check for actionable mutations including PDL1 analysis  -will need outpatient PET-CT scan  -MRI brain - poor study; brain mets seen.  recommended repeat under anaesthesia     Right pleural effusion  - Thoracic surgery following   - s/p pigtail drainage  home o2 evaluation    GIB  s/p EGD showing gastritis  s/p PRBC, hgb > 10 now  on protonix, eliquis restarted  monitor for bleed    DNR/DNI  palliative care evaluation

## 2022-11-25 NOTE — PROGRESS NOTE ADULT - ASSESSMENT
67 yr old male with no known medical history, current smoker presented with complaints of 1 week of shortness of breath. His EKG on arrival revealed diffuse ST elevations, a limited ECHO revealed circumferential pericardial effusion. He was emergently taken to cardiac cath lab where he underwent pericardiocentesis and coronary angiogram. A pericardial drain was placed, cath revealed a 90% LAD lesion, which was not felt to be acute and given pericardial effusion, PCI was deferred. His presentation was attributed to acute pericarditis He was placed on a NRB, transferred to ICU for further monitoring. His labs on admission revealed FILIBERTO and elevated LFTs, IVF were given. A US abdomen was ordered, revealed non occlusive thrombus in IVC and gall bladder sludge. CXR on admission revealed a left lung mass. Subsequent CTA chest, abdomen and pelvis was done, revealed left upper lobe segmental and subsegmental PE. Occlusion of the SVC and a thrombus within the intrahepatic IVC. Mediastinal and right hilar lymphadenopathy with resultant central  bronchial obstruction and atelectasis involving the right middle and lower lobes. 4.1 cm sized left lower lobe mass compatible with a pulmonary malignancy. Moderate right and a trace left pleural effusion. Concern for malignant cells in pericardial fluid, formal cytology report pending. ECHO revealed a EF 50%. Cardiology follow up was done, advised continue aspirin and statin, unable to initiate GDMT given hypotension. Heparin gtt was initiated, palliative care was consulted. He was transferred to medical floor on 11/9. C/b symptomatic anemia in the setting of GI bleed and hypotension.    Right pleural effusion  - Thoracic surgery following   - s/p pigtail drainage  - Will give IV Lasix 20 mg, x 1 (11/240  - F/u CXR to r/o volume overload  home o2 evaluation    #Stage 4 adenocarcinoma  - Appreciate heme/onc recs- will need systemic chemotherapy for Stage 4 malignancy once medically optimized  - MRI-H notable for multiple intracranial metastatic lesion.  - Patient deferred repeat imaging under anesthesia. Will follow up outpatient for PET scan  - will need next generation sequencing and outpatient PET-CT scan  -  c/s placed to evaluate for depression    #Melena- Patient with reported dark stool overnight  - CT A/P w/o source of bleeding  - Holding ASA and eliquis  - C/w IV Protonix BID  - Appreciate GI recs- S/p EGD w/ non-erosive diffuse gastritis. F/u H. pylori and duodenal biopsies  - Will c/w protonix 40 BID  - Will transfuse to keep Hg> 8  - Appreciate cards c/s for medical clearance    #Hypotension  - Likely in the setting of GIB  - MICU re-consulted. Deemed not a candidate  - F/u blood cx- NGTD  - Will keep Hg> 8  - Low threshold for ICU consult if hypotensive    Acute pericardial effusion s/p pericardiocentesis  - Pericardial drain removed  - Outpatient PET scan  - C/w Aspirin  - s/p IR lung biopsy- NSCLC  repeat echo w/o pericardial effusion reaccumulation    Acute pulmonary embolism/ IVC thrombus   - c/w eliquis    HFrEF with wall motion abnormalities   PCI not performed on admission given effusion  90% LAD stenosis   - Appreciate cardio recs- if chest pain, will obtain CE and EKG  - Will continue to hold toprol XL and lisinopril given soft BP    Lung mass /cancer   - Oncology recs appreciated  - Palliative consult appreciated    Smoker, likely COPD    symbicort/spiriva    prn nebs     Diet- DASH  PT evaluation --roslyn   Dispo- Pending straight Medicaid insurance for [facility] placement.      Patient is uninsured and homeless. Not on home oxygen. Will need oxygen  CODE STATUS- DNR/DNI.     Updated HCP friend [facility] - [phone]) on 11/24.

## 2022-11-25 NOTE — BH CONSULTATION LIAISON ASSESSMENT NOTE - NSBHCHARTREVIEWLAB_PSY_A_CORE FT
Basic Metabolic Panel - STAT (11.21.22 @ 19:20)    Sodium, Serum: 139 mmol/L    Potassium, Serum: 3.7 mmol/L    Chloride, Serum: 104: Chloride reference range changed from ..10/26/2022 mmol/L    Carbon Dioxide, Serum: 26.0 mmol/L    Anion Gap, Serum: 9 mmol/L    Blood Urea Nitrogen, Serum: 32.7 mg/dL    Creatinine, Serum: 0.28 mg/dL    Glucose, Serum: 98 mg/dL    Calcium, Total Serum: 8.2: Prior Reference Range of 8.6 – 10.2 was amended as of 7/26/2022 to 8.4 –  10.5. mg/dL    eGFR: 132: The estimated glomerular filtration rate (eGFR) is calculated using the  2021 CKD-EPI creatinine equation, which does not have a coefficient for  race and is validated in individuals 18 years of age and older (N Engl J  Med 2021; 385:4392-9352). Creatinine-based eGFR may be inaccurate in  various situations including but not limited to extremes of muscle mass,  altered dietary protein intake, or medications that affect renal tubular  creatinine secretion. mL/min/1.73m2

## 2022-11-25 NOTE — BH CONSULTATION LIAISON ASSESSMENT NOTE - NSBHCHARTREVIEWVS_PSY_A_CORE FT
Vital Signs Last 24 Hrs  T(C): 36.8 (25 Nov 2022 15:29), Max: 36.9 (24 Nov 2022 20:00)  T(F): 98.3 (25 Nov 2022 15:29), Max: 98.5 (25 Nov 2022 00:00)  HR: 91 (25 Nov 2022 15:35) (90 - 100)  BP: 91/61 (25 Nov 2022 15:35) (76/61 - 117/70)  BP(mean): 77 (25 Nov 2022 06:00) (65 - 82)  RR: 20 (25 Nov 2022 15:35) (18 - 27)  SpO2: 100% (25 Nov 2022 15:35) (92% - 100%)    Parameters below as of 25 Nov 2022 15:35  Patient On (Oxygen Delivery Method): nasal cannula  O2 Flow (L/min): 2

## 2022-11-25 NOTE — BH CONSULTATION LIAISON ASSESSMENT NOTE - NSBHMSESPABN_PSY_A_CORE
Amrit is feeling potential side effects. He is experiencing more twitching that is occurring during the day and more often at night. He will occasionally bite his tongue. He also has a lot of sores around his tongue. He is not sure if those are sores related to lamotrigine, or if these are canker sores. He has had the mouth sores for 9 months or so. He reports that his sores move around.   All of the sores are located on his tongue.     Advised that Amrit contact [email or web address] for additional recommendations.   Slowed rate/Impaired articulation

## 2022-11-25 NOTE — PROGRESS NOTE ADULT - SUBJECTIVE AND OBJECTIVE BOX
Discussed with the patient regarding diagnosis of adenocarcinoma likely lung origin, and treatment options based on next generation sequencing.   discussed with the patient, once medically stable would be a candidate for such treatment options, likely on outpatient basis.  Patient verbalized understanding.  events noted, GIB , drop in Hgb, s/p EGD showing gastritis  now Hgb stable    MEDICATIONS  (STANDING):  apixaban 5 milliGRAM(s) Oral every 12 hours  ascorbic acid 500 milliGRAM(s) Oral daily  aspirin enteric coated 81 milliGRAM(s) Oral daily  atorvastatin 80 milliGRAM(s) Oral at bedtime  budesonide 160 MICROgram(s)/formoterol 4.5 MICROgram(s) Inhaler 2 Puff(s) Inhalation two times a day  chlorhexidine 2% Cloths 1 Application(s) Topical daily  furosemide   Injectable 20 milliGRAM(s) IV Push once  influenza  Vaccine (HIGH DOSE) 0.7 milliLiter(s) IntraMuscular once  levalbuterol Inhalation 1.25 milliGRAM(s) Inhalation every 6 hours  lidocaine   4% Patch 2 Patch Transdermal daily  midodrine 10 milliGRAM(s) Oral every 8 hours  multivitamin/minerals 1 Tablet(s) Oral daily  pantoprazole  Injectable 40 milliGRAM(s) IV Push two times a day  tiotropium 18 MICROgram(s) Capsule 1 Capsule(s) Inhalation daily      Vital Signs Last 24 Hrs  T(C): 36.8 (25 Nov 2022 08:15), Max: 36.9 (24 Nov 2022 12:00)  T(F): 98.3 (25 Nov 2022 08:15), Max: 98.5 (24 Nov 2022 12:00)  HR: 100 (25 Nov 2022 11:07) (92 - 100)  BP: 76/61 (25 Nov 2022 11:07) (76/61 - 117/70)  BP(mean): 77 (25 Nov 2022 06:00) (65 - 82)  RR: 20 (25 Nov 2022 11:07) (18 - 27)  SpO2: 94% (25 Nov 2022 11:07) (92% - 98%)    Parameters below as of 25 Nov 2022 11:07  Patient On (Oxygen Delivery Method): nasal cannula  O2 Flow (L/min): 4        CONSTITUTIONAL: ill-appearing, frail, cachetic  RESPIRATORY: Normal respiratory effort; lungs are clear to auscultation bilaterally  CARDIOVASCULAR: Regular rate and rhythm, normal S1 and S2  ABDOMEN: Nontender to palpation, normoactive bowel sounds, no rebound/guarding  PSYCH: A+O to person, place, and time; affect appropriate  NEUROLOGY: CN 2-12 are intact and symmetric; no gross sensory deficits    LABS:                          10.4   9.33  )-----------( 315      ( 25 Nov 2022 06:21 )             32.2                              10.6   9.37  )-----------( 296      ( 24 Nov 2022 06:38 )             31.8                        7.5    9.22  )-----------( 202      ( 22 Nov 2022 08:40 )             21.9     11-24    140  |  100  |  21.1<H>  ----------------------------<  132<H>  3.6   |  30.0<H>  |  0.40<L>    Ca    8.9      24 Nov 2022 06:38    TPro  6.0<L>  /  Alb  3.4  /  TBili  0.7  /  DBili  x   /  AST  39  /  ALT  60<H>  /  AlkPhos  84  11-24 11-22    144  |  107  |  28.8<H>  ----------------------------<  89  3.8   |  27.0  |  0.33<L>    Ca    8.7      22 Nov 2022 05:00    TPro  5.4<L>  /  Alb  3.3  /  TBili  1.0  /  DBili  x   /  AST  51<H>  /  ALT  61<H>  /  AlkPhos  57  11-22

## 2022-11-25 NOTE — BH CONSULTATION LIAISON ASSESSMENT NOTE - CURRENT MEDICATION
MEDICATIONS  (STANDING):  apixaban 5 milliGRAM(s) Oral every 12 hours  ascorbic acid 500 milliGRAM(s) Oral daily  aspirin enteric coated 81 milliGRAM(s) Oral daily  atorvastatin 80 milliGRAM(s) Oral at bedtime  budesonide 160 MICROgram(s)/formoterol 4.5 MICROgram(s) Inhaler 2 Puff(s) Inhalation two times a day  chlorhexidine 2% Cloths 1 Application(s) Topical daily  influenza  Vaccine (HIGH DOSE) 0.7 milliLiter(s) IntraMuscular once  levalbuterol Inhalation 1.25 milliGRAM(s) Inhalation every 6 hours  lidocaine   4% Patch 2 Patch Transdermal daily  midodrine 10 milliGRAM(s) Oral every 8 hours  mirtazapine 7.5 milliGRAM(s) Oral at bedtime  multivitamin/minerals 1 Tablet(s) Oral daily  pantoprazole  Injectable 40 milliGRAM(s) IV Push two times a day  tiotropium 18 MICROgram(s) Capsule 1 Capsule(s) Inhalation daily    MEDICATIONS  (PRN):  acetaminophen     Tablet .. 650 milliGRAM(s) Oral every 8 hours PRN Mild Pain (1 - 3), Moderate Pain (4 - 6)  melatonin 5 milliGRAM(s) Oral at bedtime PRN Insomnia

## 2022-11-25 NOTE — CHART NOTE - NSCHARTNOTEFT_GEN_A_CORE
Source: Patient [x ]  Family [ ]   other [ ]    Current Diet: Diet, DASH/TLC:   Sodium & Cholesterol Restricted (11-21-22 @ 09:07)    PO intake:  < 50% [x ]   50-75%  [ ]   %  [ ]  other :    Current Weight:   (11/25) 104.2 lbs  (11/17) 88.1lbs  (11/16) 86.2lbs  (11/15) 85.3lbs  (11/14) 111.5lbs  (11/13) 84.2lbs  (11/11) 83.9lbs  (11/10) 83.7lbs  (11/9) 111.9lbs    % Weight Change- wt fluctuation noted, will continue to monitor for accuracy.  Aware pt with 2+ mild right arm edema noted per documenation    Pertinent Medications: MEDICATIONS  (STANDING):  apixaban 5 milliGRAM(s) Oral every 12 hours  ascorbic acid 500 milliGRAM(s) Oral daily  aspirin enteric coated 81 milliGRAM(s) Oral daily  atorvastatin 80 milliGRAM(s) Oral at bedtime  budesonide 160 MICROgram(s)/formoterol 4.5 MICROgram(s) Inhaler 2 Puff(s) Inhalation two times a day  chlorhexidine 2% Cloths 1 Application(s) Topical daily  influenza  Vaccine (HIGH DOSE) 0.7 milliLiter(s) IntraMuscular once  levalbuterol Inhalation 1.25 milliGRAM(s) Inhalation every 6 hours  lidocaine   4% Patch 2 Patch Transdermal daily  midodrine 10 milliGRAM(s) Oral every 8 hours  multivitamin/minerals 1 Tablet(s) Oral daily  pantoprazole  Injectable 40 milliGRAM(s) IV Push two times a day  tiotropium 18 MICROgram(s) Capsule 1 Capsule(s) Inhalation daily    MEDICATIONS  (PRN):  acetaminophen     Tablet .. 650 milliGRAM(s) Oral every 8 hours PRN Mild Pain (1 - 3), Moderate Pain (4 - 6)  melatonin 5 milliGRAM(s) Oral at bedtime PRN Insomnia    Pertinent Labs: CBC Full  -  ( 25 Nov 2022 06:21 )  WBC Count : 9.33 K/uL  RBC Count : 3.45 M/uL  Hemoglobin : 10.4 g/dL  Hematocrit : 32.2 %  Platelet Count - Automated : 315 K/uL  Mean Cell Volume : 93.3 fl  Mean Cell Hemoglobin : 30.1 pg  Mean Cell Hemoglobin Concentration : 32.3 gm/dL  11-25 Na141 mmol/L Glu 96 mg/dL K+ 4.0 mmol/L Cr  0.34 mg/dL<L> BUN 27.7 mg/dL<H> Phos n/a   Alb 3.3 g/dL PAB n/a       Skin: stage 2 coccyx    Nutrition focused physical exam previously conducted - found signs of malnutrition [ ]absent [x ]present    Subcutaneous fat loss: [x ] Orbital fat pads region, [x ]Buccal fat region, [ ]Triceps region,  [ ]Ribs region    Muscle wasting: [x ]Temples region, [x ]Clavicle region, [ x]Shoulder region, [ ]Scapula region, [ ]Interosseous region,  [ ]thigh region, [ ]Calf region    Current Nutrition Diagnosis: Pt remains at nutrition risk secondary to severe protein calorie malnutrition (chronic) related to inability to meet sufficient protein-energy needs with poor appetite in setting of large pericardial effusion, left lung mass (suspicious for malignancy) and skin breakdown as evidenced by pt with severe muscle wasting/fat loss and meeting <75% estimated nutrient needs >1 month.  Pt reports poor PO intake at meals; <25% consumed at lunch per tray observation.  Pt consumes Ensure supplement daily.  Aware pt's son had previously stated that patient's po intake and status has decreased since he moved out of state.  Discussed importance of consuming adequate protein intake at meals to optimize nutrition status. Pt agreeable.  RD to remain available.     Recommendations:   1) Continue Ensure TID   2) Continue with MVI, Vit C daily  3) Provide encouragement with meals/snacks  4) Obtain daily weight and monitor trend     Monitoring and Evaluation:   [x ] PO intake [x ] Tolerance to diet prescription [X] Weights  [X] Follow up per protocol [X] Labs:

## 2022-11-26 LAB
ALBUMIN SERPL ELPH-MCNC: 3.4 G/DL — SIGNIFICANT CHANGE UP (ref 3.3–5.2)
ALP SERPL-CCNC: 82 U/L — SIGNIFICANT CHANGE UP (ref 40–120)
ALT FLD-CCNC: 43 U/L — HIGH
ANION GAP SERPL CALC-SCNC: 11 MMOL/L — SIGNIFICANT CHANGE UP (ref 5–17)
AST SERPL-CCNC: 31 U/L — SIGNIFICANT CHANGE UP
BILIRUB SERPL-MCNC: 0.8 MG/DL — SIGNIFICANT CHANGE UP (ref 0.4–2)
BUN SERPL-MCNC: 22.8 MG/DL — HIGH (ref 8–20)
CALCIUM SERPL-MCNC: 9.2 MG/DL — SIGNIFICANT CHANGE UP (ref 8.4–10.5)
CHLORIDE SERPL-SCNC: 102 MMOL/L — SIGNIFICANT CHANGE UP (ref 96–108)
CO2 SERPL-SCNC: 31 MMOL/L — HIGH (ref 22–29)
CREAT SERPL-MCNC: 0.41 MG/DL — LOW (ref 0.5–1.3)
CULTURE RESULTS: SIGNIFICANT CHANGE UP
CULTURE RESULTS: SIGNIFICANT CHANGE UP
EGFR: 118 ML/MIN/1.73M2 — SIGNIFICANT CHANGE UP
GLUCOSE SERPL-MCNC: 85 MG/DL — SIGNIFICANT CHANGE UP (ref 70–99)
HCT VFR BLD CALC: 32.2 % — LOW (ref 39–50)
HGB BLD-MCNC: 10.3 G/DL — LOW (ref 13–17)
MCHC RBC-ENTMCNC: 30.2 PG — SIGNIFICANT CHANGE UP (ref 27–34)
MCHC RBC-ENTMCNC: 32 GM/DL — SIGNIFICANT CHANGE UP (ref 32–36)
MCV RBC AUTO: 94.4 FL — SIGNIFICANT CHANGE UP (ref 80–100)
PLATELET # BLD AUTO: 348 K/UL — SIGNIFICANT CHANGE UP (ref 150–400)
POTASSIUM SERPL-MCNC: 3.8 MMOL/L — SIGNIFICANT CHANGE UP (ref 3.5–5.3)
POTASSIUM SERPL-SCNC: 3.8 MMOL/L — SIGNIFICANT CHANGE UP (ref 3.5–5.3)
PROT SERPL-MCNC: 6.1 G/DL — LOW (ref 6.6–8.7)
RBC # BLD: 3.41 M/UL — LOW (ref 4.2–5.8)
RBC # FLD: 15.9 % — HIGH (ref 10.3–14.5)
SODIUM SERPL-SCNC: 144 MMOL/L — SIGNIFICANT CHANGE UP (ref 135–145)
SPECIMEN SOURCE: SIGNIFICANT CHANGE UP
SPECIMEN SOURCE: SIGNIFICANT CHANGE UP
WBC # BLD: 7.86 K/UL — SIGNIFICANT CHANGE UP (ref 3.8–10.5)
WBC # FLD AUTO: 7.86 K/UL — SIGNIFICANT CHANGE UP (ref 3.8–10.5)

## 2022-11-26 PROCEDURE — 99233 SBSQ HOSP IP/OBS HIGH 50: CPT

## 2022-11-26 RX ADMIN — PANTOPRAZOLE SODIUM 40 MILLIGRAM(S): 20 TABLET, DELAYED RELEASE ORAL at 05:28

## 2022-11-26 RX ADMIN — MIDODRINE HYDROCHLORIDE 10 MILLIGRAM(S): 2.5 TABLET ORAL at 05:25

## 2022-11-26 RX ADMIN — BUDESONIDE AND FORMOTEROL FUMARATE DIHYDRATE 2 PUFF(S): 160; 4.5 AEROSOL RESPIRATORY (INHALATION) at 20:20

## 2022-11-26 RX ADMIN — BUDESONIDE AND FORMOTEROL FUMARATE DIHYDRATE 2 PUFF(S): 160; 4.5 AEROSOL RESPIRATORY (INHALATION) at 08:28

## 2022-11-26 RX ADMIN — ATORVASTATIN CALCIUM 80 MILLIGRAM(S): 80 TABLET, FILM COATED ORAL at 22:11

## 2022-11-26 RX ADMIN — MIDODRINE HYDROCHLORIDE 10 MILLIGRAM(S): 2.5 TABLET ORAL at 22:11

## 2022-11-26 RX ADMIN — Medication 500 MILLIGRAM(S): at 15:45

## 2022-11-26 RX ADMIN — LEVALBUTEROL 1.25 MILLIGRAM(S): 1.25 SOLUTION, CONCENTRATE RESPIRATORY (INHALATION) at 08:26

## 2022-11-26 RX ADMIN — APIXABAN 5 MILLIGRAM(S): 2.5 TABLET, FILM COATED ORAL at 05:25

## 2022-11-26 RX ADMIN — PANTOPRAZOLE SODIUM 40 MILLIGRAM(S): 20 TABLET, DELAYED RELEASE ORAL at 19:39

## 2022-11-26 RX ADMIN — CHLORHEXIDINE GLUCONATE 1 APPLICATION(S): 213 SOLUTION TOPICAL at 15:49

## 2022-11-26 RX ADMIN — Medication 1 TABLET(S): at 15:50

## 2022-11-26 RX ADMIN — LEVALBUTEROL 1.25 MILLIGRAM(S): 1.25 SOLUTION, CONCENTRATE RESPIRATORY (INHALATION) at 03:43

## 2022-11-26 RX ADMIN — TIOTROPIUM BROMIDE 1 CAPSULE(S): 18 CAPSULE ORAL; RESPIRATORY (INHALATION) at 08:28

## 2022-11-26 RX ADMIN — LEVALBUTEROL 1.25 MILLIGRAM(S): 1.25 SOLUTION, CONCENTRATE RESPIRATORY (INHALATION) at 15:59

## 2022-11-26 RX ADMIN — MIRTAZAPINE 7.5 MILLIGRAM(S): 45 TABLET, ORALLY DISINTEGRATING ORAL at 22:11

## 2022-11-26 RX ADMIN — APIXABAN 5 MILLIGRAM(S): 2.5 TABLET, FILM COATED ORAL at 19:41

## 2022-11-26 RX ADMIN — MIDODRINE HYDROCHLORIDE 10 MILLIGRAM(S): 2.5 TABLET ORAL at 15:49

## 2022-11-26 RX ADMIN — Medication 81 MILLIGRAM(S): at 15:45

## 2022-11-26 RX ADMIN — LEVALBUTEROL 1.25 MILLIGRAM(S): 1.25 SOLUTION, CONCENTRATE RESPIRATORY (INHALATION) at 20:20

## 2022-11-26 NOTE — PROGRESS NOTE ADULT - ASSESSMENT
67 yr old male with no known medical history, current smoker presented with complaints of 1 week of shortness of breath. His EKG on arrival revealed diffuse ST elevations, a limited ECHO revealed circumferential pericardial effusion. He was emergently taken to cardiac cath lab where he underwent pericardiocentesis and coronary angiogram. A pericardial drain was placed, cath revealed a 90% LAD lesion, which was not felt to be acute and given pericardial effusion, PCI was deferred. His presentation was attributed to acute pericarditis He was placed on a NRB, transferred to ICU for further monitoring. His labs on admission revealed FILIBERTO and elevated LFTs, IVF were given. A US abdomen was ordered, revealed non occlusive thrombus in IVC and gall bladder sludge. CXR on admission revealed a left lung mass. Subsequent CTA chest, abdomen and pelvis was done, revealed left upper lobe segmental and subsegmental PE. Occlusion of the SVC and a thrombus within the intrahepatic IVC. Mediastinal and right hilar lymphadenopathy with resultant central  bronchial obstruction and atelectasis involving the right middle and lower lobes. 4.1 cm sized left lower lobe mass compatible with a pulmonary malignancy. Moderate right and a trace left pleural effusion. Concern for malignant cells in pericardial fluid, formal cytology report pending. ECHO revealed a EF 50%. Cardiology follow up was done, advised continue aspirin and statin, unable to initiate GDMT given hypotension. Heparin gtt was initiated, palliative care was consulted. He was transferred to medical floor on 11/9. C/b symptomatic anemia in the setting of GI bleed and hypotension.    Right pleural effusion  - Thoracic surgery following   - s/p pigtail drainage  - Will require home O2    Stage 4 adenocarcinoma  - Appreciate heme/onc recs- will need systemic chemotherapy for Stage 4 malignancy once medically optimized  - MRI-H notable for multiple intracranial metastatic lesion.  - Patient deferred repeat imaging under anesthesia. Will follow up outpatient for PET scan  - will need next generation sequencing and outpatient PET-CT scan    Depression  - BH recs appreciated    Melena  - Resolved  - CT A/P w/o source of bleeding  - Holding ASA and eliquis  - C/w IV Protonix BID  - Appreciate GI recs- S/p EGD w/ non-erosive diffuse gastritis. F/u H. pylori and duodenal biopsies  - Will c/w protonix 40 BID  - Will transfuse to keep Hg> 8  - Appreciate cards c/s for medical clearance    Hypotension  - Likely in the setting of GIB  - MICU re-consulted. Deemed not a candidate  - F/u blood cx- NGTD  - Will keep Hg> 8  - Low threshold for ICU consult if hypotensive    Acute pericardial effusion s/p pericardiocentesis  - Pericardial drain removed  - Outpatient PET scan  - C/w Aspirin  - s/p IR lung biopsy- NSCLC  - repeat echo w/o pericardial effusion reaccumulation    Acute pulmonary embolism/ IVC thrombus   - c/w eliquis    HFrEF with wall motion abnormalities   PCI not performed on admission given effusion  90% LAD stenosis   - Appreciate cardio recs- if chest pain, will obtain CE and EKG  - Will continue to hold toprol XL and lisinopril given soft BP    Lung mass /cancer   - Oncology recs appreciated  - Palliative consult appreciated    Smoker, likely COPD   - symbicort/spiriva  - PRN Nebs    Dispo- Pending straight Medicaid insurance for TRAN placement.      Patient is uninsured and homeless. Not on home oxygen. Will need oxygen  CODE STATUS- DNR/DNI.

## 2022-11-26 NOTE — PROGRESS NOTE ADULT - SUBJECTIVE AND OBJECTIVE BOX
Chief complaint: Pericardial effusion    Patient seen and examined at bedside. No acute overnight events reported. No fever, chills, cough, chest pain, nausea or vomiting.     Vital Signs Last 24 Hrs  T(F): 98.1 (26 Nov 2022 05:20), Max: 98.3 (25 Nov 2022 15:29)  HR: 83 (26 Nov 2022 08:33) (80 - 95)  BP: 93/63 (26 Nov 2022 05:20) (91/61 - 112/76)  RR: 18 (26 Nov 2022 05:20) (18 - 21)  SpO2: 93% (26 Nov 2022 08:33) (90% - 100%)    Physical Exam:  Constitutional: alert and oriented, in no acute distress   Neck: Soft and supple  Respiratory: Clear to auscultation bilaterally  Cardiovascular: Regular rate and rhyhtm  Gastrointestinal: Soft, non-tender to palpation, +bs  Musculoskeletal: 5/5 strength b/l upper and lower extremities, no lower extremity edema bilaterally    Labs:                        10.3   7.86  )-----------( 348      ( 26 Nov 2022 06:34 )             32.2   11-26    144  |  102  |  22.8<H>  ----------------------------<  85  3.8   |  31.0<H>  |  0.41<L>    Ca    9.2      26 Nov 2022 06:34    TPro  6.1<L>  /  Alb  3.4  /  TBili  0.8  /  DBili  x   /  AST  31  /  ALT  43<H>  /  AlkPhos  82  11-26

## 2022-11-27 LAB
ALBUMIN SERPL ELPH-MCNC: 3 G/DL — LOW (ref 3.3–5.2)
ALP SERPL-CCNC: 74 U/L — SIGNIFICANT CHANGE UP (ref 40–120)
ALT FLD-CCNC: 38 U/L — SIGNIFICANT CHANGE UP
ANION GAP SERPL CALC-SCNC: 12 MMOL/L — SIGNIFICANT CHANGE UP (ref 5–17)
ANION GAP SERPL CALC-SCNC: 9 MMOL/L — SIGNIFICANT CHANGE UP (ref 5–17)
APPEARANCE UR: CLEAR — SIGNIFICANT CHANGE UP
AST SERPL-CCNC: 29 U/L — SIGNIFICANT CHANGE UP
BACTERIA # UR AUTO: ABNORMAL
BASOPHILS # BLD AUTO: 0.02 K/UL — SIGNIFICANT CHANGE UP (ref 0–0.2)
BASOPHILS NFR BLD AUTO: 0.4 % — SIGNIFICANT CHANGE UP (ref 0–2)
BILIRUB SERPL-MCNC: 0.6 MG/DL — SIGNIFICANT CHANGE UP (ref 0.4–2)
BILIRUB UR-MCNC: NEGATIVE — SIGNIFICANT CHANGE UP
BLD GP AB SCN SERPL QL: SIGNIFICANT CHANGE UP
BUN SERPL-MCNC: 39.4 MG/DL — HIGH (ref 8–20)
BUN SERPL-MCNC: 45.4 MG/DL — HIGH (ref 8–20)
CALCIUM SERPL-MCNC: 8.9 MG/DL — SIGNIFICANT CHANGE UP (ref 8.4–10.5)
CALCIUM SERPL-MCNC: 9 MG/DL — SIGNIFICANT CHANGE UP (ref 8.4–10.5)
CHLORIDE SERPL-SCNC: 101 MMOL/L — SIGNIFICANT CHANGE UP (ref 96–108)
CHLORIDE SERPL-SCNC: 99 MMOL/L — SIGNIFICANT CHANGE UP (ref 96–108)
CO2 SERPL-SCNC: 28 MMOL/L — SIGNIFICANT CHANGE UP (ref 22–29)
CO2 SERPL-SCNC: 31 MMOL/L — HIGH (ref 22–29)
COLOR SPEC: YELLOW — SIGNIFICANT CHANGE UP
CREAT SERPL-MCNC: 0.44 MG/DL — LOW (ref 0.5–1.3)
CREAT SERPL-MCNC: 0.46 MG/DL — LOW (ref 0.5–1.3)
DIFF PNL FLD: ABNORMAL
EGFR: 114 ML/MIN/1.73M2 — SIGNIFICANT CHANGE UP
EGFR: 115 ML/MIN/1.73M2 — SIGNIFICANT CHANGE UP
EOSINOPHIL # BLD AUTO: 0.03 K/UL — SIGNIFICANT CHANGE UP (ref 0–0.5)
EOSINOPHIL NFR BLD AUTO: 0.5 % — SIGNIFICANT CHANGE UP (ref 0–6)
GAS PNL BLDA: SIGNIFICANT CHANGE UP
GLUCOSE SERPL-MCNC: 131 MG/DL — HIGH (ref 70–99)
GLUCOSE SERPL-MCNC: 85 MG/DL — SIGNIFICANT CHANGE UP (ref 70–99)
GLUCOSE UR QL: NEGATIVE — SIGNIFICANT CHANGE UP
HCT VFR BLD CALC: 24.1 % — LOW (ref 39–50)
HCT VFR BLD CALC: 26.3 % — LOW (ref 39–50)
HGB BLD-MCNC: 7.8 G/DL — LOW (ref 13–17)
HGB BLD-MCNC: 8.8 G/DL — LOW (ref 13–17)
IMM GRANULOCYTES NFR BLD AUTO: 0.4 % — SIGNIFICANT CHANGE UP (ref 0–0.9)
IRON SATN MFR SERPL: 20 UG/DL — LOW (ref 59–158)
IRON SATN MFR SERPL: 9 % — LOW (ref 16–55)
KETONES UR-MCNC: NEGATIVE — SIGNIFICANT CHANGE UP
LACTATE SERPL-SCNC: 2 MMOL/L — SIGNIFICANT CHANGE UP (ref 0.5–2)
LEUKOCYTE ESTERASE UR-ACNC: ABNORMAL
LYMPHOCYTES # BLD AUTO: 0.79 K/UL — LOW (ref 1–3.3)
LYMPHOCYTES # BLD AUTO: 13.8 % — SIGNIFICANT CHANGE UP (ref 13–44)
MAGNESIUM SERPL-MCNC: 2 MG/DL — SIGNIFICANT CHANGE UP (ref 1.6–2.6)
MCHC RBC-ENTMCNC: 30.3 PG — SIGNIFICANT CHANGE UP (ref 27–34)
MCHC RBC-ENTMCNC: 30.7 PG — SIGNIFICANT CHANGE UP (ref 27–34)
MCHC RBC-ENTMCNC: 32.4 GM/DL — SIGNIFICANT CHANGE UP (ref 32–36)
MCHC RBC-ENTMCNC: 33.5 GM/DL — SIGNIFICANT CHANGE UP (ref 32–36)
MCV RBC AUTO: 90.7 FL — SIGNIFICANT CHANGE UP (ref 80–100)
MCV RBC AUTO: 94.9 FL — SIGNIFICANT CHANGE UP (ref 80–100)
MONOCYTES # BLD AUTO: 0.42 K/UL — SIGNIFICANT CHANGE UP (ref 0–0.9)
MONOCYTES NFR BLD AUTO: 7.4 % — SIGNIFICANT CHANGE UP (ref 2–14)
NEUTROPHILS # BLD AUTO: 4.43 K/UL — SIGNIFICANT CHANGE UP (ref 1.8–7.4)
NEUTROPHILS NFR BLD AUTO: 77.5 % — HIGH (ref 43–77)
NITRITE UR-MCNC: POSITIVE
PH UR: 6 — SIGNIFICANT CHANGE UP (ref 5–8)
PHOSPHATE SERPL-MCNC: 3.1 MG/DL — SIGNIFICANT CHANGE UP (ref 2.4–4.7)
PLATELET # BLD AUTO: 307 K/UL — SIGNIFICANT CHANGE UP (ref 150–400)
PLATELET # BLD AUTO: 330 K/UL — SIGNIFICANT CHANGE UP (ref 150–400)
POTASSIUM SERPL-MCNC: 4.3 MMOL/L — SIGNIFICANT CHANGE UP (ref 3.5–5.3)
POTASSIUM SERPL-MCNC: 4.5 MMOL/L — SIGNIFICANT CHANGE UP (ref 3.5–5.3)
POTASSIUM SERPL-SCNC: 4.3 MMOL/L — SIGNIFICANT CHANGE UP (ref 3.5–5.3)
POTASSIUM SERPL-SCNC: 4.5 MMOL/L — SIGNIFICANT CHANGE UP (ref 3.5–5.3)
PROCALCITONIN SERPL-MCNC: 0.2 NG/ML — HIGH (ref 0.02–0.1)
PROT SERPL-MCNC: 5.9 G/DL — LOW (ref 6.6–8.7)
PROT UR-MCNC: NEGATIVE — SIGNIFICANT CHANGE UP
RAPID RVP RESULT: SIGNIFICANT CHANGE UP
RBC # BLD: 2.54 M/UL — LOW (ref 4.2–5.8)
RBC # BLD: 2.9 M/UL — LOW (ref 4.2–5.8)
RBC # FLD: 15.8 % — HIGH (ref 10.3–14.5)
RBC # FLD: 16.3 % — HIGH (ref 10.3–14.5)
RBC CASTS # UR COMP ASSIST: NEGATIVE /HPF — SIGNIFICANT CHANGE UP (ref 0–4)
SARS-COV-2 RNA SPEC QL NAA+PROBE: SIGNIFICANT CHANGE UP
SODIUM SERPL-SCNC: 139 MMOL/L — SIGNIFICANT CHANGE UP (ref 135–145)
SODIUM SERPL-SCNC: 141 MMOL/L — SIGNIFICANT CHANGE UP (ref 135–145)
SP GR SPEC: 1.01 — SIGNIFICANT CHANGE UP (ref 1.01–1.02)
TIBC SERPL-MCNC: 212 UG/DL — LOW (ref 220–430)
TRANSFERRIN SERPL-MCNC: 148 MG/DL — LOW (ref 180–329)
UROBILINOGEN FLD QL: NEGATIVE — SIGNIFICANT CHANGE UP
VIT B12 SERPL-MCNC: 1338 PG/ML — HIGH (ref 232–1245)
WBC # BLD: 5.71 K/UL — SIGNIFICANT CHANGE UP (ref 3.8–10.5)
WBC # BLD: 7.25 K/UL — SIGNIFICANT CHANGE UP (ref 3.8–10.5)
WBC # FLD AUTO: 5.71 K/UL — SIGNIFICANT CHANGE UP (ref 3.8–10.5)
WBC # FLD AUTO: 7.25 K/UL — SIGNIFICANT CHANGE UP (ref 3.8–10.5)
WBC UR QL: SIGNIFICANT CHANGE UP /HPF (ref 0–5)

## 2022-11-27 PROCEDURE — 71045 X-RAY EXAM CHEST 1 VIEW: CPT | Mod: 26

## 2022-11-27 PROCEDURE — 99233 SBSQ HOSP IP/OBS HIGH 50: CPT

## 2022-11-27 PROCEDURE — 93010 ELECTROCARDIOGRAM REPORT: CPT

## 2022-11-27 RX ORDER — MIDODRINE HYDROCHLORIDE 2.5 MG/1
15 TABLET ORAL THREE TIMES A DAY
Refills: 0 | Status: DISCONTINUED | OUTPATIENT
Start: 2022-11-27 | End: 2022-12-05

## 2022-11-27 RX ORDER — MIDODRINE HYDROCHLORIDE 2.5 MG/1
15 TABLET ORAL ONCE
Refills: 0 | Status: COMPLETED | OUTPATIENT
Start: 2022-11-27 | End: 2022-11-27

## 2022-11-27 RX ORDER — ACETAMINOPHEN 500 MG
650 TABLET ORAL ONCE
Refills: 0 | Status: COMPLETED | OUTPATIENT
Start: 2022-11-27 | End: 2022-11-27

## 2022-11-27 RX ORDER — SODIUM CHLORIDE 9 MG/ML
500 INJECTION, SOLUTION INTRAVENOUS ONCE
Refills: 0 | Status: DISCONTINUED | OUTPATIENT
Start: 2022-11-27 | End: 2022-11-27

## 2022-11-27 RX ADMIN — MIDODRINE HYDROCHLORIDE 15 MILLIGRAM(S): 2.5 TABLET ORAL at 11:38

## 2022-11-27 RX ADMIN — Medication 500 MILLIGRAM(S): at 11:39

## 2022-11-27 RX ADMIN — APIXABAN 5 MILLIGRAM(S): 2.5 TABLET, FILM COATED ORAL at 05:07

## 2022-11-27 RX ADMIN — MIDODRINE HYDROCHLORIDE 15 MILLIGRAM(S): 2.5 TABLET ORAL at 17:27

## 2022-11-27 RX ADMIN — LEVALBUTEROL 1.25 MILLIGRAM(S): 1.25 SOLUTION, CONCENTRATE RESPIRATORY (INHALATION) at 02:58

## 2022-11-27 RX ADMIN — MIDODRINE HYDROCHLORIDE 10 MILLIGRAM(S): 2.5 TABLET ORAL at 05:07

## 2022-11-27 RX ADMIN — MIDODRINE HYDROCHLORIDE 10 MILLIGRAM(S): 2.5 TABLET ORAL at 21:39

## 2022-11-27 RX ADMIN — LIDOCAINE 2 PATCH: 4 CREAM TOPICAL at 19:00

## 2022-11-27 RX ADMIN — LEVALBUTEROL 1.25 MILLIGRAM(S): 1.25 SOLUTION, CONCENTRATE RESPIRATORY (INHALATION) at 21:51

## 2022-11-27 RX ADMIN — MIRTAZAPINE 7.5 MILLIGRAM(S): 45 TABLET, ORALLY DISINTEGRATING ORAL at 21:39

## 2022-11-27 RX ADMIN — ATORVASTATIN CALCIUM 80 MILLIGRAM(S): 80 TABLET, FILM COATED ORAL at 21:40

## 2022-11-27 RX ADMIN — PANTOPRAZOLE SODIUM 40 MILLIGRAM(S): 20 TABLET, DELAYED RELEASE ORAL at 17:27

## 2022-11-27 RX ADMIN — LIDOCAINE 2 PATCH: 4 CREAM TOPICAL at 22:30

## 2022-11-27 RX ADMIN — MIDODRINE HYDROCHLORIDE 10 MILLIGRAM(S): 2.5 TABLET ORAL at 13:33

## 2022-11-27 RX ADMIN — PANTOPRAZOLE SODIUM 40 MILLIGRAM(S): 20 TABLET, DELAYED RELEASE ORAL at 05:07

## 2022-11-27 RX ADMIN — CHLORHEXIDINE GLUCONATE 1 APPLICATION(S): 213 SOLUTION TOPICAL at 11:59

## 2022-11-27 RX ADMIN — LIDOCAINE 2 PATCH: 4 CREAM TOPICAL at 11:39

## 2022-11-27 RX ADMIN — MIDODRINE HYDROCHLORIDE 15 MILLIGRAM(S): 2.5 TABLET ORAL at 08:08

## 2022-11-27 RX ADMIN — Medication 650 MILLIGRAM(S): at 03:30

## 2022-11-27 RX ADMIN — BUDESONIDE AND FORMOTEROL FUMARATE DIHYDRATE 2 PUFF(S): 160; 4.5 AEROSOL RESPIRATORY (INHALATION) at 21:51

## 2022-11-27 RX ADMIN — LEVALBUTEROL 1.25 MILLIGRAM(S): 1.25 SOLUTION, CONCENTRATE RESPIRATORY (INHALATION) at 14:41

## 2022-11-27 RX ADMIN — Medication 1 TABLET(S): at 12:38

## 2022-11-27 NOTE — PROGRESS NOTE ADULT - ASSESSMENT
67 yr old male with no known medical history, current smoker presented with complaints of 1 week of shortness of breath. His EKG on arrival revealed diffuse ST elevations, a limited ECHO revealed circumferential pericardial effusion. He was emergently taken to cardiac cath lab where he underwent pericardiocentesis and coronary angiogram. A pericardial drain was placed, cath revealed a 90% LAD lesion, which was not felt to be acute and given pericardial effusion, PCI was deferred. His presentation was attributed to acute pericarditis He was placed on a NRB, transferred to ICU for further monitoring. His labs on admission revealed FILIBERTO and elevated LFTs, IVF were given. A US abdomen was ordered, revealed non occlusive thrombus in IVC and gall bladder sludge. CXR on admission revealed a left lung mass. Subsequent CTA chest, abdomen and pelvis was done, revealed left upper lobe segmental and subsegmental PE. Occlusion of the SVC and a thrombus within the intrahepatic IVC. Mediastinal and right hilar lymphadenopathy with resultant central  bronchial obstruction and atelectasis involving the right middle and lower lobes. 4.1 cm sized left lower lobe mass compatible with a pulmonary malignancy. Moderate right and a trace left pleural effusion. Concern for malignant cells in pericardial fluid, formal cytology report pending. ECHO revealed a EF 50%. Cardiology follow up was done, advised continue aspirin and statin, unable to initiate GDMT given hypotension. Heparin gtt was initiated, palliative care was consulted. He was transferred to medical floor on 11/9. C/b symptomatic anemia in the setting of GI bleed and hypotension.    Hypoxia/Hypotension likely secondary to GI Bleed  - Unclear etiology  - Currently requiring HFNC   - No leukocytosis  - Procalcitonin 0.20  - Lactate 2.0  - ABG noted  - MICU consult appreciated  - Protonix 40mg BID  - Midodrine 15mg TID  - CXR noted    Anemia  - with melena  - Hemoglobin dropped from 10.3 to 7.8  - CT A/P ordered to r/o bleed  - 1 unit PRBC ordered    Right pleural effusion  - Thoracic surgery following   - s/p pigtail drainage  - Will require home O2    Stage 4 adenocarcinoma  - Appreciate heme/onc recs- will need systemic chemotherapy for Stage 4 malignancy once medically optimized  - MRI-H notable for multiple intracranial metastatic lesion.  - Patient deferred repeat imaging under anesthesia. Will follow up outpatient for PET scan  - will need next generation sequencing and outpatient PET-CT scan    Depression  -  recs appreciated    Melena  - Resolved  - CT A/P w/o source of bleeding  - Holding ASA and eliquis  - C/w IV Protonix BID  - Appreciate GI recs- S/p EGD w/ non-erosive diffuse gastritis. F/u H. pylori and duodenal biopsies  - Will c/w protonix 40 BID  - Will transfuse to keep Hg> 8  - Appreciate cards c/s for medical clearance    Acute pericardial effusion s/p pericardiocentesis  - Pericardial drain removed  - Outpatient PET scan  - C/w Aspirin  - s/p IR lung biopsy- NSCLC  - repeat echo w/o pericardial effusion reaccumulation    Acute pulmonary embolism/ IVC thrombus   - Eliquis held for anemia and GI bleed    HFrEF with wall motion abnormalities   PCI not performed on admission given effusion  90% LAD stenosis   - Appreciate cardio recs- if chest pain, will obtain CE and EKG  - Will continue to hold toprol XL and lisinopril given soft BP    Lung mass /cancer   - Oncology recs appreciated  - Palliative consult appreciated    Smoker, likely COPD   - symbicort/spiriva  - PRN Nebs    Dispo: Pending clinical course

## 2022-11-27 NOTE — CHART NOTE - NSCHARTNOTEFT_GEN_A_CORE
RN called, patient has been tachy in the 120's, pt denies complaints at this time, examined patient at bedside, NAD.    T(C): 37.9 (11-27-22 @ 02:00), Max: 37.9 (11-27-22 @ 02:00)  HR: 120 (11-27-22 @ 02:58) (83 - 120)  BP: 96/62 (11-27-22 @ 02:00) (93/63 - 104/72)  RR: 26 (11-27-22 @ 02:00) (18 - 26)  SpO2: 96% (11-27-22 @ 02:58) (92% - 98%)  rectal temp: 100.3,     CONSTITUTIONAL: ill appearing, thin stature, pleasant   EYES: PERRLA and symmetric, EOMI, No conjunctival or scleral injection, non-icteric  ENMT: Oral mucosa dry. no pharyngeal injection or exudates  RESP: crackles heard throughout lung fields, occasional neck accessory muscle use   CV: RRR, +S1S2, no MRG; no JVD; no peripheral edema  MSK: No digital clubbing or cyanosis; examination of the (head/neck/spine/ribs/pelvis, RUE, LUE, RLE, LLE) without misalignment,            Normal ROM without pain, no spinal tenderness, normal muscle strength/tone  SKIN: No rashes or ulcers noted; no subcutaneous nodules or induration palpable  PSYCH: Appropriate insight/judgment; A+O x 3, mood and affect appropriate, recent/remote memory intact    Assessment/ plan:   -give tylenol for temp, monitor VS   -CXR ordered, final read pending   -ABG ordered   -check labs w/ lactate, BC, and procal  -consider neb treatments for sx relief  -patient is currently on 3 L NC, sating well RN called, patient has been tachy in the 120's, pt denies complaints at this time, examined patient at bedside, NAD.    T(C): 37.9 (11-27-22 @ 02:00), Max: 37.9 (11-27-22 @ 02:00)  HR: 120 (11-27-22 @ 02:58) (83 - 120)  BP: 96/62 (11-27-22 @ 02:00) (93/63 - 104/72)  RR: 26 (11-27-22 @ 02:00) (18 - 26)  SpO2: 96% (11-27-22 @ 02:58) (92% - 98%)  rectal temp: 100.3,     CONSTITUTIONAL: ill appearing, thin stature, pleasant   EYES: PERRLA and symmetric, EOMI, No conjunctival or scleral injection, non-icteric  ENMT: Oral mucosa dry. no pharyngeal injection or exudates  RESP: crackles heard throughout lung fields, occasional neck accessory muscle use   CV: RRR, +S1S2, no MRG; no JVD; no peripheral edema  MSK: No digital clubbing or cyanosis; examination of the (head/neck/spine/ribs/pelvis, RUE, LUE, RLE, LLE) without misalignment,            Normal ROM without pain, no spinal tenderness, normal muscle strength/tone  SKIN: No rashes or ulcers noted; no subcutaneous nodules or induration palpable  PSYCH: Appropriate insight/judgment; A+O x 3, mood and affect appropriate, recent/remote memory intact    Assessment/ plan:   -give tylenol for temp, monitor VS   -CXR ordered, final read pending   -ABG ordered   -check labs w/ lactate, BC, and procal  -consider neb treatments for sx relief  -patient is currently on 3 L NC, sating well    addendum:  Anemia w/ melena  -repeat CT A/P for r/o bleed  -iron studies ordered, T&S ordered  -1 PRBC ordered HGB <8   -lactate @ 2, procal elevated, BC x2 collected   -Urine culture ordered RN called, patient has been tachy in the 120's, pt denies complaints at this time, examined patient at bedside, NAD.    T(C): 37.9 (11-27-22 @ 02:00), Max: 37.9 (11-27-22 @ 02:00)   HR: 120 (11-27-22 @ 02:58) (83 - 120)  BP: 96/62 (11-27-22 @ 02:00) (93/63 - 104/72)  RR: 26 (11-27-22 @ 02:00) (18 - 26)  SpO2: 96% (11-27-22 @ 02:58) (92% - 98%)  rectal temp: 100.3,     CONSTITUTIONAL: ill appearing, thin stature, pleasant   EYES: PERRLA and symmetric, EOMI, No conjunctival or scleral injection, non-icteric  ENMT: Oral mucosa dry. no pharyngeal injection or exudates  RESP: crackles heard throughout lung fields, occasional neck accessory muscle use   CV: RRR, +S1S2, no MRG; no JVD; no peripheral edema  MSK: No digital clubbing or cyanosis; examination of the (head/neck/spine/ribs/pelvis, RUE, LUE, RLE, LLE) without misalignment,            Normal ROM without pain, no spinal tenderness, normal muscle strength/tone  SKIN: No rashes or ulcers noted; no subcutaneous nodules or induration palpable  PSYCH: Appropriate insight/judgment; A+O x 3, mood and affect appropriate, recent/remote memory intact    Assessment/ plan:   -give tylenol for temp, monitor VS   -CXR ordered, final read pending   -ABG ordered   -check labs w/ lactate, BC, and procal  -consider neb treatments for sx relief  -patient is currently on 3 L NC, sating well    addendum:  Anemia w/ melena  -repeat CT A/P for r/o bleed  -iron studies ordered, T&S ordered  -1 PRBC ordered HGB <8   -lactate @ 2, procal elevated, BC x2 collected   -Urine culture ordered RN called, patient has been tachy in the 120's, pt denies complaints at this time, examined patient at bedside, NAD.    T(C): 37.9 (11-27-22 @ 02:00), Max: 37.9 (11-27-22 @ 02:00)   HR: 120 (11-27-22 @ 02:58) (83 - 120)  BP: 96/62 (11-27-22 @ 02:00) (93/63 - 104/72)  RR: 26 (11-27-22 @ 02:00) (18 - 26)  SpO2: 96% (11-27-22 @ 02:58) (92% - 98%)  rectal temp: 100.3,     CONSTITUTIONAL: ill appearing, thin stature, pleasant   EYES: PERRLA and symmetric, EOMI, No conjunctival or scleral injection, non-icteric  ENMT: Oral mucosa dry. no pharyngeal injection or exudates  RESP: crackles heard throughout lung fields, occasional neck accessory muscle use   CV: RRR, +S1S2, no MRG; no JVD; no peripheral edema  MSK: No digital clubbing or cyanosis; examination of the (head/neck/spine/ribs/pelvis, RUE, LUE, RLE, LLE) without misalignment,            Normal ROM without pain, no spinal tenderness, normal muscle strength/tone  SKIN: No rashes or ulcers noted; no subcutaneous nodules or induration palpable  PSYCH: Appropriate insight/judgment; A+O x 3, mood and affect appropriate, recent/remote memory intact    Assessment/ plan:   -give tylenol for temp, monitor VS   -CXR ordered, final read pending   -ABG ordered   -check labs w/ lactate, BC, and procal  -consider neb treatments for sx relief  -patient is currently on 3 L NC, sating well    addendum:  Anemia w/ melena  -repeat CT A/P for r/o bleed  -iron studies ordered, T&S ordered  -1 PRBC ordered HGB <8   -lactate @ 2, procal elevated, BC x2 collected   -Urine culture ordered  -consulted ICU, spoke with HCP Adi Aldana about GOC- explained the benefit of high flow to help aid with breathing, HCP agreed to do high flow and have them be on stepdown to monitor progress.  -transfer patient to stepdown   -High flow placed   -discussed comfort care with HCP. Discussed poor prognosis, pt's HCP wants a trial for high flow and to help aid with breathing and to be transferred to step down- will continue to monitor progress. Bipap not deemed warranted at this moment due to being less comfortable and beneficial for patient than high flow.   ICU rec appreciated

## 2022-11-28 LAB
ANION GAP SERPL CALC-SCNC: 10 MMOL/L — SIGNIFICANT CHANGE UP (ref 5–17)
BUN SERPL-MCNC: 34.2 MG/DL — HIGH (ref 8–20)
CALCIUM SERPL-MCNC: 8.8 MG/DL — SIGNIFICANT CHANGE UP (ref 8.4–10.5)
CHLORIDE SERPL-SCNC: 101 MMOL/L — SIGNIFICANT CHANGE UP (ref 96–108)
CO2 SERPL-SCNC: 31 MMOL/L — HIGH (ref 22–29)
CREAT SERPL-MCNC: 0.42 MG/DL — LOW (ref 0.5–1.3)
EGFR: 117 ML/MIN/1.73M2 — SIGNIFICANT CHANGE UP
GLUCOSE SERPL-MCNC: 129 MG/DL — HIGH (ref 70–99)
HCT VFR BLD CALC: 25.2 % — LOW (ref 39–50)
HGB BLD-MCNC: 8.1 G/DL — LOW (ref 13–17)
MCHC RBC-ENTMCNC: 30 PG — SIGNIFICANT CHANGE UP (ref 27–34)
MCHC RBC-ENTMCNC: 32.1 GM/DL — SIGNIFICANT CHANGE UP (ref 32–36)
MCV RBC AUTO: 93.3 FL — SIGNIFICANT CHANGE UP (ref 80–100)
PLATELET # BLD AUTO: 311 K/UL — SIGNIFICANT CHANGE UP (ref 150–400)
POTASSIUM SERPL-MCNC: 4 MMOL/L — SIGNIFICANT CHANGE UP (ref 3.5–5.3)
POTASSIUM SERPL-SCNC: 4 MMOL/L — SIGNIFICANT CHANGE UP (ref 3.5–5.3)
RBC # BLD: 2.7 M/UL — LOW (ref 4.2–5.8)
RBC # FLD: 16.5 % — HIGH (ref 10.3–14.5)
SODIUM SERPL-SCNC: 141 MMOL/L — SIGNIFICANT CHANGE UP (ref 135–145)
WBC # BLD: 7.22 K/UL — SIGNIFICANT CHANGE UP (ref 3.8–10.5)
WBC # FLD AUTO: 7.22 K/UL — SIGNIFICANT CHANGE UP (ref 3.8–10.5)

## 2022-11-28 PROCEDURE — 99232 SBSQ HOSP IP/OBS MODERATE 35: CPT

## 2022-11-28 RX ADMIN — PANTOPRAZOLE SODIUM 40 MILLIGRAM(S): 20 TABLET, DELAYED RELEASE ORAL at 05:14

## 2022-11-28 RX ADMIN — TIOTROPIUM BROMIDE 1 CAPSULE(S): 18 CAPSULE ORAL; RESPIRATORY (INHALATION) at 08:43

## 2022-11-28 RX ADMIN — LEVALBUTEROL 1.25 MILLIGRAM(S): 1.25 SOLUTION, CONCENTRATE RESPIRATORY (INHALATION) at 16:01

## 2022-11-28 RX ADMIN — LIDOCAINE 2 PATCH: 4 CREAM TOPICAL at 13:04

## 2022-11-28 RX ADMIN — PANTOPRAZOLE SODIUM 40 MILLIGRAM(S): 20 TABLET, DELAYED RELEASE ORAL at 17:22

## 2022-11-28 RX ADMIN — MIDODRINE HYDROCHLORIDE 15 MILLIGRAM(S): 2.5 TABLET ORAL at 12:58

## 2022-11-28 RX ADMIN — MIDODRINE HYDROCHLORIDE 15 MILLIGRAM(S): 2.5 TABLET ORAL at 17:23

## 2022-11-28 RX ADMIN — LEVALBUTEROL 1.25 MILLIGRAM(S): 1.25 SOLUTION, CONCENTRATE RESPIRATORY (INHALATION) at 05:38

## 2022-11-28 RX ADMIN — LIDOCAINE 2 PATCH: 4 CREAM TOPICAL at 21:20

## 2022-11-28 RX ADMIN — LEVALBUTEROL 1.25 MILLIGRAM(S): 1.25 SOLUTION, CONCENTRATE RESPIRATORY (INHALATION) at 21:51

## 2022-11-28 RX ADMIN — Medication 1 TABLET(S): at 12:57

## 2022-11-28 RX ADMIN — LEVALBUTEROL 1.25 MILLIGRAM(S): 1.25 SOLUTION, CONCENTRATE RESPIRATORY (INHALATION) at 08:43

## 2022-11-28 RX ADMIN — ATORVASTATIN CALCIUM 80 MILLIGRAM(S): 80 TABLET, FILM COATED ORAL at 22:26

## 2022-11-28 RX ADMIN — MIDODRINE HYDROCHLORIDE 15 MILLIGRAM(S): 2.5 TABLET ORAL at 05:14

## 2022-11-28 RX ADMIN — CHLORHEXIDINE GLUCONATE 1 APPLICATION(S): 213 SOLUTION TOPICAL at 17:19

## 2022-11-28 RX ADMIN — MIRTAZAPINE 7.5 MILLIGRAM(S): 45 TABLET, ORALLY DISINTEGRATING ORAL at 22:25

## 2022-11-28 RX ADMIN — BUDESONIDE AND FORMOTEROL FUMARATE DIHYDRATE 2 PUFF(S): 160; 4.5 AEROSOL RESPIRATORY (INHALATION) at 08:43

## 2022-11-28 RX ADMIN — Medication 500 MILLIGRAM(S): at 12:57

## 2022-11-28 NOTE — PROGRESS NOTE ADULT - SUBJECTIVE AND OBJECTIVE BOX
Discussed with the patient regarding diagnosis of adenocarcinoma likely lung origin, and treatment options based on next generation sequencing.   discussed with the patient, once medically stable would be a candidate for such treatment options, likely on outpatient basis.  Patient verbalized understanding.  events noted, GIB , drop in Hgb, s/p EGD showing gastritis  Hgb dropping again, now on HFNC  TO GET ct a/p and 1u PRBC    MEDICATIONS  (STANDING):  ascorbic acid 500 milliGRAM(s) Oral daily  atorvastatin 80 milliGRAM(s) Oral at bedtime  budesonide 160 MICROgram(s)/formoterol 4.5 MICROgram(s) Inhaler 2 Puff(s) Inhalation two times a day  chlorhexidine 2% Cloths 1 Application(s) Topical daily  influenza  Vaccine (HIGH DOSE) 0.7 milliLiter(s) IntraMuscular once  levalbuterol Inhalation 1.25 milliGRAM(s) Inhalation every 6 hours  lidocaine   4% Patch 2 Patch Transdermal daily  midodrine. 15 milliGRAM(s) Oral three times a day  mirtazapine 7.5 milliGRAM(s) Oral at bedtime  multivitamin/minerals 1 Tablet(s) Oral daily  pantoprazole  Injectable 40 milliGRAM(s) IV Push two times a day  tiotropium 18 MICROgram(s) Capsule 1 Capsule(s) Inhalation daily        ICU Vital Signs Last 24 Hrs  T(C): 36.1 (28 Nov 2022 08:04), Max: 37.2 (27 Nov 2022 11:50)  T(F): 97 (28 Nov 2022 08:04), Max: 99 (27 Nov 2022 11:50)  HR: 86 (28 Nov 2022 10:17) (77 - 110)  BP: 79/52 (28 Nov 2022 10:00) (79/51 - 105/85)  BP(mean): 59 (28 Nov 2022 10:00) (57 - 90)  ABP: --  ABP(mean): --  RR: 18 (28 Nov 2022 10:16) (17 - 23)  SpO2: 98% (28 Nov 2022 10:17) (96% - 100%)    O2 Parameters below as of 28 Nov 2022 10:17  Patient On (Oxygen Delivery Method): nasal cannula, high flow    CONSTITUTIONAL: ill-appearing, frail, cachetic  RESPIRATORY: Normal respiratory effort; lungs are clear to auscultation bilaterally  CARDIOVASCULAR: Regular rate and rhythm, normal S1 and S2  ABDOMEN: Nontender to palpation, normoactive bowel sounds, no rebound/guarding  PSYCH: A+O to person, place, and time; affect appropriate  NEUROLOGY: CN 2-12 are intact and symmetric; no gross sensory deficits    LABS:                          10.4   9.33  )-----------( 315      ( 25 Nov 2022 06:21 )             32.2                              10.6   9.37  )-----------( 296      ( 24 Nov 2022 06:38 )             31.8                        7.5    9.22  )-----------( 202      ( 22 Nov 2022 08:40 )             21.9     11-24    140  |  100  |  21.1<H>  ----------------------------<  132<H>  3.6   |  30.0<H>  |  0.40<L>    Ca    8.9      24 Nov 2022 06:38    TPro  6.0<L>  /  Alb  3.4  /  TBili  0.7  /  DBili  x   /  AST  39  /  ALT  60<H>  /  AlkPhos  84  11-24 11-22    144  |  107  |  28.8<H>  ----------------------------<  89  3.8   |  27.0  |  0.33<L>    Ca    8.7      22 Nov 2022 05:00    TPro  5.4<L>  /  Alb  3.3  /  TBili  1.0  /  DBili  x   /  AST  51<H>  /  ALT  61<H>  /  AlkPhos  57  11-22 Discussed with the patient regarding diagnosis of adenocarcinoma likely lung origin, and treatment options based on next generation sequencing.   discussed with the patient, once medically stable would be a candidate for such treatment options, likely on outpatient basis.  Patient verbalized understanding.  events noted, GIB , drop in Hgb, s/p EGD showing gastritis  Hgb dropping again, now on HFNC  TO GET ct a/p , s/p 1u PRBC, hypotensive    MEDICATIONS  (STANDING):  ascorbic acid 500 milliGRAM(s) Oral daily  atorvastatin 80 milliGRAM(s) Oral at bedtime  budesonide 160 MICROgram(s)/formoterol 4.5 MICROgram(s) Inhaler 2 Puff(s) Inhalation two times a day  chlorhexidine 2% Cloths 1 Application(s) Topical daily  influenza  Vaccine (HIGH DOSE) 0.7 milliLiter(s) IntraMuscular once  levalbuterol Inhalation 1.25 milliGRAM(s) Inhalation every 6 hours  lidocaine   4% Patch 2 Patch Transdermal daily  midodrine. 15 milliGRAM(s) Oral three times a day  mirtazapine 7.5 milliGRAM(s) Oral at bedtime  multivitamin/minerals 1 Tablet(s) Oral daily  pantoprazole  Injectable 40 milliGRAM(s) IV Push two times a day  tiotropium 18 MICROgram(s) Capsule 1 Capsule(s) Inhalation daily        ICU Vital Signs Last 24 Hrs  T(C): 36.1 (28 Nov 2022 08:04), Max: 37.2 (27 Nov 2022 11:50)  T(F): 97 (28 Nov 2022 08:04), Max: 99 (27 Nov 2022 11:50)  HR: 86 (28 Nov 2022 10:17) (77 - 110)  BP: 79/52 (28 Nov 2022 10:00) (79/51 - 105/85)  BP(mean): 59 (28 Nov 2022 10:00) (57 - 90)  ABP: --  ABP(mean): --  RR: 18 (28 Nov 2022 10:16) (17 - 23)  SpO2: 98% (28 Nov 2022 10:17) (96% - 100%)    O2 Parameters below as of 28 Nov 2022 10:17  Patient On (Oxygen Delivery Method): nasal cannula, high flow    CONSTITUTIONAL: ill-appearing, frail, cachetic  RESPIRATORY: Normal respiratory effort; lungs are clear to auscultation bilaterally  CARDIOVASCULAR: Regular rate and rhythm, normal S1 and S2  ABDOMEN: Nontender to palpation, normoactive bowel sounds, no rebound/guarding  PSYCH: A+O to person, place, and time; affect appropriate  NEUROLOGY: CN 2-12 are intact and symmetric; no gross sensory deficits    LABS:                          10.4   9.33  )-----------( 315      ( 25 Nov 2022 06:21 )             32.2                              10.6   9.37  )-----------( 296      ( 24 Nov 2022 06:38 )             31.8                        7.5    9.22  )-----------( 202      ( 22 Nov 2022 08:40 )             21.9     11-24    140  |  100  |  21.1<H>  ----------------------------<  132<H>  3.6   |  30.0<H>  |  0.40<L>    Ca    8.9      24 Nov 2022 06:38    TPro  6.0<L>  /  Alb  3.4  /  TBili  0.7  /  DBili  x   /  AST  39  /  ALT  60<H>  /  AlkPhos  84  11-24 11-22    144  |  107  |  28.8<H>  ----------------------------<  89  3.8   |  27.0  |  0.33<L>    Ca    8.7      22 Nov 2022 05:00    TPro  5.4<L>  /  Alb  3.3  /  TBili  1.0  /  DBili  x   /  AST  51<H>  /  ALT  61<H>  /  AlkPhos  57  11-22

## 2022-11-28 NOTE — PROGRESS NOTE ADULT - ASSESSMENT
66yo M w/ no known PMH who p/w SOB and found to have diffuse ST elevations and taken for emergent LHC where found to have a large pericardial effusion s/p percardiocentesis during which bloody fluid was drained (a 90% LAD lesion also seen but PCI deferred until further stabilized).  Course further c/b dx of HFrEF with EF 20-25% and imaging revealing REYNALDO PE, SVC occlusion, IVC thrombus, mediastinal/rt hilar LAD with bronchial obstruxn in RML/RLL, a 4.1cm LLL mass c/w lung malignancy, and mod rt effusion.  We are consulted for assistance with goals of care.   #Goals of care  - Pt named friend as HCP- Samuel Chaudhry - [phone] (alternate HCP friend Rios Maza - [phone])  - Newly dx'd metastatic NSCLC - LLL mass with presumed LN involvement, malignant pericardial effusion and brain mets - poor prognosis  - Course has been c/b GIB s/p EGD with gastritis now cleared for AC and DAPT if needed  - Prior GOC discussion with pt and HCP Adi Cardozo -- DNR/DNI - he is interested in hearing from oncology what treatment options there are and decide whether or not to pursue  - He will need to be weaned from HFNC which RN is doing currrently (now on 40%)  - Will cont to follow and remain available to assist with future goals of care discussions if/when the need arises.    #Rt shoulder/neck pain  - Cont tylenol 650mg PO TID prn (this is under 2gm limit for pts with hepatic impairment)  - Heat pad may help as well    #Depression  - pt cited personal losses in prior discussion but he didn't care to elaborate  - will cont to provide support   - pt does not wish to trial any medications for his mood nor does he wish to engage in psychotherapy

## 2022-11-28 NOTE — PROGRESS NOTE ADULT - ASSESSMENT
67 yr old male with no known medical history, current smoker presented with complaints of 1 week of shortness of breath. His EKG on arrival revealed diffuse ST elevations, a limited ECHO revealed circumferential pericardial effusion. He was emergently taken to cardiac cath lab where he underwent pericardiocentesis and coronary angiogram. A pericardial drain was placed, cath revealed a 90% LAD lesion, which was not felt to be acute and given pericardial effusion, PCI was deferred. His presentation was attributed to acute pericarditis He was placed on a NRB, transferred to ICU for further monitoring. His labs on admission revealed FILIBERTO and elevated LFTs, IVF were given. A US abdomen was ordered, revealed non occlusive thrombus in IVC and gall bladder sludge. CXR on admission revealed a left lung mass. Subsequent CTA chest, abdomen and pelvis was done, revealed left upper lobe segmental and subsegmental PE. Occlusion of the SVC and a thrombus within the intrahepatic IVC. Mediastinal and right hilar lymphadenopathy with resultant central  bronchial obstruction and atelectasis involving the right middle and lower lobes. 4.1 cm sized left lower lobe mass compatible with a pulmonary malignancy. Moderate right and a trace left pleural effusion. Concern for malignant cells in pericardial fluid, formal cytology report pending. ECHO revealed a EF 50%. Cardiology follow up was done, advised continue aspirin and statin, unable to initiate GDMT given hypotension. Heparin gtt was initiated, palliative care was consulted. He was transferred to medical floor on 11/9. C/b symptomatic anemia in the setting of GI bleed and hypotension.    Hypoxia/Hypotension likely secondary to GI Bleed  - Unclear etiology  - Currently requiring HFNC, will atempt to wean down   - No leukocytosis  - Procalcitonin 0.20  - Lactate 2.0  - ABG noted  - MICU consult appreciated  - Protonix 40mg BID  - Midodrine 15mg TID  - CXR noted    Anemia  - with melena  - Hemoglobin stable  - CT A/P ordered to r/o bleed  - 1 unit PRBC ordered    Right pleural effusion  - Thoracic surgery following   - s/p pigtail drainage  - Will require home O2    Stage 4 adenocarcinoma  - Appreciate heme/onc recs- will need systemic chemotherapy for Stage 4 malignancy once medically optimized  - MRI-H notable for multiple intracranial metastatic lesion.  - Patient deferred repeat imaging under anesthesia. Will follow up outpatient for PET scan  - will need next generation sequencing and outpatient PET-CT scan    Depression  - BH recs appreciated    Melena  - Resolved  - CT A/P w/o source of bleeding  - Holding ASA and eliquis  - C/w IV Protonix BID  - Appreciate GI recs- S/p EGD w/ non-erosive diffuse gastritis. F/u H. pylori and duodenal biopsies  - Will c/w protonix 40 BID  - Will transfuse to keep Hg> 8  - Appreciate cards c/s for medical clearance    Acute pericardial effusion s/p pericardiocentesis  - Pericardial drain removed  - Outpatient PET scan  - C/w Aspirin  - s/p IR lung biopsy- NSCLC  - repeat echo w/o pericardial effusion reaccumulation    Acute pulmonary embolism/ IVC thrombus   - Eliquis held for anemia and GI bleed    HFrEF with wall motion abnormalities   PCI not performed on admission given effusion  90% LAD stenosis   - Appreciate cardio recs- if chest pain, will obtain CE and EKG  - Will continue to hold toprol XL and lisinopril given soft BP    Lung mass /cancer   - Oncology recs appreciated  - Palliative consult appreciated    Smoker, likely COPD   - symbicort/spiriva  - PRN Nebs    Dispo: Pending clinical course

## 2022-11-28 NOTE — PROGRESS NOTE ADULT - SUBJECTIVE AND OBJECTIVE BOX
Interval History:  Pt seen lying in bed on HFNC in NAD  Denies any SOB or pain currently    Pt developed escalating O2 requirements over weekend  MRI showing multiple brain mets    Present Symptoms:     Dyspnea: No  Nausea/Vomiting: No  Anxiety:  No  Depression: Yes   Fatigue: Yes   Loss of appetite: Yes   Constipation: No    Pain: denies            Character-            Duration-            Effect-            Factors-            Frequency-            Location-            Severity-    Review of Systems:   As above - otherwise negative     MEDICATIONS  (STANDING):  ascorbic acid 500 milliGRAM(s) Oral daily  atorvastatin 80 milliGRAM(s) Oral at bedtime  budesonide 160 MICROgram(s)/formoterol 4.5 MICROgram(s) Inhaler 2 Puff(s) Inhalation two times a day  chlorhexidine 2% Cloths 1 Application(s) Topical daily  influenza  Vaccine (HIGH DOSE) 0.7 milliLiter(s) IntraMuscular once  levalbuterol Inhalation 1.25 milliGRAM(s) Inhalation every 6 hours  lidocaine   4% Patch 2 Patch Transdermal daily  midodrine. 15 milliGRAM(s) Oral three times a day  mirtazapine 7.5 milliGRAM(s) Oral at bedtime  multivitamin/minerals 1 Tablet(s) Oral daily  pantoprazole  Injectable 40 milliGRAM(s) IV Push two times a day  tiotropium 18 MICROgram(s) Capsule 1 Capsule(s) Inhalation daily    MEDICATIONS  (PRN):  acetaminophen     Tablet .. 650 milliGRAM(s) Oral every 8 hours PRN Mild Pain (1 - 3), Moderate Pain (4 - 6)  melatonin 5 milliGRAM(s) Oral at bedtime PRN Insomnia    PHYSICAL EXAM:  Vital Signs Last 24 Hrs  T(C): 36.1 (2022 08:04), Max: 37.1 (2022 12:11)  T(F): 97 (2022 08:04), Max: 98.8 (2022 12:11)  HR: 86 (2022 10:17) (77 - 99)  BP: 79/52 (2022 10:00) (79/51 - 105/85)  BP(mean): 59 (2022 10:00) (57 - 90)  RR: 18 (2022 10:16) (17 - 23)  SpO2: 98% (2022 10:17) (96% - 100%)    Parameters below as of 2022 10:17  Patient On (Oxygen Delivery Method): nasal cannula, high flow    General: Pt lying in bed in NAD - cachectic  HEENT: NCAT; MM dry - on HFNC  Resp: breathing unlabored; symmetric chest expansion B/L  MSK: no contractures/deformities  Skin: no rash  Neuro: awake and oriented x 3; no myoclonus  Psych: calm; flat affect    LABS:                        8.1    7.22  )-----------( 311      ( 2022 06:30 )             25.2         141  |  101  |  34.2<H>  ----------------------------<  129<H>  4.0   |  31.0<H>  |  0.42<L>    Ca    8.8      2022 06:30  Phos  3.1       Mg     2.0         TPro  5.9<L>  /  Alb  3.0<L>  /  TBili  0.6  /  DBili  x   /  AST  29  /  ALT  38  /  AlkPhos  74        Urinalysis Basic - ( 2022 03:00 )    Color: Yellow / Appearance: Clear / S.010 / pH: x  Gluc: x / Ketone: Negative  / Bili: Negative / Urobili: Negative   Blood: x / Protein: Negative / Nitrite: Positive   Leuk Esterase: Small / RBC: Negative /HPF / WBC 3-5 /HPF   Sq Epi: x / Non Sq Epi: x / Bacteria: Many      I&O's Summary    2022 07:01  -  2022 07:00  --------------------------------------------------------  IN: 0 mL / OUT: 1550 mL / NET: -1550 mL      RADIOLOGY & ADDITIONAL STUDIES:    NEUROLOGIC MEDICATIONS/OPIOIDS/BENZODIAZEPINES IN PAST 24HRS:    mirtazapine   7.5 milliGRAM(s) Oral (22 @ 21:39)    ADVANCE DIRECTIVES/TREATMENT PREFERENCES:  Code Status: DNR/DNI  MOLST (if not Full Code): yes  HCP/Surrogate: Adi Cardozo Breath sounds are clear, no distress present, no wheeze, rales, rhonchi or tachypnea. Normal rate and effort.

## 2022-11-28 NOTE — PROGRESS NOTE ADULT - ASSESSMENT
67-year-old male With no known medical history with lack of medical follow-up with tobacco use disorder presented on November 7 with difficulty breathing found to have diffuse ST elevation taken for emergent left heart cath found to have 90% stenosis of mid LAD with no acute lesion with no intervention but s/p pericardial drain for moderate to large pericardial effusion was initially admitted to medical ICU eventually found to have metastatic adenocarcinoma most likely from lung as primary based on pericardial fluid pathology, through hospital course patient was also found to have SVC and intrahepatic IVC thrombi as well as left-sided segmental subsegmental PE, mediastinal lymphadenopathy.  Hospital course complicated by GI bleed and on overnight from 20th November through early morning off 21st November while patient was on Eliquis receiving blood products, underwent CT abdomen pelvis with contrast which did not reveal active bleeding and resuscitated for 2 units PRBC along with colloid and crystalloid resuscitation.     Stage 4 adenocardircinoma:  -once stable will need systemic therapy  -NGS to be sent on the pathology specimen to check for actionable mutations including PDL1 analysis  -will need outpatient PET-CT scan  -MRI brain - poor study; brain mets seen.  recommended repeat under anaesthesia     Right pleural effusion  - Thoracic surgery following   - s/p pigtail drainage  home o2 evaluation  on HFNC currently    GIB  s/p EGD showing gastritis  s/p PRBC,   Hgb improved but now dropping again  to get CT A/P  1U PRBC    DNR/DNI  palliative care evaluation

## 2022-11-28 NOTE — PROGRESS NOTE ADULT - SUBJECTIVE AND OBJECTIVE BOX
Chief complaint: Pericardial effusion    Patient seen and examined at bedside. No acute overnight events reported. No fever, chills, cough, chest pain, nausea or vomiting. Still on HFNC, attempting to wean down.     Vital Signs Last 24 Hrs  T(F): 97 (28 Nov 2022 08:04), Max: 99 (27 Nov 2022 11:34)  HR: 86 (28 Nov 2022 10:17) (77 - 110)  BP: 79/52 (28 Nov 2022 10:00) (79/51 - 105/85)  RR: 18 (28 Nov 2022 10:16) (17 - 24)  SpO2: 98% (28 Nov 2022 10:17) (96% - 100%)    Physical Exam:  Constitutional: alert and oriented, in no acute distress   Neck: Soft and supple  Respiratory: Coarse breath sounds b/l  Cardiovascular: Regular rate and rhyhtm  Gastrointestinal: Soft, non-tender to palpation, +bs  Musculoskeletal: no lower extremity edema bilaterally    Labs:                        8.1    7.22  )-----------( 311      ( 28 Nov 2022 06:30 )             25.2   11-28    141  |  101  |  34.2<H>  ----------------------------<  129<H>  4.0   |  31.0<H>  |  0.42<L>    Ca    8.8      28 Nov 2022 06:30  Phos  3.1     11-27  Mg     2.0     11-27    TPro  5.9<L>  /  Alb  3.0<L>  /  TBili  0.6  /  DBili  x   /  AST  29  /  ALT  38  /  AlkPhos  74  11-27

## 2022-11-29 LAB
ANION GAP SERPL CALC-SCNC: 6 MMOL/L — SIGNIFICANT CHANGE UP (ref 5–17)
BUN SERPL-MCNC: 25.1 MG/DL — HIGH (ref 8–20)
CALCIUM SERPL-MCNC: 8.9 MG/DL — SIGNIFICANT CHANGE UP (ref 8.4–10.5)
CHLORIDE SERPL-SCNC: 100 MMOL/L — SIGNIFICANT CHANGE UP (ref 96–108)
CO2 SERPL-SCNC: 33 MMOL/L — HIGH (ref 22–29)
CREAT SERPL-MCNC: 0.44 MG/DL — LOW (ref 0.5–1.3)
EGFR: 115 ML/MIN/1.73M2 — SIGNIFICANT CHANGE UP
GLUCOSE SERPL-MCNC: 94 MG/DL — SIGNIFICANT CHANGE UP (ref 70–99)
HCT VFR BLD CALC: 27.6 % — LOW (ref 39–50)
HGB BLD-MCNC: 8.9 G/DL — LOW (ref 13–17)
MCHC RBC-ENTMCNC: 29.8 PG — SIGNIFICANT CHANGE UP (ref 27–34)
MCHC RBC-ENTMCNC: 32.2 GM/DL — SIGNIFICANT CHANGE UP (ref 32–36)
MCV RBC AUTO: 92.3 FL — SIGNIFICANT CHANGE UP (ref 80–100)
PLATELET # BLD AUTO: 330 K/UL — SIGNIFICANT CHANGE UP (ref 150–400)
POTASSIUM SERPL-MCNC: 3.9 MMOL/L — SIGNIFICANT CHANGE UP (ref 3.5–5.3)
POTASSIUM SERPL-SCNC: 3.9 MMOL/L — SIGNIFICANT CHANGE UP (ref 3.5–5.3)
RBC # BLD: 2.99 M/UL — LOW (ref 4.2–5.8)
RBC # FLD: 15.8 % — HIGH (ref 10.3–14.5)
SODIUM SERPL-SCNC: 139 MMOL/L — SIGNIFICANT CHANGE UP (ref 135–145)
WBC # BLD: 8.47 K/UL — SIGNIFICANT CHANGE UP (ref 3.8–10.5)
WBC # FLD AUTO: 8.47 K/UL — SIGNIFICANT CHANGE UP (ref 3.8–10.5)

## 2022-11-29 PROCEDURE — 99232 SBSQ HOSP IP/OBS MODERATE 35: CPT

## 2022-11-29 RX ADMIN — LIDOCAINE 2 PATCH: 4 CREAM TOPICAL at 01:00

## 2022-11-29 RX ADMIN — LEVALBUTEROL 1.25 MILLIGRAM(S): 1.25 SOLUTION, CONCENTRATE RESPIRATORY (INHALATION) at 20:27

## 2022-11-29 RX ADMIN — PANTOPRAZOLE SODIUM 40 MILLIGRAM(S): 20 TABLET, DELAYED RELEASE ORAL at 17:10

## 2022-11-29 RX ADMIN — MIDODRINE HYDROCHLORIDE 15 MILLIGRAM(S): 2.5 TABLET ORAL at 12:49

## 2022-11-29 RX ADMIN — MIRTAZAPINE 7.5 MILLIGRAM(S): 45 TABLET, ORALLY DISINTEGRATING ORAL at 22:56

## 2022-11-29 RX ADMIN — LEVALBUTEROL 1.25 MILLIGRAM(S): 1.25 SOLUTION, CONCENTRATE RESPIRATORY (INHALATION) at 03:23

## 2022-11-29 RX ADMIN — Medication 1 TABLET(S): at 12:49

## 2022-11-29 RX ADMIN — PANTOPRAZOLE SODIUM 40 MILLIGRAM(S): 20 TABLET, DELAYED RELEASE ORAL at 05:52

## 2022-11-29 RX ADMIN — ATORVASTATIN CALCIUM 80 MILLIGRAM(S): 80 TABLET, FILM COATED ORAL at 22:57

## 2022-11-29 RX ADMIN — Medication 500 MILLIGRAM(S): at 12:50

## 2022-11-29 RX ADMIN — LEVALBUTEROL 1.25 MILLIGRAM(S): 1.25 SOLUTION, CONCENTRATE RESPIRATORY (INHALATION) at 08:56

## 2022-11-29 RX ADMIN — BUDESONIDE AND FORMOTEROL FUMARATE DIHYDRATE 2 PUFF(S): 160; 4.5 AEROSOL RESPIRATORY (INHALATION) at 20:28

## 2022-11-29 RX ADMIN — BUDESONIDE AND FORMOTEROL FUMARATE DIHYDRATE 2 PUFF(S): 160; 4.5 AEROSOL RESPIRATORY (INHALATION) at 08:57

## 2022-11-29 RX ADMIN — TIOTROPIUM BROMIDE 1 CAPSULE(S): 18 CAPSULE ORAL; RESPIRATORY (INHALATION) at 08:56

## 2022-11-29 RX ADMIN — MIDODRINE HYDROCHLORIDE 15 MILLIGRAM(S): 2.5 TABLET ORAL at 17:10

## 2022-11-29 RX ADMIN — LEVALBUTEROL 1.25 MILLIGRAM(S): 1.25 SOLUTION, CONCENTRATE RESPIRATORY (INHALATION) at 15:35

## 2022-11-29 RX ADMIN — MIDODRINE HYDROCHLORIDE 15 MILLIGRAM(S): 2.5 TABLET ORAL at 05:53

## 2022-11-29 NOTE — PROGRESS NOTE ADULT - ASSESSMENT
67 yr old male with no known medical history, current smoker presented with complaints of 1 week of shortness of breath. His EKG on arrival revealed diffuse ST elevations, a limited ECHO revealed circumferential pericardial effusion. He was emergently taken to cardiac cath lab where he underwent pericardiocentesis and coronary angiogram. A pericardial drain was placed, cath revealed a 90% LAD lesion, which was not felt to be acute and given pericardial effusion, PCI was deferred. His presentation was attributed to acute pericarditis He was placed on a NRB, transferred to ICU for further monitoring. His labs on admission revealed FILIBERTO and elevated LFTs, IVF were given. A US abdomen was ordered, revealed non occlusive thrombus in IVC and gall bladder sludge. CXR on admission revealed a left lung mass. Subsequent CTA chest, abdomen and pelvis was done, revealed left upper lobe segmental and subsegmental PE. Occlusion of the SVC and a thrombus within the intrahepatic IVC. Mediastinal and right hilar lymphadenopathy with resultant central  bronchial obstruction and atelectasis involving the right middle and lower lobes. 4.1 cm sized left lower lobe mass compatible with a pulmonary malignancy. Moderate right and a trace left pleural effusion. Concern for malignant cells in pericardial fluid, formal cytology report pending. ECHO revealed a EF 50%. Cardiology follow up was done, advised continue aspirin and statin, unable to initiate GDMT given hypotension. Heparin gtt was initiated, palliative care was consulted. He was transferred to medical floor on 11/9. C/b symptomatic anemia in the setting of GI bleed and hypotension.    Hypoxia/Hypotension likely secondary to GI Bleed  - Unclear etiology  - No leukocytosis  - Procalcitonin 0.20  - Lactate 2.0  - ABG noted  - MICU consult appreciated  - Protonix 40mg BID  - Midodrine 15mg TID  - CXR noted  - Weaned off HFNC, now on 3L nasal cannula    Anemia  - Hemoglobin stable  - CT A/P ordered to r/o bleed  - s/p 1 unit PRBC    Right pleural effusion  - Thoracic surgery following   - s/p pigtail drainage  - Will require home O2    Stage 4 adenocarcinoma  - Appreciate heme/onc recs- will need systemic chemotherapy for Stage 4 malignancy once medically optimized  - MRI-H notable for multiple intracranial metastatic lesion.  - Patient deferred repeat imaging under anesthesia. Will follow up outpatient for PET scan  - will need next generation sequencing and outpatient PET-CT scan    Depression  - BH recs appreciated    Melena  - Resolved  - CT A/P w/o source of bleeding  - Holding ASA and eliquis  - C/w IV Protonix BID  - Appreciate GI recs- S/p EGD w/ non-erosive diffuse gastritis. F/u H. pylori and duodenal biopsies  - Will c/w protonix 40 BID  - Will transfuse to keep Hg> 8  - Appreciate cards c/s for medical clearance    Acute pericardial effusion s/p pericardiocentesis  - Pericardial drain removed  - Outpatient PET scan  - C/w Aspirin  - s/p IR lung biopsy- NSCLC  - repeat echo w/o pericardial effusion reaccumulation    Acute pulmonary embolism/ IVC thrombus   - Eliquis held for anemia and GI bleed    HFrEF with wall motion abnormalities   PCI not performed on admission given effusion  90% LAD stenosis   - Appreciate cardio recs- if chest pain, will obtain CE and EKG  - Will continue to hold toprol XL and lisinopril given soft BP    Lung mass /cancer   - Oncology recs appreciated  - Palliative consult appreciated    Smoker, likely COPD   - symbicort/spiriva  - PRN Nebs    Dispo: Pending clinical course

## 2022-11-29 NOTE — PROGRESS NOTE ADULT - SUBJECTIVE AND OBJECTIVE BOX
INTERVAL HISTORY:  Pt denies pain or SOB currently  Says he thinks he is "going to beat this".    PRESENT SYMPTOMS:     Dyspnea: No  Nausea/Vomiting: No  Anxiety:  No  Depression: Yes   Fatigue: No  Loss of appetite: Yes   Constipation: No    PAIN: denies            Character-            Duration-            Effect-            Factors-            Frequency-            Location-            Severity-    REVIEW OF SYSTEMS:   [ X ] Full review of systems performed and was otherwise negative except as noted above.  [  ] Due to altered mental status/intubation, subjective information were not able to be obtained from the patient. History was obtained, to the extent possible, from review of the chart and collateral sources of information.    MEDICATIONS  (STANDING):  ascorbic acid 500 milliGRAM(s) Oral daily  atorvastatin 80 milliGRAM(s) Oral at bedtime  budesonide 160 MICROgram(s)/formoterol 4.5 MICROgram(s) Inhaler 2 Puff(s) Inhalation two times a day  chlorhexidine 2% Cloths 1 Application(s) Topical daily  influenza  Vaccine (HIGH DOSE) 0.7 milliLiter(s) IntraMuscular once  levalbuterol Inhalation 1.25 milliGRAM(s) Inhalation every 6 hours  lidocaine   4% Patch 2 Patch Transdermal daily  midodrine. 15 milliGRAM(s) Oral three times a day  mirtazapine 7.5 milliGRAM(s) Oral at bedtime  multivitamin/minerals 1 Tablet(s) Oral daily  pantoprazole  Injectable 40 milliGRAM(s) IV Push two times a day  tiotropium 18 MICROgram(s) Capsule 1 Capsule(s) Inhalation daily    MEDICATIONS  (PRN):  acetaminophen     Tablet .. 650 milliGRAM(s) Oral every 8 hours PRN Mild Pain (1 - 3), Moderate Pain (4 - 6)  melatonin 5 milliGRAM(s) Oral at bedtime PRN Insomnia      PHYSICAL EXAM:  Vital Signs Last 24 Hrs  T(C): 36.8 (29 Nov 2022 07:55), Max: 36.8 (29 Nov 2022 07:55)  T(F): 98.2 (29 Nov 2022 07:55), Max: 98.2 (29 Nov 2022 07:55)  HR: 99 (29 Nov 2022 14:00) (81 - 99)  BP: 94/63 (29 Nov 2022 14:00) (86/59 - 107/61)  BP(mean): 73 (29 Nov 2022 10:00) (65 - 73)  RR: 17 (29 Nov 2022 14:00) (17 - 20)  SpO2: 99% (29 Nov 2022 14:00) (92% - 100%)    Parameters below as of 29 Nov 2022 14:00  Patient On (Oxygen Delivery Method): room air  O2 Flow (L/min): 3    General: Pt lying in bed in NAD; cachectic  HEENT: NCAT; MMM  Resp: breathing unlabored; symmetric chest expansion B/L  MSK: no contractures/deformities  Skin: no rash  Neuro: awake and oriented x 3; no myoclonus  Psych: calm; flat affect    LABS:                        8.9    8.47  )-----------( 330      ( 29 Nov 2022 05:12 )             27.6     11-29    139  |  100  |  25.1<H>  ----------------------------<  94  3.9   |  33.0<H>  |  0.44<L>    Ca    8.9      29 Nov 2022 05:12      I&O's Summary    28 Nov 2022 07:01  -  29 Nov 2022 07:00  --------------------------------------------------------  IN: 0 mL / OUT: 325 mL / NET: -325 mL      RADIOLOGY & ADDITIONAL STUDIES:    NEUROLOGIC MEDICATIONS/OPIOIDS/BENZODIAZEPINES IN PAST 24HRS:    mirtazapine   7.5 milliGRAM(s) Oral (11-28-22 @ 22:25)    ADVANCE DIRECTIVES/TREATMENT PREFERENCES:  Code Status: DNR/DNI  MOLST (if not Full Code): yes  HCP/Surrogate: uriel colbert

## 2022-11-29 NOTE — PROGRESS NOTE ADULT - SUBJECTIVE AND OBJECTIVE BOX
Chief complaint: Pericardial effusion    Patient seen and examined at bedside. No acute overnight events reported. Denies fever, chills, cough, chest pain, nausea or vomiting.     Vital Signs Last 24 Hrs  T(F): 98.2 (29 Nov 2022 07:55), Max: 98.2 (29 Nov 2022 07:55)  HR: 94 (29 Nov 2022 12:00) (81 - 95)  BP: 96/65 (29 Nov 2022 12:00) (86/59 - 107/61)  RR: 17 (29 Nov 2022 12:00) (16 - 20)  SpO2: 99% (29 Nov 2022 12:00) (92% - 100%)    Physical Exam:  Constitutional: alert and oriented, cachetic appearing, in no acute distress   Neck: Soft and supple  Respiratory: Good air entry b/l  Cardiovascular: Regular rate and rhyhtm  Gastrointestinal: Soft, non-tender to palpation, +bs  Musculoskeletal: no lower extremity edema bilaterally    Labs:                        8.9    8.47  )-----------( 330      ( 29 Nov 2022 05:12 )             27.6   11-29    139  |  100  |  25.1<H>  ----------------------------<  94  3.9   |  33.0<H>  |  0.44<L>    Ca    8.9      29 Nov 2022 05:12

## 2022-11-29 NOTE — PROGRESS NOTE ADULT - ASSESSMENT
68yo M w/ no known PMH who p/w SOB and found to have diffuse ST elevations and taken for emergent LHC where found to have a large pericardial effusion s/p percardiocentesis during which bloody fluid was drained (a 90% LAD lesion also seen but PCI deferred until further stabilized).  Course further c/b dx of HFrEF with EF 20-25% and imaging revealing REYNALDO PE, SVC occlusion, IVC thrombus, mediastinal/rt hilar LAD with bronchial obstruxn in RML/RLL, a 4.1cm LLL mass c/w lung malignancy, and mod rt effusion.  We are consulted for assistance with goals of care.   #Goals of care  - Pt named friend as HCP- Samuel Chaudhry - [phone] (alternate HCP friend Rios Lopezmykelmarshall - [phone])  - Newly dx'd metastatic NSCLC - LLL mass with presumed LN involvement, malignant pericardial effusion and brain mets - poor prognosis  - Course has been c/b GIB s/p EGD with gastritis now cleared for AC and DAPT if needed  - Prior GOC discussion with pt and HCP Adi Cardozo -- DNR/DNI - he is interested in hearing from oncology what treatment options there are and decide whether or not to pursue  - He has been weaned from HFNC - plans are for TRAN at discharge and f/u with oncology as outpt  - Will cont to follow and remain available to assist with future goals of care discussions if/when the need arises.    #Rt shoulder/neck pain  - Cont tylenol 650mg PO TID prn (this is under 2gm limit for pts with hepatic impairment)  - Heat pad may help as well

## 2022-11-30 LAB
ANION GAP SERPL CALC-SCNC: 8 MMOL/L — SIGNIFICANT CHANGE UP (ref 5–17)
BUN SERPL-MCNC: 21.2 MG/DL — HIGH (ref 8–20)
CALCIUM SERPL-MCNC: 8.9 MG/DL — SIGNIFICANT CHANGE UP (ref 8.4–10.5)
CHLORIDE SERPL-SCNC: 98 MMOL/L — SIGNIFICANT CHANGE UP (ref 96–108)
CO2 SERPL-SCNC: 32 MMOL/L — HIGH (ref 22–29)
CREAT SERPL-MCNC: 0.44 MG/DL — LOW (ref 0.5–1.3)
EGFR: 115 ML/MIN/1.73M2 — SIGNIFICANT CHANGE UP
GLUCOSE SERPL-MCNC: 100 MG/DL — HIGH (ref 70–99)
HCT VFR BLD CALC: 26.7 % — LOW (ref 39–50)
HGB BLD-MCNC: 8.6 G/DL — LOW (ref 13–17)
MCHC RBC-ENTMCNC: 29.9 PG — SIGNIFICANT CHANGE UP (ref 27–34)
MCHC RBC-ENTMCNC: 32.2 GM/DL — SIGNIFICANT CHANGE UP (ref 32–36)
MCV RBC AUTO: 92.7 FL — SIGNIFICANT CHANGE UP (ref 80–100)
PLATELET # BLD AUTO: 367 K/UL — SIGNIFICANT CHANGE UP (ref 150–400)
POTASSIUM SERPL-MCNC: 4 MMOL/L — SIGNIFICANT CHANGE UP (ref 3.5–5.3)
POTASSIUM SERPL-SCNC: 4 MMOL/L — SIGNIFICANT CHANGE UP (ref 3.5–5.3)
RBC # BLD: 2.88 M/UL — LOW (ref 4.2–5.8)
RBC # FLD: 15.7 % — HIGH (ref 10.3–14.5)
SODIUM SERPL-SCNC: 138 MMOL/L — SIGNIFICANT CHANGE UP (ref 135–145)
WBC # BLD: 10.45 K/UL — SIGNIFICANT CHANGE UP (ref 3.8–10.5)
WBC # FLD AUTO: 10.45 K/UL — SIGNIFICANT CHANGE UP (ref 3.8–10.5)

## 2022-11-30 PROCEDURE — 71045 X-RAY EXAM CHEST 1 VIEW: CPT | Mod: 26

## 2022-11-30 PROCEDURE — 99232 SBSQ HOSP IP/OBS MODERATE 35: CPT

## 2022-11-30 RX ORDER — SODIUM CHLORIDE 9 MG/ML
1000 INJECTION, SOLUTION INTRAVENOUS
Refills: 0 | Status: DISCONTINUED | OUTPATIENT
Start: 2022-11-30 | End: 2022-11-30

## 2022-11-30 RX ADMIN — CHLORHEXIDINE GLUCONATE 1 APPLICATION(S): 213 SOLUTION TOPICAL at 11:39

## 2022-11-30 RX ADMIN — BUDESONIDE AND FORMOTEROL FUMARATE DIHYDRATE 2 PUFF(S): 160; 4.5 AEROSOL RESPIRATORY (INHALATION) at 09:12

## 2022-11-30 RX ADMIN — MIDODRINE HYDROCHLORIDE 15 MILLIGRAM(S): 2.5 TABLET ORAL at 16:44

## 2022-11-30 RX ADMIN — ATORVASTATIN CALCIUM 80 MILLIGRAM(S): 80 TABLET, FILM COATED ORAL at 22:24

## 2022-11-30 RX ADMIN — Medication 500 MILLIGRAM(S): at 11:39

## 2022-11-30 RX ADMIN — TIOTROPIUM BROMIDE 1 CAPSULE(S): 18 CAPSULE ORAL; RESPIRATORY (INHALATION) at 09:10

## 2022-11-30 RX ADMIN — MIDODRINE HYDROCHLORIDE 15 MILLIGRAM(S): 2.5 TABLET ORAL at 11:39

## 2022-11-30 RX ADMIN — MIRTAZAPINE 7.5 MILLIGRAM(S): 45 TABLET, ORALLY DISINTEGRATING ORAL at 22:24

## 2022-11-30 RX ADMIN — MIDODRINE HYDROCHLORIDE 15 MILLIGRAM(S): 2.5 TABLET ORAL at 05:43

## 2022-11-30 RX ADMIN — BUDESONIDE AND FORMOTEROL FUMARATE DIHYDRATE 2 PUFF(S): 160; 4.5 AEROSOL RESPIRATORY (INHALATION) at 21:57

## 2022-11-30 RX ADMIN — LEVALBUTEROL 1.25 MILLIGRAM(S): 1.25 SOLUTION, CONCENTRATE RESPIRATORY (INHALATION) at 21:57

## 2022-11-30 RX ADMIN — LEVALBUTEROL 1.25 MILLIGRAM(S): 1.25 SOLUTION, CONCENTRATE RESPIRATORY (INHALATION) at 15:35

## 2022-11-30 RX ADMIN — LEVALBUTEROL 1.25 MILLIGRAM(S): 1.25 SOLUTION, CONCENTRATE RESPIRATORY (INHALATION) at 02:12

## 2022-11-30 RX ADMIN — Medication 1 TABLET(S): at 11:39

## 2022-11-30 RX ADMIN — PANTOPRAZOLE SODIUM 40 MILLIGRAM(S): 20 TABLET, DELAYED RELEASE ORAL at 16:44

## 2022-11-30 RX ADMIN — SODIUM CHLORIDE 75 MILLILITER(S): 9 INJECTION, SOLUTION INTRAVENOUS at 11:45

## 2022-11-30 RX ADMIN — LEVALBUTEROL 1.25 MILLIGRAM(S): 1.25 SOLUTION, CONCENTRATE RESPIRATORY (INHALATION) at 09:05

## 2022-11-30 RX ADMIN — PANTOPRAZOLE SODIUM 40 MILLIGRAM(S): 20 TABLET, DELAYED RELEASE ORAL at 05:44

## 2022-11-30 NOTE — PROGRESS NOTE ADULT - SUBJECTIVE AND OBJECTIVE BOX
Chief complaint: Pericardial effusion    Patient seen and examined at bedside. No acute overnight events reported. No fever, c hills, cough, nausea or vomiting.     Vital Signs Last 24 Hrs  T(F): 98.1 (30 Nov 2022 08:20), Max: 98.8 (29 Nov 2022 15:15)  HR: 88 (30 Nov 2022 09:05) (84 - 99)  BP: 101/73 (30 Nov 2022 08:00) (90/60 - 105/65)  RR: 16 (30 Nov 2022 08:00) (16 - 20)  SpO2: 96% (30 Nov 2022 09:05) (95% - 99%)    Physical Exam:  Constitutional: alert and oriented, frail, cachetic, in no acute distress   Neck: Soft   Respiratory: Clear to auscultation bilaterally  Cardiovascular: Regular rate and rhythm  Gastrointestinal: Soft, non-tender to palpation, +bs  Musculoskeletal: no lower extremity edema bilaterally    Labs:                        8.6    10.45 )-----------( 367      ( 30 Nov 2022 06:50 )             26.7   11-30    138  |  98  |  21.2<H>  ----------------------------<  100<H>  4.0   |  32.0<H>  |  0.44<L>    Ca    8.9      30 Nov 2022 06:50

## 2022-11-30 NOTE — PROGRESS NOTE ADULT - SUBJECTIVE AND OBJECTIVE BOX
Discussed with the patient regarding diagnosis of adenocarcinoma likely lung origin, and treatment options based on next generation sequencing.   discussed with the patient, once medically stable would be a candidate for such treatment options, likely on outpatient basis.  Patient verbalized understanding.  events noted, GIB , drop in Hgb, s/p EGD showing gastritis    pt w/o acute complaints    MEDICATIONS  (STANDING):  ascorbic acid 500 milliGRAM(s) Oral daily  atorvastatin 80 milliGRAM(s) Oral at bedtime  budesonide 160 MICROgram(s)/formoterol 4.5 MICROgram(s) Inhaler 2 Puff(s) Inhalation two times a day  chlorhexidine 2% Cloths 1 Application(s) Topical daily  influenza  Vaccine (HIGH DOSE) 0.7 milliLiter(s) IntraMuscular once  levalbuterol Inhalation 1.25 milliGRAM(s) Inhalation every 6 hours  lidocaine   4% Patch 2 Patch Transdermal daily  midodrine. 15 milliGRAM(s) Oral three times a day  mirtazapine 7.5 milliGRAM(s) Oral at bedtime  multivitamin/minerals 1 Tablet(s) Oral daily  pantoprazole  Injectable 40 milliGRAM(s) IV Push two times a day  tiotropium 18 MICROgram(s) Capsule 1 Capsule(s) Inhalation daily    MEDICATIONS  (PRN):  acetaminophen     Tablet .. 650 milliGRAM(s) Oral every 8 hours PRN Mild Pain (1 - 3), Moderate Pain (4 - 6)  melatonin 5 milliGRAM(s) Oral at bedtime PRN Insomnia    Vital Signs Last 24 Hrs  T(C): 36.7 (30 Nov 2022 08:20), Max: 37.1 (30 Nov 2022 00:00)  T(F): 98.1 (30 Nov 2022 08:20), Max: 98.7 (30 Nov 2022 00:00)  HR: 89 (30 Nov 2022 12:00) (84 - 94)  BP: 114/65 (30 Nov 2022 12:00) (90/60 - 114/65)  BP(mean): 93 (30 Nov 2022 12:00) (65 - 93)  RR: 18 (30 Nov 2022 12:00) (16 - 18)  SpO2: 98% (30 Nov 2022 12:00) (96% - 98%)    Parameters below as of 30 Nov 2022 12:00  Patient On (Oxygen Delivery Method): nasal cannula  O2 Flow (L/min): 2      CONSTITUTIONAL: ill-appearing, frail, cachetic  RESPIRATORY: Normal respiratory effort; lungs are clear to auscultation bilaterally  CARDIOVASCULAR: Regular rate and rhythm, normal S1 and S2  ABDOMEN: Nontender to palpation, normoactive bowel sounds, no rebound/guarding  PSYCH: A+O to person, place, and time; affect appropriate  NEUROLOGY: CN 2-12 are intact and symmetric; no gross sensory deficits    LABS:                          8.6    10.45 )-----------( 367      ( 30 Nov 2022 06:50 )             26.7                           10.4   9.33  )-----------( 315      ( 25 Nov 2022 06:21 )             32.2                              10.6   9.37  )-----------( 296      ( 24 Nov 2022 06:38 )             31.8                        7.5    9.22  )-----------( 202      ( 22 Nov 2022 08:40 )             21.9     11-24    140  |  100  |  21.1<H>  ----------------------------<  132<H>  3.6   |  30.0<H>  |  0.40<L>    Ca    8.9      24 Nov 2022 06:38    TPro  6.0<L>  /  Alb  3.4  /  TBili  0.7  /  DBili  x   /  AST  39  /  ALT  60<H>  /  AlkPhos  84  11-24 11-22    144  |  107  |  28.8<H>  ----------------------------<  89  3.8   |  27.0  |  0.33<L>    Ca    8.7      22 Nov 2022 05:00    TPro  5.4<L>  /  Alb  3.3  /  TBili  1.0  /  DBili  x   /  AST  51<H>  /  ALT  61<H>  /  AlkPhos  57  11-22

## 2022-11-30 NOTE — PROGRESS NOTE ADULT - ASSESSMENT
67 yr old male with no known medical history, current smoker presented with complaints of 1 week of shortness of breath. His EKG on arrival revealed diffuse ST elevations, a limited ECHO revealed circumferential pericardial effusion. He was emergently taken to cardiac cath lab where he underwent pericardiocentesis and coronary angiogram. A pericardial drain was placed, cath revealed a 90% LAD lesion, which was not felt to be acute and given pericardial effusion, PCI was deferred. His presentation was attributed to acute pericarditis He was placed on a NRB, transferred to ICU for further monitoring. His labs on admission revealed FILIBERTO and elevated LFTs, IVF were given. A US abdomen was ordered, revealed non occlusive thrombus in IVC and gall bladder sludge. CXR on admission revealed a left lung mass. Subsequent CTA chest, abdomen and pelvis was done, revealed left upper lobe segmental and subsegmental PE. Occlusion of the SVC and a thrombus within the intrahepatic IVC. Mediastinal and right hilar lymphadenopathy with resultant central  bronchial obstruction and atelectasis involving the right middle and lower lobes. 4.1 cm sized left lower lobe mass compatible with a pulmonary malignancy. Moderate right and a trace left pleural effusion. Concern for malignant cells in pericardial fluid, formal cytology report pending. ECHO revealed a EF 50%. Cardiology follow up was done, advised continue aspirin and statin, unable to initiate GDMT given hypotension. Heparin gtt was initiated, palliative care was consulted. He was transferred to medical floor on 11/9. C/b symptomatic anemia in the setting of GI bleed and hypotension.    Hypoxia/Hypotension likely secondary to GI Bleed  - Unclear etiology  - No leukocytosis  - Procalcitonin 0.20  - Lactate 2.0  - ABG noted  - MICU consult appreciated  - Protonix 40mg BID  - Midodrine 15mg TID  - CXR noted  - Weaned off HFNC, now on 2L nasal cannula    Anemia  - Hemoglobin stable  - CT A/P ordered to r/o bleed - patient refused  - s/p 1 unit PRBC    Right pleural effusion  - Thoracic surgery following   - s/p pigtail drainage  - Will require home O2    Stage 4 adenocarcinoma  - Appreciate heme/onc recs- will need systemic chemotherapy for Stage 4 malignancy once medically optimized  - MRI-H notable for multiple intracranial metastatic lesion.  - Patient deferred repeat imaging under anesthesia. Will follow up outpatient for PET scan  - will need next generation sequencing and outpatient PET-CT scan    Depression  - BH recs appreciated    Melena  - Resolved  - CT A/P w/o source of bleeding  - Holding ASA and eliquis  - C/w IV Protonix BID  - Appreciate GI recs- S/p EGD w/ non-erosive diffuse gastritis. F/u H. pylori and duodenal biopsies  - Will c/w protonix 40 BID  - Will transfuse to keep Hg> 8  - Appreciate cards c/s for medical clearance    Acute pericardial effusion s/p pericardiocentesis  - Pericardial drain removed  - Outpatient PET scan  - C/w Aspirin  - s/p IR lung biopsy- NSCLC  - repeat echo w/o pericardial effusion reaccumulation    Acute pulmonary embolism/ IVC thrombus   - Eliquis held for anemia and GI bleed    HFrEF with wall motion abnormalities   PCI not performed on admission given effusion  90% LAD stenosis   - Appreciate cardio recs- if chest pain, will obtain CE and EKG  - Will continue to hold toprol XL and lisinopril given soft BP    Lung mass /cancer   - Oncology recs appreciated  - Palliative consult appreciated    Smoker, likely COPD   - symbicort/spiriva  - PRN Nebs    Dispo: Pending DC to TRAN

## 2022-12-01 LAB
ANION GAP SERPL CALC-SCNC: 10 MMOL/L — SIGNIFICANT CHANGE UP (ref 5–17)
BUN SERPL-MCNC: 19.6 MG/DL — SIGNIFICANT CHANGE UP (ref 8–20)
CALCIUM SERPL-MCNC: 9.2 MG/DL — SIGNIFICANT CHANGE UP (ref 8.4–10.5)
CHLORIDE SERPL-SCNC: 99 MMOL/L — SIGNIFICANT CHANGE UP (ref 96–108)
CO2 SERPL-SCNC: 32 MMOL/L — HIGH (ref 22–29)
CREAT SERPL-MCNC: 0.45 MG/DL — LOW (ref 0.5–1.3)
EGFR: 115 ML/MIN/1.73M2 — SIGNIFICANT CHANGE UP
GLUCOSE SERPL-MCNC: 97 MG/DL — SIGNIFICANT CHANGE UP (ref 70–99)
HCT VFR BLD CALC: 28.2 % — LOW (ref 39–50)
HGB BLD-MCNC: 9 G/DL — LOW (ref 13–17)
MCHC RBC-ENTMCNC: 29.7 PG — SIGNIFICANT CHANGE UP (ref 27–34)
MCHC RBC-ENTMCNC: 31.9 GM/DL — LOW (ref 32–36)
MCV RBC AUTO: 93.1 FL — SIGNIFICANT CHANGE UP (ref 80–100)
PLATELET # BLD AUTO: 381 K/UL — SIGNIFICANT CHANGE UP (ref 150–400)
POTASSIUM SERPL-MCNC: 4 MMOL/L — SIGNIFICANT CHANGE UP (ref 3.5–5.3)
POTASSIUM SERPL-SCNC: 4 MMOL/L — SIGNIFICANT CHANGE UP (ref 3.5–5.3)
RBC # BLD: 3.03 M/UL — LOW (ref 4.2–5.8)
RBC # FLD: 15.6 % — HIGH (ref 10.3–14.5)
SODIUM SERPL-SCNC: 140 MMOL/L — SIGNIFICANT CHANGE UP (ref 135–145)
WBC # BLD: 10.57 K/UL — HIGH (ref 3.8–10.5)
WBC # FLD AUTO: 10.57 K/UL — HIGH (ref 3.8–10.5)

## 2022-12-01 PROCEDURE — 99232 SBSQ HOSP IP/OBS MODERATE 35: CPT

## 2022-12-01 PROCEDURE — 99497 ADVNCD CARE PLAN 30 MIN: CPT | Mod: 25

## 2022-12-01 RX ADMIN — PANTOPRAZOLE SODIUM 40 MILLIGRAM(S): 20 TABLET, DELAYED RELEASE ORAL at 17:30

## 2022-12-01 RX ADMIN — TIOTROPIUM BROMIDE 1 CAPSULE(S): 18 CAPSULE ORAL; RESPIRATORY (INHALATION) at 08:44

## 2022-12-01 RX ADMIN — PANTOPRAZOLE SODIUM 40 MILLIGRAM(S): 20 TABLET, DELAYED RELEASE ORAL at 05:07

## 2022-12-01 RX ADMIN — LEVALBUTEROL 1.25 MILLIGRAM(S): 1.25 SOLUTION, CONCENTRATE RESPIRATORY (INHALATION) at 14:50

## 2022-12-01 RX ADMIN — LEVALBUTEROL 1.25 MILLIGRAM(S): 1.25 SOLUTION, CONCENTRATE RESPIRATORY (INHALATION) at 08:43

## 2022-12-01 RX ADMIN — MIDODRINE HYDROCHLORIDE 15 MILLIGRAM(S): 2.5 TABLET ORAL at 12:58

## 2022-12-01 RX ADMIN — LEVALBUTEROL 1.25 MILLIGRAM(S): 1.25 SOLUTION, CONCENTRATE RESPIRATORY (INHALATION) at 20:57

## 2022-12-01 RX ADMIN — LEVALBUTEROL 1.25 MILLIGRAM(S): 1.25 SOLUTION, CONCENTRATE RESPIRATORY (INHALATION) at 03:36

## 2022-12-01 RX ADMIN — MIDODRINE HYDROCHLORIDE 15 MILLIGRAM(S): 2.5 TABLET ORAL at 20:15

## 2022-12-01 RX ADMIN — BUDESONIDE AND FORMOTEROL FUMARATE DIHYDRATE 2 PUFF(S): 160; 4.5 AEROSOL RESPIRATORY (INHALATION) at 20:57

## 2022-12-01 RX ADMIN — BUDESONIDE AND FORMOTEROL FUMARATE DIHYDRATE 2 PUFF(S): 160; 4.5 AEROSOL RESPIRATORY (INHALATION) at 08:44

## 2022-12-01 NOTE — SWALLOW BEDSIDE ASSESSMENT ADULT - SLP PERTINENT HISTORY OF CURRENT PROBLEM
As per MD note, "68yo M w/ no known PMH who p/w SOB and found to have diffuse ST elevations and taken for emergent LHC where found to have a large pericardial effusion s/p percardiocentesis during which bloody fluid was drained (a 90% LAD lesion also seen but PCI deferred until further stabilized).  Course further c/b dx of HFrEF with EF 20-25% and imaging revealing REYNALDO PE, SVC occlusion, IVC thrombus, mediastinal/rt hilar LAD with bronchial obstruxn in RML/RLL, a 4.1cm LLL mass c/w lung malignancy, and mod rt effusion.  We are consulted for assistance with goals of care".

## 2022-12-01 NOTE — PROGRESS NOTE ADULT - SUBJECTIVE AND OBJECTIVE BOX
Discussed with the patient regarding diagnosis of adenocarcinoma likely lung origin, and treatment options based on next generation sequencing.   discussed with the patient, once medically stable would be a candidate for such treatment options, likely on outpatient basis.  Patient verbalized understanding.  events noted, GIB , drop in Hgb, s/p EGD showing gastritis    seen at bedside, on 3l NC  no active bleed    MEDICATIONS  (STANDING):  ascorbic acid 500 milliGRAM(s) Oral daily  atorvastatin 80 milliGRAM(s) Oral at bedtime  budesonide 160 MICROgram(s)/formoterol 4.5 MICROgram(s) Inhaler 2 Puff(s) Inhalation two times a day  chlorhexidine 2% Cloths 1 Application(s) Topical daily  influenza  Vaccine (HIGH DOSE) 0.7 milliLiter(s) IntraMuscular once  levalbuterol Inhalation 1.25 milliGRAM(s) Inhalation every 6 hours  lidocaine   4% Patch 2 Patch Transdermal daily  midodrine. 15 milliGRAM(s) Oral three times a day  mirtazapine 7.5 milliGRAM(s) Oral at bedtime  multivitamin/minerals 1 Tablet(s) Oral daily  pantoprazole  Injectable 40 milliGRAM(s) IV Push two times a day  tiotropium 18 MICROgram(s) Capsule 1 Capsule(s) Inhalation daily    MEDICATIONS  (PRN):  acetaminophen     Tablet .. 650 milliGRAM(s) Oral every 8 hours PRN Mild Pain (1 - 3), Moderate Pain (4 - 6)  melatonin 5 milliGRAM(s) Oral at bedtime PRN Insomnia    ICU Vital Signs Last 24 Hrs  T(C): 36.6 (01 Dec 2022 07:22), Max: 37.2 (01 Dec 2022 00:00)  T(F): 97.9 (01 Dec 2022 07:22), Max: 99 (01 Dec 2022 00:00)  HR: 94 (01 Dec 2022 09:00) (89 - 97)  BP: 105/74 (01 Dec 2022 09:00) (92/64 - 114/65)  BP(mean): 82 (01 Dec 2022 09:00) (71 - 93)  ABP: --  ABP(mean): --  RR: 18 (01 Dec 2022 09:00) (18 - 20)  SpO2: 95% (01 Dec 2022 09:00) (93% - 100%)    O2 Parameters below as of 01 Dec 2022 09:00  Patient On (Oxygen Delivery Method): nasal cannula  O2 Flow (L/min): 3    CONSTITUTIONAL: ill-appearing, frail, cachetic  RESPIRATORY: Normal respiratory effort; lungs are clear to auscultation bilaterally  CARDIOVASCULAR: Regular rate and rhythm, normal S1 and S2  ABDOMEN: Nontender to palpation, normoactive bowel sounds, no rebound/guarding  PSYCH: A+O to person, place, and time; affect appropriate  NEUROLOGY: CN 2-12 are intact and symmetric; no gross sensory deficits    LABS:                           9.0    10.57 )-----------( 381      ( 01 Dec 2022 05:22 )             28.2                        8.6    10.45 )-----------( 367      ( 30 Nov 2022 06:50 )             26.7                           10.4   9.33  )-----------( 315      ( 25 Nov 2022 06:21 )             32.2                              10.6   9.37  )-----------( 296      ( 24 Nov 2022 06:38 )             31.8                        7.5    9.22  )-----------( 202      ( 22 Nov 2022 08:40 )             21.9     12-01    140  |  99  |  19.6  ----------------------------<  97  4.0   |  32.0<H>  |  0.45<L>    Ca    9.2      01 Dec 2022 05:22      11-24    140  |  100  |  21.1<H>  ----------------------------<  132<H>  3.6   |  30.0<H>  |  0.40<L>    Ca    8.9      24 Nov 2022 06:38    TPro  6.0<L>  /  Alb  3.4  /  TBili  0.7  /  DBili  x   /  AST  39  /  ALT  60<H>  /  AlkPhos  84  11-24 11-22    144  |  107  |  28.8<H>  ----------------------------<  89  3.8   |  27.0  |  0.33<L>    Ca    8.7      22 Nov 2022 05:00    TPro  5.4<L>  /  Alb  3.3  /  TBili  1.0  /  DBili  x   /  AST  51<H>  /  ALT  61<H>  /  AlkPhos  57  11-22

## 2022-12-01 NOTE — SWALLOW BEDSIDE ASSESSMENT ADULT - SWALLOW EVAL: DIAGNOSIS
Oral stage generally functional for consistencies presented, puree and all liquid densities. Pharyngeal dysphagia cannot be excluded at the bedside, Pt with baseline cough, however immediate upper airway congestion & wet vocal quality post intake all PO. PO diet contraindicated at this time.

## 2022-12-01 NOTE — GOALS OF CARE CONVERSATION - ADVANCED CARE PLANNING - CONVERSATION DETAILS
- I spoke to pt today and he is agreeable to discussing current clinical condition, concerns and treatment options tomorrow with his HCP Adi Cardozo  - I called and spoke to HCP Adi Cardozo and updated him on above - he and his wife did not feel that pt would ultimately want treatment for the cancer and asked what we would do if he didn't wish to pursue cancer therapy.  We reviewed switching focus to comfort through hospice services either at home or SNF given he is not particularly symptomatic and wouldn't be appropriate for inpatient hospice.  Pt only has Medicaid so I shared that this would certainly complicate things and limit options.  They are interested in having him move down to [place] to be closer to them whether he chooses to pursue cancer therapy or hospice.  - will have a discussion with pt and Adi tomorrow at 11am and will review current tenuous clinical status, pt's interest in pursuing cancer treatment and code status.
Discussed pt's multiple medical issues with pt with his HCP Adi Cardozo on phone.  Reviewed IVC thrombus, PE and GI bleed along with new dx of metastatic lung cancer. I shared that when cancer is metastatic, it is incurable and treatment goal is to slow progression to give him more time.  He is not sure he wants to pursue cancer treatment but would be open to hearing about the options.  Further discussed code status and I recommended against CPR or intubation given his advanced cancer as risk would more than likely be outweighed by benefit.  Pt expressed agreement.  DNR/DNI - MOLST completed.
Today I spoke to Dr Olivares of oncology - given his current performance status, NSCLC, PDL1 0% he would be unlikely to see significant benefit with CTX and likely side effects. Could consider WBRT although, at this time, no neuro symptoms.  - I spoke to pt with HCP Adi on speakerphone.  Reviewed above with them and expressed my concern that CTX would likely be more harmful than helpful and may give him side effects without doing much for him during the limited time he has.  Explained that he could forego CTX and XRT and choose instead to pursue a comfort-focused plan through the help of hospice services.  Mr Ochoa wants to discuss his decision with Adi and Moe (his friend and alternate HCP)

## 2022-12-01 NOTE — PROGRESS NOTE ADULT - ASSESSMENT
68yo M w/ no known PMH who p/w SOB and found to have diffuse ST elevations and taken for emergent LHC where found to have a large pericardial effusion s/p percardiocentesis during which bloody fluid was drained (a 90% LAD lesion also seen but PCI deferred until further stabilized).  Course further c/b dx of HFrEF with EF 20-25% and imaging revealing REYNALDO PE, SVC occlusion, IVC thrombus, mediastinal/rt hilar LAD with bronchial obstruxn in RML/RLL, a 4.1cm LLL mass c/w lung malignancy, and mod rt effusion.  We are consulted for assistance with goals of care.   #Goals of care  - Pt named friend as HCP- Samuel Sánchezbucky - [phone] (alternate HCP friend Rios Maza - [phone])  - Newly dx'd metastatic NSCLC - LLL mass with presumed LN involvement, malignant pericardial effusion and brain mets - very poor prognosis  - Prior GOC discussion with pt and HCP Adi Cardozo -- DNR/DNI - he is interested in hearing from oncology what treatment options there are and decide whether or not to pursue  - Today I spoke to Dr Olivares of oncology - given his current performance status, NSCLC, PDL1 0% he would be unlikely to see significant benefit with CTX and likely side effects. Could consider WBRT although, at this time, no neuro symptoms.  -   - Will cont to follow and remain available to assist with future goals of care discussions if/when the need arises.    #Rt shoulder/neck pain  - Cont tylenol 650mg PO TID prn (this is under 2gm limit for pts with hepatic impairment)  - Heat pad may help as well 66yo M w/ no known PMH who p/w SOB and found to have diffuse ST elevations and taken for emergent LHC where found to have a large pericardial effusion s/p percardiocentesis during which bloody fluid was drained (a 90% LAD lesion also seen but PCI deferred until further stabilized).  Course further c/b dx of HFrEF with EF 20-25% and imaging revealing REYNALDO PE, SVC occlusion, IVC thrombus, mediastinal/rt hilar LAD with bronchial obstruxn in RML/RLL, a 4.1cm LLL mass c/w lung malignancy, and mod rt effusion.  We are consulted for assistance with goals of care.   #Goals of care  - Pt named friend as HCP- Samuel Chaudhry - [phone] (alternate HCP friend Rios Maza - [phone])  - Newly dx'd metastatic NSCLC - LLL mass with presumed LN involvement, malignant pericardial effusion and brain mets - very poor prognosis  - Prior GOC discussion with pt and HCP Adi Cardozo -- DNR/DNI - he is interested in hearing from oncology what treatment options there are and decide whether or not to pursue  - Today I spoke to Dr Olivares of oncology - given his current performance status, NSCLC, PDL1 0% he would be unlikely to see significant benefit with CTX and likely side effects. Could consider WBRT although, at this time, no neuro symptoms.  - I spoke to pt with HCP Adi on speakerphone.  Reviewed above with them and expressed my concern that CTX would likely be more harmful than helpful and may give him side effects without doing much for him during the limited time he has.  Explained that he could forego CTX and XRT and choose instead to pursue a comfort-focused plan through the help of hospice services.  Mr Ochoa wants to discuss his decision with Adi and Moe (his friend and alternate HCP)  - Will cont to follow and remain available to assist with future goals of care discussions if/when the need arises.    #Rt shoulder/neck pain  - Cont tylenol 650mg PO TID prn (this is under 2gm limit for pts with hepatic impairment)  - Heat pad may help as well

## 2022-12-01 NOTE — PROGRESS NOTE ADULT - SUBJECTIVE AND OBJECTIVE BOX
INTERVAL HISTORY:      PRESENT SYMPTOMS:     Dyspnea: No  Nausea/Vomiting: No  Anxiety:  No  Depression: Yes   Fatigue: Yes   Loss of appetite: Yes   Constipation: No    PAIN: rt shoulder/neck            Character-            Duration-            Effect-            Factors-            Frequency-            Location-            Severity-    REVIEW OF SYSTEMS:   [ X ] Full review of systems performed and was otherwise negative except as noted above.  [  ] Due to altered mental status/intubation, subjective information were not able to be obtained from the patient. History was obtained, to the extent possible, from review of the chart and collateral sources of information.    MEDICATIONS  (STANDING):  ascorbic acid 500 milliGRAM(s) Oral daily  atorvastatin 80 milliGRAM(s) Oral at bedtime  budesonide 160 MICROgram(s)/formoterol 4.5 MICROgram(s) Inhaler 2 Puff(s) Inhalation two times a day  chlorhexidine 2% Cloths 1 Application(s) Topical daily  influenza  Vaccine (HIGH DOSE) 0.7 milliLiter(s) IntraMuscular once  levalbuterol Inhalation 1.25 milliGRAM(s) Inhalation every 6 hours  lidocaine   4% Patch 2 Patch Transdermal daily  midodrine. 15 milliGRAM(s) Oral three times a day  mirtazapine 7.5 milliGRAM(s) Oral at bedtime  multivitamin/minerals 1 Tablet(s) Oral daily  pantoprazole  Injectable 40 milliGRAM(s) IV Push two times a day  tiotropium 18 MICROgram(s) Capsule 1 Capsule(s) Inhalation daily    MEDICATIONS  (PRN):  acetaminophen     Tablet .. 650 milliGRAM(s) Oral every 8 hours PRN Mild Pain (1 - 3), Moderate Pain (4 - 6)  melatonin 5 milliGRAM(s) Oral at bedtime PRN Insomnia      PHYSICAL EXAM:  Vital Signs Last 24 Hrs  T(C): 36.6 (01 Dec 2022 07:22), Max: 37.2 (01 Dec 2022 00:00)  T(F): 97.9 (01 Dec 2022 07:22), Max: 99 (01 Dec 2022 00:00)  HR: 94 (01 Dec 2022 09:00) (89 - 97)  BP: 105/74 (01 Dec 2022 09:00) (92/64 - 114/65)  BP(mean): 82 (01 Dec 2022 09:00) (71 - 93)  RR: 18 (01 Dec 2022 09:00) (18 - 20)  SpO2: 95% (01 Dec 2022 09:00) (93% - 100%)    Parameters below as of 01 Dec 2022 09:00  Patient On (Oxygen Delivery Method): nasal cannula  O2 Flow (L/min): 3    General: Pt lying in bed in NAD - cachectic  HEENT: NCAT; MM dry; temporal wasting  Resp: breathing unlabored; symmetric chest expansion B/L  MSK: no contractures/deformities  Skin: no rash  Neuro: awake and oriented x 3; no myoclonus  Psych: calm; flat affect    LABS:                        9.0    10.57 )-----------( 381      ( 01 Dec 2022 05:22 )             28.2     12-01    140  |  99  |  19.6  ----------------------------<  97  4.0   |  32.0<H>  |  0.45<L>    Ca    9.2      01 Dec 2022 05:22    RADIOLOGY & ADDITIONAL STUDIES:    NEUROLOGIC MEDICATIONS/OPIOIDS/BENZODIAZEPINES IN PAST 24HRS:    mirtazapine   7.5 milliGRAM(s) Oral (11-30-22 @ 22:24)    ADVANCE DIRECTIVES/TREATMENT PREFERENCES:  Code Status: DNR/DNI  MOLST (if not Full Code): yes  HCP/Surrogate: Adi Chaudhry INTERVAL HISTORY:  Pt denies nausea but still poor appetite  He has had a cough but trouble mobilizing sputum    PRESENT SYMPTOMS:     Dyspnea: No  Nausea/Vomiting: No  Anxiety:  No  Depression: Yes   Fatigue: Yes   Loss of appetite: Yes   Constipation: No    PAIN: rt shoulder/neck            Character-            Duration-            Effect-            Factors-            Frequency-            Location-            Severity-    REVIEW OF SYSTEMS:   [ X ] Full review of systems performed and was otherwise negative except as noted above.  [  ] Due to altered mental status/intubation, subjective information were not able to be obtained from the patient. History was obtained, to the extent possible, from review of the chart and collateral sources of information.    MEDICATIONS  (STANDING):  ascorbic acid 500 milliGRAM(s) Oral daily  atorvastatin 80 milliGRAM(s) Oral at bedtime  budesonide 160 MICROgram(s)/formoterol 4.5 MICROgram(s) Inhaler 2 Puff(s) Inhalation two times a day  chlorhexidine 2% Cloths 1 Application(s) Topical daily  influenza  Vaccine (HIGH DOSE) 0.7 milliLiter(s) IntraMuscular once  levalbuterol Inhalation 1.25 milliGRAM(s) Inhalation every 6 hours  lidocaine   4% Patch 2 Patch Transdermal daily  midodrine. 15 milliGRAM(s) Oral three times a day  mirtazapine 7.5 milliGRAM(s) Oral at bedtime  multivitamin/minerals 1 Tablet(s) Oral daily  pantoprazole  Injectable 40 milliGRAM(s) IV Push two times a day  tiotropium 18 MICROgram(s) Capsule 1 Capsule(s) Inhalation daily    MEDICATIONS  (PRN):  acetaminophen     Tablet .. 650 milliGRAM(s) Oral every 8 hours PRN Mild Pain (1 - 3), Moderate Pain (4 - 6)  melatonin 5 milliGRAM(s) Oral at bedtime PRN Insomnia      PHYSICAL EXAM:  Vital Signs Last 24 Hrs  T(C): 36.6 (01 Dec 2022 07:22), Max: 37.2 (01 Dec 2022 00:00)  T(F): 97.9 (01 Dec 2022 07:22), Max: 99 (01 Dec 2022 00:00)  HR: 94 (01 Dec 2022 09:00) (89 - 97)  BP: 105/74 (01 Dec 2022 09:00) (92/64 - 114/65)  BP(mean): 82 (01 Dec 2022 09:00) (71 - 93)  RR: 18 (01 Dec 2022 09:00) (18 - 20)  SpO2: 95% (01 Dec 2022 09:00) (93% - 100%)    Parameters below as of 01 Dec 2022 09:00  Patient On (Oxygen Delivery Method): nasal cannula  O2 Flow (L/min): 3    General: Pt lying in bed in NAD - cachectic  HEENT: NCAT; MM dry; temporal wasting  Resp: breathing unlabored; symmetric chest expansion B/L  MSK: no contractures/deformities  Skin: no rash  Neuro: awake and oriented x 3; no myoclonus  Psych: calm; flat affect    LABS:                        9.0    10.57 )-----------( 381      ( 01 Dec 2022 05:22 )             28.2     12-01    140  |  99  |  19.6  ----------------------------<  97  4.0   |  32.0<H>  |  0.45<L>    Ca    9.2      01 Dec 2022 05:22    RADIOLOGY & ADDITIONAL STUDIES:    NEUROLOGIC MEDICATIONS/OPIOIDS/BENZODIAZEPINES IN PAST 24HRS:    mirtazapine   7.5 milliGRAM(s) Oral (11-30-22 @ 22:24)    ADVANCE DIRECTIVES/TREATMENT PREFERENCES:  Code Status: DNR/DNI  MOLST (if not Full Code): yes  HCP/Surrogate: Adi Chaudhry

## 2022-12-01 NOTE — PROGRESS NOTE ADULT - ASSESSMENT
67-year-old male With no known medical history with lack of medical follow-up with tobacco use disorder presented on November 7 with difficulty breathing found to have diffuse ST elevation taken for emergent left heart cath found to have 90% stenosis of mid LAD with no acute lesion with no intervention but s/p pericardial drain for moderate to large pericardial effusion was initially admitted to medical ICU eventually found to have metastatic adenocarcinoma most likely from lung as primary based on pericardial fluid pathology, through hospital course patient was also found to have SVC and intrahepatic IVC thrombi as well as left-sided segmental subsegmental PE, mediastinal lymphadenopathy.  Hospital course complicated by GI bleed and on overnight from 20th November through early morning off 21st November while patient was on Eliquis receiving blood products, underwent CT abdomen pelvis with contrast which did not reveal active bleeding and resuscitated for 2 units PRBC along with colloid and crystalloid resuscitation.     Stage 4 adenocardircinoma:  -once stable will need systemic therapy  -NGS to be sent on the pathology specimen to check for actionable mutations including PDL1 analysis  -will need outpatient PET-CT scan  -MRI brain - poor study; brain mets seen.   pt to f/u with Dr Olivares in office to review treatment options   will need brain RT      Right pleural effusion  - Thoracic surgery following   - s/p pigtail drainage  - on nasal O2     GIB  s/p EGD showing gastritis  s/p PRBC,   Hgb improved but now dropping again  to get CT A/P - pt refused   Hgb stable at 9 this am    DNR/DNI  palliative care evaluation noted  d/c planning    67-year-old male With no known medical history with lack of medical follow-up with tobacco use disorder presented on November 7 with difficulty breathing found to have diffuse ST elevation taken for emergent left heart cath found to have 90% stenosis of mid LAD with no acute lesion with no intervention but s/p pericardial drain for moderate to large pericardial effusion was initially admitted to medical ICU eventually found to have metastatic adenocarcinoma most likely from lung as primary based on pericardial fluid pathology, through hospital course patient was also found to have SVC and intrahepatic IVC thrombi as well as left-sided segmental subsegmental PE, mediastinal lymphadenopathy.  Hospital course complicated by GI bleed and on overnight from 20th November through early morning off 21st November while patient was on Eliquis receiving blood products, underwent CT abdomen pelvis with contrast which did not reveal active bleeding and resuscitated for 2 units PRBC along with colloid and crystalloid resuscitation.     Stage 4 adenocardircinoma:  -once stable will need systemic therapy  -NGS to be sent on the pathology specimen to check for actionable mutations including PDL1 analysis  -will need outpatient PET-CT scan  -MRI brain - poor study; brain mets seen.   pt to f/u with Dr Olivares in office to review treatment options   will need brain RT      Right pleural effusion  - Thoracic surgery following   - s/p pigtail drainage  - on nasal O2     GIB  s/p EGD showing gastritis  s/p PRBC,   Hgb improved but now dropping again  to get CT A/P - pt refused   Hgb stable at 9 this am    DNR/DNI  palliative care evaluation noted    spoke with Dr Cma and palliative care - patient with social issues, no insurance  MRI brain with multiple brain mets - untreated, would obtain inpatient rad onc eval for palliative WBRT, also start decadron 4 mg BID PO  at this time given declining clinical status, GIB unlikely patient will be candidate for any aggressive systemic therapy  would consider Hospice evaluation

## 2022-12-01 NOTE — SWALLOW BEDSIDE ASSESSMENT ADULT - SLP GENERAL OBSERVATIONS
Pt recd awake/upright in bed, A&A Ox2, reduced cognition, 0/10 pain pre/post, tolerating 2L NC SpO2 98%

## 2022-12-01 NOTE — PROGRESS NOTE ADULT - SUBJECTIVE AND OBJECTIVE BOX
Chief complaint: Effusion    Patient seen and examined at bedside. No acute overnight events reported. No fever, chills, cough, chest pain, nausea or vomiting.     Vital Signs Last 24 Hrs  T(F): 97.9 (01 Dec 2022 07:22), Max: 99 (01 Dec 2022 00:00)  HR: 94 (01 Dec 2022 09:00) (89 - 97)  BP: 105/74 (01 Dec 2022 09:00) (92/64 - 106/75)  RR: 18 (01 Dec 2022 09:00) (18 - 20)  SpO2: 95% (01 Dec 2022 09:00) (93% - 100%)    Physical Exam:  Constitutional: alert and oriented, in no acute distress   Neck: Soft and supple  Respiratory: Coarse breath sounds b/l  Cardiovascular: Regular rate and rhyhtm  Gastrointestinal: Soft, non-tender to palpation, +bs  Musculoskeletal: no lower extremity edema bilaterally    Labs:                        9.0    10.57 )-----------( 381      ( 01 Dec 2022 05:22 )             28.2   12-01    140  |  99  |  19.6  ----------------------------<  97  4.0   |  32.0<H>  |  0.45<L>    Ca    9.2      01 Dec 2022 05:22

## 2022-12-01 NOTE — PROGRESS NOTE ADULT - ASSESSMENT
67 yr old male with no known medical history, current smoker presented with complaints of 1 week of shortness of breath. His EKG on arrival revealed diffuse ST elevations, a limited ECHO revealed circumferential pericardial effusion. He was emergently taken to cardiac cath lab where he underwent pericardiocentesis and coronary angiogram. A pericardial drain was placed, cath revealed a 90% LAD lesion, which was not felt to be acute and given pericardial effusion, PCI was deferred. His presentation was attributed to acute pericarditis He was placed on a NRB, transferred to ICU for further monitoring. His labs on admission revealed FILIBERTO and elevated LFTs, IVF were given. A US abdomen was ordered, revealed non occlusive thrombus in IVC and gall bladder sludge. CXR on admission revealed a left lung mass. Subsequent CTA chest, abdomen and pelvis was done, revealed left upper lobe segmental and subsegmental PE. Occlusion of the SVC and a thrombus within the intrahepatic IVC. Mediastinal and right hilar lymphadenopathy with resultant central  bronchial obstruction and atelectasis involving the right middle and lower lobes. 4.1 cm sized left lower lobe mass compatible with a pulmonary malignancy. Moderate right and a trace left pleural effusion. Concern for malignant cells in pericardial fluid, formal cytology report pending. ECHO revealed a EF 50%. Cardiology follow up was done, advised continue aspirin and statin, unable to initiate GDMT given hypotension. Heparin gtt was initiated, palliative care was consulted. He was transferred to medical floor on 11/9. C/b symptomatic anemia in the setting of GI bleed and hypotension.    Hypoxia/Hypotension likely secondary to GI Bleed  - Unclear etiology  - No leukocytosis  - Procalcitonin 0.20  - Lactate 2.0  - ABG noted  - MICU consult appreciated  - Protonix 40mg BID  - Midodrine 15mg TID  - CXR noted  - Weaned off HFNC, now on 2-3L nasal cannula    Dysphagia  - Swallow eval noted  - MBS ordered    Anemia  - Hemoglobin stable  - CT A/P ordered to r/o bleed - patient refused  - s/p 1 unit PRBC    Right pleural effusion  - Thoracic surgery following   - s/p pigtail drainage  - Will require home O2    Stage 4 adenocarcinoma  - Appreciate heme/onc recs- will need systemic chemotherapy for Stage 4 malignancy once medically optimized  - MRI-H notable for multiple intracranial metastatic lesion.  - Patient deferred repeat imaging under anesthesia. Will follow up outpatient for PET scan  - will need next generation sequencing and outpatient PET-CT scan    Depression  - BH recs appreciated    Melena  - Resolved  - CT A/P w/o source of bleeding  - Holding ASA and eliquis  - C/w IV Protonix BID  - Appreciate GI recs- S/p EGD w/ non-erosive diffuse gastritis. F/u H. pylori and duodenal biopsies  - Will c/w protonix 40 BID  - Will transfuse to keep Hg> 8  - Appreciate cards c/s for medical clearance    Acute pericardial effusion s/p pericardiocentesis  - Pericardial drain removed  - Outpatient PET scan  - C/w Aspirin  - s/p IR lung biopsy- NSCLC  - repeat echo w/o pericardial effusion reaccumulation    Acute pulmonary embolism/ IVC thrombus   - Eliquis held for anemia and GI bleed    HFrEF with wall motion abnormalities   PCI not performed on admission given effusion  90% LAD stenosis   - Appreciate cardio recs- if chest pain, will obtain CE and EKG  - Will continue to hold toprol XL and lisinopril given soft BP    Lung mass /cancer   - Oncology recs appreciated  - Palliative consult appreciated    Smoker, likely COPD   - symbicort/spiriva  - PRN Nebs    Dispo: Pending clinical course

## 2022-12-02 LAB
ANION GAP SERPL CALC-SCNC: 9 MMOL/L — SIGNIFICANT CHANGE UP (ref 5–17)
BUN SERPL-MCNC: 17.6 MG/DL — SIGNIFICANT CHANGE UP (ref 8–20)
CALCIUM SERPL-MCNC: 9.5 MG/DL — SIGNIFICANT CHANGE UP (ref 8.4–10.5)
CHLORIDE SERPL-SCNC: 96 MMOL/L — SIGNIFICANT CHANGE UP (ref 96–108)
CO2 SERPL-SCNC: 31 MMOL/L — HIGH (ref 22–29)
CREAT SERPL-MCNC: 0.4 MG/DL — LOW (ref 0.5–1.3)
CULTURE RESULTS: SIGNIFICANT CHANGE UP
CULTURE RESULTS: SIGNIFICANT CHANGE UP
EGFR: 119 ML/MIN/1.73M2 — SIGNIFICANT CHANGE UP
GLUCOSE SERPL-MCNC: 92 MG/DL — SIGNIFICANT CHANGE UP (ref 70–99)
HCT VFR BLD CALC: 31 % — LOW (ref 39–50)
HGB BLD-MCNC: 9.7 G/DL — LOW (ref 13–17)
MCHC RBC-ENTMCNC: 29.2 PG — SIGNIFICANT CHANGE UP (ref 27–34)
MCHC RBC-ENTMCNC: 31.3 GM/DL — LOW (ref 32–36)
MCV RBC AUTO: 93.4 FL — SIGNIFICANT CHANGE UP (ref 80–100)
PLATELET # BLD AUTO: 332 K/UL — SIGNIFICANT CHANGE UP (ref 150–400)
POTASSIUM SERPL-MCNC: 4.1 MMOL/L — SIGNIFICANT CHANGE UP (ref 3.5–5.3)
POTASSIUM SERPL-SCNC: 4.1 MMOL/L — SIGNIFICANT CHANGE UP (ref 3.5–5.3)
RBC # BLD: 3.32 M/UL — LOW (ref 4.2–5.8)
RBC # FLD: 15.3 % — HIGH (ref 10.3–14.5)
SODIUM SERPL-SCNC: 136 MMOL/L — SIGNIFICANT CHANGE UP (ref 135–145)
SPECIMEN SOURCE: SIGNIFICANT CHANGE UP
SPECIMEN SOURCE: SIGNIFICANT CHANGE UP
SURGICAL PATHOLOGY STUDY: SIGNIFICANT CHANGE UP
WBC # BLD: 12.52 K/UL — HIGH (ref 3.8–10.5)
WBC # FLD AUTO: 12.52 K/UL — HIGH (ref 3.8–10.5)

## 2022-12-02 PROCEDURE — 99232 SBSQ HOSP IP/OBS MODERATE 35: CPT

## 2022-12-02 RX ADMIN — LEVALBUTEROL 1.25 MILLIGRAM(S): 1.25 SOLUTION, CONCENTRATE RESPIRATORY (INHALATION) at 21:44

## 2022-12-02 RX ADMIN — PANTOPRAZOLE SODIUM 40 MILLIGRAM(S): 20 TABLET, DELAYED RELEASE ORAL at 05:20

## 2022-12-02 RX ADMIN — Medication 1 TABLET(S): at 18:27

## 2022-12-02 RX ADMIN — MIDODRINE HYDROCHLORIDE 15 MILLIGRAM(S): 2.5 TABLET ORAL at 18:23

## 2022-12-02 RX ADMIN — PANTOPRAZOLE SODIUM 40 MILLIGRAM(S): 20 TABLET, DELAYED RELEASE ORAL at 18:23

## 2022-12-02 RX ADMIN — MIRTAZAPINE 7.5 MILLIGRAM(S): 45 TABLET, ORALLY DISINTEGRATING ORAL at 21:34

## 2022-12-02 RX ADMIN — LEVALBUTEROL 1.25 MILLIGRAM(S): 1.25 SOLUTION, CONCENTRATE RESPIRATORY (INHALATION) at 04:13

## 2022-12-02 RX ADMIN — Medication 500 MILLIGRAM(S): at 18:23

## 2022-12-02 RX ADMIN — BUDESONIDE AND FORMOTEROL FUMARATE DIHYDRATE 2 PUFF(S): 160; 4.5 AEROSOL RESPIRATORY (INHALATION) at 21:46

## 2022-12-02 RX ADMIN — LEVALBUTEROL 1.25 MILLIGRAM(S): 1.25 SOLUTION, CONCENTRATE RESPIRATORY (INHALATION) at 08:46

## 2022-12-02 RX ADMIN — ATORVASTATIN CALCIUM 80 MILLIGRAM(S): 80 TABLET, FILM COATED ORAL at 21:34

## 2022-12-02 RX ADMIN — BUDESONIDE AND FORMOTEROL FUMARATE DIHYDRATE 2 PUFF(S): 160; 4.5 AEROSOL RESPIRATORY (INHALATION) at 08:46

## 2022-12-02 RX ADMIN — MIDODRINE HYDROCHLORIDE 15 MILLIGRAM(S): 2.5 TABLET ORAL at 05:20

## 2022-12-02 NOTE — PROGRESS NOTE ADULT - SUBJECTIVE AND OBJECTIVE BOX
Chief complaint: Effusion    Patient seen and examined at bedside. No acute overnight events reported. No fever, chills, cough, headache, chest pain, nausea or vomiting.     Vital Signs Last 24 Hrs  T(F): 97.9 (02 Dec 2022 12:00), Max: 98.1 (02 Dec 2022 04:00)  HR: 96 (02 Dec 2022 12:00) (91 - 97)  BP: 100/77 (02 Dec 2022 12:00) (95/60 - 107/77)  RR: 18 (02 Dec 2022 12:00) (16 - 20)  SpO2: 96% (02 Dec 2022 12:00) (93% - 100%)    Physical Exam:  Constitutional: alert and oriented, in no acute distress   Neck: Soft and supple  Respiratory: Clear to auscultation bilaterally  Cardiovascular: Regular rate and rhyhtm  Gastrointestinal: Soft, non-tender to palpation, +bs  Musculoskeletal: 5/5 strength b/l upper and lower extremities, no lower extremity edema bilaterally    Labs:                        9.7    12.52 )-----------( 332      ( 02 Dec 2022 06:36 )             31.0   12-02    136  |  96  |  17.6  ----------------------------<  92  4.1   |  31.0<H>  |  0.40<L>    Ca    9.5      02 Dec 2022 06:36

## 2022-12-02 NOTE — PROGRESS NOTE ADULT - ASSESSMENT
67 yr old male with no known medical history, current smoker presented with complaints of 1 week of shortness of breath. His EKG on arrival revealed diffuse ST elevations, a limited ECHO revealed circumferential pericardial effusion. He was emergently taken to cardiac cath lab where he underwent pericardiocentesis and coronary angiogram. A pericardial drain was placed, cath revealed a 90% LAD lesion, which was not felt to be acute and given pericardial effusion, PCI was deferred. His presentation was attributed to acute pericarditis He was placed on a NRB, transferred to ICU for further monitoring. His labs on admission revealed FILIBERTO and elevated LFTs, IVF were given. A US abdomen was ordered, revealed non occlusive thrombus in IVC and gall bladder sludge. CXR on admission revealed a left lung mass. Subsequent CTA chest, abdomen and pelvis was done, revealed left upper lobe segmental and subsegmental PE. Occlusion of the SVC and a thrombus within the intrahepatic IVC. Mediastinal and right hilar lymphadenopathy with resultant central  bronchial obstruction and atelectasis involving the right middle and lower lobes. 4.1 cm sized left lower lobe mass compatible with a pulmonary malignancy. Moderate right and a trace left pleural effusion. Concern for malignant cells in pericardial fluid, formal cytology report pending. ECHO revealed a EF 50%. Cardiology follow up was done, advised continue aspirin and statin, unable to initiate GDMT given hypotension. Heparin gtt was initiated, palliative care was consulted. He was transferred to medical floor on 11/9. C/b symptomatic anemia in the setting of GI bleed and hypotension.    Hypoxia/Hypotension likely secondary to GI Bleed  - Unclear etiology  - No leukocytosis  - Procalcitonin 0.20  - Lactate 2.0  - ABG noted  - MICU consult appreciated  - Protonix 40mg BID  - Midodrine 15mg TID  - CXR noted  - Saturating well on nasal cannula    Dysphagia  - Swallow eval noted  - MBS ordered however patient refused  - Speech re-evaluation    Anemia  - Hemoglobin stable  - CT A/P ordered to r/o bleed - patient refused  - s/p 1 unit PRBC    Right pleural effusion  - Thoracic surgery following   - s/p pigtail drainage  - Will require home O2    Stage 4 adenocarcinoma  - Appreciate heme/onc recs- will need systemic chemotherapy for Stage 4 malignancy once medically optimized  - MRI-H notable for multiple intracranial metastatic lesion.  - Patient deferred repeat imaging under anesthesia. Will follow up outpatient for PET scan  - will need next generation sequencing and outpatient PET-CT scan    Depression  - BH recs appreciated    Melena  - Resolved  - CT A/P w/o source of bleeding  - Holding ASA and eliquis  - C/w IV Protonix BID  - Appreciate GI recs- S/p EGD w/ non-erosive diffuse gastritis. F/u H. pylori and duodenal biopsies  - Will c/w protonix 40 BID  - Will transfuse to keep Hg> 8  - Appreciate cards c/s for medical clearance    Acute pericardial effusion s/p pericardiocentesis  - Pericardial drain removed  - Outpatient PET scan  - C/w Aspirin  - s/p IR lung biopsy- NSCLC  - repeat echo w/o pericardial effusion reaccumulation    Acute pulmonary embolism/ IVC thrombus   - Eliquis held for anemia and GI bleed    HFrEF with wall motion abnormalities   PCI not performed on admission given effusion  90% LAD stenosis   - Appreciate cardio recs- if chest pain, will obtain CE and EKG  - Will continue to hold toprol XL and lisinopril given soft BP    Lung mass /cancer   - Oncology recs appreciated  - Palliative consult appreciated    Smoker, likely COPD   - symbicort/spiriva  - PRN Nebs    Dispo: Pending clinical course 67 yr old male with no known medical history, current smoker presented with complaints of 1 week of shortness of breath. His EKG on arrival revealed diffuse ST elevations, a limited ECHO revealed circumferential pericardial effusion. He was emergently taken to cardiac cath lab where he underwent pericardiocentesis and coronary angiogram. A pericardial drain was placed, cath revealed a 90% LAD lesion, which was not felt to be acute and given pericardial effusion, PCI was deferred. His presentation was attributed to acute pericarditis He was placed on a NRB, transferred to ICU for further monitoring. His labs on admission revealed FILIBERTO and elevated LFTs, IVF were given. A US abdomen was ordered, revealed non occlusive thrombus in IVC and gall bladder sludge. CXR on admission revealed a left lung mass. Subsequent CTA chest, abdomen and pelvis was done, revealed left upper lobe segmental and subsegmental PE. Occlusion of the SVC and a thrombus within the intrahepatic IVC. Mediastinal and right hilar lymphadenopathy with resultant central  bronchial obstruction and atelectasis involving the right middle and lower lobes. 4.1 cm sized left lower lobe mass compatible with a pulmonary malignancy. Moderate right and a trace left pleural effusion. Concern for malignant cells in pericardial fluid, formal cytology report pending. ECHO revealed a EF 50%. Cardiology follow up was done, advised continue aspirin and statin, unable to initiate GDMT given hypotension. Heparin gtt was initiated, palliative care was consulted. He was transferred to medical floor on 11/9. C/b symptomatic anemia in the setting of GI bleed and hypotension.    Hypoxia/Hypotension  - Unclear etiology  - No leukocytosis  - Procalcitonin 0.20  - Lactate 2.0  - ABG noted  - MICU consult appreciated  - Protonix 40mg BID  - Midodrine 15mg TID  - CXR noted  - Saturating well on nasal cannula    Dysphagia  - Swallow eval noted  - MBS ordered however patient refused  - Speech re-evaluation    Anemia  - Hemoglobin stable  - CT A/P ordered to r/o bleed - patient refused  - s/p 1 unit PRBC    Right pleural effusion  - Thoracic surgery following   - s/p pigtail drainage  - Will require home O2    Stage 4 adenocarcinoma  - Appreciate heme/onc recs- will need systemic chemotherapy for Stage 4 malignancy once medically optimized  - MRI-H notable for multiple intracranial metastatic lesion.  - Patient deferred repeat imaging under anesthesia. Will follow up outpatient for PET scan  - will need next generation sequencing and outpatient PET-CT scan    Depression  - BH recs appreciated    Melena  - Resolved  - CT A/P w/o source of bleeding  - Holding ASA and eliquis  - C/w IV Protonix BID  - Appreciate GI recs- S/p EGD w/ non-erosive diffuse gastritis. F/u H. pylori and duodenal biopsies  - Will c/w protonix 40 BID  - Will transfuse to keep Hg> 8  - Appreciate cards c/s for medical clearance    Acute pericardial effusion s/p pericardiocentesis  - Pericardial drain removed  - Outpatient PET scan  - C/w Aspirin  - s/p IR lung biopsy- NSCLC  - repeat echo w/o pericardial effusion reaccumulation    Acute pulmonary embolism/ IVC thrombus   - Eliquis held for anemia and GI bleed    HFrEF with wall motion abnormalities   PCI not performed on admission given effusion  90% LAD stenosis   - Appreciate cardio recs- if chest pain, will obtain CE and EKG  - Will continue to hold toprol XL and lisinopril given soft BP    Lung mass /cancer   - Oncology recs appreciated  - Palliative consult appreciated    Smoker, likely COPD   - symbicort/spiriva  - PRN Nebs    Dispo: Pending clinical course

## 2022-12-02 NOTE — PROVIDER CONTACT NOTE (OTHER) - SITUATION
pt refusing modified barium study. pt states he does note want the study. RN explained to patient that this is to see how well he is swallowing so he can eat food. pt still states that he doesn't want

## 2022-12-03 LAB
ANION GAP SERPL CALC-SCNC: 12 MMOL/L — SIGNIFICANT CHANGE UP (ref 5–17)
BUN SERPL-MCNC: 21.8 MG/DL — HIGH (ref 8–20)
CALCIUM SERPL-MCNC: 9.5 MG/DL — SIGNIFICANT CHANGE UP (ref 8.4–10.5)
CHLORIDE SERPL-SCNC: 97 MMOL/L — SIGNIFICANT CHANGE UP (ref 96–108)
CO2 SERPL-SCNC: 31 MMOL/L — HIGH (ref 22–29)
CREAT SERPL-MCNC: 0.53 MG/DL — SIGNIFICANT CHANGE UP (ref 0.5–1.3)
EGFR: 109 ML/MIN/1.73M2 — SIGNIFICANT CHANGE UP
GLUCOSE SERPL-MCNC: 91 MG/DL — SIGNIFICANT CHANGE UP (ref 70–99)
HCT VFR BLD CALC: 29.7 % — LOW (ref 39–50)
HGB BLD-MCNC: 9.4 G/DL — LOW (ref 13–17)
MCHC RBC-ENTMCNC: 29.8 PG — SIGNIFICANT CHANGE UP (ref 27–34)
MCHC RBC-ENTMCNC: 31.6 GM/DL — LOW (ref 32–36)
MCV RBC AUTO: 94.3 FL — SIGNIFICANT CHANGE UP (ref 80–100)
PLATELET # BLD AUTO: 411 K/UL — HIGH (ref 150–400)
POTASSIUM SERPL-MCNC: 4.6 MMOL/L — SIGNIFICANT CHANGE UP (ref 3.5–5.3)
POTASSIUM SERPL-SCNC: 4.6 MMOL/L — SIGNIFICANT CHANGE UP (ref 3.5–5.3)
RBC # BLD: 3.15 M/UL — LOW (ref 4.2–5.8)
RBC # FLD: 15.2 % — HIGH (ref 10.3–14.5)
SODIUM SERPL-SCNC: 140 MMOL/L — SIGNIFICANT CHANGE UP (ref 135–145)
WBC # BLD: 12.2 K/UL — HIGH (ref 3.8–10.5)
WBC # FLD AUTO: 12.2 K/UL — HIGH (ref 3.8–10.5)

## 2022-12-03 PROCEDURE — 99233 SBSQ HOSP IP/OBS HIGH 50: CPT

## 2022-12-03 RX ADMIN — LEVALBUTEROL 1.25 MILLIGRAM(S): 1.25 SOLUTION, CONCENTRATE RESPIRATORY (INHALATION) at 10:16

## 2022-12-03 RX ADMIN — LEVALBUTEROL 1.25 MILLIGRAM(S): 1.25 SOLUTION, CONCENTRATE RESPIRATORY (INHALATION) at 15:01

## 2022-12-03 RX ADMIN — Medication 500 MILLIGRAM(S): at 11:05

## 2022-12-03 RX ADMIN — BUDESONIDE AND FORMOTEROL FUMARATE DIHYDRATE 2 PUFF(S): 160; 4.5 AEROSOL RESPIRATORY (INHALATION) at 10:16

## 2022-12-03 RX ADMIN — PANTOPRAZOLE SODIUM 40 MILLIGRAM(S): 20 TABLET, DELAYED RELEASE ORAL at 05:11

## 2022-12-03 RX ADMIN — BUDESONIDE AND FORMOTEROL FUMARATE DIHYDRATE 2 PUFF(S): 160; 4.5 AEROSOL RESPIRATORY (INHALATION) at 21:57

## 2022-12-03 RX ADMIN — Medication 1 TABLET(S): at 11:05

## 2022-12-03 RX ADMIN — LEVALBUTEROL 1.25 MILLIGRAM(S): 1.25 SOLUTION, CONCENTRATE RESPIRATORY (INHALATION) at 04:37

## 2022-12-03 RX ADMIN — ATORVASTATIN CALCIUM 80 MILLIGRAM(S): 80 TABLET, FILM COATED ORAL at 21:50

## 2022-12-03 RX ADMIN — MIRTAZAPINE 7.5 MILLIGRAM(S): 45 TABLET, ORALLY DISINTEGRATING ORAL at 21:49

## 2022-12-03 RX ADMIN — TIOTROPIUM BROMIDE 1 CAPSULE(S): 18 CAPSULE ORAL; RESPIRATORY (INHALATION) at 10:16

## 2022-12-03 RX ADMIN — MIDODRINE HYDROCHLORIDE 15 MILLIGRAM(S): 2.5 TABLET ORAL at 16:55

## 2022-12-03 RX ADMIN — MIDODRINE HYDROCHLORIDE 15 MILLIGRAM(S): 2.5 TABLET ORAL at 05:11

## 2022-12-03 RX ADMIN — LEVALBUTEROL 1.25 MILLIGRAM(S): 1.25 SOLUTION, CONCENTRATE RESPIRATORY (INHALATION) at 21:56

## 2022-12-03 RX ADMIN — PANTOPRAZOLE SODIUM 40 MILLIGRAM(S): 20 TABLET, DELAYED RELEASE ORAL at 16:55

## 2022-12-03 RX ADMIN — MIDODRINE HYDROCHLORIDE 15 MILLIGRAM(S): 2.5 TABLET ORAL at 11:05

## 2022-12-03 NOTE — CHART NOTE - NSCHARTNOTEFT_GEN_A_CORE
Source: Patient [ ]  Family [ ]   other [x ]    Current Diet: Diet, Pureed:   Consistent Carbohydrate {Evening Snack} (CSTCHOSN)  Moderately Thick Liquids (MODTHICKLIQS) (12-02-22 @ 14:18)    PO intake:  < 50% [x ]   50-75%  [ ]   %  [ ]  other :    Current Weight:   (12/3) 82.2 lbs  (11/25) 104.2 lbs  (11/17) 88.1lbs  (11/16) 86.2lbs  (11/15) 85.3lbs  (11/14) 111.5lbs  (11/13) 84.2lbs  (11/11) 83.9lbs  (11/10) 83.7lbs  (11/9) 111.9lbs    % Weight Change- wt fluctuation noted, possibly trending down; will continue to monitor for accuracy.  No edema noted.     Pertinent Medications: MEDICATIONS  (STANDING):  ascorbic acid 500 milliGRAM(s) Oral daily  atorvastatin 80 milliGRAM(s) Oral at bedtime  budesonide 160 MICROgram(s)/formoterol 4.5 MICROgram(s) Inhaler 2 Puff(s) Inhalation two times a day  chlorhexidine 2% Cloths 1 Application(s) Topical daily  influenza  Vaccine (HIGH DOSE) 0.7 milliLiter(s) IntraMuscular once  levalbuterol Inhalation 1.25 milliGRAM(s) Inhalation every 6 hours  lidocaine   4% Patch 2 Patch Transdermal daily  midodrine. 15 milliGRAM(s) Oral three times a day  mirtazapine 7.5 milliGRAM(s) Oral at bedtime  multivitamin/minerals 1 Tablet(s) Oral daily  pantoprazole  Injectable 40 milliGRAM(s) IV Push two times a day  tiotropium 18 MICROgram(s) Capsule 1 Capsule(s) Inhalation daily    MEDICATIONS  (PRN):  acetaminophen     Tablet .. 650 milliGRAM(s) Oral every 8 hours PRN Mild Pain (1 - 3), Moderate Pain (4 - 6)  melatonin 5 milliGRAM(s) Oral at bedtime PRN Insomnia    Pertinent Labs: CBC Full  -  ( 03 Dec 2022 06:50 )  WBC Count : 12.20 K/uL  RBC Count : 3.15 M/uL  Hemoglobin : 9.4 g/dL  Hematocrit : 29.7 %  Platelet Count - Automated : 411 K/uL  Mean Cell Volume : 94.3 fl  Mean Cell Hemoglobin : 29.8 pg  Mean Cell Hemoglobin Concentration : 31.6 gm/dL  12-03 Na140 mmol/L Glu 91 mg/dL K+ 4.6 mmol/L Cr  0.53 mg/dL BUN 21.8 mg/dL<H> Phos n/a   Alb n/a   PAB n/a       Skin: stage 2 coccyx    Nutrition focused physical exam previously conducted - found signs of malnutrition [ ]absent [x ]present    Subcutaneous fat loss: [x ] Orbital fat pads region, [x ]Buccal fat region, [ ]Triceps region,  [ ]Ribs region    Muscle wasting: [x ]Temples region, [x ]Clavicle region, [ x]Shoulder region, [ ]Scapula region, [ ]Interosseous region,  [ ]thigh region, [ ]Calf region    Current Nutrition Diagnosis: Pt remains at nutrition risk secondary to severe protein calorie malnutrition (chronic) related to inability to meet sufficient protein-energy needs with poor appetite in setting of large pericardial effusion, stage 4 adenocarcinoma and skin breakdown as evidenced by pt with severe muscle wasting/fat loss and meeting <75% estimated nutrient needs >1 month.  Pt continues with poor po intake at meals; breakfast tray untouched.  Pt currently receiving puree diet with moderately thick liquids, however recommendation is for NPO per SLP (12/1).  Aware palliative care following for GOC; awaiting family discussion about comfort measures/hospice.  RD to remain available.     Recommendations:   1) Diet consistency per SLP vs honoring pt/family wishes (once determined)  2) Continue with MVI, Vit C daily  3) Obtain daily weight and monitor trend     Monitoring and Evaluation:   [ x] PO intake [x ] Tolerance to diet prescription [X] Weights  [X] Follow up per protocol [X] Labs:

## 2022-12-03 NOTE — PROGRESS NOTE ADULT - SUBJECTIVE AND OBJECTIVE BOX
STEVE DENISE Patient is a 67y old  Male who presents with a chief complaint of Pericardial effusion (02 Dec 2022 13:26)     HPI:  68 y/o M with no known PMH (does not see doctors), active smoker, presents to the ED with complaints of SOB. Initial ECG revealed diffuse ST-elevations and the patient was taken for emergent LHC where he was found to have nonobstructive CAD, however a large circumferential pericardial effusion was seen. Pericardiocentesis was performed with approx 800cc bloody fluid drained. Of note, there is a 3cm mass in his left upper lobe on CXR. (07 Nov 2022 21:14)    The patient was seen and evaluated frail cachectic poor dentition weak tired ill appearing in bed - offers no complaints   The patient is in no acute distress.  Denied any fever chest pain, palpitations, shortness of breath, abdominal pain,      I&O's Summary    02 Dec 2022 07:01  -  03 Dec 2022 07:00  --------------------------------------------------------  IN: 0 mL / OUT: 500 mL / NET: -500 mL      Allergies    No Known Allergies    Intolerances      HEALTH ISSUES - PROBLEM Dx:  HFrEF (heart failure with reduced ejection fraction)    Pericardial effusion, acute    Thrombus    CAD (coronary artery disease)    Mass of lower lobe of left lung    Preoperative cardiovascular examination    Pleural effusion, right    Lung mass    Anemia          PAST MEDICAL & SURGICAL HISTORY:  No pertinent past medical history      No significant past surgical history              Vital Signs Last 24 Hrs  T(C): 36.6 (03 Dec 2022 08:59), Max: 36.8 (02 Dec 2022 20:39)  T(F): 97.8 (03 Dec 2022 08:59), Max: 98.2 (02 Dec 2022 20:39)  HR: 90 (03 Dec 2022 15:01) (88 - 100)  BP: 102/80 (03 Dec 2022 11:02) (102/66 - 108/76)  BP(mean): --  RR: 20 (03 Dec 2022 11:02) (20 - 22)  SpO2: 98% (03 Dec 2022 15:01) (94% - 98%)    Parameters below as of 03 Dec 2022 15:01  Patient On (Oxygen Delivery Method): nasal cannula w/ humidification,4l    T(C): 36.6 (12-03-22 @ 08:59), Max: 36.8 (12-02-22 @ 20:39)  HR: 90 (12-03-22 @ 15:01) (88 - 100)  BP: 102/80 (12-03-22 @ 11:02) (102/66 - 108/76)  RR: 20 (12-03-22 @ 11:02) (20 - 22)  SpO2: 98% (12-03-22 @ 15:01) (94% - 98%)  Wt(kg): --    PHYSICAL EXAM:    GENERAL: NAD, cachectic frail ill appearing   HEAD:  Atraumatic, Normocephalic  EYES: EOMI, PERRL, ENMT:  poor dentition  NECK: Supple, No JVD, Normal thyroid  NERVOUS SYSTEM:  Alert & in bed barely Moves upper and lower extremities; CNS-II-XII  CHEST/LUNG: Clear to auscultation bilaterally; No rales, rhonchi, wheezing,   HEART: Regular rate and rhythm; No murmurs,   ABDOMEN: Soft, Nontender, Nondistended; Bowel sounds present  EXTREMITIES:  Peripheral Pulses, No  cyanosis, or edema  SKIN: No rashes or lesions  psychiatry- mood and affect flat? sad    acetaminophen     Tablet .. 650 milliGRAM(s) Oral every 8 hours PRN  ascorbic acid 500 milliGRAM(s) Oral daily  atorvastatin 80 milliGRAM(s) Oral at bedtime  budesonide 160 MICROgram(s)/formoterol 4.5 MICROgram(s) Inhaler 2 Puff(s) Inhalation two times a day  chlorhexidine 2% Cloths 1 Application(s) Topical daily  influenza  Vaccine (HIGH DOSE) 0.7 milliLiter(s) IntraMuscular once  levalbuterol Inhalation 1.25 milliGRAM(s) Inhalation every 6 hours  lidocaine   4% Patch 2 Patch Transdermal daily  melatonin 5 milliGRAM(s) Oral at bedtime PRN  midodrine. 15 milliGRAM(s) Oral three times a day  mirtazapine 7.5 milliGRAM(s) Oral at bedtime  multivitamin/minerals 1 Tablet(s) Oral daily  pantoprazole  Injectable 40 milliGRAM(s) IV Push two times a day  tiotropium 18 MICROgram(s) Capsule 1 Capsule(s) Inhalation daily      LABS:                          9.4    12.20 )-----------( 411      ( 03 Dec 2022 06:50 )             29.7     12-03    140  |  97  |  21.8<H>  ----------------------------<  91  4.6   |  31.0<H>  |  0.53    Ca    9.5      03 Dec 2022 06:50                CAPILLARY BLOOD GLUCOSE          RADIOLOGY & ADDITIONAL TESTS:      Consultant notes reviewed    Case discussed with consultant/provider/ family /patient

## 2022-12-03 NOTE — PROGRESS NOTE ADULT - ASSESSMENT
67 yr old male with no known medical history, current smoker presented with complaints of 1 week of shortness of breath. His EKG on arrival revealed diffuse ST elevations, a limited ECHO revealed circumferential pericardial effusion. He was emergently taken to cardiac cath lab where he underwent pericardiocentesis and coronary angiogram. A pericardial drain was placed, cath revealed a 90% LAD lesion, which was not felt to be acute and given pericardial effusion, PCI was deferred. His presentation was attributed to acute pericarditis He was placed on a NRB, transferred to ICU for further monitoring. His labs on admission revealed FILIBERTO and elevated LFTs, IVF were given. A US abdomen was ordered, revealed non occlusive thrombus in IVC and gall bladder sludge. CXR on admission revealed a left lung mass. Subsequent CTA chest, abdomen and pelvis was done, revealed left upper lobe segmental and subsegmental PE. Occlusion of the SVC and a thrombus within the intrahepatic IVC. Mediastinal and right hilar lymphadenopathy with resultant central  bronchial obstruction and atelectasis involving the right middle and lower lobes. 4.1 cm sized left lower lobe mass compatible with a pulmonary malignancy. Moderate right and a trace left pleural effusion. Concern for malignant cells in pericardial fluid, formal cytology report pending. ECHO revealed a EF 50%. Cardiology follow up was done, advised continue aspirin and statin, unable to initiate GDMT given hypotension. Heparin gtt was initiated, palliative care was consulted. He was transferred to medical floor on 11/9. C/b symptomatic anemia in the setting of GI bleed and hypotension.    Stage 4 NSCLC Adenocarcinoma Lung with multiple Brain mets - needs radiation oncology evaluation    heme/onc recs- noted, palliative discussions with heme noted - radiation oncologist?  - MRI-H notable for multiple intracranial metastatic lesion.  outpatient for PET scan  - will need next generation sequencing for chemo    Hypoxia/Hypotension secondary PE/IVC thrombus +Right pleural effusion+ Acute pericardial effusion s/p pericardiocentesis+ CHF CAD  - Thoracic surgery - repeat echo w/o pericardial effusion reaccumulation  - s/p pigtail drainage  - Eliquis held for anemia and GI bleed  - Midodrine 15mg TID  - nasal cannula oxygen  90% LAD stenosis     Dysphagia  - Swallow eval noted  - MBS ordered however patient refused  - Speech re-evaluation    Anemia  - Hemoglobin stable  - CT A/P ordered to r/o bleed - patient refused  - s/p 1 unit PRBC    Depression  - BH recs appreciated    Melena  - Resolved  - Holding ASA and eliquis  - Appreciate GI recs- S/p EGD w/ non-erosive diffuse gastritis. F/u H. pylori and duodenal biopsies  -  Protonix 40 BID            Smoker, likely COPD   - Symbicort Spiriva  - PRN Nebs    Dispo: Pending clinical course

## 2022-12-04 PROCEDURE — 99233 SBSQ HOSP IP/OBS HIGH 50: CPT

## 2022-12-04 RX ORDER — DEXAMETHASONE 0.5 MG/5ML
4 ELIXIR ORAL
Refills: 0 | Status: DISCONTINUED | OUTPATIENT
Start: 2022-12-04 | End: 2022-12-05

## 2022-12-04 RX ADMIN — Medication 500 MILLIGRAM(S): at 11:06

## 2022-12-04 RX ADMIN — MIRTAZAPINE 7.5 MILLIGRAM(S): 45 TABLET, ORALLY DISINTEGRATING ORAL at 22:21

## 2022-12-04 RX ADMIN — PANTOPRAZOLE SODIUM 40 MILLIGRAM(S): 20 TABLET, DELAYED RELEASE ORAL at 17:10

## 2022-12-04 RX ADMIN — LEVALBUTEROL 1.25 MILLIGRAM(S): 1.25 SOLUTION, CONCENTRATE RESPIRATORY (INHALATION) at 15:31

## 2022-12-04 RX ADMIN — LEVALBUTEROL 1.25 MILLIGRAM(S): 1.25 SOLUTION, CONCENTRATE RESPIRATORY (INHALATION) at 20:34

## 2022-12-04 RX ADMIN — ATORVASTATIN CALCIUM 80 MILLIGRAM(S): 80 TABLET, FILM COATED ORAL at 22:21

## 2022-12-04 RX ADMIN — TIOTROPIUM BROMIDE 1 CAPSULE(S): 18 CAPSULE ORAL; RESPIRATORY (INHALATION) at 08:20

## 2022-12-04 RX ADMIN — Medication 1 TABLET(S): at 11:06

## 2022-12-04 RX ADMIN — BUDESONIDE AND FORMOTEROL FUMARATE DIHYDRATE 2 PUFF(S): 160; 4.5 AEROSOL RESPIRATORY (INHALATION) at 20:38

## 2022-12-04 RX ADMIN — MIDODRINE HYDROCHLORIDE 15 MILLIGRAM(S): 2.5 TABLET ORAL at 17:09

## 2022-12-04 RX ADMIN — Medication 4 MILLIGRAM(S): at 17:09

## 2022-12-04 RX ADMIN — LEVALBUTEROL 1.25 MILLIGRAM(S): 1.25 SOLUTION, CONCENTRATE RESPIRATORY (INHALATION) at 08:22

## 2022-12-04 RX ADMIN — PANTOPRAZOLE SODIUM 40 MILLIGRAM(S): 20 TABLET, DELAYED RELEASE ORAL at 05:58

## 2022-12-04 RX ADMIN — MIDODRINE HYDROCHLORIDE 15 MILLIGRAM(S): 2.5 TABLET ORAL at 11:07

## 2022-12-04 RX ADMIN — BUDESONIDE AND FORMOTEROL FUMARATE DIHYDRATE 2 PUFF(S): 160; 4.5 AEROSOL RESPIRATORY (INHALATION) at 08:20

## 2022-12-04 RX ADMIN — MIDODRINE HYDROCHLORIDE 15 MILLIGRAM(S): 2.5 TABLET ORAL at 06:02

## 2022-12-04 NOTE — PROGRESS NOTE ADULT - ASSESSMENT
67 yr old male with no known medical history, current smoker presented with complaints of 1 week of shortness of breath. His EKG on arrival revealed diffuse ST elevations, a limited ECHO revealed circumferential pericardial effusion. He was emergently taken to cardiac cath lab where he underwent pericardiocentesis and coronary angiogram. A pericardial drain was placed, cath revealed a 90% LAD lesion, which was not felt to be acute and given pericardial effusion, PCI was deferred. His presentation was attributed to acute pericarditis He was placed on a NRB, transferred to ICU for further monitoring. His labs on admission revealed FILIBERTO and elevated LFTs, IVF were given. A US abdomen was ordered, revealed non occlusive thrombus in IVC and gall bladder sludge. CXR on admission revealed a left lung mass. Subsequent CTA chest, abdomen and pelvis was done, revealed left upper lobe segmental and subsegmental PE. Occlusion of the SVC and a thrombus within the intrahepatic IVC. Mediastinal and right hilar lymphadenopathy with resultant central  bronchial obstruction and atelectasis involving the right middle and lower lobes. 4.1 cm sized left lower lobe mass compatible with a pulmonary malignancy. Moderate right and a trace left pleural effusion. Concern for malignant cells in pericardial fluid, formal cytology report pending. ECHO revealed a EF 50%. Cardiology follow up was done, advised continue aspirin and statin, unable to initiate GDMT given hypotension. Heparin gtt was initiated, palliative care was consulted. He was transferred to medical floor on 11/9. C/b symptomatic anemia in the setting of GI bleed and hypotension.    Stage 4 NSCLC Adenocarcinoma Lung with multiple Brain mets - with edema   Decadron 4 mg BID   radiation oncology -reached out- await response- no radonc- on call person over the weekend in [facility]   heme/onc recs- noted, palliative discussions with heme noted   - MRI-H notable for multiple intracranial metastatic lesion.  outpatient for PET scan  - will need next generation sequencing for chemo    Hypoxia/Hypotension - PE/IVC thrombus +Right pleural effusion+ Acute pericardial effusion s/p pericardiocentesis+ CHF CAD  - Thoracic surgery - repeat echo w/o pericardial effusion reaccumulation  - s/p pigtail drainage  - Eliquis held for anemia and GI bleed  - Midodrine 15mg TID  - nasal cannula oxygen  90% LAD stenosis     Dysphagia  - Swallow eval noted  - MBS ordered however patient refused  - Speech re-evaluation    Anemia, cachecxia, severe protein calorie malnutrition  - Hemoglobin stable  - CT A/P ordered to r/o bleed - patient refused  - s/p 1 unit PRBC    Depression  - BH recs appreciated    Melena  - Resolved  - Holding ASA and Eliquis  - Appreciate GI recs- S/p EGD w/ non-erosive diffuse gastritis. F/u H. pylori and duodenal biopsies  -  Protonix 40 BID    Smoker, likely COPD   - Symbicort Spiriva  - PRN Nebs    Dispo: Pending- difficult social situation- no numbers for any family members-will probably need palliative radiation arranged

## 2022-12-04 NOTE — PROGRESS NOTE ADULT - SUBJECTIVE AND OBJECTIVE BOX
STEVE DENISE Patient is a 67y old  Male who presents with a chief complaint of Pericardial effusion (03 Dec 2022 16:06)     HPI:  68 y/o M with no known PMH (does not see doctors), active smoker, presents to the ED with complaints of SOB. Initial ECG revealed diffuse ST-elevations and the patient was taken for emergent LHC where he was found to have nonobstructive CAD, however a large circumferential pericardial effusion was seen. Pericardiocentesis was performed with approx 800cc bloody fluid drained. Of note, there is a 3cm mass in his left upper lobe on CXR. (07 Nov 2022 21:14)    The patient was seen and evaluated   The patient is in no acute distress.  Denied any fever chest pain, palpitations, shortness of breath, abdominal pain, fever, dysuria, cough, edema   tried calling the number on the chart     I&O's Summary    03 Dec 2022 07:01  -  04 Dec 2022 07:00  --------------------------------------------------------  IN: 0 mL / OUT: 400 mL / NET: -400 mL      Allergies    No Known Allergies    Intolerances      HEALTH ISSUES - PROBLEM Dx:  HFrEF (heart failure with reduced ejection fraction)    Pericardial effusion, acute    Thrombus    CAD (coronary artery disease)    Mass of lower lobe of left lung    Preoperative cardiovascular examination    Pleural effusion, right    Lung mass    Anemia          PAST MEDICAL & SURGICAL HISTORY:  No pertinent past medical history      No significant past surgical history              Vital Signs Last 24 Hrs  T(C): 36.6 (04 Dec 2022 11:12), Max: 36.6 (04 Dec 2022 05:00)  T(F): 97.8 (04 Dec 2022 11:12), Max: 97.9 (04 Dec 2022 05:00)  HR: 98 (04 Dec 2022 11:12) (89 - 98)  BP: 112/79 (04 Dec 2022 11:12) (110/77 - 113/79)  BP(mean): --  RR: 20 (04 Dec 2022 11:12) (18 - 24)  SpO2: 97% (04 Dec 2022 11:12) (96% - 100%)    Parameters below as of 04 Dec 2022 11:12  Patient On (Oxygen Delivery Method): nasal cannula  O2 Flow (L/min): 3  T(C): 36.6 (12-04-22 @ 11:12), Max: 36.6 (12-04-22 @ 05:00)  HR: 98 (12-04-22 @ 11:12) (89 - 98)  BP: 112/79 (12-04-22 @ 11:12) (110/77 - 113/79)  RR: 20 (12-04-22 @ 11:12) (18 - 24)  SpO2: 97% (12-04-22 @ 11:12) (96% - 100%)  Wt(kg): --    PHYSICAL EXAM:    GENERAL: NAD, cachectic  HEAD:  Atraumatic, Normocephalic  EYES: EOMI, PERRL, conjunctiva and sclera clear  ENMT:  Moist mucous membranes,  No lesions  NECK: Supple,  Normal thyroid  NERVOUS SYSTEM:  Alert & Oriented, Moves upper and lower extremities; CNS-II-XII  CHEST/LUNG: Clear to auscultation bilaterally; No rales, rhonchi, wheezing,   HEART: Regular rate and rhythm; No murmurs,   ABDOMEN: Soft, Nontender, Nondistended; Bowel sounds present  EXTREMITIES:  Peripheral Pulses, No  cyanosis, or edema  Psychiatry- mood and affect appropriate    acetaminophen     Tablet .. 650 milliGRAM(s) Oral every 8 hours PRN  ascorbic acid 500 milliGRAM(s) Oral daily  atorvastatin 80 milliGRAM(s) Oral at bedtime  budesonide 160 MICROgram(s)/formoterol 4.5 MICROgram(s) Inhaler 2 Puff(s) Inhalation two times a day  chlorhexidine 2% Cloths 1 Application(s) Topical daily  influenza  Vaccine (HIGH DOSE) 0.7 milliLiter(s) IntraMuscular once  levalbuterol Inhalation 1.25 milliGRAM(s) Inhalation every 6 hours  lidocaine   4% Patch 2 Patch Transdermal daily  melatonin 5 milliGRAM(s) Oral at bedtime PRN  midodrine. 15 milliGRAM(s) Oral three times a day  mirtazapine 7.5 milliGRAM(s) Oral at bedtime  multivitamin/minerals 1 Tablet(s) Oral daily  pantoprazole  Injectable 40 milliGRAM(s) IV Push two times a day  tiotropium 18 MICROgram(s) Capsule 1 Capsule(s) Inhalation daily      LABS:                          9.4    12.20 )-----------( 411      ( 03 Dec 2022 06:50 )             29.7     12-03    140  |  97  |  21.8<H>  ----------------------------<  91  4.6   |  31.0<H>  |  0.53    Ca    9.5      03 Dec 2022 06:50                CAPILLARY BLOOD GLUCOSE          RADIOLOGY & ADDITIONAL TESTS:      Consultant notes reviewed    Case discussed with consultant/provider/ family /patient

## 2022-12-05 LAB
ALBUMIN SERPL ELPH-MCNC: 3.1 G/DL — LOW (ref 3.3–5.2)
ALP SERPL-CCNC: 108 U/L — SIGNIFICANT CHANGE UP (ref 40–120)
ALT FLD-CCNC: 32 U/L — SIGNIFICANT CHANGE UP
ANION GAP SERPL CALC-SCNC: 12 MMOL/L — SIGNIFICANT CHANGE UP (ref 5–17)
APTT BLD: 27.5 SEC — SIGNIFICANT CHANGE UP (ref 27.5–35.5)
AST SERPL-CCNC: 46 U/L — HIGH
BASOPHILS # BLD AUTO: 0.02 K/UL — SIGNIFICANT CHANGE UP (ref 0–0.2)
BASOPHILS NFR BLD AUTO: 0.1 % — SIGNIFICANT CHANGE UP (ref 0–2)
BILIRUB SERPL-MCNC: 0.5 MG/DL — SIGNIFICANT CHANGE UP (ref 0.4–2)
BUN SERPL-MCNC: 28.7 MG/DL — HIGH (ref 8–20)
CALCIUM SERPL-MCNC: 9.6 MG/DL — SIGNIFICANT CHANGE UP (ref 8.4–10.5)
CHLORIDE SERPL-SCNC: 96 MMOL/L — SIGNIFICANT CHANGE UP (ref 96–108)
CO2 SERPL-SCNC: 31 MMOL/L — HIGH (ref 22–29)
CREAT SERPL-MCNC: 0.53 MG/DL — SIGNIFICANT CHANGE UP (ref 0.5–1.3)
EGFR: 109 ML/MIN/1.73M2 — SIGNIFICANT CHANGE UP
EOSINOPHIL # BLD AUTO: 0 K/UL — SIGNIFICANT CHANGE UP (ref 0–0.5)
EOSINOPHIL NFR BLD AUTO: 0 % — SIGNIFICANT CHANGE UP (ref 0–6)
GLUCOSE SERPL-MCNC: 121 MG/DL — HIGH (ref 70–99)
HCT VFR BLD CALC: 31 % — LOW (ref 39–50)
HGB BLD-MCNC: 10.2 G/DL — LOW (ref 13–17)
IMM GRANULOCYTES NFR BLD AUTO: 0.7 % — SIGNIFICANT CHANGE UP (ref 0–0.9)
INR BLD: 1.2 RATIO — HIGH (ref 0.88–1.16)
LACTATE SERPL-SCNC: 2 MMOL/L — SIGNIFICANT CHANGE UP (ref 0.5–2)
LYMPHOCYTES # BLD AUTO: 0.7 K/UL — LOW (ref 1–3.3)
LYMPHOCYTES # BLD AUTO: 4.7 % — LOW (ref 13–44)
MCHC RBC-ENTMCNC: 30.1 PG — SIGNIFICANT CHANGE UP (ref 27–34)
MCHC RBC-ENTMCNC: 32.9 GM/DL — SIGNIFICANT CHANGE UP (ref 32–36)
MCV RBC AUTO: 91.4 FL — SIGNIFICANT CHANGE UP (ref 80–100)
MONOCYTES # BLD AUTO: 0.78 K/UL — SIGNIFICANT CHANGE UP (ref 0–0.9)
MONOCYTES NFR BLD AUTO: 5.2 % — SIGNIFICANT CHANGE UP (ref 2–14)
NEUTROPHILS # BLD AUTO: 13.32 K/UL — HIGH (ref 1.8–7.4)
NEUTROPHILS NFR BLD AUTO: 89.3 % — HIGH (ref 43–77)
NON-GYNECOLOGICAL CYTOLOGY STUDY: SIGNIFICANT CHANGE UP
PLATELET # BLD AUTO: 394 K/UL — SIGNIFICANT CHANGE UP (ref 150–400)
POTASSIUM SERPL-MCNC: 4.4 MMOL/L — SIGNIFICANT CHANGE UP (ref 3.5–5.3)
POTASSIUM SERPL-SCNC: 4.4 MMOL/L — SIGNIFICANT CHANGE UP (ref 3.5–5.3)
PROT SERPL-MCNC: 7 G/DL — SIGNIFICANT CHANGE UP (ref 6.6–8.7)
PROTHROM AB SERPL-ACNC: 14 SEC — HIGH (ref 10.5–13.4)
RBC # BLD: 3.39 M/UL — LOW (ref 4.2–5.8)
RBC # FLD: 15.1 % — HIGH (ref 10.3–14.5)
SODIUM SERPL-SCNC: 138 MMOL/L — SIGNIFICANT CHANGE UP (ref 135–145)
WBC # BLD: 14.92 K/UL — HIGH (ref 3.8–10.5)
WBC # FLD AUTO: 14.92 K/UL — HIGH (ref 3.8–10.5)

## 2022-12-05 PROCEDURE — 99233 SBSQ HOSP IP/OBS HIGH 50: CPT

## 2022-12-05 PROCEDURE — 99497 ADVNCD CARE PLAN 30 MIN: CPT

## 2022-12-05 PROCEDURE — 71045 X-RAY EXAM CHEST 1 VIEW: CPT | Mod: 26

## 2022-12-05 RX ORDER — DEXAMETHASONE 0.5 MG/5ML
4 ELIXIR ORAL EVERY 8 HOURS
Refills: 0 | Status: DISCONTINUED | OUTPATIENT
Start: 2022-12-05 | End: 2022-12-10

## 2022-12-05 RX ORDER — ONDANSETRON 8 MG/1
4 TABLET, FILM COATED ORAL ONCE
Refills: 0 | Status: DISCONTINUED | OUTPATIENT
Start: 2022-12-05 | End: 2022-12-05

## 2022-12-05 RX ORDER — MORPHINE SULFATE 50 MG/1
2 CAPSULE, EXTENDED RELEASE ORAL EVERY 4 HOURS
Refills: 0 | Status: DISCONTINUED | OUTPATIENT
Start: 2022-12-05 | End: 2022-12-10

## 2022-12-05 RX ORDER — MORPHINE SULFATE 50 MG/1
2 CAPSULE, EXTENDED RELEASE ORAL EVERY 6 HOURS
Refills: 0 | Status: DISCONTINUED | OUTPATIENT
Start: 2022-12-05 | End: 2022-12-10

## 2022-12-05 RX ORDER — ATROPINE SULFATE 1 %
2 DROPS OPHTHALMIC (EYE) EVERY 4 HOURS
Refills: 0 | Status: DISCONTINUED | OUTPATIENT
Start: 2022-12-05 | End: 2022-12-10

## 2022-12-05 RX ADMIN — PANTOPRAZOLE SODIUM 40 MILLIGRAM(S): 20 TABLET, DELAYED RELEASE ORAL at 17:34

## 2022-12-05 RX ADMIN — BUDESONIDE AND FORMOTEROL FUMARATE DIHYDRATE 2 PUFF(S): 160; 4.5 AEROSOL RESPIRATORY (INHALATION) at 09:22

## 2022-12-05 RX ADMIN — Medication 4 MILLIGRAM(S): at 05:27

## 2022-12-05 RX ADMIN — Medication 4 MILLIGRAM(S): at 14:01

## 2022-12-05 RX ADMIN — MORPHINE SULFATE 2 MILLIGRAM(S): 50 CAPSULE, EXTENDED RELEASE ORAL at 14:02

## 2022-12-05 RX ADMIN — LEVALBUTEROL 1.25 MILLIGRAM(S): 1.25 SOLUTION, CONCENTRATE RESPIRATORY (INHALATION) at 09:22

## 2022-12-05 RX ADMIN — MIDODRINE HYDROCHLORIDE 15 MILLIGRAM(S): 2.5 TABLET ORAL at 05:27

## 2022-12-05 RX ADMIN — LEVALBUTEROL 1.25 MILLIGRAM(S): 1.25 SOLUTION, CONCENTRATE RESPIRATORY (INHALATION) at 03:37

## 2022-12-05 RX ADMIN — BUDESONIDE AND FORMOTEROL FUMARATE DIHYDRATE 2 PUFF(S): 160; 4.5 AEROSOL RESPIRATORY (INHALATION) at 20:57

## 2022-12-05 RX ADMIN — MORPHINE SULFATE 2 MILLIGRAM(S): 50 CAPSULE, EXTENDED RELEASE ORAL at 17:34

## 2022-12-05 RX ADMIN — MIRTAZAPINE 7.5 MILLIGRAM(S): 45 TABLET, ORALLY DISINTEGRATING ORAL at 21:48

## 2022-12-05 RX ADMIN — LEVALBUTEROL 1.25 MILLIGRAM(S): 1.25 SOLUTION, CONCENTRATE RESPIRATORY (INHALATION) at 20:54

## 2022-12-05 RX ADMIN — PANTOPRAZOLE SODIUM 40 MILLIGRAM(S): 20 TABLET, DELAYED RELEASE ORAL at 05:26

## 2022-12-05 RX ADMIN — MORPHINE SULFATE 2 MILLIGRAM(S): 50 CAPSULE, EXTENDED RELEASE ORAL at 14:55

## 2022-12-05 RX ADMIN — TIOTROPIUM BROMIDE 1 CAPSULE(S): 18 CAPSULE ORAL; RESPIRATORY (INHALATION) at 10:16

## 2022-12-05 RX ADMIN — LEVALBUTEROL 1.25 MILLIGRAM(S): 1.25 SOLUTION, CONCENTRATE RESPIRATORY (INHALATION) at 14:47

## 2022-12-05 RX ADMIN — MORPHINE SULFATE 2 MILLIGRAM(S): 50 CAPSULE, EXTENDED RELEASE ORAL at 18:19

## 2022-12-05 RX ADMIN — Medication 4 MILLIGRAM(S): at 21:48

## 2022-12-05 NOTE — PROGRESS NOTE ADULT - NS ATTEND OPT1 GEN_ALL_CORE
I attest my time as attending is greater than 50% of the total combined time spent on qualifying patient care activities by the PA/NP and attending.
I independently performed the documented:
I attest my time as attending is greater than 50% of the total combined time spent on qualifying patient care activities by the PA/NP and attending.
I independently performed the documented:
I attest my time as attending is greater than 50% of the total combined time spent on qualifying patient care activities by the PA/NP and attending.
I independently performed the documented:
I independently performed the documented:

## 2022-12-05 NOTE — PROGRESS NOTE ADULT - NS ATTEND AMEND GEN_ALL_CORE FT
Patient seen and examined by me.  I have discussed my recommendation with the PA which are outlined above.  Will sign off
67 year old male with prolonged hospitalization for metastatic lung cancer, complicated by pericardial effusion, heart failure.   Patient seen, examined earlier in day.    Admit to dyspnea, pain - non-specific  Hypoxic, requiring supplemental O2.  Severely malnourished.  [place] in place  Follows simple commands  Aware of being in Hospital.    Comfort care measures initiated; IV Morphine, Lorazepam  Plan for transfer to Hospice once arrangements in place    Multiple discussions with palliative care Dr Goodwin who has been managing conversation with patient's surrogates, who agree with comfort care and hospice
I evaluated this pt. with my NP and agree with the above assessment and management plan. Poor candidate for colonoscopy. EGD: Gastritis.  Anemia likely secondary to chronic disease. OPT Rx with Pantoprazole 40 mg./d. No plans or need for continued GI f/u. Signing off. Reconsult as needed. Thank you.
35 Minutes was spent during this encounter.
seen with above,    67M chronic active smoker no routine medical care, lives alone, presents with dyspnea found with diffuse ST elevations on EKG and Echo with moderate pericardial effusion, LHC found with 90% mLAD and 60% dRCA, PCI deferred and underwent pericardiocentesis, cytology noted malignant cells and also lung mass, repeat TTE with severe LV systolic dysfunction, EF 25%, CTA noted SVC, IVC thrombi and L-lung PE  -likely malignant pericardial effusion from metastatic disease  -currently not candidate for PCI of LAD lesion, continue ASA/statin, added heparin gtt for diffuse clots and monitor if any reaccumulation of pericardial effusion interval remains no output s/p drain removal yesterday, repeat TTE trace pericardial effusion  -low BP not tolerating addition of GDMT  -overall prognosis guarded with cachexia and poor functional status unclear if candidate for systemic chemotherapy/treatment, seen by Hem/Onc now planning for Lung biopsy next week can hold ASA 81mg for 5 days  -palliative care seen for goals of care discussion  -discussed with patient/HCP at bedside         Hany Hutton DO, FACC  Faculty Non-Invasive Cardiologist  [phone]
seen with above,    67M chronic active smoker no routine medical care, lives alone, presents with dyspnea found with diffuse ST elevations on EKG and Echo with moderate pericardial effusion, LHC found with 90% mLAD and 60% dRCA, PCI deferred and underwent pericardiocentesis, cytology noted malignant cells and also lung mass, repeat TTE with severe LV systolic dysfunction, EF 25%, CTA noted SVC, IVC thrombi and L-lung PE  -likely malignant pericardial effusion from metastatic disease  -currently not candidate for PCI of LAD lesion, continue ASA/statin, added heparin gtt for diffuse clots and monitor if any reaccumulation of pericardial effusion interval remains  no output s/p drain removal, repeat TTE tomorrow.   -low BP not tolerating addition of GDMT  -overall prognosis guarded with cachexia and poor functional status unlikely candidate for systemic chemotherapy, need Hem/Onc eval.  -palliative care seen for goals of care discussion        Hany Hutton DO, [facility]  Faculty Non-Invasive Cardiologist  [phone]. w
seen with above,    67M chronic active smoker no routine medical care, lives alone, presents with dyspnea found with diffuse ST elevations on EKG and Echo with moderate pericardial effusion, LHC found with 90% mLAD and 60% dRCA, PCI deferred and underwent pericardiocentesis, cytology noted malignant cells and also lung mass, repeat TTE with severe LV systolic dysfunction, EF 25%, CTA noted SVC, IVC thrombi and L-lung PE  -likely malignant pericardial effusion from metastatic disease  -currently not candidate for PCI of LAD lesion, continue ASA/statin, added heparin gtt for diffuse clots and monitor if any reaccumulation of pericardial effusion interval remains no output s/p drain removal yesterday, repeat TTE trace pericardial effusion  -low BP not tolerating addition of GDMT  -overall prognosis guarded with cachexia and poor functional status unclear if candidate for systemic chemotherapy/treatment, seen by Hem/Onc now planning for Lung biopsy (next Tuesday) will hold ASA 81mg in the interim  -palliative care seen for goals of care discussion  -discussed with patient/HCP at bedside yesterday  -cardiology will follow up on Monday         Hany Hutton DO, [facility]  Faculty Non-Invasive Cardiologist  [phone]
Patient was seen and examined and agree with above plan  Patient is s/p chest tube placement and lung biopsy     67M chronic active smoker no routine medical care, lives alone, presents with dyspnea found with diffuse ST elevations on EKG and Echo with moderate pericardial effusion, LHC found with 90% mLAD and 60% dRCA, PCI deferred and underwent pericardiocentesis, cytology noted malignant cells and also lung mass, repeat TTE with severe LV systolic dysfunction, EF 25%, CTA noted SVC, IVC thrombi and L-lung PE  - patient notes no chest pain or shortness of breath, patient has effusion likely malignant from metastatic disease and is s/p chest tube placement and lung biopsy   - currently not candidate for PCI of LAD lesion, continue ASA/statin, added heparin gtt for diffuse clots and monitor if any reaccumulation of pericardial effusion interval remains no output s/p drain removal yesterday, repeat TTE trace pericardial effusion  -recommend to start Toprol XL 12.5mg po q PM and then if blood pressure can tolerate start Lisinopril 2.5mg po q AM and assess BP   -overall prognosis guarded with cachexia and poor functional status unclear if candidate for systemic chemotherapy/treatment, seen by Hem/Onc   - now that s/p lung biopsy can resume ASA 81mg from IR / Thoracic standpoint resume Hep ggt or even DOAC   - once resumed on DOAC would obtain TTE 24-48 hrs post to assess of any recurrent pericardial effusion         Mirela Grier D.O. [facility]  Cardiology/Vascular Cardiology -[facility] Cardiology   Telephone # [phone].
Patient was seen and examined at bedside and agree plan      67M chronic active smoker no routine medical care, lives alone, presents with dyspnea found with diffuse ST elevations on EKG and Echo with moderate pericardial effusion, LHC found with 90% mLAD and 60% dRCA, PCI deferred and underwent pericardiocentesis, cytology noted malignant cells and also lung mass, repeat TTE with severe LV systolic dysfunction, EF 25%, CTA noted SVC, IVC thrombi and L-lung PE  - patient notes no chest pain or shortness of breath, patient has effusion likely malignant from metastatic disease   - currently not candidate for PCI of LAD lesion, continue ASA/statin, added heparin gtt for diffuse clots and monitor if any reaccumulation of pericardial effusion interval remains no output s/p drain removal yesterday, repeat TTE trace pericardial effusion  -low BP not tolerating addition of GDMT  -overall prognosis guarded with cachexia and poor functional status unclear if candidate for systemic chemotherapy/treatment, seen by Hem/Onc now planning for Lung biopsy (next Tuesday) will hold ASA 81mg in the interim and from a cardiovascular stand point cleared for lung biopsy, recommend to hold Hep ggt possibly 6 hrs prior to procedure and resume once cleared and low risk of bleeding from IR standpoint resume Hep ggt   - will follow up on Wednesday     Mirela Grier D.O. FACC  Cardiology/Vascular Cardiology -[facility] Cardiology   Telephone # [phone]
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
## 2022-12-05 NOTE — PROGRESS NOTE ADULT - SUBJECTIVE AND OBJECTIVE BOX
CC: Pericardial effusion /Adenocarcinoma of the lung with METS to brain    INTERVAL HPI/OVERNIGHT EVENTS: Informed by overnight PA, patient hypothermic, placed on warming blanket. Seen and examined, noted to be tachypneic with increase WOB. Reports vomiting this am, difficulty taking po. Spoke with Palliative team. As per primary HCP Adi with transition to comfort care.    Vital Signs Last 24 Hrs  T(C): 35.6 (05 Dec 2022 05:19), Max: 36.4 (04 Dec 2022 16:38)  T(F): 96 (05 Dec 2022 05:19), Max: 97.5 (04 Dec 2022 16:38)  HR: 102 (05 Dec 2022 09:24) (92 - 104)  BP: 112/82 (05 Dec 2022 05:19) (95/66 - 112/82)  BP(mean): --  RR: 24 (05 Dec 2022 05:19) (19 - 28)  SpO2: 95% (05 Dec 2022 09:24) (92% - 97%)    Parameters below as of 05 Dec 2022 09:24  Patient On (Oxygen Delivery Method): nasal cannula,5l    ROS:  Limited due to WOB      PHYSICAL EXAM:    GENERAL: Weak, frail, mild distress due WOB  HEAD:  Atraumatic, Normocephalic  EYES: EOMI, PERRL, conjunctiva and sclera clear  ENMT:  Dry MMM, poor dentition  NECK: Supple,  No JVD  NERVOUS SYSTEM:  Non focal  CHEST/LUNG: Coarse BS B/L, tachypneic  HEART: Regular rate and rhythm; No murmurs,   ABDOMEN: Soft, Nontender, Nondistended; Bowel sounds present  EXTREMITIES:  Muscle wasting      I&O's Detail                                10.2   14.92 )-----------( 394      ( 05 Dec 2022 07:33 )             31.0     05 Dec 2022 07:33    138    |  96     |  28.7   ----------------------------<  121    4.4     |  31.0   |  0.53     Ca    9.6        05 Dec 2022 07:33    TPro  7.0    /  Alb  3.1    /  TBili  0.5    /  DBili  x      /  AST  46     /  ALT  32     /  AlkPhos  108    05 Dec 2022 07:33    PT/INR - ( 05 Dec 2022 07:33 )   PT: 14.0 sec;   INR: 1.20 ratio         PTT - ( 05 Dec 2022 07:33 )  PTT:27.5 sec  CAPILLARY BLOOD GLUCOSE        LIVER FUNCTIONS - ( 05 Dec 2022 07:33 )  Alb: 3.1 g/dL / Pro: 7.0 g/dL / ALK PHOS: 108 U/L / ALT: 32 U/L / AST: 46 U/L / GGT: x               MEDICATIONS  (STANDING):  budesonide 160 MICROgram(s)/formoterol 4.5 MICROgram(s) Inhaler 2 Puff(s) Inhalation two times a day  dexAMETHasone  Injectable 4 milliGRAM(s) IV Push every 8 hours  levalbuterol Inhalation 1.25 milliGRAM(s) Inhalation every 6 hours  lidocaine   4% Patch 2 Patch Transdermal daily  mirtazapine 7.5 milliGRAM(s) Oral at bedtime  morphine  - Injectable 2 milliGRAM(s) IV Push every 6 hours  pantoprazole  Injectable 40 milliGRAM(s) IV Push two times a day    MEDICATIONS  (PRN):  atropine 1% Ophthalmic Solution for SubLingual Use 2 Drop(s) SubLingual every 4 hours PRN secretion mgmt  LORazepam   Injectable 0.5 milliGRAM(s) IV Push every 6 hours PRN anxiety/restlessness  morphine  - Injectable 2 milliGRAM(s) IV Push every 4 hours PRN pain or SOB or RR>24      RADIOLOGY & ADDITIONAL TESTS:

## 2022-12-05 NOTE — PROGRESS NOTE ADULT - NS ATTEND OPT1A GEN_ALL_CORE
History/Exam/Medical decision making
History/Exam/Medical decision making
History/Medical decision making
History/Exam/Medical decision making

## 2022-12-05 NOTE — PROGRESS NOTE ADULT - ASSESSMENT
69 yo M cachectic admitted very SOB with Pericardial Effusion S/P pericardiocentesis with history of Metastatic Lung Mass, further CT revealed REYNALDO segmental PE and occlusion of SVC  and thrombus intrahepatic IVC. Moderate  R and trace L Pleural Effusions. Treated here for symptomatic anemia. Pt's symptoms continue to worsen; he is in great Respiratory Distress  few options going forward-will not tolerate oncologic treatment.    NSCLC  Leading to Pericardial  s and pleural effusions  S/P Pericaridiocentesi  Advanced disease, causing major bronchial restriction and atelectasis  Not a candidate for curative oncologic treatment  Symptoms becoming much more difficult to control  Patient and HCP opted to transition to "Comfort care only status"    Acute Respiratory Failure  In setting of diffuse advanced Metastatic Lung Cancer  Patient did not want to be intubated  Dexamethasone  Severe dyspnea-O2 NC 6l  Restless and uncomfortable- Morphine 2mg IVP ordered to manage dyspnea  Then Q6 ATC  Ativan IV prn for anxiety related to dyspnea    Hypercoagulation state  Due to malignancy  Heparin infusion DC'd today    GO  Comfort aggressive symptom mgmt  Discussed by Dr. Goodwin Palliative Attending Physician, with HCP Adi and Patient  Both agreed it was best for Patient to transition to Comfort measures only treatment plan  MOLST voided and reconfirmed code status-then completed MOLST page 2 directive.  Applications for bed availability at [facility] and [facility].  Depending on how Patient's symptoms can be treated, In-Pt Hospice would be a possibility

## 2022-12-05 NOTE — PROGRESS NOTE ADULT - NSPROGADDITIONALINFOA_GEN_ALL_CORE
Total Time Spent__60__ minutes  This includes chart review, patient assessment, discussion and collaboration with interdisciplinary team members, ACP planning    COUNSELING:  Face to face meeting to discuss Advanced Care Planning - Time Spent ______Minutes.      Thank you for the opportunity to assist with the care of this patient.   [facility] Palliative Medicine Consult Service [phone].

## 2022-12-05 NOTE — PROGRESS NOTE ADULT - ASSESSMENT
67-year-old male With no known medical history with lack of medical follow-up with tobacco use disorder presented on November 7 with difficulty breathing found to have diffuse ST elevation taken for emergent left heart cath found to have 90% stenosis of mid LAD with no acute lesion with no intervention but s/p pericardial drain for moderate to large pericardial effusion was initially admitted to medical ICU eventually found to have metastatic adenocarcinoma most likely from lung as primary based on pericardial fluid pathology, through hospital course patient was also found to have SVC and intrahepatic IVC thrombi as well as left-sided segmental subsegmental PE, mediastinal lymphadenopathy.  Hospital course complicated by GI bleed and on overnight from 20th November through early morning off 21st November while patient was on Eliquis receiving blood products, underwent CT abdomen pelvis with contrast which did not reveal active bleeding and resuscitated for 2 units PRBC along with colloid and crystalloid resuscitation.     Stage 4 adenocardircinoma:  decision has been made for comfort care  no further cancer directed testing, therapy.      Right pleural effusion  - Thoracic surgery following   - s/p pigtail drainage  - on nasal O2 5L    GIB  s/p EGD showing gastritis  s/p PRBC,   Hgb improved but now dropping again  to get CT A/P - pt refused   Hgb stable at 9 this am    DNR/DNI  palliative care evaluation noted    dispo inpatient hospice

## 2022-12-05 NOTE — PROGRESS NOTE ADULT - SUBJECTIVE AND OBJECTIVE BOX
OVERNIGHT EVENTS:  F/U Note  Patient more symptomatic today  Severe dyspnea at rest causing restlessness and anxiety  Undecided about whether he has pain or not  Patient spoke with his proxy, both decided to transition ot Comfort measures and more aggressive symptom mgmt  Morphine 2mg IV ordered by Eulalia Ruiz NP Hospitalist for dyspnea x 1  All meds reviewed nonessential medications DC'd    Present Symptoms:     Dyspnea: Mild Moderate Severe  Nausea/Vomiting: Yes No  Anxiety:  Yes No  Depression: Yes No  Fatigue: Yes No  Loss of appetite: Yes No  Constipation:     Pain:             Character-            Duration-            Effect-            Factors-            Frequency-            Location-            Severity-    Pain AD Score:  [email or web address] (press ctrl + left click to view)    Review of Systems: Reviewed                     Negative:                     Positive:  Unable to obtain due to poor mentation   All others negative    MEDICATIONS  (STANDING):  budesonide 160 MICROgram(s)/formoterol 4.5 MICROgram(s) Inhaler 2 Puff(s) Inhalation two times a day  dexAMETHasone  Injectable 4 milliGRAM(s) IV Push every 8 hours  levalbuterol Inhalation 1.25 milliGRAM(s) Inhalation every 6 hours  lidocaine   4% Patch 2 Patch Transdermal daily  mirtazapine 7.5 milliGRAM(s) Oral at bedtime  morphine  - Injectable 2 milliGRAM(s) IV Push every 6 hours  pantoprazole  Injectable 40 milliGRAM(s) IV Push two times a day    MEDICATIONS  (PRN):  atropine 1% Ophthalmic Solution for SubLingual Use 2 Drop(s) SubLingual every 4 hours PRN secretion mgmt  LORazepam   Injectable 0.5 milliGRAM(s) IV Push every 6 hours PRN anxiety/restlessness  morphine  - Injectable 2 milliGRAM(s) IV Push every 4 hours PRN pain or SOB or RR>24      PHYSICAL EXAM:    Vital Signs Last 24 Hrs  T(C): 35.6 (05 Dec 2022 05:19), Max: 36.4 (04 Dec 2022 16:38)  T(F): 96 (05 Dec 2022 05:19), Max: 97.5 (04 Dec 2022 16:38)  HR: 102 (05 Dec 2022 09:24) (92 - 104)  BP: 112/82 (05 Dec 2022 05:19) (95/66 - 112/82)  BP(mean): --  RR: 24 (05 Dec 2022 05:19) (19 - 28)  SpO2: 95% (05 Dec 2022 09:24) (92% - 97%)    Parameters below as of 05 Dec 2022 09:24  Patient On (Oxygen Delivery Method): nasal cannula,5l        General: alert  oriented x ____ lethargic agitated                  cachexia  nonverbal  coma    Karnofsky:  %    HEENT: normal  dry mouth  ET tube/trach    Lungs: comfortable tachypnea/labored breathing  excessive secretions    CV: normal  tachycardia    GI: normal  distended  tender  no BS               PEG/NG/OG tube  constipation  last BM:     : normal  incontinent  oliguria/anuria  ann    MSK: normal  weakness  edema             ambulatory  bedbound/wheelchair bound    Skin: normal  pressure ulcers- Stage_____  no rash    LABS:                          10.2   14.92 )-----------( 394      ( 05 Dec 2022 07:33 )             31.0     12-05    138  |  96  |  28.7<H>  ----------------------------<  121<H>  4.4   |  31.0<H>  |  0.53    Ca    9.6      05 Dec 2022 07:33    TPro  7.0  /  Alb  3.1<L>  /  TBili  0.5  /  DBili  x   /  AST  46<H>  /  ALT  32  /  AlkPhos  108  12-05    PT/INR - ( 05 Dec 2022 07:33 )   PT: 14.0 sec;   INR: 1.20 ratio         PTT - ( 05 Dec 2022 07:33 )  PTT:27.5 sec    I&O's Summary      RADIOLOGY & ADDITIONAL STUDIES:    ADVANCE DIRECTIVES/TREATMENT PREFERENCES:  DNR YES NO  Completed on:                     MOLST  YES NO   Completed on:  Living Will  YES NO   Completed on: OVERNIGHT EVENTS:  F/U Note  Patient more symptomatic today  Severe dyspnea at rest causing restlessness and anxiety  Undecided about whether he has pain or not  Patient spoke with his proxy, both decided to transition ot Comfort measures and more aggressive symptom mgmt  Morphine 2mg IV ordered by Eulalia Ruiz NP Hospitalist for dyspnea x 1  All meds reviewed nonessential medications DC'd    Present Symptoms:   Dyspnea: Severe  Nausea/Vomiting: Yes   Anxiety:  Yes   Depression: Yes   Fatigue: Yes   Loss of appetite: Yes   Constipation:     Pain: denies but did put his hand up over R chest            Character-            Duration-            Effect-            Factors-            Frequency-            Location-            Severity-    Review of Systems: Reviewed                       Unable to obtain due to poor mentation       MEDICATIONS  (STANDING):  budesonide 160 MICROgram(s)/formoterol 4.5 MICROgram(s) Inhaler 2 Puff(s) Inhalation two times a day  dexAMETHasone  Injectable 4 milliGRAM(s) IV Push every 8 hours  levalbuterol Inhalation 1.25 milliGRAM(s) Inhalation every 6 hours  lidocaine   4% Patch 2 Patch Transdermal daily  mirtazapine 7.5 milliGRAM(s) Oral at bedtime  morphine  - Injectable 2 milliGRAM(s) IV Push every 6 hours  pantoprazole  Injectable 40 milliGRAM(s) IV Push two times a day    MEDICATIONS  (PRN):  atropine 1% Ophthalmic Solution for SubLingual Use 2 Drop(s) SubLingual every 4 hours PRN secretion mgmt  LORazepam   Injectable 0.5 milliGRAM(s) IV Push every 6 hours PRN anxiety/restlessness  morphine  - Injectable 2 milliGRAM(s) IV Push every 4 hours PRN pain or SOB or RR>24    PHYSICAL EXAM:    Vital Signs Last 24 Hrs  T(C): 35.6 (05 Dec 2022 05:19), Max: 36.4 (04 Dec 2022 16:38)  T(F): 96 (05 Dec 2022 05:19), Max: 97.5 (04 Dec 2022 16:38)  HR: 102 (05 Dec 2022 09:24) (92 - 104)  BP: 112/82 (05 Dec 2022 05:19) (95/66 - 112/82)  BP(mean): --  RR: 24 (05 Dec 2022 05:19) (19 - 28)  SpO2: 95% (05 Dec 2022 09:24) (92% - 97%)    Parameters below as of 05 Dec 2022 09:24  Patient On (Oxygen Delivery Method): nasal cannula,5l    General: alert  oriented x1 _awake___ lethargic restless dyspneic                  cachexia   voice hypophonic verbal      Karnofsky:10  % having trouble swallowing    HEENT:   dry mouth      Lungs:  tachypnea/labored breathing      CV: tachycardia    GI:  distended  tender              constipation  last BM:     :   ann    MSK:   weakness  edema             bedbound    Skin:   no rash    LABS:             10.2   14.92 )-----------( 394      ( 05 Dec 2022 07:33 )             31.0     12-05    138  |  96  |  28.7<H>  ----------------------------<  121<H>  4.4   |  31.0<H>  |  0.53    Ca    9.6      05 Dec 2022 07:33    TPro  7.0  /  Alb  3.1<L>  /  TBili  0.5  /  DBili  x   /  AST  46<H>  /  ALT  32  /  AlkPhos  108  12-05    PT/INR - ( 05 Dec 2022 07:33 )   PT: 14.0 sec;   INR: 1.20 ratio       PTT - ( 05 Dec 2022 07:33 )  PTT:27.5 sec    I&O's Summary    RADIOLOGY & ADDITIONAL STUDIES:  < from: Xray Chest 1 View- PORTABLE-Urgent (Xray Chest 1 View- PORTABLE-Urgent .) (12.05.22 @ 11:02) >  COMPARISON: 11/30/22 plain chest; 11/8/22 CT scan of chest.    Technique: AP radiograph of the chest    FINDINGS:  Heart/Vascular: Cardiomediastinal silhouette is within normal limits.  Pulmonary: Persistent mildly loculated small right pleural effusion. Mild   fluid in the right minor fissure. 3 to 4 cm in diameter mid left lung   mass redemonstrated. No acute pneumonia. No pneumothorax.  Bones: No acute bony finding.    Impression:  Redemonstration of mid left lung mass.  Right pleural effusion with right basilar atelectasis.    < end of copied text >    ADVANCE DIRECTIVES/TREATMENT PREFERENCES:  DNR YES   Completed on:                     MOLST  YES    Completed on: Reconfirmed DNR (NEW MOLST-completed page 2)  Living Will  NO   Completed on:

## 2022-12-05 NOTE — PROGRESS NOTE ADULT - SUBJECTIVE AND OBJECTIVE BOX
Discussed with the patient regarding diagnosis of adenocarcinoma likely lung origin, and treatment options based on next generation sequencing.   discussed with the patient, once medically stable would be a candidate for such treatment options, likely on outpatient basis.  Patient verbalized understanding.  events noted, GIB , drop in Hgb, s/p EGD showing gastritis    on 5l NC  being transitioned to comfort care.  no active bleed    MEDICATIONS  (STANDING):  budesonide 160 MICROgram(s)/formoterol 4.5 MICROgram(s) Inhaler 2 Puff(s) Inhalation two times a day  dexAMETHasone  Injectable 4 milliGRAM(s) IV Push every 8 hours  levalbuterol Inhalation 1.25 milliGRAM(s) Inhalation every 6 hours  lidocaine   4% Patch 2 Patch Transdermal daily  mirtazapine 7.5 milliGRAM(s) Oral at bedtime  morphine  - Injectable 2 milliGRAM(s) IV Push every 6 hours  pantoprazole  Injectable 40 milliGRAM(s) IV Push two times a day    MEDICATIONS  (PRN):  atropine 1% Ophthalmic Solution for SubLingual Use 2 Drop(s) SubLingual every 4 hours PRN secretion mgmt  LORazepam   Injectable 0.5 milliGRAM(s) IV Push every 6 hours PRN anxiety/restlessness  morphine  - Injectable 2 milliGRAM(s) IV Push every 4 hours PRN pain or SOB or RR>24          afebrile  Patient On (Oxygen Delivery Method): nasal cannula  O2 Flow (L/min): 5    CONSTITUTIONAL: ill-appearing, frail, cachetic  RESPIRATORY: Normal respiratory effort; lungs are clear to auscultation bilaterally  CARDIOVASCULAR: Regular rate and rhythm, normal S1 and S2  ABDOMEN: Nontender to palpation, normoactive bowel sounds, no rebound/guarding  PSYCH: A+O to person, place, and time; affect appropriate  NEUROLOGY: CN 2-12 are intact and symmetric; no gross sensory deficits    LABS:                          10.2   14.92 )-----------( 394      ( 05 Dec 2022 07:33 )             31.0                              9.0    10.57 )-----------( 381      ( 01 Dec 2022 05:22 )             28.2                        8.6    10.45 )-----------( 367      ( 30 Nov 2022 06:50 )             26.7                           10.4   9.33  )-----------( 315      ( 25 Nov 2022 06:21 )             32.2                              10.6   9.37  )-----------( 296      ( 24 Nov 2022 06:38 )             31.8                        7.5    9.22  )-----------( 202      ( 22 Nov 2022 08:40 )             21.9     12-01    140  |  99  |  19.6  ----------------------------<  97  4.0   |  32.0<H>  |  0.45<L>    Ca    9.2      01 Dec 2022 05:22      11-24    140  |  100  |  21.1<H>  ----------------------------<  132<H>  3.6   |  30.0<H>  |  0.40<L>    Ca    8.9      24 Nov 2022 06:38    TPro  6.0<L>  /  Alb  3.4  /  TBili  0.7  /  DBili  x   /  AST  39  /  ALT  60<H>  /  AlkPhos  84  11-24 11-22    144  |  107  |  28.8<H>  ----------------------------<  89  3.8   |  27.0  |  0.33<L>    Ca    8.7      22 Nov 2022 05:00    TPro  5.4<L>  /  Alb  3.3  /  TBili  1.0  /  DBili  x   /  AST  51<H>  /  ALT  61<H>  /  AlkPhos  57  11-22

## 2022-12-06 LAB — SARS-COV-2 RNA SPEC QL NAA+PROBE: SIGNIFICANT CHANGE UP

## 2022-12-06 PROCEDURE — 99233 SBSQ HOSP IP/OBS HIGH 50: CPT

## 2022-12-06 RX ORDER — SALIVA SUBSTITUTE COMB NO.11 351 MG
5 POWDER IN PACKET (EA) MUCOUS MEMBRANE
Refills: 0 | Status: DISCONTINUED | OUTPATIENT
Start: 2022-12-06 | End: 2022-12-10

## 2022-12-06 RX ADMIN — PANTOPRAZOLE SODIUM 40 MILLIGRAM(S): 20 TABLET, DELAYED RELEASE ORAL at 05:45

## 2022-12-06 RX ADMIN — BUDESONIDE AND FORMOTEROL FUMARATE DIHYDRATE 2 PUFF(S): 160; 4.5 AEROSOL RESPIRATORY (INHALATION) at 20:21

## 2022-12-06 RX ADMIN — Medication 4 MILLIGRAM(S): at 05:45

## 2022-12-06 RX ADMIN — MORPHINE SULFATE 2 MILLIGRAM(S): 50 CAPSULE, EXTENDED RELEASE ORAL at 17:30

## 2022-12-06 RX ADMIN — LEVALBUTEROL 1.25 MILLIGRAM(S): 1.25 SOLUTION, CONCENTRATE RESPIRATORY (INHALATION) at 04:00

## 2022-12-06 RX ADMIN — LEVALBUTEROL 1.25 MILLIGRAM(S): 1.25 SOLUTION, CONCENTRATE RESPIRATORY (INHALATION) at 20:21

## 2022-12-06 RX ADMIN — MORPHINE SULFATE 2 MILLIGRAM(S): 50 CAPSULE, EXTENDED RELEASE ORAL at 11:37

## 2022-12-06 RX ADMIN — MORPHINE SULFATE 2 MILLIGRAM(S): 50 CAPSULE, EXTENDED RELEASE ORAL at 06:16

## 2022-12-06 RX ADMIN — Medication 4 MILLIGRAM(S): at 14:18

## 2022-12-06 RX ADMIN — Medication 4 MILLIGRAM(S): at 21:13

## 2022-12-06 RX ADMIN — MIRTAZAPINE 7.5 MILLIGRAM(S): 45 TABLET, ORALLY DISINTEGRATING ORAL at 21:14

## 2022-12-06 RX ADMIN — PANTOPRAZOLE SODIUM 40 MILLIGRAM(S): 20 TABLET, DELAYED RELEASE ORAL at 17:51

## 2022-12-06 RX ADMIN — MORPHINE SULFATE 2 MILLIGRAM(S): 50 CAPSULE, EXTENDED RELEASE ORAL at 17:10

## 2022-12-06 RX ADMIN — LEVALBUTEROL 1.25 MILLIGRAM(S): 1.25 SOLUTION, CONCENTRATE RESPIRATORY (INHALATION) at 15:25

## 2022-12-06 RX ADMIN — BUDESONIDE AND FORMOTEROL FUMARATE DIHYDRATE 2 PUFF(S): 160; 4.5 AEROSOL RESPIRATORY (INHALATION) at 08:45

## 2022-12-06 RX ADMIN — MORPHINE SULFATE 2 MILLIGRAM(S): 50 CAPSULE, EXTENDED RELEASE ORAL at 11:50

## 2022-12-06 RX ADMIN — Medication 5 MILLILITER(S): at 17:10

## 2022-12-06 RX ADMIN — Medication 5 MILLILITER(S): at 11:35

## 2022-12-06 RX ADMIN — LEVALBUTEROL 1.25 MILLIGRAM(S): 1.25 SOLUTION, CONCENTRATE RESPIRATORY (INHALATION) at 08:46

## 2022-12-06 RX ADMIN — MORPHINE SULFATE 2 MILLIGRAM(S): 50 CAPSULE, EXTENDED RELEASE ORAL at 05:46

## 2022-12-06 RX ADMIN — Medication 5 MILLILITER(S): at 23:13

## 2022-12-06 NOTE — PROGRESS NOTE ADULT - ASSESSMENT
66yo M w/ no known PMH who p/w SOB and found to have diffuse ST elevations and taken for emergent LHC where found to have a large pericardial effusion s/p percardiocentesis during which bloody fluid was drained (a 90% LAD lesion also seen but PCI deferred until further stabilized).  Course further c/b dx of HFrEF with EF 20-25% and imaging revealing REYNALDO PE, SVC occlusion, IVC thrombus, mediastinal/rt hilar LAD with bronchial obstruxn in RML/RLL, a 4.1cm LLL mass c/w lung malignancy, and mod rt effusion.  We are consulted for assistance with goals of care.   #Goals of care  - Pt named friend as HCP- Samuel Chaudhry - [phone] (alternate HCP friend Rios Maza - [phone])  - Newly dx'd metastatic NSCLC - LLL mass with presumed LN involvement, malignant pericardial effusion and brain mets - very poor prognosis  - Given his current performance status, NSCLC, PDL1 0% he would be unlikely to see significant benefit with CTX and likely side effects  - On 12/5 I discussed poor outlook with HCP Adi and later with Alternate HCP Rios - both understand and agree that comfort measures makes most sense at this time.  - Applications for bed availability at [facility] and [facility] made  - Today I discussed with Dr Howard at [facility]'s inpatient unit [facility] - pt is accepted on clinical grounds - will need to seek approval for kristi care given insurance issues  - Will cont to follow and remain available to assist with future goals of care discussions if/when the need arises.      #SOB  - cont morphine 2mg IV Q6hrs ATC  - cont morphine 2mg IV Q2hrs prn pain or SOB or RR>24  - cont ativan prn

## 2022-12-06 NOTE — PROGRESS NOTE ADULT - SUBJECTIVE AND OBJECTIVE BOX
HOSPITALIST PROGRESS NOTE    STEVE UNVAR  [id number]  67yMale    Patient is a 67y old  Male who presents with a chief complaint of Pericardial effusion (05 Dec 2022 20:33)      SUBJECTIVE:   Chart reviewed since last visit.  Patient seen and examined at bedside for metastatic lung cancer  comfortable      OBJECTIVE:  Vital Signs Last 24 Hrs  T(C): 36.5 (06 Dec 2022 08:00), Max: 36.7 (05 Dec 2022 21:30)  T(F): 97.7 (06 Dec 2022 08:00), Max: 98.1 (06 Dec 2022 05:42)  HR: 93 (06 Dec 2022 15:37) (90 - 101)  BP: 100/70 (06 Dec 2022 08:00) (96/76 - 108/75)   RR: 22 (06 Dec 2022 08:00) (22 - 22)  SpO2: 95% (06 Dec 2022 15:37) (94% - 98%)    Parameters below as of 06 Dec 2022 15:37  Patient On (Oxygen Delivery Method): nasal cannula        PHYSICAL EXAMINATION  General: Cachectic male lying in bed, appears comfortable  HEENT:  sunken eyes, poor dentition  NECK:  supple  CVS: regular rate and rhythm S1 S2  RESP:  No respiratory distress  GI:  scaphoid abdomen  : Makeda  MSK:  Severe muscle atrophy  CNS:  sleeping, confused  INTEG:  dry skin  PSYCH:  confused    MONITOR:  CAPILLARY BLOOD GLUCOSE            I&O's Summary    05 Dec 2022 07:01  -  06 Dec 2022 07:00  --------------------------------------------------------  IN: 315 mL / OUT: 2350 mL / NET: -2035 mL    06 Dec 2022 07:01  -  06 Dec 2022 15:43  --------------------------------------------------------  IN: 0 mL / OUT: 300 mL / NET: -300 mL                            10.2   14.92 )-----------( 394      ( 05 Dec 2022 07:33 )             31.0     PT/INR - ( 05 Dec 2022 07:33 )   PT: 14.0 sec;   INR: 1.20 ratio         PTT - ( 05 Dec 2022 07:33 )  PTT:27.5 sec  12-05    138  |  96  |  28.7<H>  ----------------------------<  121<H>  4.4   |  31.0<H>  |  0.53    Ca    9.6      05 Dec 2022 07:33    TPro  7.0  /  Alb  3.1<L>  /  TBili  0.5  /  DBili  x   /  AST  46<H>  /  ALT  32  /  AlkPhos  108  12-05            Culture:    TTE:    RADIOLOGY        MEDICATIONS  (STANDING):  Biotene Dry Mouth Oral Rinse 5 milliLiter(s) Swish and Spit four times a day  budesonide 160 MICROgram(s)/formoterol 4.5 MICROgram(s) Inhaler 2 Puff(s) Inhalation two times a day  dexAMETHasone  Injectable 4 milliGRAM(s) IV Push every 8 hours  levalbuterol Inhalation 1.25 milliGRAM(s) Inhalation every 6 hours  lidocaine   4% Patch 2 Patch Transdermal daily  mirtazapine 7.5 milliGRAM(s) Oral at bedtime  morphine  - Injectable 2 milliGRAM(s) IV Push every 6 hours  pantoprazole  Injectable 40 milliGRAM(s) IV Push two times a day      MEDICATIONS  (PRN):  atropine 1% Ophthalmic Solution for SubLingual Use 2 Drop(s) SubLingual every 4 hours PRN secretion mgmt  LORazepam   Injectable 0.5 milliGRAM(s) IV Push every 6 hours PRN anxiety/restlessness  morphine  - Injectable 2 milliGRAM(s) IV Push every 4 hours PRN pain or SOB or RR>24

## 2022-12-06 NOTE — PROGRESS NOTE ADULT - ASSESSMENT
68yo M w/ no known PMH who p/w SOB and found to have diffuse ST elevations and taken for emergent LHC where found to have a large pericardial effusion s/p percardiocentesis during which bloody fluid was drained (a 90% LAD lesion also seen but PCI deferred until further stabilized).  Course further c/b dx of HFrEF with EF 20-25% and imaging revealing REYNALDO PE, SVC occlusion, IVC thrombus, mediastinal/rt hilar LAD with bronchial obstruxn in RML/RLL, a 4.1cm LLL mass c/w lung malignancy, and mod rt effusion. Newly dx'd metastatic NSCLC - LLL mass with presumed LN involvement, malignant pericardial effusion and brain mets - very poor prognosis. 12/5: Hypothermic, unable to take po safely, comfort measures initiated    Stage 4 NSCLC Adenocarcinoma Lung with multiple Brain mets - with edema   As per Dr Olivares of oncology - given his current performance status, NSCLC, PDL1 0% he would be unlikely to see significant benefit with CTX and likely side effects  Discussed with Palliative care team, after discussion with HCP, transition to comfort care  Discussed with [facility], Hospice eval initiated to [facility] or other accepting facility,  patient is uninsured.     Hypoxia/Hypotension - PE/IVC thrombus +Right pleural effusion+ Acute pericardial effusion s/p pericardiocentesis+ CHF CAD  - Thoracic surgery - repeat echo w/o pericardial effusion reaccumulation  - s/p pigtail drainage    Dysphagia  - Comfort feeds    Anemia, cachexia,  severe protein calorie malnutrition  - Hemoglobin stable  - CT A/P ordered to r/o bleed - patient refused  - s/p 1 unit PRBC    Depression  - BH recs appreciated    Melena  - Resolved    Smoker, likely COPD   - Symbicort Spiriva  - PRN Nebs    Dispo: Inpatient Hospice facility pending bed      Discussed with MIKE Godinez and Palliative Care Dr Vazquez

## 2022-12-06 NOTE — PROGRESS NOTE ADULT - SUBJECTIVE AND OBJECTIVE BOX
INTERVAL HISTORY:  Pt is now on comfort measures  He was lying in bed in NAD on my visit  He c/o intermittent SOB - morphine helps  Pt seemed a little inattentive at times - difficulty following discussion    PRESENT SYMPTOMS:     Dyspnea: Yes   Nausea/Vomiting: No  Anxiety:  No  Depression: Yes   Fatigue: Yes   Loss of appetite: Yes   Constipation: No    PAIN: denies            Character-            Duration-            Effect-            Factors-            Frequency-            Location-            Severity-    REVIEW OF SYSTEMS:   [ X ] Full review of systems performed and was otherwise negative except as noted above.  [  ] Due to altered mental status/intubation, subjective information were not able to be obtained from the patient. History was obtained, to the extent possible, from review of the chart and collateral sources of information.    MEDICATIONS  (STANDING):  Biotene Dry Mouth Oral Rinse 5 milliLiter(s) Swish and Spit four times a day  budesonide 160 MICROgram(s)/formoterol 4.5 MICROgram(s) Inhaler 2 Puff(s) Inhalation two times a day  dexAMETHasone  Injectable 4 milliGRAM(s) IV Push every 8 hours  levalbuterol Inhalation 1.25 milliGRAM(s) Inhalation every 6 hours  lidocaine   4% Patch 2 Patch Transdermal daily  mirtazapine 7.5 milliGRAM(s) Oral at bedtime  morphine  - Injectable 2 milliGRAM(s) IV Push every 6 hours  pantoprazole  Injectable 40 milliGRAM(s) IV Push two times a day    MEDICATIONS  (PRN):  atropine 1% Ophthalmic Solution for SubLingual Use 2 Drop(s) SubLingual every 4 hours PRN secretion mgmt  LORazepam   Injectable 0.5 milliGRAM(s) IV Push every 6 hours PRN anxiety/restlessness  morphine  - Injectable 2 milliGRAM(s) IV Push every 4 hours PRN pain or SOB or RR>24    PHYSICAL EXAM:  Vital Signs Last 24 Hrs  T(C): 36.7 (06 Dec 2022 15:50), Max: 36.7 (05 Dec 2022 21:30)  T(F): 98 (06 Dec 2022 15:50), Max: 98.1 (06 Dec 2022 05:42)  HR: 96 (06 Dec 2022 15:50) (90 - 101)  BP: 103/74 (06 Dec 2022 15:50) (96/76 - 108/75)  BP(mean): --  RR: 20 (06 Dec 2022 15:50) (20 - 22)  SpO2: 96% (06 Dec 2022 15:50) (94% - 98%)    Parameters below as of 06 Dec 2022 15:50  Patient On (Oxygen Delivery Method): nasal cannula  O2 Flow (L/min): 5    General: Pt lying in bed in NAD - markedly cachectic  HEENT: NCAT; MM dry; poor dentition; temporal wasting  Resp: breathing unlabored; symmetric chest expansion B/L  MSK: no contractures/deformities  Skin: no rash  Neuro: awake and conversant; no myoclonus; hypophonic speech  Psych: calm; flat, depressed affect; inattentive and distractible at times    LABS:                        10.2   14.92 )-----------( 394      ( 05 Dec 2022 07:33 )             31.0     12-05    138  |  96  |  28.7<H>  ----------------------------<  121<H>  4.4   |  31.0<H>  |  0.53    Ca    9.6      05 Dec 2022 07:33    TPro  7.0  /  Alb  3.1<L>  /  TBili  0.5  /  DBili  x   /  AST  46<H>  /  ALT  32  /  AlkPhos  108  12-05    PT/INR - ( 05 Dec 2022 07:33 )   PT: 14.0 sec;   INR: 1.20 ratio         PTT - ( 05 Dec 2022 07:33 )  PTT:27.5 sec    RADIOLOGY & ADDITIONAL STUDIES:    NEUROLOGIC MEDICATIONS/OPIOIDS/BENZODIAZEPINES IN PAST 24HRS:    mirtazapine   7.5 milliGRAM(s) Oral (12-05-22 @ 21:48)    morphine  - Injectable   2 milliGRAM(s) IV Push (12-06-22 @ 11:37)   2 milliGRAM(s) IV Push (12-06-22 @ 05:46)   2 milliGRAM(s) IV Push (12-05-22 @ 17:34)    ADVANCE DIRECTIVES/TREATMENT PREFERENCES:  Code Status: DNR/DNI  MOLST (if not Full Code): yes  HCP/Surrogate: Adi Cardozo - friend

## 2022-12-06 NOTE — PROGRESS NOTE ADULT - TIME BILLING
coordination of care with hospice director and SW and primary team attending
extensive discussion of clinical course with pt - provided support - coordinated with primary team PA
discussed case with family, palliative team, cardiology team
Acute MI, HFrEF, Malignant pericardial effusion/metastatic cancer
Acute MI, Metastatic cancer with malignant effusion, DVT/PE
discussed case with family, palliative team, cardiology team
Acute MI, Metastatic cancer, VTE

## 2022-12-07 LAB
CULTURE RESULTS: SIGNIFICANT CHANGE UP
SPECIMEN SOURCE: SIGNIFICANT CHANGE UP

## 2022-12-07 PROCEDURE — 99233 SBSQ HOSP IP/OBS HIGH 50: CPT

## 2022-12-07 PROCEDURE — 99232 SBSQ HOSP IP/OBS MODERATE 35: CPT

## 2022-12-07 RX ORDER — SALIVA SUBSTITUTE COMB NO.11 351 MG
5 POWDER IN PACKET (EA) MUCOUS MEMBRANE
Refills: 0 | Status: DISCONTINUED | OUTPATIENT
Start: 2022-12-07 | End: 2022-12-10

## 2022-12-07 RX ORDER — SODIUM CHLORIDE 0.65 %
1 AEROSOL, SPRAY (ML) NASAL THREE TIMES A DAY
Refills: 0 | Status: DISCONTINUED | OUTPATIENT
Start: 2022-12-07 | End: 2022-12-10

## 2022-12-07 RX ADMIN — MORPHINE SULFATE 2 MILLIGRAM(S): 50 CAPSULE, EXTENDED RELEASE ORAL at 12:15

## 2022-12-07 RX ADMIN — MORPHINE SULFATE 2 MILLIGRAM(S): 50 CAPSULE, EXTENDED RELEASE ORAL at 05:37

## 2022-12-07 RX ADMIN — BUDESONIDE AND FORMOTEROL FUMARATE DIHYDRATE 2 PUFF(S): 160; 4.5 AEROSOL RESPIRATORY (INHALATION) at 21:22

## 2022-12-07 RX ADMIN — Medication 4 MILLIGRAM(S): at 05:07

## 2022-12-07 RX ADMIN — PANTOPRAZOLE SODIUM 40 MILLIGRAM(S): 20 TABLET, DELAYED RELEASE ORAL at 05:07

## 2022-12-07 RX ADMIN — Medication 1 SPRAY(S): at 22:42

## 2022-12-07 RX ADMIN — Medication 4 MILLIGRAM(S): at 22:43

## 2022-12-07 RX ADMIN — Medication 4 MILLIGRAM(S): at 14:06

## 2022-12-07 RX ADMIN — MORPHINE SULFATE 2 MILLIGRAM(S): 50 CAPSULE, EXTENDED RELEASE ORAL at 05:07

## 2022-12-07 RX ADMIN — Medication 5 MILLILITER(S): at 12:02

## 2022-12-07 RX ADMIN — Medication 5 MILLILITER(S): at 05:07

## 2022-12-07 RX ADMIN — LEVALBUTEROL 1.25 MILLIGRAM(S): 1.25 SOLUTION, CONCENTRATE RESPIRATORY (INHALATION) at 21:22

## 2022-12-07 RX ADMIN — BUDESONIDE AND FORMOTEROL FUMARATE DIHYDRATE 2 PUFF(S): 160; 4.5 AEROSOL RESPIRATORY (INHALATION) at 08:23

## 2022-12-07 RX ADMIN — MORPHINE SULFATE 2 MILLIGRAM(S): 50 CAPSULE, EXTENDED RELEASE ORAL at 12:02

## 2022-12-07 RX ADMIN — LEVALBUTEROL 1.25 MILLIGRAM(S): 1.25 SOLUTION, CONCENTRATE RESPIRATORY (INHALATION) at 08:22

## 2022-12-07 RX ADMIN — MIRTAZAPINE 7.5 MILLIGRAM(S): 45 TABLET, ORALLY DISINTEGRATING ORAL at 22:43

## 2022-12-07 RX ADMIN — LEVALBUTEROL 1.25 MILLIGRAM(S): 1.25 SOLUTION, CONCENTRATE RESPIRATORY (INHALATION) at 15:25

## 2022-12-07 NOTE — PROGRESS NOTE ADULT - ASSESSMENT
66yo M w/ no known PMH who p/w SOB and found to have diffuse ST elevations and taken for emergent LHC where found to have a large pericardial effusion s/p percardiocentesis during which bloody fluid was drained (a 90% LAD lesion also seen but PCI deferred until further stabilized).  Course further c/b dx of HFrEF with EF 20-25% and imaging revealing REYNALDO PE, SVC occlusion, IVC thrombus, mediastinal/rt hilar LAD with bronchial obstruxn in RML/RLL, a 4.1cm LLL mass c/w lung malignancy, and mod rt effusion.  We are consulted for assistance with goals of care.   #Goals of care  - Pt named friend as HCP- Samuel Isidoro - [phone] (alternate HCP friend Rios Link - [phone])  - Newly dx'd metastatic NSCLC - LLL mass with presumed LN involvement, malignant pericardial effusion and brain mets - very poor prognosis  - Comfort measures  - Applications for bed availability at [facility] and [facility] made  - Apparently, pt will not be accepted at [facility] inpatient hospice  - Will cont to follow and remain available to assist with future goals of care discussions if/when the need arises.    #SOB  - cont morphine 2mg IV Q6hrs ATC  - cont morphine 2mg IV Q2hrs prn pain or SOB or RR>24  - cont ativan prn

## 2022-12-07 NOTE — PROGRESS NOTE ADULT - ASSESSMENT
66yo M w/ no known PMH who p/w SOB and found to have diffuse ST elevations and taken for emergent LHC where found to have a large pericardial effusion s/p percardiocentesis during which bloody fluid was drained (a 90% LAD lesion also seen but PCI deferred until further stabilized).  Course further c/b dx of HFrEF with EF 20-25% and imaging revealing REYNALDO PE, SVC occlusion, IVC thrombus, mediastinal/rt hilar LAD with bronchial obstruxn in RML/RLL, a 4.1cm LLL mass c/w lung malignancy, and mod rt effusion. Newly dx'd metastatic NSCLC - LLL mass with presumed LN involvement, malignant pericardial effusion and brain mets - very poor prognosis. 12/5: Hypothermic, unable to take po safely, comfort measures initiated    Stage 4 NSCLC Adenocarcinoma Lung with multiple Brain mets - with edema  Dexamethasone 4 TID  Per Dr Olivares oncology - given his current performance status, NSCLC, PDL1 0% he would be unlikely to see significant benefit with CTX and likely side effects  Discussed with Palliative care team, after discussion with HCP, transition to comfort care  Hospice eval initiated to [facility] or other accepting facility,  patient is uninsured.     PE/IVC thrombus +Right pleural effusion+ Acute pericardial effusion s/p pericardiocentesis+ CHF CAD  -Hypoxia/Hypotension -  - Thoracic surgery -> repeat echo w/o pericardial effusion reaccumulation  - s/p pigtail drainage    Dysphagia  - Comfort feeds    Anemia, cachexia,    severe protein calorie malnutrition  - Hemoglobin stable  - CT A/P ordered to r/o bleed - patient refused  - s/p 1 unit PRBC    Depression  - BH recs appreciated    Melena  - Resolved    COPD ,Smoker, likely   - Symbicort Spiriva  - PRN Nebs    Dispo: Inpatient Hospice facility pending bed

## 2022-12-07 NOTE — PROGRESS NOTE ADULT - SUBJECTIVE AND OBJECTIVE BOX
INTERVAL HISTORY:  Pt seen lying in bed in NAD  Feeling comfortable currently - tired and frustrated    PRESENT SYMPTOMS:     Dyspnea: No  Nausea/Vomiting: No  Anxiety:  No  Depression: Yes   Fatigue: Yes   Loss of appetite: Yes   Constipation: No    PAIN: denies            Character-            Duration-            Effect-            Factors-            Frequency-            Location-            Severity-    REVIEW OF SYSTEMS:   [ X ] Full review of systems performed and was otherwise negative except as noted above.  [  ] Due to altered mental status/intubation, subjective information were not able to be obtained from the patient. History was obtained, to the extent possible, from review of the chart and collateral sources of information.    MEDICATIONS  (STANDING):  Biotene Dry Mouth Oral Rinse 5 milliLiter(s) Swish and Spit four times a day  budesonide 160 MICROgram(s)/formoterol 4.5 MICROgram(s) Inhaler 2 Puff(s) Inhalation two times a day  dexAMETHasone  Injectable 4 milliGRAM(s) IV Push every 8 hours  levalbuterol Inhalation 1.25 milliGRAM(s) Inhalation every 6 hours  lidocaine   4% Patch 2 Patch Transdermal daily  mirtazapine 7.5 milliGRAM(s) Oral at bedtime  morphine  - Injectable 2 milliGRAM(s) IV Push every 6 hours  pantoprazole  Injectable 40 milliGRAM(s) IV Push two times a day  saliva substitute (BIOTENE MOISTURIZING MOUTH SPRAY) 5 [place](s) Topical four times a day    MEDICATIONS  (PRN):  atropine 1% Ophthalmic Solution for SubLingual Use 2 Drop(s) SubLingual every 4 hours PRN secretion mgmt  LORazepam   Injectable 0.5 milliGRAM(s) IV Push every 6 hours PRN anxiety/restlessness  morphine  - Injectable 2 milliGRAM(s) IV Push every 4 hours PRN pain or SOB or RR>24    PHYSICAL EXAM:  Vital Signs Last 24 Hrs  T(C): 36.6 (07 Dec 2022 12:30), Max: 37.1 (07 Dec 2022 04:47)  T(F): 97.9 (07 Dec 2022 12:30), Max: 98.8 (07 Dec 2022 04:47)  HR: 83 (07 Dec 2022 12:30) (83 - 98)  BP: 100/77 (07 Dec 2022 12:30) (93/66 - 103/74)  BP(mean): --  RR: 22 (07 Dec 2022 12:30) (20 - 22)  SpO2: 92% (07 Dec 2022 12:30) (92% - 96%)    Parameters below as of 07 Dec 2022 12:30  Patient On (Oxygen Delivery Method): nasal cannula  O2 Flow (L/min): 5    General: Pt lying in bed - markedly cachectic  HEENT: NCAT; MM dry; poor dentition; severe temporal wasting  Resp: breathing unlabored; symmetric chest expansion B/L  MSK: no contractures/deformities  Skin: no rash  Neuro: awake and oriented x 3; no myoclonus  Psych: calm; appropriate speech and affect    NEUROLOGIC MEDICATIONS/OPIOIDS/BENZODIAZEPINES IN PAST 24HRS:    mirtazapine   7.5 milliGRAM(s) Oral (12-06-22 @ 21:14)    morphine  - Injectable   2 milliGRAM(s) IV Push (12-07-22 @ 12:02)   2 milliGRAM(s) IV Push (12-07-22 @ 05:07)   2 milliGRAM(s) IV Push (12-06-22 @ 17:10)    ADVANCE DIRECTIVES/TREATMENT PREFERENCES:  Code Status: comfort measures  MOLST (if not Full Code):  HCP/Surrogate: uriel colbert

## 2022-12-07 NOTE — PROGRESS NOTE ADULT - SUBJECTIVE AND OBJECTIVE BOX
STEVE DENISE Patient is a 67y old  Male who presents with a chief complaint of Pericardial effusion (07 Dec 2022 13:37)     HPI:  68 y/o M with no known PMH (does not see doctors), active smoker, presents to the ED with complaints of SOB. Initial ECG revealed diffuse ST-elevations and the patient was taken for emergent LHC where he was found to have nonobstructive CAD, however a large circumferential pericardial effusion was seen. Pericardiocentesis was performed with approx 800cc bloody fluid drained. Of note, there is a 3cm mass in his left upper lobe on CXR. (07 Nov 2022 21:14)    The patient was seen and evaluated lying in bed awake- not offering any complaints   The patient is in no acute distress.      I&O's Summary    06 Dec 2022 07:01  -  07 Dec 2022 07:00  --------------------------------------------------------  IN: 200 mL / OUT: 1200 mL / NET: -1000 mL      Allergies    No Known Allergies    Intolerances      HEALTH ISSUES - PROBLEM Dx:  HFrEF (heart failure with reduced ejection fraction)    Pericardial effusion, acute    Thrombus    CAD (coronary artery disease)    Mass of lower lobe of left lung    Preoperative cardiovascular examination    Pleural effusion, right    Lung mass    Anemia          PAST MEDICAL & SURGICAL HISTORY:  No pertinent past medical history      No significant past surgical history              Vital Signs Last 24 Hrs  T(C): 37.2 (07 Dec 2022 16:06), Max: 37.2 (07 Dec 2022 16:06)  T(F): 98.9 (07 Dec 2022 16:06), Max: 98.9 (07 Dec 2022 16:06)  HR: 89 (07 Dec 2022 16:06) (83 - 98)  BP: 109/69 (07 Dec 2022 16:06) (93/66 - 109/69)  BP(mean): --  RR: 18 (07 Dec 2022 16:06) (18 - 22)  SpO2: 98% (07 Dec 2022 16:06) (92% - 98%)    Parameters below as of 07 Dec 2022 12:30  Patient On (Oxygen Delivery Method): nasal cannula  O2 Flow (L/min): 5  T(C): 37.2 (12-07-22 @ 16:06), Max: 37.2 (12-07-22 @ 16:06)  HR: 89 (12-07-22 @ 16:06) (83 - 98)  BP: 109/69 (12-07-22 @ 16:06) (93/66 - 109/69)  RR: 18 (12-07-22 @ 16:06) (18 - 22)  SpO2: 98% (12-07-22 @ 16:06) (92% - 98%)  Wt(kg): --    PHYSICAL EXAM:    GENERAL: NAD, eldlery cachectic- poor dentition ill appearing conversing  HEAD:  Atraumatic, Normocephalic  EYES: EOMI, PERRL, conjunctiva and sclera clear  ENMT:  Moist mucous membranes,  No lesions  NECK: Supple, No JVD, Normal thyroid  NERVOUS SYSTEM:  Alert & Oriented X3,  can Move upper and lower extremities; CNS-II-XII  CHEST/LUNG: Clear to auscultation bilaterally; No rales, rhonchi, wheezing,   HEART: Regular rate and rhythm; No murmurs,   ABDOMEN: Soft, Nontender, Nondistended; Bowel sounds present  EXTREMITIES:  Peripheral Pulses, No  cyanosis, or edema  Psychiatry- mood and affect flat affect    atropine 1% Ophthalmic Solution for SubLingual Use 2 Drop(s) SubLingual every 4 hours PRN  Biotene Dry Mouth Oral Rinse 5 milliLiter(s) Swish and Spit four times a day  budesonide 160 MICROgram(s)/formoterol 4.5 MICROgram(s) Inhaler 2 Puff(s) Inhalation two times a day  dexAMETHasone  Injectable 4 milliGRAM(s) IV Push every 8 hours  levalbuterol Inhalation 1.25 milliGRAM(s) Inhalation every 6 hours  lidocaine   4% Patch 2 Patch Transdermal daily  LORazepam   Injectable 0.5 milliGRAM(s) IV Push every 6 hours PRN  mirtazapine 7.5 milliGRAM(s) Oral at bedtime  morphine  - Injectable 2 milliGRAM(s) IV Push every 6 hours  morphine  - Injectable 2 milliGRAM(s) IV Push every 4 hours PRN  pantoprazole  Injectable 40 milliGRAM(s) IV Push two times a day  saliva substitute (BIOTENE MOISTURIZING MOUTH SPRAY) 5 [place](s) Topical four times a day      LABS:                      CAPILLARY BLOOD GLUCOSE          RADIOLOGY & ADDITIONAL TESTS:      Consultant notes reviewed    Case discussed with consultant/provider/ family /patient

## 2022-12-08 PROCEDURE — 99233 SBSQ HOSP IP/OBS HIGH 50: CPT

## 2022-12-08 RX ADMIN — Medication 4 MILLIGRAM(S): at 05:41

## 2022-12-08 RX ADMIN — LEVALBUTEROL 1.25 MILLIGRAM(S): 1.25 SOLUTION, CONCENTRATE RESPIRATORY (INHALATION) at 09:18

## 2022-12-08 RX ADMIN — BUDESONIDE AND FORMOTEROL FUMARATE DIHYDRATE 2 PUFF(S): 160; 4.5 AEROSOL RESPIRATORY (INHALATION) at 09:18

## 2022-12-08 RX ADMIN — LEVALBUTEROL 1.25 MILLIGRAM(S): 1.25 SOLUTION, CONCENTRATE RESPIRATORY (INHALATION) at 16:14

## 2022-12-08 RX ADMIN — LEVALBUTEROL 1.25 MILLIGRAM(S): 1.25 SOLUTION, CONCENTRATE RESPIRATORY (INHALATION) at 05:05

## 2022-12-08 RX ADMIN — PANTOPRAZOLE SODIUM 40 MILLIGRAM(S): 20 TABLET, DELAYED RELEASE ORAL at 05:41

## 2022-12-08 RX ADMIN — LEVALBUTEROL 1.25 MILLIGRAM(S): 1.25 SOLUTION, CONCENTRATE RESPIRATORY (INHALATION) at 20:40

## 2022-12-08 RX ADMIN — BUDESONIDE AND FORMOTEROL FUMARATE DIHYDRATE 2 PUFF(S): 160; 4.5 AEROSOL RESPIRATORY (INHALATION) at 20:39

## 2022-12-08 RX ADMIN — PANTOPRAZOLE SODIUM 40 MILLIGRAM(S): 20 TABLET, DELAYED RELEASE ORAL at 17:12

## 2022-12-08 NOTE — PROGRESS NOTE ADULT - ASSESSMENT
67-year-old male With no known medical history with lack of medical follow-up with tobacco use disorder presented on November 7 with difficulty breathing found to have diffuse ST elevation taken for emergent left heart cath found to have 90% stenosis of mid LAD with no acute lesion with no intervention but s/p pericardial drain for moderate to large pericardial effusion was initially admitted to medical ICU eventually found to have metastatic adenocarcinoma most likely from lung as primary based on pericardial fluid pathology,   SVC and intrahepatic IVC thrombi as well as left-sided segmental subsegmental PE, mediastinal lymphadenopathy.  GI bleed and on overnight from 20th November through early morning off 21st November while patient was on Eliquis receiving blood products,  resuscitated for 2 units PRBC along with colloid and crystalloid resuscitation.     Stage 4 NSCLC Adenocarcinoma Lung with multiple Brain mets - with edema  Dexamethasone 4 TID  Per Dr Olivares oncology - given his current performance status, NSCLC, PDL1 0% he would be unlikely to see significant benefit with CTX and likely side effects  Discussed with Palliative care team, after discussion with HCP, transition to comfort care  Hospice eval initiated to [place] or other accepting facility,  patient is uninsured.     PE/IVC thrombus +Right pleural effusion+ Acute pericardial effusion s/p pericardiocentesis+ CHF CAD  -Hypoxia/Hypotension -  - Thoracic surgery ->  - s/p pigtail drainage  cont nebs     Dysphagia  - Comfort feeds    Anemia, cachexia, severe protein calorie malnutrition  - CT A/P ordered to r/o bleed - patient refused    Depression  - BH recs appreciated    Melena  - Resolved    COPD ,Smoker, likely   - Symbicort Spiriva  - PRN Nebs    Dispo: Inpatient Hospice facility    Right pleural effusion  - Thoracic surgery following   - s/p pigtail drainage  - on nasal O2 5L    GIB  s/p EGD showing gastritis  s/p PRBC,   Hgb improved but now dropping again  to get CT A/P - pt refused   Hgb stable at 9 this am    DNR/DNI  palliative care evaluation noted

## 2022-12-08 NOTE — PROGRESS NOTE ADULT - REASON FOR ADMISSION
Pericardial effusion

## 2022-12-08 NOTE — PROGRESS NOTE ADULT - SUBJECTIVE AND OBJECTIVE BOX
on 5l NC  being transitioned to comfort care.  no active bleed    MEDICATIONS  (STANDING):  Biotene Dry Mouth Oral Rinse 5 milliLiter(s) Swish and Spit four times a day  budesonide 160 MICROgram(s)/formoterol 4.5 MICROgram(s) Inhaler 2 Puff(s) Inhalation two times a day  dexAMETHasone  Injectable 4 milliGRAM(s) IV Push every 8 hours  levalbuterol Inhalation 1.25 milliGRAM(s) Inhalation every 6 hours  lidocaine   4% Patch 2 Patch Transdermal daily  mirtazapine 7.5 milliGRAM(s) Oral at bedtime  morphine  - Injectable 2 milliGRAM(s) IV Push every 6 hours  pantoprazole  Injectable 40 milliGRAM(s) IV Push two times a day  saliva substitute (BIOTENE MOISTURIZING MOUTH SPRAY) 5 [place](s) Topical four times a day            afebrile  Patient On (Oxygen Delivery Method): nasal cannula  O2 Flow (L/min): 5    CONSTITUTIONAL: ill-appearing, frail, cachetic  RESPIRATORY: Normal respiratory effort; lungs are clear to auscultation bilaterally  CARDIOVASCULAR: Regular rate and rhythm, normal S1 and S2  ABDOMEN: Nontender to palpation, normoactive bowel sounds, no rebound/guarding  PSYCH: A+O to person, place, and time; affect appropriate  NEUROLOGY: CN 2-12 are intact and symmetric; no gross sensory deficits    LABS:                            10.2   14.92 )-----------( 394      ( 05 Dec 2022 07:33 )             31.0                              9.0    10.57 )-----------( 381      ( 01 Dec 2022 05:22 )             28.2                        8.6    10.45 )-----------( 367      ( 30 Nov 2022 06:50 )             26.7                           10.4   9.33  )-----------( 315      ( 25 Nov 2022 06:21 )             32.2                              10.6   9.37  )-----------( 296      ( 24 Nov 2022 06:38 )             31.8                        7.5    9.22  )-----------( 202      ( 22 Nov 2022 08:40 )             21.9     12-01    140  |  99  |  19.6  ----------------------------<  97  4.0   |  32.0<H>  |  0.45<L>    Ca    9.2      01 Dec 2022 05:22      11-24    140  |  100  |  21.1<H>  ----------------------------<  132<H>  3.6   |  30.0<H>  |  0.40<L>    Ca    8.9      24 Nov 2022 06:38    TPro  6.0<L>  /  Alb  3.4  /  TBili  0.7  /  DBili  x   /  AST  39  /  ALT  60<H>  /  AlkPhos  84  11-24 11-22    144  |  107  |  28.8<H>  ----------------------------<  89  3.8   |  27.0  |  0.33<L>    Ca    8.7      22 Nov 2022 05:00    TPro  5.4<L>  /  Alb  3.3  /  TBili  1.0  /  DBili  x   /  AST  51<H>  /  ALT  61<H>  /  AlkPhos  57  11-22

## 2022-12-08 NOTE — PROGRESS NOTE ADULT - NUTRITIONAL ASSESSMENT
This patient has been assessed with a concern for Malnutrition and has been determined to have a diagnosis/diagnoses of Severe protein-calorie malnutrition.    This patient is being managed with:   Diet DASH/TLC-  Sodium & Cholesterol Restricted  Entered: Nov 21 2022  9:06AM    Diet NPO after Midnight-     NPO Start Date: 21-Nov-2022   NPO Start Time: 23:59  Except Medications  Entered: Nov 21 2022  9:06AM    
This patient has been assessed with a concern for Malnutrition and has been determined to have a diagnosis/diagnoses of Severe protein-calorie malnutrition.    This patient is being managed with:   Diet DASH/TLC-  Sodium & Cholesterol Restricted  Supplement Feeding Modality:  Oral  Ensure Max Cans or Servings Per Day:  1       Frequency:  Three Times a day     Special Instructions for Nursing:  Sodium & Cholesterol Restricted  Entered: Nov 11 2022  9:06PM    
This patient has been assessed with a concern for Malnutrition and has been determined to have a diagnosis/diagnoses of Severe protein-calorie malnutrition.    This patient is being managed with:   Diet DASH/TLC-  Sodium & Cholesterol Restricted     Special Instructions for Nursing:  Sodium & Cholesterol Restricted  Entered: Nov 7 2022  7:12PM    
This patient has been assessed with a concern for Malnutrition and has been determined to have a diagnosis/diagnoses of Severe protein-calorie malnutrition.    This patient is being managed with:   Diet DASH/TLC-  Sodium & Cholesterol Restricted  Entered: Nov 21 2022  9:06AM    
This patient has been assessed with a concern for Malnutrition and has been determined to have a diagnosis/diagnoses of Severe protein-calorie malnutrition.    This patient is being managed with:   Diet DASH/TLC-  Sodium & Cholesterol Restricted  Supplement Feeding Modality:  Oral  Ensure Max Cans or Servings Per Day:  1       Frequency:  Three Times a day     Special Instructions for Nursing:  Sodium & Cholesterol Restricted  Entered: Nov 11 2022  9:06PM    
This patient has been assessed with a concern for Malnutrition and has been determined to have a diagnosis/diagnoses of Severe protein-calorie malnutrition.    This patient is being managed with:   Diet DASH/TLC-  Sodium & Cholesterol Restricted  Supplement Feeding Modality:  Oral  Ensure Max Cans or Servings Per Day:  1       Frequency:  Three Times a day     Special Instructions for Nursing:  Sodium & Cholesterol Restricted  Entered: Nov 11 2022  9:06PM    
This patient has been assessed with a concern for Malnutrition and has been determined to have a diagnosis/diagnoses of Severe protein-calorie malnutrition.    This patient is being managed with:   Diet DASH/TLC-  Sodium & Cholesterol Restricted  Entered: Nov 21 2022  9:06AM    
This patient has been assessed with a concern for Malnutrition and has been determined to have a diagnosis/diagnoses of Severe protein-calorie malnutrition.    This patient is being managed with:   Diet DASH/TLC-  Sodium & Cholesterol Restricted  Entered: Nov 21 2022  9:06AM    
This patient has been assessed with a concern for Malnutrition and has been determined to have a diagnosis/diagnoses of Severe protein-calorie malnutrition.    This patient is being managed with:   Diet DASH/TLC-  Sodium & Cholesterol Restricted  Supplement Feeding Modality:  Oral  Ensure Max Cans or Servings Per Day:  1       Frequency:  Three Times a day     Special Instructions for Nursing:  Sodium & Cholesterol Restricted  Entered: Nov 11 2022  9:06PM    
This patient has been assessed with a concern for Malnutrition and has been determined to have a diagnosis/diagnoses of Severe protein-calorie malnutrition.    This patient is being managed with:   Diet Pureed-  Moderately Thick Liquids (MODTHICKLIQS)  Entered: Dec  6 2022  8:15AM    
This patient has been assessed with a concern for Malnutrition and has been determined to have a diagnosis/diagnoses of Severe protein-calorie malnutrition.    This patient is being managed with:   Diet Pureed-  Moderately Thick Liquids (MODTHICKLIQS)  Entered: Dec  6 2022  8:15AM    
This patient has been assessed with a concern for Malnutrition and has been determined to have a diagnosis/diagnoses of Severe protein-calorie malnutrition.    This patient is being managed with:   Diet DASH/TLC-  Sodium & Cholesterol Restricted  Entered: Nov 21 2022  9:06AM    
This patient has been assessed with a concern for Malnutrition and has been determined to have a diagnosis/diagnoses of Severe protein-calorie malnutrition.      
This patient has been assessed with a concern for Malnutrition and has been determined to have a diagnosis/diagnoses of Severe protein-calorie malnutrition.    This patient is being managed with:   Diet DASH/TLC-  Sodium & Cholesterol Restricted     Special Instructions for Nursing:  Sodium & Cholesterol Restricted  Entered: Nov 7 2022  7:12PM    
This patient has been assessed with a concern for Malnutrition and has been determined to have a diagnosis/diagnoses of Severe protein-calorie malnutrition.    This patient is being managed with:   Diet DASH/TLC-  Sodium & Cholesterol Restricted     Special Instructions for Nursing:  Sodium & Cholesterol Restricted  Entered: Nov 7 2022  7:12PM    
This patient has been assessed with a concern for Malnutrition and has been determined to have a diagnosis/diagnoses of Severe protein-calorie malnutrition.    This patient is being managed with:   Diet DASH/TLC-  Sodium & Cholesterol Restricted  Entered: Nov 21 2022  9:06AM    
This patient has been assessed with a concern for Malnutrition and has been determined to have a diagnosis/diagnoses of Severe protein-calorie malnutrition.    This patient is being managed with:   Diet DASH/TLC-  Sodium & Cholesterol Restricted  Entered: Nov 21 2022  9:06AM    Diet NPO after Midnight-     NPO Start Date: 21-Nov-2022   NPO Start Time: 23:59  Except Medications  Entered: Nov 21 2022  9:06AM    
This patient has been assessed with a concern for Malnutrition and has been determined to have a diagnosis/diagnoses of Severe protein-calorie malnutrition.    This patient is being managed with:   Diet DASH/TLC-  Sodium & Cholesterol Restricted  Supplement Feeding Modality:  Oral  Ensure Max Cans or Servings Per Day:  1       Frequency:  Three Times a day     Special Instructions for Nursing:  Sodium & Cholesterol Restricted  Entered: Nov 11 2022  9:06PM    
This patient has been assessed with a concern for Malnutrition and has been determined to have a diagnosis/diagnoses of Severe protein-calorie malnutrition.    This patient is being managed with:   Diet NPO after Midnight-     NPO Start Date: 14-Nov-2022   NPO Start Time: 23:59  Entered: Nov 14 2022  4:41PM    Diet DASH/TLC-  Sodium & Cholesterol Restricted  Supplement Feeding Modality:  Oral  Ensure Max Cans or Servings Per Day:  1       Frequency:  Three Times a day     Special Instructions for Nursing:  Sodium & Cholesterol Restricted  Entered: Nov 11 2022  9:06PM    
This patient has been assessed with a concern for Malnutrition and has been determined to have a diagnosis/diagnoses of Severe protein-calorie malnutrition.    This patient is being managed with:   Diet NPO-  NPO for Procedure/Test     NPO Start Date: 02-Dec-2022   NPO Start Time: 09:30  Entered: Dec  2 2022  9:30AM    Diet DASH/TLC-  Sodium & Cholesterol Restricted  Entered: Nov 21 2022  9:06AM    
This patient has been assessed with a concern for Malnutrition and has been determined to have a diagnosis/diagnoses of Severe protein-calorie malnutrition.    This patient is being managed with:   Diet Pureed-  Consistent Carbohydrate {Evening Snack} (CSTCHOSN)  Moderately Thick Liquids (MODTHICKLIQS)  Entered: Dec  2 2022  2:17PM    
This patient has been assessed with a concern for Malnutrition and has been determined to have a diagnosis/diagnoses of Severe protein-calorie malnutrition.    This patient is being managed with:   Diet Pureed-  Consistent Carbohydrate {Evening Snack} (CSTCHOSN)  Moderately Thick Liquids (MODTHICKLIQS)  Entered: Dec  2 2022  2:17PM    
This patient has been assessed with a concern for Malnutrition and has been determined to have a diagnosis/diagnoses of Severe protein-calorie malnutrition.    This patient is being managed with:   Diet Pureed-  Moderately Thick Liquids (MODTHICKLIQS)  Entered: Dec  6 2022  8:15AM

## 2022-12-08 NOTE — PROGRESS NOTE ADULT - SUBJECTIVE AND OBJECTIVE BOX
STEVE DENISE Patient is a 67y old  Male who presents with a chief complaint of Pericardial effusion (08 Dec 2022 10:20)     HPI:  68 y/o M with no known PMH (does not see doctors), active smoker, presents to the ED with complaints of SOB. Initial ECG revealed diffuse ST-elevations and the patient was taken for emergent LHC where he was found to have nonobstructive CAD, however a large circumferential pericardial effusion was seen. Pericardiocentesis was performed with approx 800cc bloody fluid drained. Of note, there is a 3cm mass in his left upper lobe on CXR. (07 Nov 2022 21:14)    The patient was seen and evaluated lying in bed- states he is afraid to take the medicine - made him feel funny- asked if he would like to try a lower dose- agrees   The patient is in no acute distress.  Denied any fever,abdominal pain, fever, dysuria, cough, edema       I&O's Summary    07 Dec 2022 07:01  -  08 Dec 2022 07:00  --------------------------------------------------------  IN: 210 mL / OUT: 1400 mL / NET: -1190 mL    08 Dec 2022 07:01  -  08 Dec 2022 18:54  --------------------------------------------------------  IN: 380 mL / OUT: 0 mL / NET: 380 mL      Allergies    No Known Allergies    Intolerances      HEALTH ISSUES - PROBLEM Dx:  HFrEF (heart failure with reduced ejection fraction)    Pericardial effusion, acute    Thrombus    CAD (coronary artery disease)    Mass of lower lobe of left lung    Preoperative cardiovascular examination    Pleural effusion, right    Lung mass    Anemia          PAST MEDICAL & SURGICAL HISTORY:  No pertinent past medical history      No significant past surgical history              Vital Signs Last 24 Hrs  T(C): 37 (08 Dec 2022 05:34), Max: 37 (08 Dec 2022 05:34)  T(F): 98.6 (08 Dec 2022 05:34), Max: 98.6 (08 Dec 2022 05:34)  HR: 96 (08 Dec 2022 16:18) (89 - 98)  BP: 98/68 (08 Dec 2022 05:34) (90/62 - 98/68)  BP(mean): --  RR: 22 (08 Dec 2022 05:34) (22 - 22)  SpO2: 98% (08 Dec 2022 16:18) (95% - 98%)    Parameters below as of 08 Dec 2022 16:18  Patient On (Oxygen Delivery Method): nasal cannula,3    T(C): 37 (12-08-22 @ 05:34), Max: 37 (12-08-22 @ 05:34)  HR: 96 (12-08-22 @ 16:18) (89 - 98)  BP: 98/68 (12-08-22 @ 05:34) (90/62 - 98/68)  RR: 22 (12-08-22 @ 05:34) (22 - 22)  SpO2: 98% (12-08-22 @ 16:18) (95% - 98%)  Wt(kg): --    PHYSICAL EXAM:    GENERAL: NAD, cachectic ,chronically ill appearing , poor dentition- conversing   HEAD:  Atraumatic, Normocephalic  EYES: EOMI, PERRL, conjunctiva and sclera clear  ENMT:  Moist mucous membranes,  No lesions  NECK: Supple,, Normal thyroid  NERVOUS SYSTEM:  Alert & Oriented X3,  Moves upper and lower extremities; CNS-II-XII  CHEST/LUNG: Clear to auscultation bilaterally; No rales, rhonchi, wheezing,   HEART: Regular rate and rhythm; No murmurs,   ABDOMEN: Soft, Nontender, Nondistended; Bowel sounds present  EXTREMITIES:  Peripheral Pulses, No  cyanosis, or edema  Psychiatry- mood and affect anxious    atropine 1% Ophthalmic Solution for SubLingual Use 2 Drop(s) SubLingual every 4 hours PRN  Biotene Dry Mouth Oral Rinse 5 milliLiter(s) Swish and Spit four times a day  budesonide 160 MICROgram(s)/formoterol 4.5 MICROgram(s) Inhaler 2 Puff(s) Inhalation two times a day  dexAMETHasone  Injectable 4 milliGRAM(s) IV Push every 8 hours  levalbuterol Inhalation 1.25 milliGRAM(s) Inhalation every 6 hours  lidocaine   4% Patch 2 Patch Transdermal daily  LORazepam   Injectable 0.5 milliGRAM(s) IV Push every 6 hours PRN  mirtazapine 7.5 milliGRAM(s) Oral at bedtime  morphine  - Injectable 2 milliGRAM(s) IV Push every 6 hours  morphine  - Injectable 2 milliGRAM(s) IV Push every 4 hours PRN  pantoprazole  Injectable 40 milliGRAM(s) IV Push two times a day  saliva substitute (BIOTENE MOISTURIZING MOUTH SPRAY) 5 [place](s) Topical four times a day  sodium chloride 0.65% Nasal 1 Spray(s) Both Nostrils three times a day PRN      LABS:                      CAPILLARY BLOOD GLUCOSE          RADIOLOGY & ADDITIONAL TESTS:      Consultant notes reviewed    Case discussed with consultant/provider/ family /patient

## 2022-12-09 LAB — SARS-COV-2 RNA SPEC QL NAA+PROBE: SIGNIFICANT CHANGE UP

## 2022-12-09 PROCEDURE — 36415 COLL VENOUS BLD VENIPUNCTURE: CPT

## 2022-12-09 PROCEDURE — 80053 COMPREHEN METABOLIC PANEL: CPT

## 2022-12-09 PROCEDURE — 76870 US EXAM SCROTUM: CPT

## 2022-12-09 PROCEDURE — 83036 HEMOGLOBIN GLYCOSYLATED A1C: CPT

## 2022-12-09 PROCEDURE — 87116 MYCOBACTERIA CULTURE: CPT

## 2022-12-09 PROCEDURE — 89051 BODY FLUID CELL COUNT: CPT

## 2022-12-09 PROCEDURE — 82962 GLUCOSE BLOOD TEST: CPT

## 2022-12-09 PROCEDURE — 99291 CRITICAL CARE FIRST HOUR: CPT

## 2022-12-09 PROCEDURE — 76942 ECHO GUIDE FOR BIOPSY: CPT

## 2022-12-09 PROCEDURE — 87206 SMEAR FLUORESCENT/ACID STAI: CPT

## 2022-12-09 PROCEDURE — 85027 COMPLETE CBC AUTOMATED: CPT

## 2022-12-09 PROCEDURE — 86901 BLOOD TYPING SEROLOGIC RH(D): CPT

## 2022-12-09 PROCEDURE — 87205 SMEAR GRAM STAIN: CPT

## 2022-12-09 PROCEDURE — 93970 EXTREMITY STUDY: CPT

## 2022-12-09 PROCEDURE — P9016: CPT

## 2022-12-09 PROCEDURE — 93005 ELECTROCARDIOGRAM TRACING: CPT

## 2022-12-09 PROCEDURE — 74178 CT ABD&PLV WO CNTR FLWD CNTR: CPT

## 2022-12-09 PROCEDURE — 87641 MR-STAPH DNA AMP PROBE: CPT

## 2022-12-09 PROCEDURE — 87015 SPECIMEN INFECT AGNT CONCNTJ: CPT

## 2022-12-09 PROCEDURE — 36430 TRANSFUSION BLD/BLD COMPNT: CPT

## 2022-12-09 PROCEDURE — C1769: CPT

## 2022-12-09 PROCEDURE — 97116 GAIT TRAINING THERAPY: CPT

## 2022-12-09 PROCEDURE — 87070 CULTURE OTHR SPECIMN AEROBIC: CPT

## 2022-12-09 PROCEDURE — 76705 ECHO EXAM OF ABDOMEN: CPT

## 2022-12-09 PROCEDURE — 88342 IMHCHEM/IMCYTCHM 1ST ANTB: CPT

## 2022-12-09 PROCEDURE — 80048 BASIC METABOLIC PNL TOTAL CA: CPT

## 2022-12-09 PROCEDURE — 82042 OTHER SOURCE ALBUMIN QUAN EA: CPT

## 2022-12-09 PROCEDURE — 97530 THERAPEUTIC ACTIVITIES: CPT

## 2022-12-09 PROCEDURE — 85014 HEMATOCRIT: CPT

## 2022-12-09 PROCEDURE — P9047: CPT

## 2022-12-09 PROCEDURE — U0005: CPT

## 2022-12-09 PROCEDURE — 0225U NFCT DS DNA&RNA 21 SARSCOV2: CPT

## 2022-12-09 PROCEDURE — 82550 ASSAY OF CK (CPK): CPT

## 2022-12-09 PROCEDURE — 82330 ASSAY OF CALCIUM: CPT

## 2022-12-09 PROCEDURE — 87640 STAPH A DNA AMP PROBE: CPT

## 2022-12-09 PROCEDURE — 87075 CULTR BACTERIA EXCEPT BLOOD: CPT

## 2022-12-09 PROCEDURE — 80061 LIPID PANEL: CPT

## 2022-12-09 PROCEDURE — 82435 ASSAY OF BLOOD CHLORIDE: CPT

## 2022-12-09 PROCEDURE — 82607 VITAMIN B-12: CPT

## 2022-12-09 PROCEDURE — 83735 ASSAY OF MAGNESIUM: CPT

## 2022-12-09 PROCEDURE — 70553 MRI BRAIN STEM W/O & W/DYE: CPT

## 2022-12-09 PROCEDURE — 99285 EMERGENCY DEPT VISIT HI MDM: CPT

## 2022-12-09 PROCEDURE — 33016 PERICARDIOCENTESIS W/IMAGING: CPT

## 2022-12-09 PROCEDURE — 71275 CT ANGIOGRAPHY CHEST: CPT

## 2022-12-09 PROCEDURE — 80074 ACUTE HEPATITIS PANEL: CPT

## 2022-12-09 PROCEDURE — 97163 PT EVAL HIGH COMPLEX 45 MIN: CPT

## 2022-12-09 PROCEDURE — 84100 ASSAY OF PHOSPHORUS: CPT

## 2022-12-09 PROCEDURE — 82947 ASSAY GLUCOSE BLOOD QUANT: CPT

## 2022-12-09 PROCEDURE — 81001 URINALYSIS AUTO W/SCOPE: CPT

## 2022-12-09 PROCEDURE — 73200 CT UPPER EXTREMITY W/O DYE: CPT

## 2022-12-09 PROCEDURE — 86850 RBC ANTIBODY SCREEN: CPT

## 2022-12-09 PROCEDURE — 84145 PROCALCITONIN (PCT): CPT

## 2022-12-09 PROCEDURE — 87040 BLOOD CULTURE FOR BACTERIA: CPT

## 2022-12-09 PROCEDURE — C1887: CPT

## 2022-12-09 PROCEDURE — 97110 THERAPEUTIC EXERCISES: CPT

## 2022-12-09 PROCEDURE — 83540 ASSAY OF IRON: CPT

## 2022-12-09 PROCEDURE — 88305 TISSUE EXAM BY PATHOLOGIST: CPT

## 2022-12-09 PROCEDURE — 83605 ASSAY OF LACTIC ACID: CPT

## 2022-12-09 PROCEDURE — 93458 L HRT ARTERY/VENTRICLE ANGIO: CPT

## 2022-12-09 PROCEDURE — 83986 ASSAY PH BODY FLUID NOS: CPT

## 2022-12-09 PROCEDURE — 84295 ASSAY OF SERUM SODIUM: CPT

## 2022-12-09 PROCEDURE — 93306 TTE W/DOPPLER COMPLETE: CPT

## 2022-12-09 PROCEDURE — 74177 CT ABD & PELVIS W/CONTRAST: CPT

## 2022-12-09 PROCEDURE — C1894: CPT

## 2022-12-09 PROCEDURE — C1729: CPT

## 2022-12-09 PROCEDURE — 83550 IRON BINDING TEST: CPT

## 2022-12-09 PROCEDURE — 84132 ASSAY OF SERUM POTASSIUM: CPT

## 2022-12-09 PROCEDURE — 82553 CREATINE MB FRACTION: CPT

## 2022-12-09 PROCEDURE — 85018 HEMOGLOBIN: CPT

## 2022-12-09 PROCEDURE — 87102 FUNGUS ISOLATION CULTURE: CPT

## 2022-12-09 PROCEDURE — 84443 ASSAY THYROID STIM HORMONE: CPT

## 2022-12-09 PROCEDURE — 85610 PROTHROMBIN TIME: CPT

## 2022-12-09 PROCEDURE — 84157 ASSAY OF PROTEIN OTHER: CPT

## 2022-12-09 PROCEDURE — 82248 BILIRUBIN DIRECT: CPT

## 2022-12-09 PROCEDURE — 71045 X-RAY EXAM CHEST 1 VIEW: CPT

## 2022-12-09 PROCEDURE — 82803 BLOOD GASES ANY COMBINATION: CPT

## 2022-12-09 PROCEDURE — 84466 ASSAY OF TRANSFERRIN: CPT

## 2022-12-09 PROCEDURE — 83615 LACTATE (LD) (LDH) ENZYME: CPT

## 2022-12-09 PROCEDURE — 85730 THROMBOPLASTIN TIME PARTIAL: CPT

## 2022-12-09 PROCEDURE — 93308 TTE F-UP OR LMTD: CPT

## 2022-12-09 PROCEDURE — 85025 COMPLETE CBC W/AUTO DIFF WBC: CPT

## 2022-12-09 PROCEDURE — 36600 WITHDRAWAL OF ARTERIAL BLOOD: CPT

## 2022-12-09 PROCEDURE — 77012 CT SCAN FOR NEEDLE BIOPSY: CPT

## 2022-12-09 PROCEDURE — 86900 BLOOD TYPING SEROLOGIC ABO: CPT

## 2022-12-09 PROCEDURE — 88360 TUMOR IMMUNOHISTOCHEM/MANUAL: CPT

## 2022-12-09 PROCEDURE — 88341 IMHCHEM/IMCYTCHM EA ADD ANTB: CPT

## 2022-12-09 PROCEDURE — 88112 CYTOPATH CELL ENHANCE TECH: CPT

## 2022-12-09 PROCEDURE — 82945 GLUCOSE OTHER FLUID: CPT

## 2022-12-09 PROCEDURE — 84484 ASSAY OF TROPONIN QUANT: CPT

## 2022-12-09 PROCEDURE — 94640 AIRWAY INHALATION TREATMENT: CPT

## 2022-12-09 PROCEDURE — 70491 CT SOFT TISSUE NECK W/DYE: CPT

## 2022-12-09 PROCEDURE — 72125 CT NECK SPINE W/O DYE: CPT

## 2022-12-09 PROCEDURE — U0003: CPT

## 2022-12-09 PROCEDURE — 86923 COMPATIBILITY TEST ELECTRIC: CPT

## 2022-12-09 RX ORDER — MIRTAZAPINE 45 MG/1
1 TABLET, ORALLY DISINTEGRATING ORAL
Qty: 0 | Refills: 0 | DISCHARGE
Start: 2022-12-09

## 2022-12-09 RX ORDER — MORPHINE SULFATE 50 MG/1
2 CAPSULE, EXTENDED RELEASE ORAL
Qty: 0 | Refills: 0 | DISCHARGE
Start: 2022-12-09

## 2022-12-09 RX ORDER — PANTOPRAZOLE SODIUM 20 MG/1
40 TABLET, DELAYED RELEASE ORAL
Qty: 0 | Refills: 0 | DISCHARGE
Start: 2022-12-09

## 2022-12-09 RX ORDER — SODIUM CHLORIDE 0.65 %
1 AEROSOL, SPRAY (ML) NASAL
Qty: 0 | Refills: 0 | DISCHARGE
Start: 2022-12-09

## 2022-12-09 RX ORDER — LEVALBUTEROL 1.25 MG/.5ML
3 SOLUTION, CONCENTRATE RESPIRATORY (INHALATION)
Qty: 0 | Refills: 0 | DISCHARGE
Start: 2022-12-09

## 2022-12-09 RX ORDER — SALIVA SUBSTITUTE COMB NO.11 351 MG
5 POWDER IN PACKET (EA) MUCOUS MEMBRANE
Qty: 0 | Refills: 0 | DISCHARGE
Start: 2022-12-09

## 2022-12-09 RX ORDER — BUDESONIDE AND FORMOTEROL FUMARATE DIHYDRATE 160; 4.5 UG/1; UG/1
2 AEROSOL RESPIRATORY (INHALATION)
Qty: 0 | Refills: 0 | DISCHARGE
Start: 2022-12-09

## 2022-12-09 RX ORDER — DEXAMETHASONE 0.5 MG/5ML
16.67 ELIXIR ORAL
Qty: 0 | Refills: 0 | DISCHARGE
Start: 2022-12-09

## 2022-12-09 RX ORDER — ATROPINE SULFATE 1 %
2 DROPS OPHTHALMIC (EYE)
Qty: 0 | Refills: 0 | DISCHARGE
Start: 2022-12-09

## 2022-12-09 RX ORDER — LIDOCAINE 4 G/100G
2 CREAM TOPICAL
Qty: 0 | Refills: 0 | DISCHARGE
Start: 2022-12-09

## 2022-12-09 RX ADMIN — LEVALBUTEROL 1.25 MILLIGRAM(S): 1.25 SOLUTION, CONCENTRATE RESPIRATORY (INHALATION) at 14:05

## 2022-12-09 RX ADMIN — PANTOPRAZOLE SODIUM 40 MILLIGRAM(S): 20 TABLET, DELAYED RELEASE ORAL at 17:58

## 2022-12-09 RX ADMIN — LEVALBUTEROL 1.25 MILLIGRAM(S): 1.25 SOLUTION, CONCENTRATE RESPIRATORY (INHALATION) at 08:22

## 2022-12-09 RX ADMIN — Medication 5 SPRAY(S): at 12:05

## 2022-12-09 RX ADMIN — BUDESONIDE AND FORMOTEROL FUMARATE DIHYDRATE 2 PUFF(S): 160; 4.5 AEROSOL RESPIRATORY (INHALATION) at 08:22

## 2022-12-09 RX ADMIN — Medication 5 SPRAY(S): at 17:57

## 2022-12-09 RX ADMIN — Medication 5 SPRAY(S): at 05:26

## 2022-12-09 RX ADMIN — Medication 4 MILLIGRAM(S): at 05:25

## 2022-12-09 NOTE — DISCHARGE NOTE PROVIDER - ATTENDING DISCHARGE PHYSICAL EXAMINATION:
Vital Signs Last 24 Hrs  T(C): 36.4 (10 Dec 2022 09:16), Max: 36.6 (09 Dec 2022 10:38)  T(F): 97.5 (10 Dec 2022 09:16), Max: 97.9 (09 Dec 2022 10:38)  HR: 65 (10 Dec 2022 09:16) (65 - 100)  BP: 96/62 (10 Dec 2022 09:16) (87/62 - 110/64)  BP(mean): --  RR: 18 (10 Dec 2022 09:16) (18 - 20)  SpO2: 96% (10 Dec 2022 09:16) (95% - 96%)    Parameters below as of 10 Dec 2022 09:16  Patient On (Oxygen Delivery Method): nasal cannula  O2 Flow (L/min): 3    PHYSICAL EXAM:  Constitutional: No acute distress, alert and oriented by 3, cachectic  HEENT: AT/NC, EOMI, PERRLA, Normal conjunctiva, no pharyngeal erythema, moist oral mucosa  Respiratory: decreased breath sounds in the bases. Nasal canula  Cardiovascular: RRR, no edema  Gastrointestinal: soft, Non-tender, Non-distended + Bowel sounds, no rebound or guarding  Musculoskeletal: No joint edema  Neurological: CN 2-12 grossly intact, no focal deficits  Skin: warm, dry and intact  Psychiatric: normal mood and affect

## 2022-12-09 NOTE — DISCHARGE NOTE PROVIDER - NSDCMRMEDTOKEN_GEN_ALL_CORE_FT
atropine 1% ophthalmic solution: 2 drop(s) sublingual every 4 hours, As Needed for secretion management  bisacodyl 10 mg rectal suppository: 1 suppository(ies) rectal once a day, As needed, Constipation  budesonide-formoterol 160 mcg-4.5 mcg/inh inhalation aerosol: 2 puff(s) inhaled 2 times a day  dexamethasone 6 mg/25 mL-NaCl 0.9% intravenous solution: 16.67 milliliter(s) intravenous every 8 hours  levalbuterol 1.25 mg/3 mL inhalation solution: 3 milliliter(s) inhaled every 6 hours  lidocaine 4% topical film: Apply topically to affected area once a day  LORazepam: 0.5 milligram(s) intravenous every 6 hours, As Needed  mirtazapine 7.5 mg oral tablet: 1 tab(s) orally once a day (at bedtime)  morphine: 2 milligram(s) intravenous every 4 hours, As Needed  morphine: 2 milligram(s) intravenous every 6 hours  pantoprazole 40 mg intravenous injection: 40 milligram(s) intravenous 2 times a day  saliva substitutes oral spray: 5 spray(s) orally 4 times a day  sodium chloride 0.65% nasal spray: 1 spray(s) nasal 4 times a day

## 2022-12-09 NOTE — CHART NOTE - NSCHARTNOTEFT_GEN_A_CORE
IN PROGRESS    Current Diet: Diet, Pureed:   Moderately Thick Liquids (MODTHICKLIQS) (12-06-22 @ 08:15)    Patient reports [ ] nausea  [ ] vomiting [ ] diarrhea [ ] constipation  [ ]chewing problems [ ] swallowing issues  [ ] other:     PO intake:  < 50% [ ]   50-75%  [ ]   %  [ ]  other :    Source for PO intake [ ] Patient [ ] family [ ] chart [ ] staff [ ] other    Weight History:  (12/9) 114.6lbs  (12/8) 113.5lbs  (12/7) 113.3lbs  (12/4) 92.8lbs  (12/3) 82.2lbs  (12/3) 82.2 lbs  (11/25) 104.2 lbs  (11/17) 88.1lbs  (11/16) 86.2lbs  (11/15) 85.3lbs  (11/14) 111.5lbs  (11/13) 84.2lbs  (11/11) 83.9lbs  (11/10) 83.7lbs  (11/9) 111.9lbs  *will monitor; 1+ edema scrotum      Pertinent Medications: MEDICATIONS  (STANDING):  Biotene Dry Mouth Oral Rinse 5 milliLiter(s) Swish and Spit four times a day  budesonide 160 MICROgram(s)/formoterol 4.5 MICROgram(s) Inhaler 2 Puff(s) Inhalation two times a day  dexAMETHasone  Injectable 4 milliGRAM(s) IV Push every 8 hours  levalbuterol Inhalation 1.25 milliGRAM(s) Inhalation every 6 hours  lidocaine   4% Patch 2 Patch Transdermal daily  mirtazapine 7.5 milliGRAM(s) Oral at bedtime  morphine  - Injectable 2 milliGRAM(s) IV Push every 6 hours  pantoprazole  Injectable 40 milliGRAM(s) IV Push two times a day  saliva substitute (BIOTENE MOISTURIZING MOUTH SPRAY) 5 [place](s) Topical four times a day    MEDICATIONS  (PRN):  atropine 1% Ophthalmic Solution for SubLingual Use 2 Drop(s) SubLingual every 4 hours PRN secretion mgmt  LORazepam   Injectable 0.5 milliGRAM(s) IV Push every 6 hours PRN anxiety/restlessness  morphine  - Injectable 2 milliGRAM(s) IV Push every 4 hours PRN pain or SOB or RR>24  sodium chloride 0.65% Nasal 1 Spray(s) Both Nostrils three times a day PRN Nasal Congestion      Skin: stage 2 coccyx    Nutrition focused physical exam previously conducted - found signs of malnutrition [ ]absent [x ]present    Subcutaneous fat loss: [x ] Orbital fat pads region, [x ]Buccal fat region, [ ]Triceps region,  [ ]Ribs region    Muscle wasting: [x ]Temples region, [x ]Clavicle region, [ x]Shoulder region, [ ]Scapula region, [ ]Interosseous region,  [ ]thigh region, [ ]Calf region      CURRENT NUTRITION DIAGNOSIS: Pt remains at nutrition risk secondary to severe protein calorie malnutrition (chronic) related to inability to meet sufficient protein-energy needs with poor appetite in setting of large pericardial effusion, stage 4 adenocarcinoma and skin breakdown as evidenced by pt with severe muscle wasting/fat loss and meeting <75% estimated nutrient needs >1 month.      RECOMMENDATIONS:    Monitoring and Evaluation:   [x ] PO intake [ ] Tolerance to diet prescription [X] Weights  [X] Follow up per protocol [X] Labs: Current Diet: Diet, Pureed:   Moderately Thick Liquids (MODTHICKLIQS) (12-06-22 @ 08:15)    PO intake:  < 50% [ x ]   50-75%  [ ]   %  [ ]  other :    Source for PO intake [ x ] Patient [ ] family [ ] chart [ x ] staff [ ] other    Weight History:  (12/9) 114.6lbs  (12/8) 113.5lbs  (12/7) 113.3lbs  (12/4) 92.8lbs  (12/3) 82.2lbs  (12/3) 82.2 lbs  (11/25) 104.2 lbs  (11/17) 88.1lbs  (11/16) 86.2lbs  (11/15) 85.3lbs  (11/14) 111.5lbs  (11/13) 84.2lbs  (11/11) 83.9lbs  (11/10) 83.7lbs  (11/9) 111.9lbs  *will monitor; 1+ edema scrotum      Pertinent Medications: MEDICATIONS  (STANDING):  Biotene Dry Mouth Oral Rinse 5 milliLiter(s) Swish and Spit four times a day  budesonide 160 MICROgram(s)/formoterol 4.5 MICROgram(s) Inhaler 2 Puff(s) Inhalation two times a day  dexAMETHasone  Injectable 4 milliGRAM(s) IV Push every 8 hours  levalbuterol Inhalation 1.25 milliGRAM(s) Inhalation every 6 hours  lidocaine   4% Patch 2 Patch Transdermal daily  mirtazapine 7.5 milliGRAM(s) Oral at bedtime  morphine  - Injectable 2 milliGRAM(s) IV Push every 6 hours  pantoprazole  Injectable 40 milliGRAM(s) IV Push two times a day  saliva substitute (BIOTENE MOISTURIZING MOUTH SPRAY) 5 [place](s) Topical four times a day    MEDICATIONS  (PRN):  atropine 1% Ophthalmic Solution for SubLingual Use 2 Drop(s) SubLingual every 4 hours PRN secretion mgmt  LORazepam   Injectable 0.5 milliGRAM(s) IV Push every 6 hours PRN anxiety/restlessness  morphine  - Injectable 2 milliGRAM(s) IV Push every 4 hours PRN pain or SOB or RR>24  sodium chloride 0.65% Nasal 1 Spray(s) Both Nostrils three times a day PRN Nasal Congestion      Skin: stage 2 coccyx    Nutrition focused physical exam previously conducted - found signs of malnutrition [ ]absent [x ]present    Subcutaneous fat loss: [x ] Orbital fat pads region, [x ]Buccal fat region, [ ]Triceps region,  [ ]Ribs region    Muscle wasting: [x ]Temples region, [x ]Clavicle region, [ x]Shoulder region, [ ]Scapula region, [ ]Interosseous region,  [ ]thigh region, [ ]Calf region      CURRENT NUTRITION DIAGNOSIS: Pt remains at nutrition risk secondary to severe protein calorie malnutrition (chronic) related to inability to meet sufficient protein-energy needs with poor appetite in setting of large pericardial effusion, stage 4 adenocarcinoma and skin breakdown as evidenced by pt with severe muscle wasting/fat loss and meeting <75% estimated nutrient needs >1 month.  Patient now on comfort measures. Continues with poor PO intake and appetite. Ensure supplements at bedside, good PO intake of them. RD to remain available.     RECOMMENDATIONS:  1) Continue nutrition plan of care  2) Nutrition interventions as appropriate to honor pt/family wishes  3) Monitor wt trends, PO intake and nutrition related labs    Monitoring and Evaluation:   [x ] PO intake [ ] Tolerance to diet prescription [X] Weights  [X] Follow up per protocol [X] Labs:

## 2022-12-09 NOTE — CHART NOTE - NSCHARTNOTESELECT_GEN_ALL_CORE
Event Note
Nutrition Services
Registered Dietitian
Event Note
Nutrition Services

## 2022-12-09 NOTE — DISCHARGE NOTE PROVIDER - CARE PROVIDER_API CALL
Coleen Olivares)  HematologyOncology; Internal Medicine; Medical Oncology  [address]  [place] [address]  Phone: [phone]  Fax: [phone]  Follow Up Time:

## 2022-12-09 NOTE — DISCHARGE NOTE PROVIDER - HOSPITAL COURSE
66yo M w/ no known PMH who p/w SOB and found to have diffuse ST elevations and taken for emergent LHC where found to have a large pericardial effusion s/p percardiocentesis during which bloody fluid was drained (a 90% LAD lesion also seen but PCI deferred until further stabilized).  Course further c/b dx of HFrEF with EF 20-25% and imaging revealing REYNALDO PE, SVC occlusion, IVC thrombus, mediastinal/rt hilar LAD with bronchial obstruction  in RML/RLL, a 4.1cm LLL mass c/w lung malignancy, and mod rt effusion. Newly dx'd metastatic NSCLC - LLL mass with presumed LN involvement, malignant pericardial effusion and brain mets - very poor prognosis.  As per Dr Olivares of oncology - given his current performance status, NSCLC, PDL1 0% he would be unlikely to see significant benefit with CTX and likely side effects. Discussed with Palliative care team, after discussion with HCP, transition to comfort care. The patient is medically stable for transfer to inpatient Hospice facility. 66yo M w/ no known PMH who p/w SOB and found to have diffuse ST elevations and taken for emergent LHC where found to have a large pericardial effusion s/p percardiocentesis during which bloody fluid was drained (a 90% LAD lesion also seen but PCI deferred until further stabilized).  Course further c/b dx of HFrEF with EF 20-25% and imaging revealing REYNALDO PE, SVC occlusion, IVC thrombus, mediastinal/rt hilar LAD with bronchial obstruction  in RML/RLL, a 4.1cm LLL mass c/w lung malignancy, and mod rt effusion. Newly dx'd metastatic NSCLC - LLL mass with presumed LN involvement, malignant pericardial effusion and brain mets - very poor prognosis.      As per Dr Olivares of oncology - given his current performance status, NSCLC, PDL1 0% he would be unlikely to see significant benefit with CTX and likely side effects.     Discussed with Palliative care team, after discussion with HCP, transition to comfort care.     The patient is medically stable for transfer to inpatient Hospice facility.

## 2022-12-09 NOTE — DISCHARGE NOTE PROVIDER - NSDCPNSUBOBJ_GEN_ALL_CORE
STEVE DENISE Patient is a 67y old  Male who presents with a chief complaint of Pericardial effusion (09 Dec 2022 10:56)     HPI:  68 y/o M with no known PMH (does not see doctors), active smoker, presents to the ED with complaints of SOB. Initial ECG revealed diffuse ST-elevations and the patient was taken for emergent LHC where he was found to have nonobstructive CAD, however a large circumferential pericardial effusion was seen. Pericardiocentesis was performed with approx 800cc bloody fluid drained. Of note, there is a 3cm mass in his left upper lobe on CXR. (07 Nov 2022 21:14)    The patient was seen and evaluated - plans for comfort care only  The patient is in no acute distress.  Denied any fever chest pain, palpitations, shortness of breath, abdominal pain,      I&O's Summary    08 Dec 2022 07:01  -  09 Dec 2022 07:00  --------------------------------------------------------  IN: 500 mL / OUT: 400 mL / NET: 100 mL    09 Dec 2022 07:01  -  09 Dec 2022 15:56  --------------------------------------------------------  IN: 240 mL / OUT: 0 mL / NET: 240 mL      Allergies    No Known Allergies    Intolerances      HEALTH ISSUES - PROBLEM Dx:  HFrEF (heart failure with reduced ejection fraction)    Pericardial effusion, acute    Thrombus    CAD (coronary artery disease)    Mass of lower lobe of left lung    Preoperative cardiovascular examination    Pleural effusion, right    Lung mass    Anemia          PAST MEDICAL & SURGICAL HISTORY:  No pertinent past medical history      No significant past surgical history              Vital Signs Last 24 Hrs  T(C): 36.6 (09 Dec 2022 10:38), Max: 36.6 (09 Dec 2022 10:38)  T(F): 97.9 (09 Dec 2022 10:38), Max: 97.9 (09 Dec 2022 10:38)  HR: 100 (09 Dec 2022 14:13) (92 - 100)  BP: 87/62 (09 Dec 2022 10:38) (87/62 - 87/62)  BP(mean): --  RR: 20 (09 Dec 2022 10:38) (20 - 20)  SpO2: 95% (09 Dec 2022 10:38) (95% - 98%)    Parameters below as of 09 Dec 2022 10:38  Patient On (Oxygen Delivery Method): nasal cannula  O2 Flow (L/min): 3  T(C): 36.6 (12-09-22 @ 10:38), Max: 36.6 (12-09-22 @ 10:38)  HR: 100 (12-09-22 @ 14:13) (92 - 100)  BP: 87/62 (12-09-22 @ 10:38) (87/62 - 87/62)  RR: 20 (12-09-22 @ 10:38) (20 - 20)  SpO2: 95% (12-09-22 @ 10:38) (95% - 98%)  Wt(kg): --    PHYSICAL EXAM:    GENERAL: NAD cachectic ill appearing poor dentition  HEAD:  Atraumatic, Normocephalic  EYES: EOMI, PERRL, conjunctiva and sclera clear  ENMT:  Moist mucous membranes,  No lesions  NECK: Supple, No JVD, Normal thyroid  NERVOUS SYSTEM:  Alert & Oriented X3, barely Moves upper and lower extremities; CNS-II-XII  CHEST/LUNG: Clear to auscultation bilaterally; No rales, rhonchi, wheezing,   HEART: Regular rate and rhythm; No murmurs,   ABDOMEN: Soft, Nontender, Nondistended; Bowel sounds present  EXTREMITIES:  Peripheral Pulses, No  cyanosis, or edema  SKIN: No rashes or lesions  psychiatry- mood and affect flat    atropine 1% Ophthalmic Solution for SubLingual Use 2 Drop(s) SubLingual every 4 hours PRN  Biotene Dry Mouth Oral Rinse 5 milliLiter(s) Swish and Spit four times a day  bisacodyl Suppository 10 milliGRAM(s) Rectal daily PRN  budesonide 160 MICROgram(s)/formoterol 4.5 MICROgram(s) Inhaler 2 Puff(s) Inhalation two times a day  dexAMETHasone  Injectable 4 milliGRAM(s) IV Push every 8 hours  levalbuterol Inhalation 1.25 milliGRAM(s) Inhalation every 6 hours  lidocaine   4% Patch 2 Patch Transdermal daily  LORazepam   Injectable 0.5 milliGRAM(s) IV Push every 6 hours PRN  mirtazapine 7.5 milliGRAM(s) Oral at bedtime  morphine  - Injectable 2 milliGRAM(s) IV Push every 4 hours PRN  morphine  - Injectable 2 milliGRAM(s) IV Push every 6 hours  pantoprazole  Injectable 40 milliGRAM(s) IV Push two times a day  saliva substitute (BIOTENE MOISTURIZING MOUTH SPRAY) 5 [place](s) Topical four times a day  sodium chloride 0.65% Nasal 1 Spray(s) Both Nostrils three times a day PRN

## 2022-12-09 NOTE — DISCHARGE NOTE PROVIDER - NSDCCPCAREPLAN_GEN_ALL_CORE_FT
PRINCIPAL DISCHARGE DIAGNOSIS  Diagnosis: Lung cancer metastatic to brain  Assessment and Plan of Treatment: Not a candidate for treatment  DNR/DNI  Transfer to [facility] for symptom management      SECONDARY DISCHARGE DIAGNOSES  Diagnosis: Dysphagia  Assessment and Plan of Treatment: Pleasure feeds  Aspiration precautions  Pureed diet with moderate thick liquids as tolerated

## 2022-12-10 VITALS
OXYGEN SATURATION: 96 % | TEMPERATURE: 98 F | DIASTOLIC BLOOD PRESSURE: 62 MMHG | RESPIRATION RATE: 18 BRPM | SYSTOLIC BLOOD PRESSURE: 96 MMHG | HEART RATE: 65 BPM

## 2022-12-10 LAB
CULTURE RESULTS: SIGNIFICANT CHANGE UP
CULTURE RESULTS: SIGNIFICANT CHANGE UP
SPECIMEN SOURCE: SIGNIFICANT CHANGE UP
SPECIMEN SOURCE: SIGNIFICANT CHANGE UP

## 2022-12-10 PROCEDURE — 99239 HOSP IP/OBS DSCHRG MGMT >30: CPT

## 2022-12-10 NOTE — DISCHARGE NOTE NURSING/CASE MANAGEMENT/SOCIAL WORK - PATIENT PORTAL LINK FT
You can access the FollowMyHealth Patient Portal offered by [facility] by registering at the following website: [email or web address]. By joining [employer]’s FollowMyHealth portal, you will also be able to view your health information using other applications (apps) compatible with our system.

## 2022-12-14 LAB
CULTURE RESULTS: SIGNIFICANT CHANGE UP
SPECIMEN SOURCE: SIGNIFICANT CHANGE UP

## 2022-12-21 LAB
CULTURE RESULTS: SIGNIFICANT CHANGE UP
SPECIMEN SOURCE: SIGNIFICANT CHANGE UP

## 2022-12-23 PROBLEM — Z00.00 ENCOUNTER FOR PREVENTIVE HEALTH EXAMINATION: Status: ACTIVE | Noted: 2022-12-23

## 2023-01-26 NOTE — PROCEDURE NOTE - AMOUNT OF FLUID OBTAINED
900 Spiral Flap Text: The defect edges were debeveled with a #15 scalpel blade.  Given the location of the defect, shape of the defect and the proximity to free margins a spiral flap was deemed most appropriate.  Using a sterile surgical marker, an appropriate rotation flap was drawn incorporating the defect and placing the expected incisions within the relaxed skin tension lines where possible. The area thus outlined was incised deep to adipose tissue with a #15 scalpel blade.  The skin margins were undermined to an appropriate distance in all directions utilizing iris scissors.

## 2023-10-23 NOTE — BH CONSULTATION LIAISON ASSESSMENT NOTE - ABORTED (SELF-INTERRUPTED) ATTEMPT:
None known Dupixent Counseling: I discussed with the patient the risks of dupilumab including but not limited to eye infection and irritation, cold sores, injection site reactions, worsening of asthma, allergic reactions and increased risk of parasitic infection.  Live vaccines should be avoided while taking dupilumab. Dupilumab will also interact with certain medications such as warfarin and cyclosporine. The patient understands that monitoring is required and they must alert us or the primary physician if symptoms of infection or other concerning signs are noted.

## 2025-03-31 NOTE — PROGRESS NOTE ADULT - SUBJECTIVE AND OBJECTIVE BOX
Diabetes Care Specialist Progress Note  Author: Lottie Iraheta [facility]                                                         CARDIOLOGY AT [facility]                                                                  [address] [facility] [place] [place] [address]                                                         Telephone: [phone]. Fax:[phone]                                                                             PROGRESS NOTE    Reason for follow up: Pericardial effusion, CAD  Update: Patient underwent lung biopsy yesterday and had right pigtail catheter placed. Patient still with some dyspnea and cough, but no acute changes.       Review of symptoms:   Cardiac:  No chest pain. No dyspnea. No palpitations.  Respiratory: no cough. No dyspnea  Gastrointestinal: No diarrhea. No abdominal pain. No bleeding.   Neuro: No focal neuro complaints.    Vitals:  T(C): 36.7 (11-16-22 @ 04:59), Max: 37.1 (11-15-22 @ 13:12)  HR: 87 (11-16-22 @ 04:59) (78 - 107)  BP: 111/76 (11-16-22 @ 04:59) (91/61 - 111/76)  RR: 18 (11-16-22 @ 04:59) (18 - 18)  SpO2: 95% (11-16-22 @ 04:59) (93% - 98%)  Wt(kg): --  I&O's Summary    15 Nov 2022 07:01  -  16 Nov 2022 07:00  --------------------------------------------------------  IN: 500 mL / OUT: 2030 mL / NET: -1530 mL      PHYSICAL EXAM:  Appearance: No acute distress, Cachectic  HEENT:  Atraumatic. Normocephalic.  Normal oral mucosa  Neurologic: A & O x 3, no gross focal deficits.  Cardiovascular: RRR S1 S2, No murmur, no rubs/gallops. No JVD  Respiratory: Decreased BS at bases, + R pigtail catheter  Gastrointestinal:  Soft, Non-tender, + BS  Lower Extremities: 2+ Peripheral Pulses, No clubbing, cyanosis, or edema, +scrotal edema  Psychiatry: Patient is calm. No agitation.   Skin: warm and dry.    CURRENT CARDIAC MEDICATIONS:      CURRENT OTHER MEDICATIONS:  albuterol/ipratropium for Nebulization 3 milliLiter(s) Nebulizer every 6 hours  buDESOnide    Inhalation Suspension 0.5 milliGRAM(s) Inhalation two times a day  guaiFENesin  milliGRAM(s) Oral every 12 hours  acetaminophen     Tablet .. 650 milliGRAM(s) Oral every 8 hours PRN Mild Pain (1 - 3), Moderate Pain (4 - 6)  oxycodone    5 mG/acetaminophen 325 mG 1 Tablet(s) Oral every 6 hours PRN Moderate Pain (4 - 6)  atorvastatin 80 milliGRAM(s) Oral at bedtime  apixaban 10 milliGRAM(s) Oral every 12 hours, Stop order after: 14 Doses  ascorbic acid 500 milliGRAM(s) Oral daily  chlorhexidine 2% Cloths 1 Application(s) Topical daily  influenza  Vaccine (HIGH DOSE) 0.7 milliLiter(s) IntraMuscular once  multivitamin/minerals 1 Tablet(s) Oral daily      LABS:	 	  ( 09 Nov 2022 04:25 )  Troponin T  0.06 ,  CPK  X    , CKMB  X    , BNP X        , ( 08 Nov 2022 10:50 )  Troponin T  0.11<H>,  CPK  477<H>, CKMB  X    , BNP X        , ( 08 Nov 2022 04:43 )  Troponin T  0.15<H>,  CPK  X    , CKMB  X    , BNP X                                  11.2   12.01 )-----------( 284      ( 16 Nov 2022 04:40 )             33.0     11-16    136  |  104  |  27.3<H>  ----------------------------<  86  4.2   |  25.0  |  0.36<L>    Ca    8.6      16 Nov 2022 04:40      PT/INR/PTT ( 15 Nov 2022 04:45 )                       :                       :      X            :       70.6                  .        .                   .              .           .       X           .                                       Lipid Profile: Date: 11-08 @ 04:43  Total cholesterol 152; Direct LDL: --; HDL: 22; Triglycerides:129    HgA1c:   TSH: Thyroid Stimulating Hormone, Serum: 0.41 uIU/mL      TELEMETRY: SR  ECG:    DIAGNOSTIC TESTING:  [ ] Echocardiogram:   < from: TTE Echo Limited or F/U (11.10.22 @ 10:07) >  PHYSICIAN INTERPRETATION:  Left Ventricle: Endocardial visualization was enhanced with intravenous   echo contrast. The left ventricular internal cavity size is normal.  Global LV systolic function was severely decreased. Left ventricular   ejection fraction, by visual estimation, is 30 to 35%. The basal and mid   ventricular segments are hypokinetic with sparing of the apical segments.   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  Right Ventricle: The right ventricular size is mildly enlarged. RV   systolic function is mildly reduced.  Pericardium: Trivial pericardial effusion is present. The pericardial   effusion is localized near the right atrium, anterior to the right   ventricle and surrounding the apex. There is a small pleural effusion in   the right lateral region.  Aortic Valve: The aortic valve is trileaflet.  Venous: The inferior vena cava was dilated, with respiratory size   variation less than 50%.There is a echogenic mass in the IVC measuring   3.83cm X 1.37cm. Probably thrombus.  In comparison to the previous echocardiogram(s): Prior examinations are   available and were reviewed for comparison purposes.      Summary:   1. Endocardial visualization was enhanced with intravenous echo contrast.   2. The basal and mid ventricular segments are hypokinetic with sparing   of the apical segments. 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   3. Left ventricular ejection fraction, by visual estimation, is 30 to   35%.   4. Mildly enlarged right ventricle. Mildly reduced RV systolic function.   5. Trivial pericardial effusion.   6. There is a echogenic mass in the IVC measuring 3.83cm X 1.37cm.  Probably thrombus.    MD Raisa, RPVI Electronically signed on 11/10/2022 at 12:17:18   PM    < end of copied text >    [ ]  Catheterization:  < from: Cardiac Catheterization (11.07.22 @ 18:15) >  Pericardiocentesis   Pericardiocentesis was performed. Along thin-walled needle was  advanced into the pericardial space until fluid was aspirated.  Over a floppy wire, the needle was replaced with a catheter.      Diagnostic Findings:     Coronary Angiography   The coronary circulation is right dominant.     LM   Left main artery: The segment is visually normal in size and  structure.    LAD   Mid left anterior descending: There is a 90 % stenosis.      CX   Circumflex: Angiography shows mild atherosclerosis.      RCA   Proximal right coronary artery: The segment is moderately calcified.  There is a 30 % stenosis. Mid right coronary artery: There  is a 20 % stenosis. Distal right coronary artery: The segment is  severely calcified. There is a 60 % stenosis. OSVALDO Flow 3.    Left Heart Cath   Global left ventricular function is normal. Ejection fraction is 60%.    Valves   Aortic Valve: There is no aortic valve stenosis.    Mitral Valve: The mitral valve exhibits trivial (less than 1+)  regurgitation.      < end of copied text >    [ ] Stress Test:    OTHER:
